# Patient Record
Sex: MALE | Race: WHITE | Employment: FULL TIME | URBAN - METROPOLITAN AREA
[De-identification: names, ages, dates, MRNs, and addresses within clinical notes are randomized per-mention and may not be internally consistent; named-entity substitution may affect disease eponyms.]

---

## 2017-01-02 ENCOUNTER — OFFICE VISIT (OUTPATIENT)
Dept: FAMILY MEDICINE | Facility: CLINIC | Age: 32
End: 2017-01-02
Payer: COMMERCIAL

## 2017-01-02 VITALS
HEART RATE: 65 BPM | HEIGHT: 74 IN | WEIGHT: 257.9 LBS | BODY MASS INDEX: 33.1 KG/M2 | SYSTOLIC BLOOD PRESSURE: 128 MMHG | TEMPERATURE: 97 F | DIASTOLIC BLOOD PRESSURE: 79 MMHG | OXYGEN SATURATION: 96 %

## 2017-01-02 DIAGNOSIS — G47.00 INSOMNIA, UNSPECIFIED TYPE: ICD-10-CM

## 2017-01-02 DIAGNOSIS — B08.1 MOLLUSCUM CONTAGIOSUM: ICD-10-CM

## 2017-01-02 DIAGNOSIS — Z00.01 ENCOUNTER FOR ROUTINE ADULT HEALTH EXAMINATION WITH ABNORMAL FINDINGS: Primary | ICD-10-CM

## 2017-01-02 LAB
BASOPHILS # BLD AUTO: 0 10E9/L (ref 0–0.2)
BASOPHILS NFR BLD AUTO: 0.2 %
DIFFERENTIAL METHOD BLD: NORMAL
EOSINOPHIL # BLD AUTO: 0.1 10E9/L (ref 0–0.7)
EOSINOPHIL NFR BLD AUTO: 1.4 %
ERYTHROCYTE [DISTWIDTH] IN BLOOD BY AUTOMATED COUNT: 12.4 % (ref 10–15)
HCT VFR BLD AUTO: 43 % (ref 40–53)
HGB BLD-MCNC: 14.8 G/DL (ref 13.3–17.7)
LYMPHOCYTES # BLD AUTO: 2.4 10E9/L (ref 0.8–5.3)
LYMPHOCYTES NFR BLD AUTO: 29.6 %
MCH RBC QN AUTO: 30.9 PG (ref 26.5–33)
MCHC RBC AUTO-ENTMCNC: 34.4 G/DL (ref 31.5–36.5)
MCV RBC AUTO: 90 FL (ref 78–100)
MONOCYTES # BLD AUTO: 0.7 10E9/L (ref 0–1.3)
MONOCYTES NFR BLD AUTO: 8.2 %
NEUTROPHILS # BLD AUTO: 4.9 10E9/L (ref 1.6–8.3)
NEUTROPHILS NFR BLD AUTO: 60.6 %
PLATELET # BLD AUTO: 268 10E9/L (ref 150–450)
RBC # BLD AUTO: 4.79 10E12/L (ref 4.4–5.9)
WBC # BLD AUTO: 8 10E9/L (ref 4–11)

## 2017-01-02 PROCEDURE — 80050 GENERAL HEALTH PANEL: CPT | Performed by: FAMILY MEDICINE

## 2017-01-02 PROCEDURE — 99385 PREV VISIT NEW AGE 18-39: CPT | Performed by: FAMILY MEDICINE

## 2017-01-02 PROCEDURE — 93000 ELECTROCARDIOGRAM COMPLETE: CPT | Performed by: FAMILY MEDICINE

## 2017-01-02 PROCEDURE — 80061 LIPID PANEL: CPT | Performed by: FAMILY MEDICINE

## 2017-01-02 PROCEDURE — 36415 COLL VENOUS BLD VENIPUNCTURE: CPT | Performed by: FAMILY MEDICINE

## 2017-01-02 NOTE — MR AVS SNAPSHOT
After Visit Summary   1/2/2017    Esa Rivera    MRN: [id number]           Patient Information     Date Of Birth          1985        Visit Information        Provider Department      1/2/2017 4:00 PM Aristides Jenkins MD [facility] [place]        Today's Diagnoses     Encounter for routine adult health examination with abnormal findings    -  1     Insomnia, unspecified type         Molluscum contagiosum           Care Instructions    -I will let you know when I get the results back from your labs.  -Please ask [place] to route any information to me so that I may know what their thinking is.  -If you are interested, you may come back for a future visit for treatment of your molluscum.    Preventive Health Recommendations  Male Ages 26 - 39    Yearly exam:             See your health care provider every year in order to  o   Review health changes.   o   Discuss preventive care.    o   Review your medicines if your doctor has prescribed any.    You should be tested each year for STDs (sexually transmitted diseases), if you re at risk.     After age 35, talk to your provider about cholesterol testing. If you are at risk for heart disease, have your cholesterol tested at least every 5 years.     If you are at risk for diabetes, you should have a diabetes test (fasting glucose).  Shots: Get a flu shot each year. Get a tetanus shot every 10 years.     Nutrition:    Eat at least 5 servings of fruits and vegetables daily.     Eat whole-grain bread, whole-wheat pasta and brown rice instead of white grains and rice.     Talk to your provider about Calcium and Vitamin D.     Lifestyle    Exercise for at least 150 minutes a week (30 minutes a day, 5 days a week). This will help you control your weight and prevent disease.     Limit alcohol to one drink per day.     No smoking.     Wear sunscreen to prevent skin cancer.     See your dentist every six months for an exam and cleaning.              "Follow-ups after your visit        Who to contact     If you have questions or need follow up information about today's clinic visit or your schedule please contact [facility] directly at [phone].  Normal or non-critical lab and imaging results will be communicated to you by MyChart, letter or phone within 4 business days after the clinic has received the results. If you do not hear from us within 7 days, please contact the clinic through [employer] or phone. If you have a critical or abnormal lab result, we will notify you by phone as soon as possible.  Submit refill requests through [employer] or call your pharmacy and they will forward the refill request to us. Please allow 3 business days for your refill to be completed.          Additional Information About Your Visit        [employer] Information     [employer] gives you secure access to your electronic health record. If you see a primary care provider, you can also send messages to your care team and make appointments. If you have questions, please call your primary care clinic.  If you do not have a primary care provider, please call [phone] and they will assist you.        Your Vitals Were     Pulse Temperature Height BMI (Body Mass Index) Pulse Oximetry       65 97  F (36.1  C) (Oral) 6' 1.74\" (1.873 m) 33.35 kg/m2 96%        Blood Pressure from Last 3 Encounters:   01/02/17 128/79   12/15/16 152/111   07/26/11 110/70    Weight from Last 3 Encounters:   01/02/17 257 lb 14.4 oz (116.983 kg)   12/15/16 249 lb 4.8 oz (113.082 kg)   07/26/11 223 lb (101.152 kg)              We Performed the Following     CBC with platelets differential     Comprehensive metabolic panel     EKG 12-lead complete w/read - Clinics     Lipid panel reflex to direct LDL     TSH with free T4 reflex        Primary Care Provider    Physician No Ref-Primary       No address on file        Thank you!     Thank you for choosing [facility]  for your care. Our " goal is always to provide you with excellent care. Hearing back from our patients is one way we can continue to improve our services. Please take a few minutes to complete the written survey that you may receive in the mail after your visit with us. Thank you!             Your Updated Medication List - Protect others around you: Learn how to safely use, store and throw away your medicines at www.disposemymeds.org.          This list is accurate as of: 1/2/17  4:54 PM.  Always use your most recent med list.                   Brand Name Dispense Instructions for use    OLANZAPINE PO      Take 5 mg by mouth 2 times daily       QUEtiapine 100 MG tablet    SEROquel    30 tablet    Take 1-2 tablets (100-200 mg) by mouth At Bedtime To help with sleep

## 2017-01-02 NOTE — PROGRESS NOTES
SUBJECTIVE:     CC: Esa Rivera is an 31 year old male who presents for preventative health visit.     Patient says that he was working on a project at work and it was making him increasingly stressed and obsessive, and eventually led to him needing to take medical action. He has since seen a therapist and has been taking ativan as needed, which he says has been helpful. Patient has a history of adderall use and says that the episode could be due to a manic episode due to lack of sleep, adderall, or something else. He has been having increased paranoia, and he has been having problems with the current political tensions due to the election, and he notes that he has not been doing as well at his job as usual. Patient notes that he has also had an elevated blood pressure, which might be due to his increased stress levels.    Patient would like a regular physical today with some labs and an EKG which have been requested by his mother, who is an MD.    Patient has a wart on his right flank and on his genitals which he would like looked at.    Healthy Habits:    Do you get at least three servings of calcium containing foods daily (dairy, green leafy vegetables, etc.)? yes    Amount of exercise or daily activities, outside of work: 3 day(s) per week    Problems taking medications regularly No    Medication side effects: Yes decrease energy     Have you had an eye exam in the past two years? no    Do you see a dentist twice per year? yes    Do you have sleep apnea, excessive snoring or daytime drowsiness?no    Today's PHQ-2 Score: No flowsheet data found.    Abuse: Current or Past(Physical, Sexual or Emotional)- No  Do you feel safe in your environment - Yes    Social History   Substance Use Topics     Smoking status: Never Smoker      Smokeless tobacco: Never Used     Alcohol Use: No      Comment: social     The patient does not drink >3 drinks per day nor >7 drinks per week.    Last PSA: No results found for:  "PSA    No results for input(s): CHOL, HDL, LDL, TRIG, CHOLHDLRATIO, NHDL in the last 22071 hours.    Reviewed orders with patient. Reviewed health maintenance and updated orders accordingly - Yes    All Histories reviewed and updated in Epic.    ROS:  Positive for warts on right flank and genitals.    Denies headache, insomnia, chest pain, shortness of breath, cough, heartburn, bowel issues, bladder issues, neck pain, back pain, hip pain, knee pain, ankle pain, or foot pain. Remainder of ROS is negative unless otherwise noted above or in HPI.    This document serves as a record of the services and decisions personally performed and made by Aristides Jenkins MD. It was created on his behalf by Luca Apodaca, a trained medical scribe. The creation of this document is based the provider's statements to the medical scribe.  Luca Apodaca 4:30 PM January 2, 2017    Patient Active Problem List   Diagnosis     Attention deficit disorder     Recurrent dislocation of shoulder joint     Other acne     Past Surgical History   Procedure Laterality Date     C explore shoulder joint  2003     Arthroplasty, Shoulder     Test not found  2004     Fx cervical, fusion        Social History   Substance Use Topics     Smoking status: Never Smoker      Smokeless tobacco: Never Used     Alcohol Use: No      Comment: social     Family History   Problem Relation Age of Onset     Cardiovascular Maternal Grandfather      DIABETES No family hx of          Current Outpatient Prescriptions   Medication Sig Dispense Refill     OLANZAPINE PO Take 5 mg by mouth 2 times daily       QUEtiapine (SEROQUEL) 100 MG tablet Take 1-2 tablets (100-200 mg) by mouth At Bedtime To help with sleep 30 tablet 0     Allergies   Allergen Reactions     No Known Allergies      OBJECTIVE:     /79 mmHg  Pulse 65  Temp(Src) 97  F (36.1  C) (Oral)  Ht 6' 1.74\" (1.873 m)  Wt 257 lb 14.4 oz (116.983 kg)  BMI 33.35 kg/m2  SpO2 96%  EXAM:  GENERAL: " healthy, alert and no distress  EYES: Eyes grossly normal to inspection, PERRL and conjunctivae and sclerae normal. EOMI.  HENT: ear canals and TM's normal, nose and mouth without ulcers or lesions  NECK: no adenopathy, no asymmetry, masses, or scars and thyroid normal to palpation. TMJ normal. No bruits.  RESP: lungs clear to auscultation - no rales, rhonchi or wheezes  CV: regular rate and rhythm, normal S1 S2, no S3 or S4, no murmur, click or rub, no peripheral edema and peripheral pulses strong  ABDOMEN: soft, nontender, no hepatosplenomegaly, no masses and bowel sounds normal   (male): normal scrotal contents, 3 mm lesion on right upper inner thigh with umbilicated center, several slightly red areas in suprapubic area without discreet identifiable papule, no hernia  MS: no gross musculoskeletal defects noted, no edema. Calves nontender.  SKIN: Well healed 4 inch diagonal scar over right SI joint, 1 cm scar on midline well healed  NEURO: Normal strength and tone, mentation intact and speech normal. Knee reflexes normal. No tremors.  PSYCH: mentation appears normal, affect normal/bright  LYMPH: no cervical, supraclavicular, axillary, or inguinal adenopathy    ASSESSMENT/PLAN:     (Z00.01) Encounter for routine adult health examination with abnormal findings  (primary encounter diagnosis)  Comment: Routine physical, labs and EKG completed today. EKG was normal.  Plan: Comprehensive metabolic panel, Lipid panel         reflex to direct LDL, TSH with free T4 reflex,         EKG 12-lead complete w/read - Clinics, CBC with        platelets differential        Follow up with results from lab.    (G47.00) Insomnia, unspecified type  Comment: Somewhat controlled on quetiapine.  Plan: Continue on current medication. Follow up as needed.    (B08.1) Molluscum contagiosum  Comment/Plan: As above in physical exam. Follow up at future date for treatment.    Patient Instructions   -I will let you know when I get the results  "back from your labs.  -Please ask [place] to route any information to me so that I may know what their thinking is.  -If you are interested, you may come back for a future visit for treatment of your molluscum.    Preventive Health Recommendations  Male Ages 26 - 39    Yearly exam:             See your health care provider every year in order to  o   Review health changes.   o   Discuss preventive care.    o   Review your medicines if your doctor has prescribed any.    You should be tested each year for STDs (sexually transmitted diseases), if you re at risk.     After age 35, talk to your provider about cholesterol testing. If you are at risk for heart disease, have your cholesterol tested at least every 5 years.     If you are at risk for diabetes, you should have a diabetes test (fasting glucose).  Shots: Get a flu shot each year. Get a tetanus shot every 10 years.     Nutrition:    Eat at least 5 servings of fruits and vegetables daily.     Eat whole-grain bread, whole-wheat pasta and brown rice instead of white grains and rice.     Talk to your provider about Calcium and Vitamin D.     Lifestyle    Exercise for at least 150 minutes a week (30 minutes a day, 5 days a week). This will help you control your weight and prevent disease.     Limit alcohol to one drink per day.     No smoking.     Wear sunscreen to prevent skin cancer.     See your dentist every six months for an exam and cleaning.           COUNSELING:  Reviewed preventive health counseling, as reflected in patient instructions     reports that he has never smoked. He has never used smokeless tobacco.  Estimated body mass index is 33.35 kg/(m^2) as calculated from the following:    Height as of this encounter: 6' 1.74\" (1.873 m).    Weight as of this encounter: 257 lb 14.4 oz (116.983 kg).   Weight management plan: Encouraged regular exercise and eating healthy diet    Counseling Resources:  ATP IV Guidelines  Pooled Cohorts Equation Calculator  FRAX " Risk Assessment  ICSI Preventive Guidelines  Dietary Guidelines for Americans, 2010  USDA's MyPlate  ASA Prophylaxis  Lung CA Screening    The information in this document, created by the medical scribe for me, accurately reflects the services I personally performed and the decisions made by me. I have reviewed and approved this document for accuracy prior to leaving the patient care area.   Aristides Jenkins MD 4:31 PM January 2, 2017  [facility] [place]

## 2017-01-02 NOTE — PATIENT INSTRUCTIONS
-I will let you know when I get the results back from your labs.  -Please ask [place] to route any information to me so that I may know what their thinking is.  -If you are interested, you may come back for a future visit for treatment of your molluscum.    Preventive Health Recommendations  Male Ages 26 - 39    Yearly exam:             See your health care provider every year in order to  o   Review health changes.   o   Discuss preventive care.    o   Review your medicines if your doctor has prescribed any.    You should be tested each year for STDs (sexually transmitted diseases), if you re at risk.     After age 35, talk to your provider about cholesterol testing. If you are at risk for heart disease, have your cholesterol tested at least every 5 years.     If you are at risk for diabetes, you should have a diabetes test (fasting glucose).  Shots: Get a flu shot each year. Get a tetanus shot every 10 years.     Nutrition:    Eat at least 5 servings of fruits and vegetables daily.     Eat whole-grain bread, whole-wheat pasta and brown rice instead of white grains and rice.     Talk to your provider about Calcium and Vitamin D.     Lifestyle    Exercise for at least 150 minutes a week (30 minutes a day, 5 days a week). This will help you control your weight and prevent disease.     Limit alcohol to one drink per day.     No smoking.     Wear sunscreen to prevent skin cancer.     See your dentist every six months for an exam and cleaning.

## 2017-01-03 LAB
ALBUMIN SERPL-MCNC: 4.5 G/DL (ref 3.4–5)
ALP SERPL-CCNC: 43 U/L (ref 40–150)
ALT SERPL W P-5'-P-CCNC: 114 U/L (ref 0–70)
ANION GAP SERPL CALCULATED.3IONS-SCNC: 9 MMOL/L (ref 3–14)
AST SERPL W P-5'-P-CCNC: 61 U/L (ref 0–45)
BILIRUB SERPL-MCNC: 1.5 MG/DL (ref 0.2–1.3)
BUN SERPL-MCNC: 12 MG/DL (ref 7–30)
CALCIUM SERPL-MCNC: 9.5 MG/DL (ref 8.5–10.1)
CHLORIDE SERPL-SCNC: 105 MMOL/L (ref 94–109)
CHOLEST SERPL-MCNC: 191 MG/DL
CO2 SERPL-SCNC: 27 MMOL/L (ref 20–32)
CREAT SERPL-MCNC: 0.95 MG/DL (ref 0.66–1.25)
GFR SERPL CREATININE-BSD FRML MDRD: ABNORMAL ML/MIN/1.7M2
GLUCOSE SERPL-MCNC: 69 MG/DL (ref 70–99)
HDLC SERPL-MCNC: 55 MG/DL
LDLC SERPL CALC-MCNC: 124 MG/DL
NONHDLC SERPL-MCNC: 136 MG/DL
POTASSIUM SERPL-SCNC: 3.8 MMOL/L (ref 3.4–5.3)
PROT SERPL-MCNC: 7.7 G/DL (ref 6.8–8.8)
SODIUM SERPL-SCNC: 141 MMOL/L (ref 133–144)
TRIGL SERPL-MCNC: 60 MG/DL
TSH SERPL DL<=0.005 MIU/L-ACNC: 2.33 MU/L (ref 0.4–4)

## 2017-01-09 DIAGNOSIS — K75.9 INFLAMMATORY LIVER DISEASE, UNSPECIFIED: Primary | ICD-10-CM

## 2017-01-10 ENCOUNTER — TRANSFERRED RECORDS (OUTPATIENT)
Dept: HEALTH INFORMATION MANAGEMENT | Facility: CLINIC | Age: 32
End: 2017-01-10

## 2017-01-10 NOTE — PROGRESS NOTES
Quick Note:    Chris See: Your recent results indicate liver inflammation likely due to excess alcohol. I recommend abstaining/ or reducing alcohol for 6 weeks and rechecking. Contact if questions.     Aristides  ______

## 2017-01-26 ENCOUNTER — HOSPITAL ENCOUNTER (OUTPATIENT)
Dept: CARDIOLOGY | Facility: CLINIC | Age: 32
Discharge: HOME | End: 2017-01-26
Attending: FAMILY MEDICINE | Admitting: FAMILY MEDICINE
Payer: COMMERCIAL

## 2017-01-26 DIAGNOSIS — Z82.49 FAMILY HISTORY OF ISCHEMIC HEART DISEASE: ICD-10-CM

## 2017-01-26 PROCEDURE — 93325 DOPPLER ECHO COLOR FLOW MAPG: CPT | Mod: 26 | Performed by: INTERNAL MEDICINE

## 2017-01-26 PROCEDURE — 93018 CV STRESS TEST I&R ONLY: CPT | Performed by: INTERNAL MEDICINE

## 2017-01-26 PROCEDURE — 93350 STRESS TTE ONLY: CPT | Mod: 26 | Performed by: INTERNAL MEDICINE

## 2017-01-26 PROCEDURE — 93321 DOPPLER ECHO F-UP/LMTD STD: CPT | Mod: 26 | Performed by: INTERNAL MEDICINE

## 2017-01-26 PROCEDURE — 93350 STRESS TTE ONLY: CPT | Mod: TC

## 2017-01-26 PROCEDURE — 93016 CV STRESS TEST SUPVJ ONLY: CPT | Performed by: INTERNAL MEDICINE

## 2017-01-28 NOTE — PROGRESS NOTES
Quick Note:    Chris See, your recent stress echo is normal. Please contact if any questions.   Aristides  ______

## 2017-05-01 ENCOUNTER — OFFICE VISIT (OUTPATIENT)
Dept: FAMILY MEDICINE | Facility: CLINIC | Age: 32
End: 2017-05-01
Payer: COMMERCIAL

## 2017-05-01 VITALS
SYSTOLIC BLOOD PRESSURE: 126 MMHG | TEMPERATURE: 96.8 F | OXYGEN SATURATION: 100 % | WEIGHT: 248 LBS | BODY MASS INDEX: 32.07 KG/M2 | HEART RATE: 55 BPM | DIASTOLIC BLOOD PRESSURE: 70 MMHG

## 2017-05-01 DIAGNOSIS — B08.1 MOLLUSCUM CONTAGIOSUM: Primary | ICD-10-CM

## 2017-05-01 DIAGNOSIS — F31.74 BIPOLAR 1 DISORDER, MANIC, FULL REMISSION (H): ICD-10-CM

## 2017-05-01 PROCEDURE — 99213 OFFICE O/P EST LOW 20 MIN: CPT | Mod: 25 | Performed by: FAMILY MEDICINE

## 2017-05-01 PROCEDURE — 17110 DESTRUCTION B9 LES UP TO 14: CPT | Performed by: FAMILY MEDICINE

## 2017-05-01 NOTE — PATIENT INSTRUCTIONS
-Please let me know if you have any problems with the wound healing or if you would like to have another round of liquid nitrogen treatment.                Outpatient Cryosurgery  What is cryosurgery?   Cryosurgery is a procedure for destroying abnormal body tissues (sometimes referred to as lesions) by exposure to very cold temperatures.   When is it used?   Cryosurgery is used to treat skin lesions such as freckles (for cosmetic reasons), hemorrhoids, warts, and some skin cancers.   It is also used to treat skin changes caused by the genital wart virus, cervicitis, and precancerous changes on the surface of a woman's cervix. These precancerous abnormalities are usually found with a Pap test. The lesions are also called ADITYA, or cervical intraepithelial neoplasia.   Cryosurgery is not done on the cervix if you are having your menstrual period or if you are pregnant. It also may not be used to treat large abnormal areas.   How do I prepare for cryosurgery?   Most likely you will not have to do anything to prepare for the procedure. It is a simple procedure, and it is done in a short time in your healthcare provider's office.   Find someone to give you a ride home after the procedure.   What happens during the procedure?   Your healthcare provider will use a probelike tool to treat the affected areas. A very cold substance, such as liquid nitrogen, pumped through the probe makes the tip of the probe very cold.   Your provider will touch the tip of the probe to your skin or cervix. Depending on the area being treated, you may feel a burning or cramping sensation while the area freezes and then thaws. Although the procedure may cause some discomfort, an anesthetic is rarely needed.   How long your provider keeps the probe touching the skin or cervix depends on the size and type of the lesion and the type of freezing substance used. For some abnormal tissue, such as genital warts, the procedure works best if the tissue  is frozen quickly, allowed to thaw for a few minutes, and then frozen again.   What happens after the procedure?   For problems such as warts, a small blister will form. The blister will later become a scab or a crust.   If you have cervical cryosurgery, you will be asked to stay in your healthcare provider's office for at least 20 to 30 minutes after the procedure. Although it is rare, you may become dizzy or faint more than 30 minutes after the procedure, so you should have someone give you a ride home.   You may have mild abdominal cramping during and after cervical cryosurgery. You may also have a watery or slightly bloody discharge from the vagina after the procedure. The discharge may last 4 weeks. You should not use tampons or douche for 4 weeks after the surgery, while you are healing. You should also avoid sexual intercourse for 4 weeks.   Some abnormal tissues may need to be treated more than once. Your healthcare provider will tell you how often you need to be checked for recurrence or to have another treatment. You will need a follow-up visit to check healing and to see if any abnormal tissue remains.   If you have cervical cryosurgery because you had an abnormal Pap test, your healthcare provider will tell when you should have your next Pap test.   Follow your healthcare provider's instructions for checking back for problems, questions, and your next visit.   What are the benefits of this procedure?   Cryosurgery is very effective and does not cost as much as other treatments. It can be done in your healthcare provider's office, and anesthesia is not necessary.   What are the risks associated with procedure?   There are usually no complications after this procedure. However, if you are treated for a cervical lesion, during or after cryosurgery you may experience:   dizziness   fainting   hot flashes   cramping in your lower abdomen   bleeding   If you are being treated for a skin lesion:   The treated  area may be discolored.   Hair and sweat glands in the treated area may be damaged. As a result you may lose hair in the treated area and not be able to sweat in that area. You may get a cyst in the area of the sweat gland.   The area may have some scarring.   How can I help take care of myself?   Care for the wounded area according to your healthcare provider's instructions.   Call your provider if:   The treated area is bleeding or not healing.   The lesions come back.   The treated area has signs of infection (redness, tenderness, swelling, or discharge).   After cervical cryosurgery you have abdominal cramps for more than 24 hours.   After cervical cryosurgery you have a foul-smelling vaginal discharge for longer than your provider told you to expect, or the discharge becomes yellow or gray in color.   You have vaginal bleeding that lasts longer than 1 hour.     Published by [employer].  This content is reviewed periodically and is subject to change as new health information becomes available. The information is intended to inform and educate and is not a replacement for medical evaluation, advice, diagnosis or treatment by a healthcare professional.   Developed by [employer].   ? 2010 [employer] and/or its affiliates. All Rights Reserved.   Copyright   [employer] 2011

## 2017-05-01 NOTE — PROGRESS NOTES
SUBJECTIVE:                                                    Esa Rivera is a 32 year old male who presents to clinic today for the following health issues:    WART(S)     Onset: last summer     Description:   Location: right side of abdominal   Number of warts: 1  Painful: no     Accompanying Signs & Symptoms:  Signs of infection: no    History:   History of trauma: no   Prior warts: YES- when he was a kid          Therapies Tried and outcome: none    Patient was diagnosed at his last appointment on 1/2/17 with molluscum contagiosum, and he is here to have the wart frozen off. No pain, itching or tenderness. The wart has been there since summer 2016. History of warts when he was a child.    Patient has recently developed a sore spot on his right thigh he would like checked. He first noticed in 1 week ago, and says that it causes some discomfort. He is wondering if the sore can also be treated with cryotherapy.    Problem list and histories reviewed & adjusted, as indicated.  Additional history: as documented    Patient Active Problem List   Diagnosis     Attention deficit disorder     Recurrent dislocation of shoulder joint     Other acne     Molluscum contagiosum     Inflammatory liver disease, unspecified     Past Surgical History:   Procedure Laterality Date     C EXPLORE SHOULDER JOINT  2003    Arthroplasty, Shoulder     TEST NOT FOUND  2004    Fx cervical, fusion        Social History   Substance Use Topics     Smoking status: Never Smoker     Smokeless tobacco: Never Used     Alcohol use No      Comment: social     Family History   Problem Relation Age of Onset     Cardiovascular Maternal Grandfather      DIABETES No family hx of          Current Outpatient Prescriptions   Medication Sig Dispense Refill     OLANZAPINE PO Take 5 mg by mouth 2 times daily       QUEtiapine (SEROQUEL) 100 MG tablet Take 1-2 tablets (100-200 mg) by mouth At Bedtime To help with sleep 30 tablet 0     Allergies   Allergen  Reactions     No Known Allergies        Reviewed and updated as needed this visit by clinical staff  Tobacco  Allergies  Meds  Med Hx  Surg Hx  Fam Hx  Soc Hx      Reviewed and updated as needed this visit by Provider         ROS:  Positive for wart on abdomen and sore on right thigh.    Denies headache, insomnia, chest pain, shortness of breath, cough, heartburn, bowel issues, bladder issues, neck pain, back pain, hip pain, knee pain, ankle pain, or foot pain. Remainder of ROS is negative unless otherwise noted above or in HPI.    This document serves as a record of the services and decisions personally performed and made by Aristides Jenkins MD. It was created on his behalf by Luca Apodaca, a trained medical scribe. The creation of this document is based the provider's statements to the medical scribe.  Luca Apodaca 5:57 PM May 1, 2017    OBJECTIVE:                                                    /70  Pulse 55  Temp 96.8  F (36  C) (Oral)  Wt 112.5 kg (248 lb)  SpO2 100%  BMI 32.07 kg/m2  Body mass index is 32.07 kg/(m^2).  GENERAL: healthy, alert and no distress  SKIN: 4x6 mm smooth topped umbilicated lump near anterior superior iliac crest and 4x3 mm reddish bump with surrounding redness and scaling half inch around with tiny swelling scab in center  NEURO: Normal strength and tone, mentation intact and speech normal  PSYCH: mentation appears normal, affect normal/bright    Diagnostic Test Results:  No results found for this or any previous visit (from the past 24 hour(s)).     ASSESSMENT/PLAN:                                                      (B08.1) Molluscum contagiosum  (primary encounter diagnosis)  Comment: Cryotherapy completed today.  Plan: DESTRUCT BENIGN LESION, UP TO 14        Follow up as needed.  Encounter Diagnoses   Name Primary?     Molluscum contagiosum Yes     Bipolar 1 disorder, manic, full remission (H)         Patient Instructions   -Please let me know if  you have any problems with the wound healing or if you would like to have another round of liquid nitrogen treatment.     40 minutes were spent with the patient with more than 50% of time spent in counseling and coordination of care, regarding above assessment and plan. Discouraged stopping medications, recommended [facility]. Followup with psychiatrist.  The information in this document, created by the medical scribe for me, accurately reflects the services I personally performed and the decisions made by me. I have reviewed and approved this document for accuracy prior to leaving the patient care area.   Aristides Jenkins MD 5:57 PM May 1, 2017  [facility] – [place], [place]

## 2017-05-01 NOTE — NURSING NOTE
"Chief Complaint   Patient presents with     Wart       Initial /70  Pulse 55  Temp 96.8  F (36  C) (Oral)  Wt 248 lb (112.5 kg)  SpO2 100%  BMI 32.07 kg/m2 Estimated body mass index is 32.07 kg/(m^2) as calculated from the following:    Height as of 1/2/17: 6' 1.74\" (1.873 m).    Weight as of this encounter: 248 lb (112.5 kg).  Medication Reconciliation: complete    "

## 2017-05-01 NOTE — MR AVS SNAPSHOT
After Visit Summary   5/1/2017    Esa Rivera    MRN: [id number]           Patient Information     Date Of Birth          1985        Visit Information        Provider Department      5/1/2017 5:30 PM Aristides Jenkins MD [facility] [place]        Today's Diagnoses     Molluscum contagiosum    -  1      Care Instructions    -Please let me know if you have any problems with the wound healing or if you would like to have another round of liquid nitrogen treatment.                Outpatient Cryosurgery  What is cryosurgery?   Cryosurgery is a procedure for destroying abnormal body tissues (sometimes referred to as lesions) by exposure to very cold temperatures.   When is it used?   Cryosurgery is used to treat skin lesions such as freckles (for cosmetic reasons), hemorrhoids, warts, and some skin cancers.   It is also used to treat skin changes caused by the genital wart virus, cervicitis, and precancerous changes on the surface of a woman's cervix. These precancerous abnormalities are usually found with a Pap test. The lesions are also called ADITYA, or cervical intraepithelial neoplasia.   Cryosurgery is not done on the cervix if you are having your menstrual period or if you are pregnant. It also may not be used to treat large abnormal areas.   How do I prepare for cryosurgery?   Most likely you will not have to do anything to prepare for the procedure. It is a simple procedure, and it is done in a short time in your healthcare provider's office.   Find someone to give you a ride home after the procedure.   What happens during the procedure?   Your healthcare provider will use a probelike tool to treat the affected areas. A very cold substance, such as liquid nitrogen, pumped through the probe makes the tip of the probe very cold.   Your provider will touch the tip of the probe to your skin or cervix. Depending on the area being treated, you may feel a burning or cramping sensation  while the area freezes and then thaws. Although the procedure may cause some discomfort, an anesthetic is rarely needed.   How long your provider keeps the probe touching the skin or cervix depends on the size and type of the lesion and the type of freezing substance used. For some abnormal tissue, such as genital warts, the procedure works best if the tissue is frozen quickly, allowed to thaw for a few minutes, and then frozen again.   What happens after the procedure?   For problems such as warts, a small blister will form. The blister will later become a scab or a crust.   If you have cervical cryosurgery, you will be asked to stay in your healthcare provider's office for at least 20 to 30 minutes after the procedure. Although it is rare, you may become dizzy or faint more than 30 minutes after the procedure, so you should have someone give you a ride home.   You may have mild abdominal cramping during and after cervical cryosurgery. You may also have a watery or slightly bloody discharge from the vagina after the procedure. The discharge may last 4 weeks. You should not use tampons or douche for 4 weeks after the surgery, while you are healing. You should also avoid sexual intercourse for 4 weeks.   Some abnormal tissues may need to be treated more than once. Your healthcare provider will tell you how often you need to be checked for recurrence or to have another treatment. You will need a follow-up visit to check healing and to see if any abnormal tissue remains.   If you have cervical cryosurgery because you had an abnormal Pap test, your healthcare provider will tell when you should have your next Pap test.   Follow your healthcare provider's instructions for checking back for problems, questions, and your next visit.   What are the benefits of this procedure?   Cryosurgery is very effective and does not cost as much as other treatments. It can be done in your healthcare provider's office, and anesthesia is  not necessary.   What are the risks associated with procedure?   There are usually no complications after this procedure. However, if you are treated for a cervical lesion, during or after cryosurgery you may experience:   dizziness   fainting   hot flashes   cramping in your lower abdomen   bleeding   If you are being treated for a skin lesion:   The treated area may be discolored.   Hair and sweat glands in the treated area may be damaged. As a result you may lose hair in the treated area and not be able to sweat in that area. You may get a cyst in the area of the sweat gland.   The area may have some scarring.   How can I help take care of myself?   Care for the wounded area according to your healthcare provider's instructions.   Call your provider if:   The treated area is bleeding or not healing.   The lesions come back.   The treated area has signs of infection (redness, tenderness, swelling, or discharge).   After cervical cryosurgery you have abdominal cramps for more than 24 hours.   After cervical cryosurgery you have a foul-smelling vaginal discharge for longer than your provider told you to expect, or the discharge becomes yellow or gray in color.   You have vaginal bleeding that lasts longer than 1 hour.     Published by [employer].  This content is reviewed periodically and is subject to change as new health information becomes available. The information is intended to inform and educate and is not a replacement for medical evaluation, advice, diagnosis or treatment by a healthcare professional.   Developed by [employer].   ? 2010 [employer] and/or its affiliates. All Rights Reserved.   Copyright   [employer] 2011            Follow-ups after your visit        Who to contact     If you have questions or need follow up information about today's clinic visit or your schedule please [place] [facility] [place] [phone].  Normal or non-critical lab and imaging  results will be communicated to you by MyChart, letter or phone within 4 business days after the clinic has received the results. If you do not hear from us within 7 days, please contact the clinic through [employer] or phone. If you have a critical or abnormal lab result, we will notify you by phone as soon as possible.  Submit refill requests through [employer] or call your pharmacy and they will forward the refill request to us. Please allow 3 business days for your refill to be completed.          Additional Information About Your Visit        [employer] Information     [employer] gives you secure access to your electronic health record. If you see a primary care provider, you can also send messages to your care team and make appointments. If you have questions, please call your primary care clinic.  If you do not have a primary care provider, please call [phone] and they will assist you.        Care EveryWhere ID     This is your Care EveryWhere ID. This could be used by other organizations to access your [place] medical records  [id number]        Your Vitals Were     Pulse Temperature Pulse Oximetry BMI (Body Mass Index)          55 96.8  F (36  C) (Oral) 100% 32.07 kg/m2         Blood Pressure from Last 3 Encounters:   05/01/17 126/70   01/02/17 128/79   12/15/16 (!) 152/111    Weight from Last 3 Encounters:   05/01/17 248 lb (112.5 kg)   01/02/17 257 lb 14.4 oz (117 kg)   12/15/16 249 lb 4.8 oz (113.1 kg)              We Performed the Following     DESTRUCT BENIGN LESION, UP TO 14        Primary Care Provider Office Phone # Fax #    Aristides Jenkins [phone]       [facility] [address]  [facility] [address]        Thank you!     Thank you [place] [facility] [place]. Our goal is always to provide you with excellent care. Hearing back from our patients is one way we can continue to improve our services. Please take a few minutes to  complete the written survey that you may receive in the mail after your visit with us. Thank you!             Your Updated Medication List - Protect others around you: Learn how to safely use, store and throw away your medicines at www.disposemymeds.org.          This list is accurate as of: 5/1/17  6:14 PM.  Always use your most recent med list.                   Brand Name Dispense Instructions for use    OLANZAPINE PO      Take 5 mg by mouth 2 times daily       QUEtiapine 100 MG tablet    SEROquel    30 tablet    Take 1-2 tablets (100-200 mg) by mouth At Bedtime To help with sleep

## 2017-05-02 PROBLEM — F31.74 BIPOLAR 1 DISORDER, MANIC, FULL REMISSION (H): Status: ACTIVE | Noted: 2017-05-02

## 2017-05-02 RX ORDER — LAMOTRIGINE 200 MG/1
200 TABLET, EXTENDED RELEASE ORAL EVERY EVENING
Qty: 30 TABLET | Refills: 11 | Status: SHIPPED | OUTPATIENT
Start: 2017-05-02 | End: 2017-11-08

## 2017-07-24 ENCOUNTER — OFFICE VISIT (OUTPATIENT)
Dept: SLEEP MEDICINE | Facility: CLINIC | Age: 32
End: 2017-07-24
Attending: FAMILY MEDICINE
Payer: COMMERCIAL

## 2017-07-24 VITALS
OXYGEN SATURATION: 100 % | WEIGHT: 249 LBS | RESPIRATION RATE: 14 BRPM | HEIGHT: 74 IN | BODY MASS INDEX: 31.95 KG/M2 | DIASTOLIC BLOOD PRESSURE: 80 MMHG | SYSTOLIC BLOOD PRESSURE: 134 MMHG | HEART RATE: 53 BPM

## 2017-07-24 DIAGNOSIS — G47.30 OBSERVED SLEEP APNEA: ICD-10-CM

## 2017-07-24 DIAGNOSIS — R06.83 SNORING: Primary | ICD-10-CM

## 2017-07-24 PROCEDURE — 99204 OFFICE O/P NEW MOD 45 MIN: CPT | Performed by: PHYSICIAN ASSISTANT

## 2017-07-24 NOTE — PATIENT INSTRUCTIONS
"MY TREATMENT INFORMATION FOR SLEEP APNEA-  Esa Rivera    DOCTOR : Bennett Ezra Goltz, PA-C  SLEEP CENTER : [facility]     MY CONTACT NUMBER: [phone]    Am I having a sleep study at a sleep center?  Make sure you have an appointment for the study before you leave!    Am I having a home sleep study?  Watch this video:  https://www.Jobfox.com/watch?v=CteI_GhyP9g&[email or web address]    Frequently asked questions:  1. What is Obstructive Sleep Apnea (MATEUSZ)? MATEUSZ is the most common type of sleep apnea. Apnea means, \"without breath.\"  Apnea is most often caused by narrowing or collapse of the upper airway as muscles relax during sleep.   Almost everyone has occasional apneas. Most people with sleep apnea have had brief interruptions at night frequently for many years.  The severity of sleep apnea is related to how frequent and severe the events are.   2. What are the consequences of MATEUSZ? Symptoms include: feeling sleepy during the day, snoring loudly, gasping or stopping of breathing, trouble sleeping, and occasionally morning headaches or heartburn at night.  Sleepiness can be serious and even increase the risk of falling asleep while driving. Other health consequences may include development of high blood pressure and other cardiovascular disease in persons who are susceptible. Untreated MATEUSZ  can contribute to heart disease, stroke and diabetes.   3. What are the treatment options? In most situations, sleep apnea is a lifelong disease that must be managed with daily therapy. Medications are not effective for sleep apnea and surgery is generally not considered until other therapies have been tried. Your treatment is your choice . Continuous Positive Airway (CPAP) works right away and is the therapy that is effective in nearly everyone. An oral device to hold your jaw forward is usually the next most reliable option. Other options include postioning devices (to keep you off your back), weight loss, " and surgery including a tongue pacing device. There is more detail about some of these options below.    Important tips for using CPAP and similar devices   Know your equipment:  CPAP is continuous positive airway pressure that prevents obstructive sleep apnea by keeping the throat from collapsing while you are sleeping. In most cases, the device is  smart  and can slowly self-adjusts if your throat collapses and keeps a record every day of how well you are treated-this information is available to you and your care team.  BPAP is bilevel positive airway pressure that keeps your throat open and also assists each breath with a pressure boost to maintain adequate breathing.  Special kinds of BPAP are used in patients who have inadequate breathing from lung or heart disease. In most cases, the device is  smart  and can slowly self-adjusts to assist breathing. Like CPAP, the device keeps a record of how well you are treated.  Your mask is your connection to the device. You get to choose what feels most comfortable and the staff will help to make sure if fits. Here: are some examples of the different masks that are available:       Key points to remember on your journey with sleep apnea:  1. Sleep study.  PAP devices often need to be adjusted during a sleep study to show that they are effective and adjusted right.  2. Good tips to remember: Try wearing just the mask during a quiet time during the day so your body adapts to wearing it. A humidifier is recommended for comfort in most cases to prevent drying of your nose and throat. Allergy medication from your provider may help you if you are having nasal congestion.  3. Getting settled-in. It takes more than one night for most of us to get used to wearing a mask. Try wearing just the mask during a quiet time during the day so your body adapts to wearing it. A humidifier is recommended for comfort in most cases. Our team will work with you carefully on the first day and  will be in contact within 4 days and again at 2 and 4 weeks for advice and remote device adjustments. Your therapy is evaluated by the device each day.   4. Use it every night. The more you are able to sleep naturally for 7-8 hours, the more likely you will have good sleep and to prevent health risks or symptoms from sleep apnea. Even if you use it 4 hours it helps. Occasionally all of us are unable to use a medical therapy, in sleep apnea, it is not dangerous to miss one night.   5. Communicate. Call our skilled team on the number provided on the first day if your visit for problems that make it difficult to wear the device. Over 2 out of 3 patients can learn to wear the device long-term with help from our team. Remember to call our team or your sleep providers if you are unable to wear the device as we may have other solutions for those who cannot adapt to mask CPAP therapy. It is recommended that you sleep your sleep provider within the first 3 months and yearly after that if you are not having problems.   Take care of your equipment. Make sure you clean your mask and tubing using directions every day and that your filter and mask are replaced as recommended or if they are not working.     BESIDES CPAP, WHAT OTHER THERAPIES ARE THERE?    Positioning Device  Positioning devices are generally used when sleep apnea is mild and only occurs on your back.This example shows a pillow that straps around the waist. It may be appropriate for those whose sleep study shows milder sleep apnea that occurs primarily when lying flat on one's back. Preliminary studies have shown benefit but effectiveness at home may need to be verified by a home sleep test. These devices are generally not covered by medical insurance.    Oral Appliance  What is oral appliance therapy?  An oral appliance device fits on your teeth at night like a retainer used after having braces. The device is made by a specialized dentist and requires several  visits over 1-2 months before a manufactured device is made to fit your teeth and is adjusted to prevent your sleep apnea. Once an oral device is working properly, snoring should be improved. A home sleep test may be recommended at that time if to determine whether the sleep apnea is adequately treated.       Some things to remember:  -Oral devices are often, but not always, covered by your medical insurance. Be sure to check with your insurance provider.   -If you are referred for oral therapy, you will be given a list of specialized dentists to consider or you may choose to visit the Web site of the American Academy of Dental Sleep Medicine  -Oral devices are less likely to work if you have severe sleep apnea or are extremely overweight.     More detailed information  An oral appliance is a small acrylic device that fits over the upper and lower teeth  (similar to a retainer or a mouth guard). This device slightly moves jaw forward, which moves the base of the tongue forward, opens the airway, improves breathing for effective treat snoring and obstructive sleep apnea in perhaps 7 out of 10 people .  The best working devices are custom-made by a dental device  after a mold is made of the teeth 1, 2, 3.  When is an oral appliance indicated?  Oral appliance therapy is recommended as a first-line treatment for patients with primary snoring, mild sleep apnea, and for patients with moderate sleep apnea who prefer appliance therapy to use of CPAP4, 5. Severity of sleep apnea is determined by sleep testing and is based on the number of respiratory events per hour of sleep.   How successful is oral appliance therapy?  The success rate of oral appliance therapy in patients with mild sleep apnea is 75-80% while in patients with moderate sleep apnea it is 50-70%. The chance of success in patients with severe sleep apnea is 40-50%. The research also shows that oral appliances have a beneficial effect on the  cardiovascular health of MATEUSZ patients at the same magnitude as CPAP therapy7.  Oral appliances should be a second-line treatment in cases of severe sleep apnea, but if not completely successful then a combination therapy utilizing CPAP plus oral appliance therapy may be effective. Oral appliances tend to be effective in a broad range of patients although studies show that the patients who have the highest success are females, younger patients, those with milder disease, and less severe obesity. 3, 6.   Finding a dentist that practices dental sleep medicine  Specific training is available through the American Academy of Dental Sleep Medicine for dentists interested in working in the field of sleep. To find a dentist who is educated in the field of sleep and the use of oral appliances, near you, visit the Web site of the American Academy of Dental Sleep Medicine.    References  1. Tere et al. Objectively measured vs self-reported compliance during oral appliance therapy for sleep-disordered breathing. Chest 2013; 144(5): 7104-6906.  2. Latesha et al. Objective measurement of compliance during oral appliance therapy for sleep-disordered breathing. Thorax 2013; 68(1): 91-96.  3. Dipika, et al. Mandibular advancement devices in 620 men and women with MATEUSZ and snoring: tolerability and predictors of treatment success. Chest 2004; 125: 4830-4420.  4. Maggy, et al. Oral appliances for snoring and MATEUSZ: a review. Sleep 2006; 29: 244-262.  5. Jenni et al. Oral appliance treatment for MATEUSZ: an update. J Clin Sleep Med 2014; 10(2): 215-227.  6. Lauro et al. Predictors of OSAH treatment outcome. J Dent Res 2007; 86: 3459-2883.    Weight Loss:    Weight loss is a long-term strategy that may improve sleep apnea in some patients.    Weight management is a personal decision.  If you are interested in exploring weight loss strategies, the following discussion covers the impact on weight loss on sleep apnea and  the approaches that may be successful.    Weight loss decreases severity of sleep apnea in most people with obesity. For those with mild obesity who have developed snoring with weight gain, even 15-30 pound weight loss can improve and occasionally eliminate sleep apnea.  Structured and life-long dietary and health habits are necessary to lose weight and keep healthier weight levels.     Though there may be significant health benefits from weight loss, long-term weight loss is very difficult to achieve- studies show success with dietary management in less than 10% of people. In addition, substantial weight loss may require years of dietary control and may be difficult if patients have severe obesity. In these cases, surgical management may be considered.  Finally, older individuals who have tolerated obesity without health complications may be less likely to benefit from weight loss strategies.      Your BMI is Body mass index is 32.2 kg/(m^2).  Weight management is a personal decision.  If you are interested in exploring weight loss strategies, the following discussion covers the approaches that may be successful. Body mass index (BMI) is one way to tell whether you are at a healthy weight, overweight, or obese. It measures your weight in relation to your height.  A BMI of 18.5 to 24.9 is in the healthy range. A person with a BMI of 25 to 29.9 is considered overweight, and someone with a BMI of 30 or greater is considered obese. More than two-thirds of American adults are considered overweight or obese.  Being overweight or obese increases the risk for further weight gain. Excess weight may lead to heart disease and diabetes.  Creating and following plans for healthy eating and physical activity may help you improve your health.  Weight control is part of healthy lifestyle and includes exercise, emotional health, and healthy eating habits. Careful eating habits lifelong are the mainstay of weight control. Though  there are significant health benefits from weight loss, long-term weight loss with diet alone may be very difficult to achieve- studies show long-term success with dietary management in less than 10% of people. Attaining a healthy weight may be especially difficult to achieve in those with severe obesity. In some cases, medications, devices and surgical management might be considered.  What can you do?  If you are overweight or obese and are interested in methods for weight loss, you should discuss this with your provider.     Consider reducing daily calorie intake by 500 calories.     Keep a food journal.     Avoiding skipping meals, consider cutting portions instead.    Diet combined with exercise helps maintain muscle while optimizing fat loss. Strength training is particularly important for building and maintaining muscle mass. Exercise helps reduce stress, increase energy, and improves fitness. Increasing exercise without diet control, however, may not burn enough calories to loose weight.       Start walking three days a week 10-20 minutes at a time    Work towards walking thirty minutes five days a week     Eventually, increase the speed of your walking for 1-2 minutes at time    In addition, we recommend that you review healthy lifestyles and methods for weight loss available through the National Institutes of Health patient information sites:  http://win.niddk.nih.gov/publications/index.htm    And look into health and wellness programs that may be available through your health insurance provider, employer, local community center, or mis club.    Weight management plan: Patient was referred to their PCP to discuss a diet and exercise plan.    Surgery:    Surgery for obstructive sleep apnea is considered generally only when other therapies fail to work. Surgery may be discussed with you if you are having a difficult time tolerating CPAP and or when there is an abnormal structure that requires surgical  correction.  Nose and throat surgeries often enlarge the airway to prevent collapse.  Most of these surgeries create pain for 1-2 weeks and up to half of the most common surgeries are not effective throughout life.  You should carefully discuss the benefits and drawbacks to surgery with your sleep provider and surgeon to determine if it is the best solution for you.   More information  Surgery for MATEUSZ is directed at areas that are responsible for narrowing or complete obstruction of the airway during sleep.  There are a wide range of procedures available to enlarge and/or stabilize the airway to prevent blockage of breathing in the three major areas where it can occur: the palate, tongue, and nasal regions.  Successful surgical treatment depends on the accurate identification of the factors responsible for obstructive sleep apnea in each person.  A personalized approach is required because there is no single treatment that works well for everyone.  Because of anatomic variation, consultation with an examination by a sleep surgeon is a critical first step in determining what surgical options are best for each patient.  In some cases, examination during sedation may be recommended in order to guide the selection of procedures.  Patients will be counseled about risks and benefits as well as the typical recovery course after surgery. Surgery is typically not a cure for a person s MATEUSZ.  However, surgery will often significantly improve one s MATEUSZ severity (termed  success rate ).  Even in the absence of a cure, surgery will decrease the cardiovascular risk associated with OSA7; improve overall quality of life8 (sleepiness, functionality, sleep quality, etc).  Palate Procedures:  Patients with MATEUSZ often have narrowing of their airway in the region of their tonsils and uvula.  The goals of palate procedures are to widen the airway in this region as well as to help the tissues resist collapse.  Modern palate procedure  techniques focus on tissue conservation and soft tissue rearrangement, rather than tissue removal.  Often the uvula is preserved in this procedure. Residual sleep apnea is common in patient after pharyngoplasty with an average reduction in sleep apnea events of 33%2.    Tongue Procedures:  ExamWhile patients are awake, the muscles that surround the throat are active and keep this region open for breathing. These muscles relax during sleep, allowing the tongue and other structures to collapse and block breathing.  There are several different tongue procedures available.  Selection of a tongue base procedure depends on characteristics seen on physical exam.  Generally, procedures are aimed at removing bulky tissues in this area or preventing the back of the tongue from falling back during sleep.  Success rates for tongue surgery range from 50-62%3.  Hypoglossal Nerve Stimulation:  Hypoglossal nerve stimulation has recently received approval from the [place] Food and Drug Administration for the treatment of obstructive sleep apnea.  This is based on research showing that the system was safe and effective in treating sleep apnea6.  Results showed that the median AHI score decreased 68%, from 29.3 to 9.0. This therapy uses an implant system that senses breathing patterns and delivers mild stimulation to airway muscles, which keeps the airway open during sleep.  The system consists of three fully implanted components: a small generator (similar in size to a pacemaker), a breathing sensor, and a stimulation lead.  Using a small handheld remote, a patient turns the therapy on before bed and off upon awakening.    Candidates for this device must be greater than 22 years of age, have moderate to severe MATEUSZ (AHI between 20-65), BMI less than 32, have tried CPAP/oral appliance without success, and have appropriate upper airway anatomy (determined by a sleep endoscopy performed by Dr. eDvi).  Hypoglossal Nerve Stimulation  Pathway:    The sleep surgeon s office will work with the patient through the insurance prior-authorization process (including communications and appeals).    Nasal Procedures:  Nasal obstruction can interfere with nasal breathing during the day and night.  Studies have shown that relief of nasal obstruction can improve the ability of some patients to tolerate positive airway pressure therapy for obstructive sleep apnea1.  Treatment options include medications such as nasal saline, topical corticosteroid and antihistamine sprays, and oral medications such as antihistamines or decongestants. Non-surgical treatments can include external nasal dilators for selected patients. If these are not successful by themselves, surgery can improve the nasal airway either alone or in combination with these other options.    Combination Procedures:  Combination of surgical procedures and other treatments may be recommended, particularly if patients have more than one area of narrowing or persistent positional disease.  The success rate of combination surgery ranges from 66-80%2,3.    References  1. Clau MONGE. The Role of the Nose in Snoring and Obstructive Sleep Apnoea: An Update.  Eur Arch Otorhinolaryngol. 2011; 268: 1365-73.  2.  Cullen SM; Talita JA; Nguyen JR; Pallanch JF; Dahlia MB; Usman SG; Jace JENKINS. Surgical modifications of the upper airway for obstructive sleep apnea in adults: a systematic review and meta-analysis. SLEEP 2010;33(10):0075-1199. Concetta BOWLING. Hypopharyngeal surgery in obstructive sleep apnea: an evidence-based medicine review.  Arch Otolaryngol Head Neck Surg. 2006 Feb;132(2):206-13.  3. Jamil YH1, Dinah Y, Yony IMANI. The efficacy of anatomically based multilevel surgery for obstructive sleep apnea. Otolaryngol Head Neck Surg. 2003 Oct;129(4):327-35.  4. Concetta BOWLING, Goldberg A. Hypopharyngeal Surgery in Obstructive Sleep Apnea: An Evidence-Based Medicine Review. Arch Otolaryngol Head Neck Surg. 2006  Feb;132(2):206-13.  5. Clem STOKES et al. Upper-Airway Stimulation for Obstructive Sleep Apnea.  N Engl J Med. 2014 Jan 9;370(2):139-49.  6. Eugenio Y et al. Increased Incidence of Cardiovascular Disease in Middle-aged Men with Obstructive Sleep Apnea. Am J Respir Crit Care Med; 2002 166: 159-165  Cerna EM et al. Studying Life Effects and Effectiveness of Palatopharyngoplasty (SLEEP) study: Subjective Outcomes of Isolated Uvulopalatopharyngoplasty. Otolaryngol Head Neck Surg. 2011; 144: 623-631. Your blood pressure was checked while you were in clinic today.  Please read the guidelines below about what these numbers mean and what you should do about them.  Your systolic blood pressure is the top number.  This is the pressure when the heart is pumping.  Your diastolic blood pressure is the bottom number.  This is the pressure in between beats.  If your systolic blood pressure is less than 120 and your diastolic blood pressure is less than 80, then your blood pressure is normal. There is nothing more that you need to do about it  If your systolic blood pressure is 120-139 or your diastolic blood pressure is 80-89, your blood pressure may be higher than it should be.  You should have your blood pressure re-checked within a year by a primary care provider.  7. If your systolic blood pressure is 140 or greater or your diastolic blood pressure is 90 or greater, you may have high blood pressure.  High blood pressure is treatable, but if left untreated over time it can put you at risk for heart attack, stroke, or kidney failure.  You should have your blood pressure re-checked by a primary care provider within the next four weeks.

## 2017-07-24 NOTE — MR AVS SNAPSHOT
"              After Visit Summary   7/24/2017    Esa Rivera    MRN: [id number]           Patient Information     Date Of Birth          1985        Visit Information        Provider Department      7/24/2017 9:00 AM Goltz, Bennett Ezra, PA-C [facility]        Today's Diagnoses     Snoring    -  1    Observed sleep apnea          Care Instructions    MY TREATMENT INFORMATION FOR SLEEP APNEA-  Esa Rivera    DOCTOR : Bennett Ezra Goltz, PA-C  SLEEP CENTER : [facility]     MY CONTACT NUMBER: [phone]    Am I having a sleep study at a sleep center?  Make sure you have an appointment for the study before you leave!    Am I having a home sleep study?  Watch this video:  [email or web address]/watch?v=[email or web address]    Frequently asked questions:  1. What is Obstructive Sleep Apnea (MATEUSZ)? MATEUSZ is the most common type of sleep apnea. Apnea means, \"without breath.\"  Apnea is most often caused by narrowing or collapse of the upper airway as muscles relax during sleep.   Almost everyone has occasional apneas. Most people with sleep apnea have had brief interruptions at night frequently for many years.  The severity of sleep apnea is related to how frequent and severe the events are.   2. What are the consequences of MATEUSZ? Symptoms include: feeling sleepy during the day, snoring loudly, gasping or stopping of breathing, trouble sleeping, and occasionally morning headaches or heartburn at night.  Sleepiness can be serious and even increase the risk of falling asleep while driving. Other health consequences may include development of high blood pressure and other cardiovascular disease in persons who are susceptible. Untreated MATEUSZ  can contribute to heart disease, stroke and diabetes.   3. What are the treatment options? In most situations, sleep apnea is a lifelong disease that must be managed with daily therapy. Medications are not effective for sleep apnea and " surgery is generally not considered until other therapies have been tried. Your treatment is your choice . Continuous Positive Airway (CPAP) works right away and is the therapy that is effective in nearly everyone. An oral device to hold your jaw forward is usually the next most reliable option. Other options include postioning devices (to keep you off your back), weight loss, and surgery including a tongue pacing device. There is more detail about some of these options below.    Important tips for using CPAP and similar devices   Know your equipment:  CPAP is continuous positive airway pressure that prevents obstructive sleep apnea by keeping the throat from collapsing while you are sleeping. In most cases, the device is  smart  and can slowly self-adjusts if your throat collapses and keeps a record every day of how well you are treated-this information is available to you and your care team.  BPAP is bilevel positive airway pressure that keeps your throat open and also assists each breath with a pressure boost to maintain adequate breathing.  Special kinds of BPAP are used in patients who have inadequate breathing from lung or heart disease. In most cases, the device is  smart  and can slowly self-adjusts to assist breathing. Like CPAP, the device keeps a record of how well you are treated.  Your mask is your connection to the device. You get to choose what feels most comfortable and the staff will help to make sure if fits. Here: are some examples of the different masks that are available:       Key points to remember on your journey with sleep apnea:  1. Sleep study.  PAP devices often need to be adjusted during a sleep study to show that they are effective and adjusted right.  2. Good tips to remember: Try wearing just the mask during a quiet time during the day so your body adapts to wearing it. A humidifier is recommended for comfort in most cases to prevent drying of your nose and throat. Allergy medication  from your provider may help you if you are having nasal congestion.  3. Getting settled-in. It takes more than one night for most of us to get used to wearing a mask. Try wearing just the mask during a quiet time during the day so your body adapts to wearing it. A humidifier is recommended for comfort in most cases. Our team will work with you carefully on the first day and will be in contact within 4 days and again at 2 and 4 weeks for advice and remote device adjustments. Your therapy is evaluated by the device each day.   4. Use it every night. The more you are able to sleep naturally for 7-8 hours, the more likely you will have good sleep and to prevent health risks or symptoms from sleep apnea. Even if you use it 4 hours it helps. Occasionally all of us are unable to use a medical therapy, in sleep apnea, it is not dangerous to miss one night.   5. Communicate. Call our skilled team on the number provided on the first day if your visit for problems that make it difficult to wear the device. Over 2 out of 3 patients can learn to wear the device long-term with help from our team. Remember to call our team or your sleep providers if you are unable to wear the device as we may have other solutions for those who cannot adapt to mask CPAP therapy. It is recommended that you sleep your sleep provider within the first 3 months and yearly after that if you are not having problems.   Take care of your equipment. Make sure you clean your mask and tubing using directions every day and that your filter and mask are replaced as recommended or if they are not working.     BESIDES CPAP, WHAT OTHER THERAPIES ARE THERE?    Positioning Device  Positioning devices are generally used when sleep apnea is mild and only occurs on your back.This example shows a pillow that straps around the waist. It may be appropriate for those whose sleep study shows milder sleep apnea that occurs primarily when lying flat on one's back. Preliminary  studies have shown benefit but effectiveness at home may need to be verified by a home sleep test. These devices are generally not covered by medical insurance.    Oral Appliance  What is oral appliance therapy?  An oral appliance device fits on your teeth at night like a retainer used after having braces. The device is made by a specialized dentist and requires several visits over 1-2 months before a manufactured device is made to fit your teeth and is adjusted to prevent your sleep apnea. Once an oral device is working properly, snoring should be improved. A home sleep test may be recommended at that time if to determine whether the sleep apnea is adequately treated.       Some things to remember:  -Oral devices are often, but not always, covered by your medical insurance. Be sure to check with your insurance provider.   -If you are referred for oral therapy, you will be given a list of specialized dentists to consider or you may choose to visit the Web site of the American Academy of Dental Sleep Medicine  -Oral devices are less likely to work if you have severe sleep apnea or are extremely overweight.     More detailed information  An oral appliance is a small acrylic device that fits over the upper and lower teeth  (similar to a retainer or a mouth guard). This device slightly moves jaw forward, which moves the base of the tongue forward, opens the airway, improves breathing for effective treat snoring and obstructive sleep apnea in perhaps 7 out of 10 people .  The best working devices are custom-made by a dental device  after a mold is made of the teeth 1, 2, 3.  When is an oral appliance indicated?  Oral appliance therapy is recommended as a first-line treatment for patients with primary snoring, mild sleep apnea, and for patients with moderate sleep apnea who prefer appliance therapy to use of CPAP4, 5. Severity of sleep apnea is determined by sleep testing and is based on the number of  respiratory events per hour of sleep.   How successful is oral appliance therapy?  The success rate of oral appliance therapy in patients with mild sleep apnea is 75-80% while in patients with moderate sleep apnea it is 50-70%. The chance of success in patients with severe sleep apnea is 40-50%. The research also shows that oral appliances have a beneficial effect on the cardiovascular health of MATEUSZ patients at the same magnitude as CPAP therapy7.  Oral appliances should be a second-line treatment in cases of severe sleep apnea, but if not completely successful then a combination therapy utilizing CPAP plus oral appliance therapy may be effective. Oral appliances tend to be effective in a broad range of patients although studies show that the patients who have the highest success are females, younger patients, those with milder disease, and less severe obesity. 3, 6.   Finding a dentist that practices dental sleep medicine  Specific training is available through the American Academy of Dental Sleep Medicine for dentists interested in working in the field of sleep. To find a dentist who is educated in the field of sleep and the use of oral appliances, near you, visit the Web site of the American Academy of Dental Sleep Medicine.    References  1. Tere et al. Objectively measured vs self-reported compliance during oral appliance therapy for sleep-disordered breathing. Chest 2013; 144(5): 3803-1742.  2. Latesha et al. Objective measurement of compliance during oral appliance therapy for sleep-disordered breathing. Thorax 2013; 68(1): 91-96.  3. Dipika, et al. Mandibular advancement devices in 620 men and women with MATEUSZ and snoring: tolerability and predictors of treatment success. Chest 2004; 125: 1688-9981.  4. Maggy, et al. Oral appliances for snoring and MATEUSZ: a review. Sleep 2006; 29: 244-262.  5. Jenni et al. Oral appliance treatment for MATEUSZ: an update. J Clin Sleep Med 2014; 10(2):  215-227.  6. imca Restrepo al. Predictors of OSAH treatment outcome. J Dent Res 2007; 86: 8957-5842.    Weight Loss:    Weight loss is a long-term strategy that may improve sleep apnea in some patients.    Weight management is a personal decision.  If you are interested in exploring weight loss strategies, the following discussion covers the impact on weight loss on sleep apnea and the approaches that may be successful.    Weight loss decreases severity of sleep apnea in most people with obesity. For those with mild obesity who have developed snoring with weight gain, even 15-30 pound weight loss can improve and occasionally eliminate sleep apnea.  Structured and life-long dietary and health habits are necessary to lose weight and keep healthier weight levels.     Though there may be significant health benefits from weight loss, long-term weight loss is very difficult to achieve- studies show success with dietary management in less than 10% of people. In addition, substantial weight loss may require years of dietary control and may be difficult if patients have severe obesity. In these cases, surgical management may be considered.  Finally, older individuals who have tolerated obesity without health complications may be less likely to benefit from weight loss strategies.      Your BMI is Body mass index is 32.2 kg/(m^2).  Weight management is a personal decision.  If you are interested in exploring weight loss strategies, the following discussion covers the approaches that may be successful. Body mass index (BMI) is one way to tell whether you are at a healthy weight, overweight, or obese. It measures your weight in relation to your height.  A BMI of 18.5 to 24.9 is in the healthy range. A person with a BMI of 25 to 29.9 is considered overweight, and someone with a BMI of 30 or greater is considered obese. More than two-thirds of American adults are considered overweight or obese.  Being overweight or obese increases  the risk for further weight gain. Excess weight may lead to heart disease and diabetes.  Creating and following plans for healthy eating and physical activity may help you improve your health.  Weight control is part of healthy lifestyle and includes exercise, emotional health, and healthy eating habits. Careful eating habits lifelong are the mainstay of weight control. Though there are significant health benefits from weight loss, long-term weight loss with diet alone may be very difficult to achieve- studies show long-term success with dietary management in less than 10% of people. Attaining a healthy weight may be especially difficult to achieve in those with severe obesity. In some cases, medications, devices and surgical management might be considered.  What can you do?  If you are overweight or obese and are interested in methods for weight loss, you should discuss this with your provider.     Consider reducing daily calorie intake by 500 calories.     Keep a food journal.     Avoiding skipping meals, consider cutting portions instead.    Diet combined with exercise helps maintain muscle while optimizing fat loss. Strength training is particularly important for building and maintaining muscle mass. Exercise helps reduce stress, increase energy, and improves fitness. Increasing exercise without diet control, however, may not burn enough calories to loose weight.       Start walking three days a week 10-20 minutes at a time    Work towards walking thirty minutes five days a week     Eventually, increase the speed of your walking for 1-2 minutes at time    In addition, we recommend that you review healthy lifestyles and methods for weight loss available through the National Institutes of Health patient information sites:  http://win.niddk.nih.gov/publications/index.htm    And look into health and wellness programs that may be available through your health insurance provider, employer, local community center, or  [facility].    Weight management plan: Patient was referred to their PCP to discuss a diet and exercise plan.    Surgery:    Surgery for obstructive sleep apnea is considered generally only when other therapies fail to work. Surgery may be discussed with you if you are having a difficult time tolerating CPAP and or when there is an abnormal structure that requires surgical correction.  Nose and throat surgeries often enlarge the airway to prevent collapse.  Most of these surgeries create pain for 1-2 weeks and up to half of the most common surgeries are not effective throughout life.  You should carefully discuss the benefits and drawbacks to surgery with your sleep provider and surgeon to determine if it is the best solution for you.   More information  Surgery for MATEUSZ is directed at areas that are responsible for narrowing or complete obstruction of the airway during sleep.  There are a wide range of procedures available to enlarge and/or stabilize the airway to prevent blockage of breathing in the three major areas where it can occur: the palate, tongue, and nasal regions.  Successful surgical treatment depends on the accurate identification of the factors responsible for obstructive sleep apnea in each person.  A personalized approach is required because there is no single treatment that works well for everyone.  Because of anatomic variation, consultation with an examination by a sleep surgeon is a critical first step in determining what surgical options are best for each patient.  In some cases, examination during sedation may be recommended in order to guide the selection of procedures.  Patients will be counseled about risks and benefits as well as the typical recovery course after surgery. Surgery is typically not a cure for a person s MATEUSZ.  However, surgery will often significantly improve one s MATEUSZ severity (termed  success rate ).  Even in the absence of a cure, surgery will decrease the cardiovascular  risk associated with OSA7; improve overall quality of life8 (sleepiness, functionality, sleep quality, etc).  Palate Procedures:  Patients with MATEUSZ often have narrowing of their airway in the region of their tonsils and uvula.  The goals of palate procedures are to widen the airway in this region as well as to help the tissues resist collapse.  Modern palate procedure techniques focus on tissue conservation and soft tissue rearrangement, rather than tissue removal.  Often the uvula is preserved in this procedure. Residual sleep apnea is common in patient after pharyngoplasty with an average reduction in sleep apnea events of 33%2.    Tongue Procedures:  ExamWhile patients are awake, the muscles that surround the throat are active and keep this region open for breathing. These muscles relax during sleep, allowing the tongue and other structures to collapse and block breathing.  There are several different tongue procedures available.  Selection of a tongue base procedure depends on characteristics seen on physical exam.  Generally, procedures are aimed at removing bulky tissues in this area or preventing the back of the tongue from falling back during sleep.  Success rates for tongue surgery range from 50-62%3.  Hypoglossal Nerve Stimulation:  Hypoglossal nerve stimulation has recently received approval from the [place] Food and Drug Administration for the treatment of obstructive sleep apnea.  This is based on research showing that the system was safe and effective in treating sleep apnea6.  Results showed that the median AHI score decreased 68%, from 29.3 to 9.0. This therapy uses an implant system that senses breathing patterns and delivers mild stimulation to airway muscles, which keeps the airway open during sleep.  The system consists of three fully implanted components: a small generator (similar in size to a pacemaker), a breathing sensor, and a stimulation lead.  Using a small handheld remote, a  patient turns the therapy on before bed and off upon awakening.    Candidates for this device must be greater than 22 years of age, have moderate to severe MATEUSZ (AHI between 20-65), BMI less than 32, have tried CPAP/oral appliance without success, and have appropriate upper airway anatomy (determined by a sleep endoscopy performed by Dr. Devi).  Hypoglossal Nerve Stimulation Pathway:    The sleep surgeon s office will work with the patient through the insurance prior-authorization process (including communications and appeals).    Nasal Procedures:  Nasal obstruction can interfere with nasal breathing during the day and night.  Studies have shown that relief of nasal obstruction can improve the ability of some patients to tolerate positive airway pressure therapy for obstructive sleep apnea1.  Treatment options include medications such as nasal saline, topical corticosteroid and antihistamine sprays, and oral medications such as antihistamines or decongestants. Non-surgical treatments can include external nasal dilators for selected patients. If these are not successful by themselves, surgery can improve the nasal airway either alone or in combination with these other options.    Combination Procedures:  Combination of surgical procedures and other treatments may be recommended, particularly if patients have more than one area of narrowing or persistent positional disease.  The success rate of combination surgery ranges from 66-80%2,3.    References  1. Clau MONGE. The Role of the Nose in Snoring and Obstructive Sleep Apnoea: An Update.  Eur Arch Otorhinolaryngol. 2011; 268: 1365-73.  2.  Cullen SM; Talita JA; Nguyen JR; Pallanch JF; Dahlia MB; Usman SG; Jace JENKINS. Surgical modifications of the upper airway for obstructive sleep apnea in adults: a systematic review and meta-analysis. SLEEP 2010;33(10):6005-4236. Concetta BOWLING. Hypopharyngeal surgery in obstructive sleep apnea: an evidence-based medicine review.  Arch  Otolaryngol Head Neck Surg. 2006 Feb;132(2):206-13.  3. Jamil YH1, Dinah Y, Yony IMANI. The efficacy of anatomically based multilevel surgery for obstructive sleep apnea. Otolaryngol Head Neck Surg. 2003 Oct;129(4):327-35.  4. Kezirian E, Goldberg A. Hypopharyngeal Surgery in Obstructive Sleep Apnea: An Evidence-Based Medicine Review. Arch Otolaryngol Head Neck Surg. 2006 Feb;132(2):206-13.  5. Clem STOKES et al. Upper-Airway Stimulation for Obstructive Sleep Apnea.  N Engl J Med. 2014 Jan 9;370(2):139-49.  6. Eugenio Y et al. Increased Incidence of Cardiovascular Disease in Middle-aged Men with Obstructive Sleep Apnea. Am J Respir Crit Care Med; 2002 166: 159-165  Cernapapo CARPIO et al. Studying Life Effects and Effectiveness of Palatopharyngoplasty (SLEEP) study: Subjective Outcomes of Isolated Uvulopalatopharyngoplasty. Otolaryngol Head Neck Surg. 2011; 144: 623-631. Your blood pressure was checked while you were in clinic today.  Please read the guidelines below about what these numbers mean and what you should do about them.  Your systolic blood pressure is the top number.  This is the pressure when the heart is pumping.  Your diastolic blood pressure is the bottom number.  This is the pressure in between beats.  If your systolic blood pressure is less than 120 and your diastolic blood pressure is less than 80, then your blood pressure is normal. There is nothing more that you need to do about it  If your systolic blood pressure is 120-139 or your diastolic blood pressure is 80-89, your blood pressure may be higher than it should be.  You should have your blood pressure re-checked within a year by a primary care provider.  7. If your systolic blood pressure is 140 or greater or your diastolic blood pressure is 90 or greater, you may have high blood pressure.  High blood pressure is treatable, but if left untreated over time it can put you at risk for heart attack, stroke, or kidney failure.  You should have your blood  pressure re-checked by a primary care provider within the next four weeks.                      Follow-ups after your visit        Follow-up notes from your care team     Return in about 2 weeks (around 8/7/2017) for Sleep Study Review.      Your next 10 appointments already scheduled     Aug 22, 2017  8:30 PM CDT   PSG Split with BED 3 SH SLEEP   [facility])    [address]  [facility] [id number] [phone]            Sep 05, 2017  8:30 AM CDT   Return Sleep Patient with Bennett Ezra Goltz, PA-C   [facility])    [address]  [facility] [id number] [phone]              Future tests that were ordered for you today     Open Future Orders        Priority Expected Expires Ordered    Comprehensive Sleep Study Routine  1/20/2018 7/24/2017            Who to contact     If you have questions or need follow up information about today's clinic visit or your schedule please contact [facility] directly at [phone].  Normal or non-critical lab and imaging results will be communicated to you by [employer], letter or phone within 4 business days after the clinic has received the results. If you do not hear from us within 7 days, please contact the clinic through [employer] or phone. If you have a critical or abnormal lab result, we will notify you by phone as soon as possible.  Submit refill requests through [employer] or call your pharmacy and they will forward the refill request to us. Please allow 3 business days for your refill to be completed.          Additional Information About Your Visit        [employer] Information     [employer] gives you secure access to your electronic health record. If you see a primary care provider, you can also send messages to your care team and make appointments. If you have questions, please call your primary care clinic.  If you do not have  "a primary care provider, please call [phone] and they will assist you.        Care EveryWhere ID     This is your Care EveryWhere ID. This could be used by other organizations to access your [place] medical records  [id number]        Your Vitals Were     Pulse Respirations Height Pulse Oximetry BMI (Body Mass Index)       53 14 1.873 m (6' 1.74\") 100% 32.2 kg/m2        Blood Pressure from Last 3 Encounters:   07/24/17 134/80   05/01/17 126/70   01/02/17 128/79    Weight from Last 3 Encounters:   07/24/17 112.9 kg (249 lb)   05/01/17 112.5 kg (248 lb)   01/02/17 117 kg (257 lb 14.4 oz)              We Performed the Following     SLEEP EVALUATION & MANAGEMENT REFERRAL - ADULT        Primary Care Provider Office Phone # Fax #    Aristides Jenkins [phone]       [facility] [address] [facility] [address]  [facility] [address]        Equal Access to Services     BROCK RUEDA : Hadii aad ku hadasho Soomaali, waaxda luqadaha, qaybta kaalmada adeegyada, waxay suzette hayjcn adilson castillo . So [facility] [phone].    ATENCIÓN: Si habla español, tiene a osorio disposición servicios gratuitos de asistencia lingüística. [facility] [phone].    We comply with applicable federal civil rights laws and [place] laws. We do not discriminate on the basis of race, color, national origin, age, disability sex, sexual orientation or gender identity.            Thank you!     Thank you for choosing [facility]  for your care. Our goal is always to provide you with excellent care. Hearing back from our patients is one way we can continue to improve our services. Please take a few minutes to complete the written survey that you may receive in the mail after your visit with us. Thank you!             Your Updated Medication List - Protect others around you: Learn how to safely use, store and throw away your medicines at www.disposemymeds.org.          This list is accurate as of: " 7/24/17 10:09 AM.  Always use your most recent med list.                   Brand Name Dispense Instructions for use Diagnosis    LamoTRIgine 200 MG Tb24    LAMICTAL XR    30 tablet    Take 200 mg by mouth every evening    Bipolar 1 disorder, manic, full remission (H)       OLANZAPINE PO      Take 5 mg by mouth 2 times daily 2.5 mg at night        VYVANSE PO      Take 20 mg by mouth daily

## 2017-07-24 NOTE — NURSING NOTE
"Chief Complaint   Patient presents with     Sleep Problem     Witnessed apnea       Initial /80  Pulse 53  Resp 14  Ht 1.873 m (6' 1.74\")  Wt 112.9 kg (249 lb)  SpO2 100%  BMI 32.2 kg/m2 Estimated body mass index is 32.2 kg/(m^2) as calculated from the following:    Height as of this encounter: 1.873 m (6' 1.74\").    Weight as of this encounter: 112.9 kg (249 lb).  Medication Reconciliation: complete     Neck circumference 41 cm, 16 inches    ESS 3  Kathryn Colon CNA, Sleep Clinic Specialist  "

## 2017-07-24 NOTE — PROGRESS NOTES
Sleep Consultation:    Date on this visit: 7/24/2017    Esa Rivera is sent by Aristides Jenkisn for a sleep consultation regarding snoring and sleep apnea.    Primary Physician: Aristides Jenkisn     Esa Rivera reports his fiance has observed him to stop breathing in his sleep for several years. His medical history is significant for bipolar 1 disorder and ADHD.    Esa goes to bed around 9:30 PM and watches movies until he falls asleep around 10:00-10:15 PM during the week. He wakes up at 6:30 and hits the snooze until 6:48 AM. He does have some trouble getting up. He falls asleep in 30-45 minutes at most. Sometimes he falls asleep right away.  Esa denies difficulty falling asleep for the most part. He feels he sleeps pretty well, aside from when he had a manic episode last December. He sometimes has a racing mind about work. He wakes up 4 times a night for 5-10 minutes before falling back to sleep.  Esa wakes up to body aches, dreams or racing thoughts. He sometimes gets up for the restroom. On weekends, Esa goes to sleep at 11:00 PM.  He wakes up at 8:00 AM without an alarm. He falls asleep in 15-20 minutes.  Patient gets an average of 7-9 hours of sleep per night.     Patient does use electronics in bed and watch TV in bed and does not read in bed. He does have a racing mind about work sometimes.     Esa does not do shift work.  He works day shifts.  He works as an  for [employer]. He works on components for hydrogen fuel cells. He lives with his fiance.    Esa does snore every night and snoring is loud, but he is not sure if it can be heard outside of the room. Patient does have a regular bed partner. There is report of gasping and snorting.  He does have witnessed apneas. It does seem to occur more after he has alcohol. He has 6 drinks on some weekend nights. They never sleep separately.  Patient sleeps on his side. He has infrequent snort arousals as he is just falling asleep and occasional  morning dry mouth, denies morning headaches, morning confusion and restless legs. Esa denies any bruxism (used to, does not notice it anymore), sleep walking, sleep talking, dream enactment, sleep paralysis, cataplexy and hypnogogic/hypnopompic hallucinations.    Esa has depression and anxiety, denies difficulty breathing through his nose, claustrophobia, reflux at night and heartburn.      Esa has lost 15-20 pounds in 10 years.  Patient describes themself as a night person.  He would prefer to go to sleep at 11:00 PM and wake up at 8:00 AM.  Patient's [place] Sleepiness score 3/24 inconsistent with daytime sleepiness.  He is on Vyvanse for ADHD as of the last month.     Esa generally does not take naps. He has some trouble falling asleep. He takes no inadvertant naps.  He denies dozing while driving. Patient was counseled on the importance of driving while alert, to pull over if drowsy, or nap before getting into the vehicle if sleepy.  He uses 3 cups/day of coffee. Last caffeine intake is usually before noon.    Allergies:    Allergies   Allergen Reactions     No Known Allergies        Medications:    Current Outpatient Prescriptions   Medication Sig Dispense Refill     Lisdexamfetamine Dimesylate (VYVANSE PO) Take 20 mg by mouth daily       LamoTRIgine (LAMICTAL XR) 200 MG TB24 Take 200 mg by mouth every evening 30 tablet 11     OLANZAPINE PO Take 5 mg by mouth 2 times daily 2.5 mg at night         Problem List:  Patient Active Problem List    Diagnosis Date Noted     Bipolar 1 disorder, manic, full remission (H) 05/02/2017     Priority: Medium     Inflammatory liver disease, unspecified 01/09/2017     Priority: Medium     Molluscum contagiosum 01/02/2017     Priority: Medium     Other acne 12/23/2003     Priority: Medium     Recurrent dislocation of shoulder joint 06/12/2003     Priority: Medium     Problem list name updated by automated process. Provider to review       Attention deficit disorder 12/04/2002      Priority: Medium     Problem list name updated by automated process. Provider to review          Past Medical/Surgical History:  No past medical history on file.  Past Surgical History:   Procedure Laterality Date     C EXPLORE SHOULDER JOINT  2003    Arthroplasty, Shoulder     TEST NOT FOUND  2004    Fx cervical, fusion        Social History:  Social History     Social History     Marital status: Single     Spouse name: N/A     Number of children: N/A     Years of education: N/A     Occupational History       [employer]     Social History Main Topics     Smoking status: Never Smoker     Smokeless tobacco: Never Used     Alcohol use 1.5 oz/week     3 Standard drinks or equivalent per week      Comment: 2-3 times per week, 2 drinks on a weekday, about 6 on a weekend     Drug use: No     Sexual activity: Yes     Partners: Female     Birth control/ protection: Implant     Other Topics Concern     Exercise Yes     Seat Belt Yes     Self-Exams Yes     Social History Narrative       Family History:  Family History   Problem Relation Age of Onset     Cardiovascular Maternal Grandfather      DIABETES No family hx of      Coronary Artery Disease No family hx of      CEREBROVASCULAR DISEASE No family hx of      Hypertension No family hx of        Review of Systems:  A complete review of systems reviewed by me is negative with the exeption of what has been mentioned in the history of present illness.  CONSTITUTIONAL: NEGATIVE for fever, chills, sweats or night sweats, drug allergies.  CONSTITUTIONAL:  POSITIVE for  weight gain and weight loss  EYES: POSITIVE for changes in vision, blind spots, double vision (when lying in bed watching TV on his computer.  ENT: NEGATIVE for ear pain, sore throat, sinus pain, post-nasal drip, runny nose, bloody nose  CARDIAC: NEGATIVE for fast heartbeats or fluttering in chest, chest pain or pressure, breathlessness when lying flat, swollen legs or swollen feet.  NEUROLOGIC: NEGATIVE headaches,  "weakness or numbness in the arms or legs.  DERMATOLOGIC: NEGATIVE for rashes, new moles or change in mole(s)  PULMONARY: NEGATIVE SOB at rest, SOB with activity, dry cough, productive cough, coughing up blood, wheezing or whistling when breathing.    GASTROINTESTINAL: NEGATIVE for nausea or vomitting, loose or watery stools, fat or grease in stools, constipation, abdominal pain, bowel movements black in color or blood noted.  GENITOURINARY: NEGATIVE for pain during urination, blood in urine, urinating more frequently than usual, irregular menstrual periods.  MUSCULOSKELETAL: NEGATIVE for muscle pain, bone or joint pain, swollen joints.  MUSCULOSKELETAL:  POSITIVE for back pain-in area of thoracic fusion  ENDOCRINE: NEGATIVE for increased thirst or urination, diabetes.  LYMPHATIC: NEGATIVE for swollen lymph nodes, lumps or bumps in the breasts or nipple discharge.  MENTAL HEALTH: POSITIVE for depression, anxiety, bipolar 1, ADHD.    Physical Examination:  Vitals: /80  Pulse 53  Resp 14  Ht 1.873 m (6' 1.74\")  Wt 112.9 kg (249 lb)  SpO2 100%  BMI 32.2 kg/m2  Neck Circumference: 41 cm.     [place] Total Score 7/24/2017   Total score - [place] 3       GENERAL APPEARANCE: healthy, alert, no distress and cooperative  EYES: Eyes grossly normal to inspection, PERRL, conjunctivae and sclerae normal and lids and lashes normal  HENT: nose and mouth without ulcers or lesions, oropharynx crowded/small and tongue base enlarged  NECK: no adenopathy, no asymmetry, masses, or scars, thyroid normal to palpation and trachea midline and normal to palpation  RESP: lungs clear to auscultation - no rales, rhonchi or wheezes  CV: regular rates and rhythm, normal S1 S2, no S3 or S4, no murmur, click or rub and no irregular beats  LYMPHATICS: normal ant/post cervical and supraclavicular nodes  MS: extremities normal- no gross deformities noted  NEURO: Normal strength and tone, mentation intact, speech normal and cranial nerves " 2-12 intact  Mallampati Class: IV.  Tonsillar Stage: 1  hidden by pillars.    Impression/Plan:    (R06.83) Snoring  (primary encounter diagnosis), (G47.30) Observed sleep apnea  Comment: Esa presents with concerns for obstructive sleep apnea. His wife observes snoring and pauses in breathing. He is not sure how often the pauses are observed, but thinks it may relate to when he has alcohol. He has 6 or so drinks on weekends. Other positive risk factors include neck circumference of 41 cm and male gender. He denies sleepiness, ESS 3/24. He is on Vyvanse and has a history of anxiety though. He does not have HTN and his age is <50 (32). He sleeps mostly on his sides and wonders if that could be a treatment.   Plan: Comprehensive Sleep Study        Split night PSG with CPAP/BiPAP titration if indicated. We reviewed that alcohol can significantly contribute to MATEUSZ. He was encouraged to limit his alcohol to 2 drinks per night and try to keep it earlier in the evening.      Literature provided regarding sleep apnea.      He will follow up with me in approximately two weeks after his sleep study has been competed to review the results and discuss plan of care.       Polysomnography reviewed.  Obstructive sleep apnea reviewed.  Complications of untreated sleep apnea were reviewed.    45 minutes was spent during this visit, over 50% in counseling and coordination of care.     Bennett Goltz, PA-C    CC: Aristides Jenkins

## 2017-07-27 ENCOUNTER — TELEPHONE (OUTPATIENT)
Dept: SLEEP MEDICINE | Facility: CLINIC | Age: 32
End: 2017-07-27

## 2017-07-27 NOTE — TELEPHONE ENCOUNTER
Patient wanting to discuss possibly doing a HST instead of an overnight in our facility. He has questions in regards to the differences. Please give him a call, you may leave him a voice mail if he misses your phone call.    Savita Helm

## 2017-08-22 ENCOUNTER — THERAPY VISIT (OUTPATIENT)
Dept: SLEEP MEDICINE | Facility: CLINIC | Age: 32
End: 2017-08-22
Payer: COMMERCIAL

## 2017-08-22 DIAGNOSIS — G47.30 OBSERVED SLEEP APNEA: ICD-10-CM

## 2017-08-22 DIAGNOSIS — R06.83 SNORING: ICD-10-CM

## 2017-08-22 PROCEDURE — 95810 POLYSOM 6/> YRS 4/> PARAM: CPT | Performed by: INTERNAL MEDICINE

## 2017-08-22 NOTE — MR AVS SNAPSHOT
After Visit Summary   8/22/2017    Esa Rivera    MRN: [id number]           Patient Information     Date Of Birth          1985        Visit Information        Provider Department      8/22/2017 8:30 PM BED 3 SH SLEEP [facility]        Today's Diagnoses     Snoring        Observed sleep apnea           Follow-ups after your visit        Your next 10 appointments already scheduled     Oct 18, 2017  1:00 PM CDT   Adult General Eval with Mayito Motley MD   Psychiatry Clinic ([facility])    [address]  [address] [address] [phone]              Who to contact     If you have questions or need follow up information about today's clinic visit or your schedule please contact [facility] directly at [phone].  Normal or non-critical lab and imaging results will be communicated to you by MyChart, letter or phone within 4 business days after the clinic has received the results. If you do not hear from us within 7 days, please contact the clinic through MyChart or phone. If you have a critical or abnormal lab result, we will notify you by phone as soon as possible.  Submit refill requests through [facility] or call your pharmacy and they will forward the refill request to us. Please allow 3 business days for your refill to be completed.          Additional Information About Your Visit        MyChart Information     [facility] gives you secure access to your electronic health record. If you see a primary care provider, you can also send messages to your care team and make appointments. If you have questions, please call your primary care clinic.  If you do not have a primary care provider, please call [phone] and they will assist you.        Care EveryWhere ID     This is your Care EveryWhere ID. This could be used by other organizations to access your [place] medical records  [id number]          Blood Pressure from Last 3 Encounters:   07/24/17 134/80   05/01/17 126/70   01/02/17 128/79    Weight from Last 3 Encounters:   07/24/17 112.9 kg (249 lb)   05/01/17 112.5 kg (248 lb)   01/02/17 117 kg (257 lb 14.4 oz)              We Performed the Following     Comprehensive Sleep Study        Primary Care Provider Office Phone # Fax #    Aristides Jenkins [phone]       [address] [facility] [address]  [facility] [address]        Equal Access to Services     [facility]: Hadii aad ku hadasho Soomaali, waaxda luqadaha, qaybta kaalmada adebassamyada, jermaine castillo . So [facility] [phone].    ATENCIÓN: Si habla español, tiene a osorio disposición servicios gratuitos de asistencia lingüística. [facility] [phone].    We comply with applicable federal civil rights laws and [place] laws. We do not discriminate on the basis of race, color, national origin, age, disability sex, sexual orientation or gender identity.            Thank you!     Thank you for choosing [facility]  for your care. Our goal is always to provide you with excellent care. Hearing back from our patients is one way we can continue to improve our services. Please take a few minutes to complete the written survey that you may receive in the mail after your visit with us. Thank you!             Your Updated Medication List - Protect others around you: Learn how to safely use, store and throw away your medicines at www.disposemymeds.org.          This list is accurate as of: 8/22/17 11:59 PM.  Always use your most recent med list.                   Brand Name Dispense Instructions for use Diagnosis    LamoTRIgine 200 MG Tb24    LAMICTAL XR    30 tablet    Take 200 mg by mouth every evening    Bipolar 1 disorder, manic, full remission (H)       OLANZAPINE PO      Take 5 mg by mouth 2 times daily 2.5 mg at night        VYVANSE PO      Take 20 mg by mouth daily

## 2017-08-25 NOTE — PROCEDURES
" SLEEP STUDY INTERPRETATION  POLYSOMNOGRAPHY REPORT      Patient: Esa Rivera  YOB: 1985  Study Date: 8/22/2017  MRN: [id number]  Referring Provider: MD Jenkins Kevin  Ordering Provider: Goltz, Pa-C, Bennett    Indications for Polysomnography: The patient is a 32 y old Male who is 6' 2\" and weighs 248.0 lbs.  His BMI is 32.2, [place] sleepiness scale 3.0 and neck size is 41.0.  Relevant medical history includes obesity, bipolar 1 disorder, and ADHD. A diagnostic polysomnogram was performed to evaluate for MATEUSZ.    Polysomnogram Data:  A full night polysomnogram recorded the standard physiologic parameters including EEG, EOG, EMG, ECG, nasal and oral airflow.  Respiratory parameters of chest and abdominal movements were recorded with respiratory inductance plethysmography.  Oxygen saturation was recorded by pulse oximetry.      Sleep Architecture: Normal sleep latency without note of sleep aid, increased arousal index, and increased sleep efficiency.  The total recording time of the polysomnogram was 477.1 minutes.  The total sleep time was 458.0 minutes.  Sleep latency was normal at 5.8 minutes without the use of a sleep aid.  REM latency was 78.5 minutes.  Arousal index was increased at 19.8 arousals per hour.  Sleep efficiency was increased at 96.0%.  Wake after sleep onset was 10.5 minutes.  The patient spent 6.0% of total sleep time in Stage N1, 64.0% in Stage N2, 5.1% in Stages N3, and 24.9% in REM.  Time in REM supine was 57.5 minutes.    Respiration: Mild snoring without significant sleep-disordered breathing.    Events - The polysomnogram revealed a presence of - obstructive, - central, and - mixed apneas resulting in an apnea index of - events per hour.  There were - hypopneas resulting in a hypopnea index of - events per hour.  The combined apnea/hypopnea index was - events per hour.  The REM AHI was - events per hour.  The supine AHI was - events per hour.  The RERA index was 0.5 events per " hour.   The RDI was 0.5 events per hour.    Snoring - was reported as mild and intermittent.    Respiratory rate and pattern - was normal.    Sustained Sleep Associated Hypoventilation - Transcutaneous carbon dioxide monitoring was not used, however significant hypoventilation was not suggested by oximetry.    Sleep Associated Hypoxemia - (Greater than 5 minutes O2 sat below 89%) was not present.  Baseline oxygen saturation was 96.0%. Lowest oxygen saturation was 91.5%.  Time spent less than or equal to 88% was - minutes.  Time spent less than or equal to 89% was - minutes.    Movement Activity: Clinically insignificant number of PLMs noted.    Periodic Limb Activity - There were 31 PLMs during the entire study. The PLM index was 4.1 movements per hour.  The PLM Arousal Index was 0.1 per hour.    REM EMG Activity - Excessive muscle activity was not present.    Nocturnal Behavior - Abnormal sleep related behaviors were not noted.    Bruxism - None apparent.    Cardiac Summary: No significant arrhythmias noted.  The average pulse rate was 50.8 bpm.  The minimum pulse rate was 40.9 bpm while the maximum pulse rate was 87.7 bpm. The rhythm is normal sinus. Arrhythmias were not noted.    Assessment:     Normal sleep latency without note of sleep aid, increased arousal index, and increased sleep efficiency.    Mild snoring without significant sleep-disordered breathing.    Clinically insignificant number of PLMs noted.     No significant arrhythmias noted.     Recommendations:    Advise regarding the risks of drowsy driving.    Suggest optimizing sleep schedule and avoiding sleep deprivation.    Weight management (if BMI > 30).    Pharmacologic therapy should be used for management of restless legs syndrome only if present and clinically indicated and not based on the presence of periodic limb movements alone.    Cardiac Comments: -  Diagnostic Codes:     Unspecified Sleep Disturbance G47.9      _____________________________________   Electronically Signed By: Elias Berger MD 8/25/2017

## 2017-09-11 ENCOUNTER — OFFICE VISIT (OUTPATIENT)
Dept: SLEEP MEDICINE | Facility: CLINIC | Age: 32
End: 2017-09-11
Payer: COMMERCIAL

## 2017-09-11 VITALS
HEART RATE: 72 BPM | DIASTOLIC BLOOD PRESSURE: 76 MMHG | OXYGEN SATURATION: 97 % | BODY MASS INDEX: 31.81 KG/M2 | WEIGHT: 240 LBS | HEIGHT: 73 IN | SYSTOLIC BLOOD PRESSURE: 120 MMHG | RESPIRATION RATE: 16 BRPM

## 2017-09-11 DIAGNOSIS — G47.00 INSOMNIA, UNSPECIFIED TYPE: ICD-10-CM

## 2017-09-11 DIAGNOSIS — R06.83 SNORING: Primary | ICD-10-CM

## 2017-09-11 PROCEDURE — 99214 OFFICE O/P EST MOD 30 MIN: CPT | Performed by: PHYSICIAN ASSISTANT

## 2017-09-11 NOTE — PATIENT INSTRUCTIONS
General recommendations for sleep problems (Insomnia)  Allow 2-4 weeks to see results     Establish a regular sleep schedule    Most people only need 7-8 hours of sleep.  Don't be in bed longer than you need     to sleep or you will end up spending more time awake in bed. This trains your    brain to think of the bed as a place to not sleep.  Go to bed at same time each night   Get up at same time each day - Set an alarm everyday (even weekends). This is one of    the most important tips. It prevents you from relying on your insomnia to get you    up on time for your day. That actually reinforces insomnia. It also will help your    body get into a pattern where you start feeling tired at a consistent time each    night.  The body functions best when you keep a consistent routine.  Avoid sleeping-in and napping. Anytime you sleep during the day, you will be less tired at    night. You may be tired enough to fall asleep, but you will wake more in the    middle of the night because you will have met your sleep need before the night is    done.   Cut down time in bed (if not asleep, get up)- Use your bed only for sleep and sex    Anytime you spend time in bed doing activities other than sleep (reading,    watching TV, working, playing on the computer or phone, or even just laying in    bed trying to sleep), you are training  your brain to think of the bed as a place to    do activities other than sleep. If you are not falling asleep within 20-30 minutes,    get out of bed. While out of bed, avoid bright lights. Avoid work or chores. Being    productive in the middle of the night reinforces waking up at night. Find relaxing,    not particularly entertaining activities like reading, listening to music, or relaxation    exercises. Go back to bed if you start feeling groggy, or after about 30 minutes,    even if not feeling very tired. Sometimes, just getting out of bed stops the pattern    of getting frustrated about  laying in bed not sleeping, and that can help you fall    asleep.   Avoid trying to force yourself to sleep- sleep is not like everything else. The harder you    work at most things, the more you can accomplish. The harder you work at    sleep, the less you will sleep.     Make the bedroom comfortable - quiet, dark and cool are better. Consider ear plugs    (silicon). Use dark blinds or wear an eye mask if needed     Make a relaxing routine prior to bedtime  Relaxation exercises:   Progressive muscle relaxation: Relax each muscle group individually    Begin with your feet, flex, then relax. Try to imagine your feet feeling heavy and sinking into the bed. Move to your calves, do the same thing. Work through each muscle group toward your head.    Relaxing Mental Imagery: Try to imagine a trip that you took and found relaxing, or imagine a day at the beach. Try to walk yourself through the day in your mind as if you were dreaming it. Try to imagine sensing the different experiences, such as feeling sand between your toes, the heat of the sun on your skin, seeing the waves crashing the shore, the smell of the salt water, etc.     Deal with your worries before bedtime    Set aside a worry time around dinner time for 10-15 minutes. Write down the    things that are on your mind. Plan time in the coming days to address those    issues. Brainstorm ideas on how you will deal with them. Try to identify issues    that are out of your control, and try to let those issues go.  Listen to relaxation tapes   Classical Music or Nature sounds   Back Massage   Get regular exercise each day (at least 1-2 hours before bedtime)   Take medications only as directed   Eat a light bedtime snack or warm drink   Warm milk   Warm herbal tea (non-caffeinated)       Things to avoid   No overstimulating activities just before bed   No competitive games before bedtime   No exciting television programs before bedtime   Avoid caffeine after lunchtime    Avoid chocolate   Do not use alcohol to induce sleep (worsens Insomnia)   Do not take someone else's sleeping pills   Do not look at the clock when awakening   Do not turn on light when getting up to use bathroom, use a nightlight     Online Programs     www.SHUTi.me (pronounced shut eye). There is a fee for this program. Enter the code  [facility]  if you decide to enroll in this program.      www.sleepIO.com (pronounced sleep ee oh). There is a fee for this program. Enter the code  [facility]  if you decide to enroll in this program.     Suggested Resources  Insomnia Treatment Books     Overcoming Insomnia by Damien Grove and Darlin Pierre (2008)    No More Sleepless Nights by Michael Wilson and Silvia Siddiqi (1996)    Say Preet to Insomnia by Raymond Talley (2009)    The Insomnia Workbook by Barbara Johnosn and Brandin Ayon (2009)    The Insomnia Answer by Allan Dimas and Ger Moore (2006)      Stress Management and Relaxation Books    The Relaxation and Stress Reduction Workbook by Kyra Cortés, Alem Sanchez and Ludin Loera (2008)    Stress Management Workbook: Techniques and Self-Assessment Procedures by Sara Hutson and Percy Owens (1997)    A Mindfulness-Based Stress Reduction Workbook by Bobo Smith and Ludmila Francois (2010)    The Complete Stress Management Workbook by Jai Steel, Geovanny Olvera and Alec Rendon (1996)    Assert Yourself by Miranda Contreras and Esa Contreras (1977)    Relaxation Resources for Computer Download   These websites offer resources to help you relax. This list is for information only. [facility] is not responsible for the quality of services or the actions of any person or organization.  Progressive Muscle Relaxation (PMR):     http://www.innerMetaLogicsstudio.com/progressive-muscle-relaxation-exercise.html     http://studentsupport.[facility]/counseling/resources/self-help/relaxation-and-stress-management/   Deep Breathing  Exercises:    http://www.Guanxi.me/breathing-awareness.html     Meditation:     www.[employer]    www.theMuses Labsguided-meditation-site.com You may have to pay for some of these resources.    Guided Imagery:    http://www.Guanxi.me/guided-imagery-scripts.html     http://for; to (do) Centers/library/tzurpvqyig-nofesw-yuzcvau/     Counseling / Behavioral Health  [facility]  Visit [email or web address] or call [phone] to find a clinic close to you.  Or call [phone] for [facility].    Your BMI is Body mass index is 32.1 kg/(m^2).  Weight management is a personal decision.  If you are interested in exploring weight loss strategies, the following discussion covers the approaches that may be successful. Body mass index (BMI) is one way to tell whether you are at a healthy weight, overweight, or obese. It measures your weight in relation to your height.  A BMI of 18.5 to 24.9 is in the healthy range. A person with a BMI of 25 to 29.9 is considered overweight, and someone with a BMI of 30 or greater is considered obese. More than two-thirds of American adults are considered overweight or obese.  Being overweight or obese increases the risk for further weight gain. Excess weight may lead to heart disease and diabetes.  Creating and following plans for healthy eating and physical activity may help you improve your health.  Weight control is part of healthy lifestyle and includes exercise, emotional health, and healthy eating habits. Careful eating habits lifelong are the mainstay of weight control. Though there are significant health benefits from weight loss, long-term weight loss with diet alone may be very difficult to achieve- studies show long-term success with dietary management in less than 10% of people. Attaining a healthy weight may be especially difficult to achieve in those with severe obesity. In some cases, medications, devices and surgical  management might be considered.  What can you do?  If you are overweight or obese and are interested in methods for weight loss, you should discuss this with your provider.     Consider reducing daily calorie intake by 500 calories.     Keep a food journal.     Avoiding skipping meals, consider cutting portions instead.    Diet combined with exercise helps maintain muscle while optimizing fat loss. Strength training is particularly important for building and maintaining muscle mass. Exercise helps reduce stress, increase energy, and improves fitness. Increasing exercise without diet control, however, may not burn enough calories to loose weight.       Start walking three days a week 10-20 minutes at a time    Work towards walking thirty minutes five days a week     Eventually, increase the speed of your walking for 1-2 minutes at time    In addition, we recommend that you review healthy lifestyles and methods for weight loss available through the National Institutes of Health patient information sites:  http://win.niddk.nih.gov/publications/index.htm    And look into health and wellness programs that may be available through your health insurance provider, employer, local community center, or mis club.    Weight management plan: Patient was referred to their PCP to discuss a diet and exercise plan.

## 2017-09-11 NOTE — NURSING NOTE
"Chief Complaint   Patient presents with     Study Results     psg results       Initial /76  Pulse 72  Resp 16  Ht 1.842 m (6' 0.5\")  Wt 108.9 kg (240 lb)  SpO2 97%  BMI 32.1 kg/m2 Estimated body mass index is 32.1 kg/(m^2) as calculated from the following:    Height as of this encounter: 1.842 m (6' 0.5\").    Weight as of this encounter: 108.9 kg (240 lb).  Medication Reconciliation: complete     ESS 2  Loida Hunt    "

## 2017-09-11 NOTE — MR AVS SNAPSHOT
After Visit Summary   9/11/2017    Esa Rivera    MRN: [id number]           Patient Information     Date Of Birth          1985        Visit Information        Provider Department      9/11/2017 8:30 AM Goltz, Bennett Ezra, PA-C [place] [facility]        Today's Diagnoses     Snoring    -  1      Care Instructions    General recommendations for sleep problems (Insomnia)  Allow 2-4 weeks to see results     Establish a regular sleep schedule    Most people only need 7-8 hours of sleep.  Don't be in bed longer than you need     to sleep or you will end up spending more time awake in bed. This trains your    brain to think of the bed as a place to not sleep.  Go to bed at same time each night   Get up at same time each day - Set an alarm everyday (even weekends). This is one of    the most important tips. It prevents you from relying on your insomnia to get you    up on time for your day. That actually reinforces insomnia. It also will help your    body get into a pattern where you start feeling tired at a consistent time each    night.  The body functions best when you keep a consistent routine.  Avoid sleeping-in and napping. Anytime you sleep during the day, you will be less tired at    night. You may be tired enough to fall asleep, but you will wake more in the    middle of the night because you will have met your sleep need before the night is    done.   Cut down time in bed (if not asleep, get up)- Use your bed only for sleep and sex    Anytime you spend time in bed doing activities other than sleep (reading,    watching TV, working, playing on the computer or phone, or even just laying in    bed trying to sleep), you are training  your brain to think of the bed as a place to    do activities other than sleep. If you are not falling asleep within 20-30 minutes,    get out of bed. While out of bed, avoid bright lights. Avoid work or chores. Being    productive in the middle of the  night reinforces waking up at night. Find relaxing,    not particularly entertaining activities like reading, listening to music, or relaxation    exercises. Go back to bed if you start feeling groggy, or after about 30 minutes,    even if not feeling very tired. Sometimes, just getting out of bed stops the pattern    of getting frustrated about laying in bed not sleeping, and that can help you fall    asleep.   Avoid trying to force yourself to sleep- sleep is not like everything else. The harder you    work at most things, the more you can accomplish. The harder you work at    sleep, the less you will sleep.     Make the bedroom comfortable - quiet, dark and cool are better. Consider ear plugs    (silicon). Use dark blinds or wear an eye mask if needed     Make a relaxing routine prior to bedtime  Relaxation exercises:   Progressive muscle relaxation: Relax each muscle group individually    Begin with your feet, flex, then relax. Try to imagine your feet feeling heavy and sinking into the bed. Move to your calves, do the same thing. Work through each muscle group toward your head.    Relaxing Mental Imagery: Try to imagine a trip that you took and found relaxing, or imagine a day at the beach. Try to walk yourself through the day in your mind as if you were dreaming it. Try to imagine sensing the different experiences, such as feeling sand between your toes, the heat of the sun on your skin, seeing the waves crashing the shore, the smell of the salt water, etc.     Deal with your worries before bedtime    Set aside a worry time around dinner time for 10-15 minutes. Write down the    things that are on your mind. Plan time in the coming days to address those    issues. Brainstorm ideas on how you will deal with them. Try to identify issues    that are out of your control, and try to let those issues go.  Listen to relaxation tapes   Classical Music or Nature sounds   Back Massage   Get regular exercise each day (at  least 1-2 hours before bedtime)   Take medications only as directed   Eat a light bedtime snack or warm drink   Warm milk   Warm herbal tea (non-caffeinated)       Things to avoid   No overstimulating activities just before bed   No competitive games before bedtime   No exciting television programs before bedtime   Avoid caffeine after lunchtime   Avoid chocolate   Do not use alcohol to induce sleep (worsens Insomnia)   Do not take someone else's sleeping pills   Do not look at the clock when awakening   Do not turn on light when getting up to use bathroom, use a nightlight     Online Programs     www.[facility] (pronounced shut eye). There is a fee for this program. Enter the code  [place]  if you decide to enroll in this program.      www.sleepIO.com (pronounced sleep ee oh). There is a fee for this program. Enter the code  [place]  if you decide to enroll in this program.     Suggested Resources  Insomnia Treatment Books     Overcoming Insomnia by Damien Grove and Darlin Pierre (2008)    No More Sleepless Nights by Michael Wilson and Silvia Siddiqi (1996)    Say Preet to Insomnia by Raymond Talley (2009)    The Insomnia Workbook by Barbara Johnson and Brandin Ayon (2009)    The Insomnia Answer by Allan Dimas and Ger Moore (2006)      Stress Management and Relaxation Books    The Relaxation and Stress Reduction Workbook by Kyra Cortés, Alem Sanchez and Ludin Loera (2008)    Stress Management Workbook: Techniques and Self-Assessment Procedures by Sara Hutson and Percy Owens (1997)    A Mindfulness-Based Stress Reduction Workbook by Bobo Smith and Ludmila Francois (2010)    The Complete Stress Management Workbook by Jai Steel, Geovanny Olvera and Alec Rendon (1996)    Assert Yourself by Miranda Contreras and Esa Contreras (1977)    Relaxation Resources for Computer Download   These websites offer resources to help you relax. This list is for information only.  [facility] is not responsible for the quality of services or the actions of any person or organization.  Progressive Muscle Relaxation (PMR):     http://www.[employer]on [employer]/progressive-muscle-relaxation-exercise.html     http://studentsupport.[facility]/counseling/resources/self-help/relaxation-and-stress-management/   Deep Breathing Exercises:    http://www.[employer]/breathing-awareness.html     Meditation:     www79 Group    www.TNT Luxury GroupmeditationFirst Class EV Conversionssite.EV Connect You may have to pay for some of these resources.    Guided Imagery:    http://www.Theron Pharmaceuticals/guided-imagery-scripts.html     http://[place]/library/chqirrwehn-rnniax-yrbxnna/     Counseling / Behavioral Health  [facility]  Visit [email or web address] or call [phone] to find a clinic close to you.  Or call [phone] for [facility].    Your BMI is Body mass index is 32.1 kg/(m^2).  Weight management is a personal decision.  If you are interested in exploring weight loss strategies, the following discussion covers the approaches that may be successful. Body mass index (BMI) is one way to tell whether you are at a healthy weight, overweight, or obese. It measures your weight in relation to your height.  A BMI of 18.5 to 24.9 is in the healthy range. A person with a BMI of 25 to 29.9 is considered overweight, and someone with a BMI of 30 or greater is considered obese. More than two-thirds of American adults are considered overweight or obese.  Being overweight or obese increases the risk for further weight gain. Excess weight may lead to heart disease and diabetes.  Creating and following plans for healthy eating and physical activity may help you improve your health.  Weight control is part of healthy lifestyle and includes exercise, emotional health, and healthy eating habits. Careful eating habits lifelong are the mainstay of weight control. Though there  are significant health benefits from weight loss, long-term weight loss with diet alone may be very difficult to achieve- studies show long-term success with dietary management in less than 10% of people. Attaining a healthy weight may be especially difficult to achieve in those with severe obesity. In some cases, medications, devices and surgical management might be considered.  What can you do?  If you are overweight or obese and are interested in methods for weight loss, you should discuss this with your provider.     Consider reducing daily calorie intake by 500 calories.     Keep a food journal.     Avoiding skipping meals, consider cutting portions instead.    Diet combined with exercise helps maintain muscle while optimizing fat loss. Strength training is particularly important for building and maintaining muscle mass. Exercise helps reduce stress, increase energy, and improves fitness. Increasing exercise without diet control, however, may not burn enough calories to loose weight.       Start walking three days a week 10-20 minutes at a time    Work towards walking thirty minutes five days a week     Eventually, increase the speed of your walking for 1-2 minutes at time    In addition, we recommend that you review healthy lifestyles and methods for weight loss available through the National Institutes of Health patient information sites:  http://win.niddk.nih.gov/publications/index.htm    And look into health and wellness programs that may be available through your health insurance provider, employer, local community center, or mis club.    Weight management plan: Patient was referred to their PCP to discuss a diet and exercise plan.            Follow-ups after your visit        Your next 10 appointments already scheduled     Oct 18, 2017  1:00 PM CDT   Adult General Eval with Mayito Motley MD   Psychiatry Clinic ([facility])    [address]  [facility] [address] [phone]              Who to contact     If you have questions or need follow up information about today's clinic visit or your schedule please contact [facility] directly at [phone].  Normal or non-critical lab and imaging results will be communicated to you by MyChart, letter or phone within 4 business days after the clinic has received the results. If you do not hear from us within 7 days, please contact the clinic through MyChart or phone. If you have a critical or abnormal lab result, we will notify you by phone as soon as possible.  Submit refill requests through [employer] or call your pharmacy and they will forward the refill request to us. Please allow 3 business days for your refill to be completed.          Additional Information About Your Visit        [facility] Information     [employer] gives you secure access to your electronic health record. If you see a primary care provider, you can also send messages to your care team and make appointments. If you have questions, please call your primary care clinic.  If you do not have a primary care provider, please call [phone] and they will assist you.        Care EveryWhere ID     This is your Care EveryWhere ID. This could be used by other organizations to access your [facility] medical records  [id number]        Your Vitals Were     Pulse Respirations Height Pulse Oximetry BMI (Body Mass Index)       72 16 1.842 m (6' 0.5\") 97% 32.1 kg/m2        Blood Pressure from Last 3 Encounters:   09/11/17 120/76   07/24/17 134/80   05/01/17 126/70    Weight from Last 3 Encounters:   09/11/17 108.9 kg (240 lb)   07/24/17 112.9 kg (249 lb)   05/01/17 112.5 kg (248 lb)              Today, you had the following     No orders found for display       Primary Care Provider Office Phone # Fax #    Aristides Jenkins [phone]       [address]  [facility] [address]        Equal " Access to Services     [facility]: Hadii aad ku hadvitalybrady Saminaameena, waphilippeda luqadaha, qaskylerta robinmartyjermaine mckee. So [facility] [phone].    ATENCIÓN: Si habla español, tiene a osorio disposición servicios gratuitos de asistencia lingüística. Llame al [phone].    We comply with applicable federal civil rights laws and [place] laws. We do not discriminate on the basis of race, color, national origin, age, disability sex, sexual orientation or gender identity.            Thank you!     Thank you for choosing [facility]  for your care. Our goal is always to provide you with excellent care. Hearing back from our patients is one way we can continue to improve our services. Please take a few minutes to complete the written survey that you may receive in the mail after your visit with us. Thank you!             Your Updated Medication List - Protect others around you: Learn how to safely use, store and throw away your medicines at www.disposemymeds.org.          This list is accurate as of: 9/11/17  9:03 AM.  Always use your most recent med list.                   Brand Name Dispense Instructions for use Diagnosis    ADDERALL PO      Take 20 mg by mouth daily        LamoTRIgine 200 MG Tb24    LAMICTAL XR    30 tablet    Take 200 mg by mouth every evening    Bipolar 1 disorder, manic, full remission (H)       OLANZAPINE PO      Take 2.5 mg by mouth as needed 2.5 mg at night

## 2017-09-11 NOTE — PROGRESS NOTES
Sleep Study Follow-Up Visit:    Date on this visit: 9/11/2017    Esa Rivera comes in today for follow-up of his sleep study done on 8/22/2017 at the [facility] for his fiance has observed him to stop breathing in his sleep for several years. His medical history is significant for bipolar 1 disorder and ADHD.    Sleep latency 5.8 minutes without Ambien.  REM achieved.   REM latency 78.5 minutes.  Sleep efficiency 96%. Total sleep time 458 minutes.    Sleep architecture:  Stage 1, 6% (5%), stage 2, 64% (45-55%), stage 3, 5.1% (15-20%), stage REM, 24.9% (20-25%).  AHI was 0, with desaturations down to 92%. He spent 0 minutes below 90% SpO2. RDI 0.5.  REM RDI 0, consistent with no REM MATEUSZ.  Supine RDI 0.9/hr, consistent with no SUPINE MATEUSZ.  Periodic Limb Movement Index 4.1/hour, 0.1/hr were associated with arousals.       CPAP titration:  CPAP was not indicated. These findings were reviewed with the patient.    Past medical/surgical history, family history, social history, medications and allergies were reviewed.      Problem List:  Patient Active Problem List    Diagnosis Date Noted     Bipolar 1 disorder, manic, full remission (H) 05/02/2017     Priority: Medium     Inflammatory liver disease, unspecified 01/09/2017     Priority: Medium     Molluscum contagiosum 01/02/2017     Priority: Medium     Other acne 12/23/2003     Priority: Medium     Recurrent dislocation of shoulder joint 06/12/2003     Priority: Medium     Problem list name updated by automated process. Provider to review       Attention deficit disorder 12/04/2002     Priority: Medium     Problem list name updated by automated process. Provider to review          Impression/Plan:    (R06.83) Snoring  (primary encounter diagnosis)  Comment: no significant apnea  Plan: No treatment needed for snoring. He was advised to limit alcohol. He was reminded of how that can contribute to apnea and sleep fragmentation.       (G47.00) Insomnia,  unspecified type  Comment: He has bipolar 1 disorder and is aware that irregular sleep can influence manic episodes. He has olanzapine for when he notices difficulty sleeping. His sleep schedule is a little irregular.   Plan: We reviewed concepts of homeostatic and circadian sleep drives. We reviewed recommendations for sleep scheduling. He was advised to pick an 8 hour window of time allowed in bed for sleep, I recommended 10:30 PM to 6:30 AM based on his work schedule. Try to maintain a consistent schedule on weekends as well. Return if still       He will follow up with me in the future as needed.     Twenty-five minutes spent with patient, all of which were spent face-to-face counseling, consulting, coordinating plan of care.      Bennett Goltz, PA-C    CC: Aristides Jenkins MD

## 2017-10-18 ENCOUNTER — OFFICE VISIT (OUTPATIENT)
Dept: PSYCHIATRY | Facility: CLINIC | Age: 32
End: 2017-10-18
Attending: PSYCHIATRY & NEUROLOGY
Payer: COMMERCIAL

## 2017-10-18 DIAGNOSIS — F31.74 BIPOLAR 1 DISORDER, MANIC, FULL REMISSION (H): Primary | ICD-10-CM

## 2017-10-18 NOTE — PROGRESS NOTES
October 18, 2017  Progress Note    Esa Rivera is a 32 year old year old male with Bipolar I Disorder, Most Recent Episode Manic Severe, with psychotic behavior (296.40) who presents for ongoing psychiatric care. He got  1 month ago. He has no children. He and his wife (a psychologist) live on their own in [place] [facility]. Esa works as an  for [employer].    Diagnosis    Axis 1: Bipolar I disorder, with severe manic episode with psychosis in Dec 2016. ADHD by history. Alcohol use disorder, moderate, in short-term remission  Axis 2: Nil  Axis 3: Remote concussion x 2  Axis 4: Moderate stressors  Axis 5: GAF at time of visit: 70    Assessment & Plan     Esa Rivera is currently euthymic. He is hoping for clarification regarding whether he meets criteria for a diagnosis of bipolar disorder, the necessity of treatment, and treatment options. He will continue his current medications and return in 1 week to discuss treatment options. Aside from LTG and OLZ, he has not taken mood stabilizing or antidepressant medications. He has used stimulants for ADHD (mostly Adderall, also Vyvanse) but is not currently taking them.    The patient understands the risks, benefits, adverse effects and alternatives. Agrees to treatment with the capacity to do so. No medical contraindications to treatment. Agrees to call clinic for any problems. The patient understands to call 911 or come to the nearest ED if life threatening or urgent symptoms present.    Attestation:  Patient has been seen and evaluated by me, Mayito Motley MD, PhD.    I spent a total time of 60 minutes face-to-face with the patient during today s office visit.  Over 50% of this time was spent counseling the patient and/or coordinating care regarding diagnostic assessment, medication management.    Interim History     In Dec 2016 Esa experienced an episode of severe katie with psychosis (grandiose delusions). He was seen in the ER but not hospitalized. He had  "some mild depressive episodes and possible (but questionable) short hypomanic periods in the preceding years, but never received treatment for these.     Esa's manic symptoms began when he became excited about a new idea at work. 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 He became so excited about this the he was unable to sleep. As his katie escalated, his sleep decreased to 1-2 hours per night for 5-6 nights. His affect was elated and also markedly irritable. He would \"go off\" on people for minor things in very uncharacteristic ways. His thoughts were \"really fast, crisp, and clear\". He saw \"connections in everything.\" His energy was clearly elevated. He became grandiose, believing that his idea would change the world and as a result he would meet many \"powerful people\". Eventually he came to believe that sings on the radio were meant for him.     Esa was taking Adderall at the time. He has taken this, on and off, for many years. In my opinion it is not an adequate explanation for his manic symptoms.      His mother (a PCP) became concerned and brought him to hospital. He was assessed in the ER and prescribed Ativan for sleep. He was seen by Dr Burt (a psychiatrist) the next day. He was prescribed OLZ and LTG. The doses were titrated \"up and down\" for the next 6 months. There were significant side effects (sedation, cognitive impairment), likely from OLZ. Most recently, Esa stopped OLZ for a period of time. In the context of being on a honeymoon in [place], he develop racing thoughts and difficulty sleeping and restarted it, with benefit. He currently takes 10 mg HS.    Esa endorses several previous short (1-2 day) periods of reduced sleep and elevated mood, typically in the context of getting involved in a work project. At most they represent subthreshold hypomanic periods.     He has also experienced several " periods over the years, lasting about a week, of low mood, decreased energy and motivation, poor focus/concentration, and negative thoughts. He denies neurovegetative symptoms or SI. His wife thought that these represented depressive episodes.    Esa has consumed EtOH heavily at points during his life. It was most pronounced during his college years. He has sporadically binge-drank since then. He has not had a drink in 2 weeks.    Past Psych Hx: As above. Esa was assessed at [facility] in Dec 2016 and diagnosed with BDI. He was seen by Psychiatry at age 17 and dxed with ADD. He took Adderall off and on (mostly on) with benefit over the years since then. He denies abusing it.     Past Med Hx: 2 concussion: 1) age 15 - skiing. Age 16: football. LOC x a couple of minutes with both.    MEDICATION ADHERENCE: Good.   Current Medications     Current Outpatient Prescriptions   Medication Sig Dispense Refill     Amphetamine-Dextroamphetamine (ADDERALL PO) Take 20 mg by mouth daily       LamoTRIgine (LAMICTAL XR) 200 MG TB24 Take 200 mg by mouth every evening 30 tablet 11     OLANZAPINE PO Take 2.5 mg by mouth as needed 2.5 mg at night           Substance use     ETOH: Sporadic binge drinking  Cigarettes: NA  Street Drugs: Denies    Review of Systems     A comprehensive review of systems was performed and is negative other than noted above.     Allergies     Allergies   Allergen Reactions     No Known Allergies         Mental Status Exam     There were no vitals taken for this visit.    Alertness: alert  and oriented  Appearance: well groomed  Behavior/Demeanor: cooperative, pleasant and calm, with good  eye contact  Speech: normal  Language: intact  Psychomotor: normal or unremarkable  Mood:  description consistent with euthymia  Affect: full range; was congruent to mood  Thought Process/Associations: unremarkable  Thought Content:  Denies suicidal ideation and violent ideation  Perception:  Denies auditory hallucinations and  visual hallucinations  Insight: good  Judgment: good  Cognition:  does appear grossly intact.    Laboratory

## 2017-10-18 NOTE — MR AVS SNAPSHOT
After Visit Summary   10/18/2017    Esa Rivera    MRN: [id number]           Patient Information     Date Of Birth          1985        Visit Information        Provider Department      10/18/2017 1:00 PM Mayito Motley MD Psychiatry [facility]        Today's Diagnoses     Bipolar 1 disorder, manic, full remission (H)    -  1      Care Instructions    Continue current medications  Return in 1 week          Follow-ups after your visit        Follow-up notes from your care team     Return in about 1 week (around 10/25/2017).      Your next 10 appointments already scheduled     Oct 25, 2017 10:00 AM CDT   Adult Med Follow UP with Mayito Motley MD   Psychiatry Clinic ([facility])    [address]  [address] [facility] [address] [phone]              Who to contact     Please call your clinic at [phone] to:    Ask questions about your health    Make or cancel appointments    Discuss your medicines    Learn about your test results    Speak to your doctor   If you have compliments or concerns about an experience at your clinic, or if you wish to file a complaint, please contact [facility] Patient Relations at [phone] or email us at [email or web address]         Additional Information About Your Visit        MyChart Information     [employer] gives you secure access to your electronic health record. If you see a primary care provider, you can also send messages to your care team and make appointments. If you have questions, please call your primary care clinic.  If you do not have a primary care provider, please call [phone] and they will assist you.      [employer] is an electronic gateway that provides easy, online access to your medical records. With [employer], you can request a clinic appointment, read your test results, renew a prescription or communicate with your care team.     To access your  existing account, please contact your [facility] or call [phone] for assistance.        Care EveryWhere ID     This is your Care EveryWhere ID. This could be used by other organizations to access your [place] medical records  [id number]         Blood Pressure from Last 3 Encounters:   09/11/17 120/76   07/24/17 134/80   05/01/17 126/70    Weight from Last 3 Encounters:   09/11/17 108.9 kg (240 lb)   07/24/17 112.9 kg (249 lb)   05/01/17 112.5 kg (248 lb)              Today, you had the following     No orders found for display       Primary Care Provider Office Phone # Fax #    Aristides Jenkins [phone]       [address] [address]        Equal Access to Services     ALEX RUEDA : Hadii aad jovany hadasho Soameena, waaxda luqadaha, qaybta kaalmada adebassamyada, jermaine castillo . So [facility] [phone].    ATENCIÓN: Si habla español, tiene a osorio disposición servicios gratuitos de asistencia lingüística. Sadie al [phone].    We comply with applicable federal civil rights laws and [place] laws. We do not discriminate on the basis of race, color, national origin, age, disability, sex, sexual orientation, or gender identity.            Thank you!     Thank you for choosing [facility]IN[facility]  for your care. Our goal is always to provide you with excellent care. Hearing back from our patients is one way we can continue to improve our services. Please take a few minutes to complete the written survey that you may receive in the mail after your visit with us. Thank you!             Your Updated Medication List - Protect others around you: Learn how to safely use, store and throw away your medicines at www.disposemymeds.org.          This list is accurate as of: 10/18/17  6:55 PM.  Always use your most recent med list.                   Brand Name Dispense Instructions for use Diagnosis    LamoTRIgine 200 MG Tb24     LAMICTAL XR    30 tablet    Take 200 mg by mouth every evening    Bipolar 1 disorder, manic, full remission (H)       OLANZAPINE PO      Take 2.5 mg by mouth as needed 2.5 mg at night

## 2017-10-20 ENCOUNTER — TRANSFERRED RECORDS (OUTPATIENT)
Dept: HEALTH INFORMATION MANAGEMENT | Facility: CLINIC | Age: 32
End: 2017-10-20

## 2017-10-25 ENCOUNTER — OFFICE VISIT (OUTPATIENT)
Dept: PSYCHIATRY | Facility: CLINIC | Age: 32
End: 2017-10-25
Attending: PSYCHIATRY & NEUROLOGY
Payer: COMMERCIAL

## 2017-10-25 VITALS
HEART RATE: 72 BPM | DIASTOLIC BLOOD PRESSURE: 81 MMHG | BODY MASS INDEX: 32.37 KG/M2 | WEIGHT: 242 LBS | SYSTOLIC BLOOD PRESSURE: 133 MMHG

## 2017-10-25 DIAGNOSIS — F31.74 BIPOLAR 1 DISORDER, MANIC, FULL REMISSION (H): Primary | ICD-10-CM

## 2017-10-25 PROCEDURE — 99212 OFFICE O/P EST SF 10 MIN: CPT | Mod: ZF

## 2017-10-25 RX ORDER — LITHIUM CARBONATE 300 MG
TABLET ORAL
Qty: 60 TABLET | Refills: 0 | Status: SHIPPED | OUTPATIENT
Start: 2017-10-25 | End: 2017-11-07

## 2017-10-25 NOTE — PATIENT INSTRUCTIONS
Continue lamotrigine and olanzapine  Add lithium 600 mg at night for 4 days, then increase to 900 mg at night  Get a blood test after 1 week at 900 mg  Return in 2 weeks      Thank you for coming to the PSYCHIATRY CLINIC.    Lab Testing:  If you had lab testing today and your results are reassuring or normal they will be mailed to you or sent through [employer] within 7 days.   If the lab tests need quick action we will call you with the results.  The phone number we will call with results is # [phone] (home) . If this is not the best number please call our clinic and change the number.    Medication Refills:  If you need any refills please call your pharmacy and they will contact us. Our fax number for refills is [phone]. Please allow three business for refill processing.   If you need to  your refill at a new pharmacy, please contact the new pharmacy directly. The new pharmacy will help you get your medications transferred.     Scheduling:  If you have any concerns about today's visit or wish to schedule another appointment please call our office during normal business hours [phone] (8-5:00 M-F)    Contact Us:  Please call [phone] during business hours (8-5:00 M-F).  If after clinic hours, or on the weekend, please call  [phone].    Financial Assistance [phone]  [facility] [phone]  [facility] [phone]  Medical Records [id number]      MENTAL HEALTH CRISIS NUMBERS:  [facility]:   [facility] - [phone]   Crisis Residence [facility] Residence - [phone]   Walk-In Counseling [facility] - [phone]   COPE 24/7 [place] Mobile Team for Adults - [[phone]]; Child - [[phone]]     Crisis Connection - [phone]     [facility]:   [facility] - [phone]   Walk-in counseling [facility] - [phone]   Walk-in counseling [facility] - [phone]   Crisis Residence  [facility] - [phone]   Urgent Care Adult Mental Health:   --Drop-in, 24/7 crisis line, and [facility] Mobile Team [[phone]]    CRISIS TEXT LINE: Text [phone] from anywhere, anytime, any crisis 24/7;    OR SEE www.crisistextline.org     Poison Control Center - [phone]    CHILD: [facility] needs assessment team - [phone]     [facility] - [phone]; or [facility] - [phone]    If you have a medical emergency please call 911or go to the nearest ER.                    _____________________________________________    Again thank you for choosing PSYCHIATRY CLINIC and please let us know how we can best partner with you to improve you and your family's health.  You may be receiving a survey in the mail regarding this appointment. We would love to have your feedback, both positive and negative, so please fill out the survey and return it using the provided envolpe. The survey is done by an external company, so your answers are anonymous.

## 2017-10-25 NOTE — PROGRESS NOTES
October 25, 2017  Progress Note    Esa Rivera is a 32 year old year old male with Bipolar I Disorder, Most Recent Episode Manic Severe, with psychotic behavior (296.40) who presents for ongoing psychiatric care. He got  1 month ago. He has no children. He and his wife (a psychologist) live on their own in [facility]. Esa works as an  for [employer].    Diagnosis    Axis 1: Bipolar I disorder, with severe manic episode with psychosis in Dec 2016. ADHD by history. Alcohol use disorder, moderate, in short-term remission  Axis 2: Nil  Axis 3: Remote concussion x 2  Axis 4: Moderate stressors  Axis 5: GAF at time of visit: 70    Assessment & Plan     Esa Rivera is currently experiencing mild katie. He will add lithium 600 mg HS, increasing to 900 mg HS, to his  mg and OLZ 20 mg. He will get a blood test to check the blood level after a week on 900 mg. He will take the next 2 weeks off work. He will return in 2 weeks. We will hopefully taper and DC OLZ over the next couple of months. He may need treatment for ADHD but this is best assessed during euthymia. Aside from LTG and OLZ, he has not taken mood stabilizing or antidepressant medications. He has used stimulants for ADHD (mostly Adderall, also Vyvanse) but is not currently taking them.    The patient understands the risks, benefits, adverse effects and alternatives. Agrees to treatment with the capacity to do so. No medical contraindications to treatment. Agrees to call clinic for any problems. The patient understands to call 911 or come to the nearest ED if life threatening or urgent symptoms present.    Attestation:  Patient has been seen and evaluated by me, Mayito Motley MD, PhD.    I spent a total time of 60 minutes face-to-face with the patient during today s office visit.  Over 50% of this time was spent counseling the patient and/or coordinating care regarding diagnostic assessment, medication management.    Interim History     I saw  "Esa and his wife today. They report that Esa has had manic symptoms for the last 7-10 days. He has been grandiose about ideas at work (believing he has an idea that will make \"trillions\"). His mood has been elevated and he has been more irritable. He has spent more money than usual, on things he feels are necessary for his work related ideas. He has had ideas of reference, eg believing photographers will following him to document him. He has partial insight into his katie.    He has drank some alcohol - \"more than intended\" - but not heavily/consistently. He denies other substance use.    His manic symptoms have likely been present for several weeks, beginning while he was on his honeymoon. They have escalated over the last week or so.    We had a lengthy discussion about the Dx of bipolar disorder and treatments. We specifically discussed Li and DVP as long-term mood stabilizing medications. We reviewed the benefits and s/e of each.    Esa elects a trial of lithium. He and his wife had several good questions which I answered. He is agreeable with the plan above.      In Dec 2016 Esa experienced an episode of severe katie with psychosis (grandiose delusions). He was seen in the ER but not hospitalized. He had some mild depressive episodes and possible (but questionable) short hypomanic periods in the preceding years, but never received treatment for these.     Esa's manic symptoms began when he became excited about a new idea at work. 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 He became so excited about this the he was unable to sleep. As his katie escalated, his sleep decreased to 1-2 hours per night for 5-6 nights. His affect was elated and also markedly irritable. He would \"go off\" on people for minor things in very uncharacteristic ways. His thoughts were \"really fast, crisp, and clear\". He saw \"connections in " "everything.\" His energy was clearly elevated. He became grandiose, believing that his idea would change the world and as a result he would meet many \"powerful people\". Eventually he came to believe that sings on the radio were meant for him.     Esa was taking Adderall at the time. He has taken this, on and off, for many years. In my opinion it is not an adequate explanation for his manic symptoms.      His mother (a PCP) became concerned and brought him to hospital. He was assessed in the ER and prescribed Ativan for sleep. He was seen by Dr Burt (a psychiatrist) the next day. He was prescribed OLZ and LTG. The doses were titrated \"up and down\" for the next 6 months. There were significant side effects (sedation, cognitive impairment), likely from OLZ. Most recently, Esa stopped OLZ for a period of time. In the context of being on a honeymoon in [place], he develop racing thoughts and difficulty sleeping and restarted it, with benefit. He currently takes 10 mg HS.    Esa endorses several previous short (1-2 day) periods of reduced sleep and elevated mood, typically in the context of getting involved in a work project. At most they represent subthreshold hypomanic periods.     He has also experienced several periods over the years, lasting about a week, of low mood, decreased energy and motivation, poor focus/concentration, and negative thoughts. He denies neurovegetative symptoms or SI. His wife thought that these represented depressive episodes.    sEa has consumed EtOH heavily at points during his life. It was most pronounced during his college years. He has sporadically binge-drank since then. He has not had a drink in 2 weeks.    Past Psych Hx: As above. Esa was assessed at [facility] in Dec 2016 and diagnosed with BDI. He was seen by Psychiatry at age 17 and dxed with ADD. He took Adderall off and on (mostly on) with benefit over the years since then. He denies abusing it.     Past Med Hx: 2 concussion: 1) age " 15 - skiing. Age 16: football. LOC x a couple of minutes with both.    MEDICATION ADHERENCE: Good.   Current Medications     Current Outpatient Prescriptions   Medication Sig Dispense Refill     LamoTRIgine (LAMICTAL XR) 200 MG TB24 Take 200 mg by mouth every evening 30 tablet 11     OLANZAPINE PO Take 2.5 mg by mouth as needed 2.5 mg at night           Substance use     ETOH: Sporadic binge drinking  Cigarettes: NA  Street Drugs: Denies    Review of Systems     A comprehensive review of systems was performed and is negative other than noted above.     Allergies     Allergies   Allergen Reactions     No Known Allergies         Mental Status Exam     Wt 109.8 kg (242 lb)  BMI 32.37 kg/m2    Alertness: alert  and oriented  Appearance: well groomed  Behavior/Demeanor: cooperative, pleasant and calm, with good  eye contact  Speech: normal  Language: intact  Psychomotor: normal or unremarkable  Mood:  description consistent with euthymia  Affect: full range; was congruent to mood  Thought Process/Associations: unremarkable  Thought Content:  Denies suicidal ideation and violent ideation  Perception:  Denies auditory hallucinations and visual hallucinations  Insight: good  Judgment: good  Cognition:  does appear grossly intact.    Laboratory

## 2017-10-25 NOTE — NURSING NOTE
Chief Complaint   Patient presents with     Recheck Medication     Bipolar I disorder     Reviewed allergies, smoking status, and pharmacy preference    Obtained weight, blood pressure and heart rate

## 2017-10-26 ASSESSMENT — PATIENT HEALTH QUESTIONNAIRE - PHQ9: SUM OF ALL RESPONSES TO PHQ QUESTIONS 1-9: 7

## 2017-10-31 ENCOUNTER — TELEPHONE (OUTPATIENT)
Dept: PSYCHIATRY | Facility: CLINIC | Age: 32
End: 2017-10-31

## 2017-10-31 DIAGNOSIS — Z51.81 THERAPEUTIC DRUG MONITORING: ICD-10-CM

## 2017-10-31 DIAGNOSIS — F31.9 BIPOLAR 1 DISORDER (H): Primary | ICD-10-CM

## 2017-10-31 NOTE — TELEPHONE ENCOUNTER
1031/17 at 1142:     Pt Care         Bradley Lam, Ladonna RAMIREZ, RN       Phone Number: [phone] (Call me)                     [facility]   call 10/31/17@11:43am on behave of pt. Caller stated that pt would like to know if he needs take a Lithium Level. Pt asked if either the nurse or provider Motley could please give him a call.

## 2017-10-31 NOTE — TELEPHONE ENCOUNTER
Per 10/25/17 office visit note and med tab:  He will add lithium 600 mg HS for 4 nights then increase to 900 mg HS      PEN 11/8/17.        Routed to Dr. Motley to advise when pt should have lab and if other labs should be ordered.

## 2017-11-01 ENCOUNTER — TELEPHONE (OUTPATIENT)
Dept: PSYCHIATRY | Facility: CLINIC | Age: 32
End: 2017-11-01

## 2017-11-01 NOTE — TELEPHONE ENCOUNTER
Mayito Motley MD   You     11/1/17 (7:54 AM)                He should get CBC/diff, Li level, lytes, BUN, Cr, TSH after 1 week at 900 mg.

## 2017-11-01 NOTE — TELEPHONE ENCOUNTER
Orders placed.  Pt notified via phon call at [phone].  Provided education about lithium level being drawn 12 hours post dose.

## 2017-11-06 DIAGNOSIS — F31.9 BIPOLAR 1 DISORDER (H): ICD-10-CM

## 2017-11-06 DIAGNOSIS — Z51.81 THERAPEUTIC DRUG MONITORING: ICD-10-CM

## 2017-11-06 LAB
ANION GAP SERPL CALCULATED.3IONS-SCNC: 8 MMOL/L (ref 3–14)
BASOPHILS # BLD AUTO: 0 10E9/L (ref 0–0.2)
BASOPHILS NFR BLD AUTO: 0.3 %
BUN SERPL-MCNC: 21 MG/DL (ref 7–30)
CHLORIDE SERPL-SCNC: 105 MMOL/L (ref 94–109)
CO2 SERPL-SCNC: 27 MMOL/L (ref 20–32)
CREAT SERPL-MCNC: 0.99 MG/DL (ref 0.66–1.25)
DIFFERENTIAL METHOD BLD: NORMAL
EOSINOPHIL # BLD AUTO: 0.2 10E9/L (ref 0–0.7)
EOSINOPHIL NFR BLD AUTO: 2.4 %
ERYTHROCYTE [DISTWIDTH] IN BLOOD BY AUTOMATED COUNT: 11.9 % (ref 10–15)
GFR SERPL CREATININE-BSD FRML MDRD: 87 ML/MIN/1.7M2
HCT VFR BLD AUTO: 43.8 % (ref 40–53)
HGB BLD-MCNC: 14.8 G/DL (ref 13.3–17.7)
IMM GRANULOCYTES # BLD: 0 10E9/L (ref 0–0.4)
IMM GRANULOCYTES NFR BLD: 0.1 %
LITHIUM SERPL-SCNC: 0.53 MMOL/L (ref 0.6–1.2)
LYMPHOCYTES # BLD AUTO: 1.4 10E9/L (ref 0.8–5.3)
LYMPHOCYTES NFR BLD AUTO: 19.9 %
MCH RBC QN AUTO: 31 PG (ref 26.5–33)
MCHC RBC AUTO-ENTMCNC: 33.8 G/DL (ref 31.5–36.5)
MCV RBC AUTO: 92 FL (ref 78–100)
MONOCYTES # BLD AUTO: 0.6 10E9/L (ref 0–1.3)
MONOCYTES NFR BLD AUTO: 7.8 %
NEUTROPHILS # BLD AUTO: 5 10E9/L (ref 1.6–8.3)
NEUTROPHILS NFR BLD AUTO: 69.5 %
NRBC # BLD AUTO: 0 10*3/UL
NRBC BLD AUTO-RTO: 0 /100
PLATELET # BLD AUTO: 238 10E9/L (ref 150–450)
POTASSIUM SERPL-SCNC: 4.1 MMOL/L (ref 3.4–5.3)
RBC # BLD AUTO: 4.77 10E12/L (ref 4.4–5.9)
SODIUM SERPL-SCNC: 140 MMOL/L (ref 133–144)
TSH SERPL DL<=0.005 MIU/L-ACNC: 3.16 MU/L (ref 0.4–4)
WBC # BLD AUTO: 7.2 10E9/L (ref 4–11)

## 2017-11-06 PROCEDURE — 84520 ASSAY OF UREA NITROGEN: CPT | Performed by: PSYCHIATRY & NEUROLOGY

## 2017-11-06 PROCEDURE — 84443 ASSAY THYROID STIM HORMONE: CPT | Performed by: PSYCHIATRY & NEUROLOGY

## 2017-11-06 PROCEDURE — 82565 ASSAY OF CREATININE: CPT | Performed by: PSYCHIATRY & NEUROLOGY

## 2017-11-06 PROCEDURE — 80051 ELECTROLYTE PANEL: CPT | Performed by: PSYCHIATRY & NEUROLOGY

## 2017-11-06 PROCEDURE — 85025 COMPLETE CBC W/AUTO DIFF WBC: CPT | Performed by: PSYCHIATRY & NEUROLOGY

## 2017-11-06 PROCEDURE — 36415 COLL VENOUS BLD VENIPUNCTURE: CPT | Performed by: PSYCHIATRY & NEUROLOGY

## 2017-11-06 PROCEDURE — 80178 ASSAY OF LITHIUM: CPT | Performed by: PSYCHIATRY & NEUROLOGY

## 2017-11-07 ENCOUNTER — TELEPHONE (OUTPATIENT)
Dept: PSYCHIATRY | Facility: CLINIC | Age: 32
End: 2017-11-07

## 2017-11-07 DIAGNOSIS — F31.74 BIPOLAR 1 DISORDER, MANIC, FULL REMISSION (H): ICD-10-CM

## 2017-11-07 DIAGNOSIS — Z79.899 ENCOUNTER FOR LONG-TERM (CURRENT) USE OF MEDICATIONS: Primary | ICD-10-CM

## 2017-11-07 RX ORDER — LITHIUM CARBONATE 300 MG
1200 TABLET ORAL AT BEDTIME
Qty: 60 TABLET | Refills: 0 | COMMUNITY
Start: 2017-11-07 | End: 2017-11-08

## 2017-11-07 NOTE — TELEPHONE ENCOUNTER
----- Message from Jamila Guillory RN sent at 11/7/2017  9:15 AM CST -----      ----- Message -----     From: Mayito Motley MD     Sent: 11/7/2017   8:56 AM       To: MAE Uriostegui -     Can you give Carissa call to follow up on his labs?    His Li level yesterday was 0.53. If he has been on lithium 900 mg HS for a week, and he is tolerating it OK, then I'll suggest her should increase the dose to 1200 mg and get another blood test to check the level after 1 week at that amount.    Thx!    DB

## 2017-11-07 NOTE — TELEPHONE ENCOUNTER
Spoke with pt to pass along recommendations per Dr. Motley.  Reviewed lab results.  Pt is agreeable to increasing Lithium to 1200 mg QHS.  Will likely need a refill of Lithium and Zypexa at appt tomorrow.    Pt also mentions two other items that he would like to discuss with provider tomorrow:    1. When pt obtained his lithium prescription from the pharmacy, was advised against drinking coffee and tea by the pharmacist.  Pt seemed uncertain about the reason for this and mentions that the pharmacist retracted the recommendation after discussing.  Writer stated that it is possible that it was not recommended to consume these if there was a concern about dehydration secondary to caffeine (diuresis).  Pt reports that he consumes other non-caffeinated beverages and continues to drink coffee, kombucha, and herbal tea.  Will discuss specific herbal teas at upcoming appt to see if there are any interactions.      2. Pt plans to bring disability paperwork to appt tomorrow.  Reports that provider will need to complete- Attending Physician's Statement.  Reports that this is due on 11/11/17.    Routed to Dr. Motley.

## 2017-11-08 ENCOUNTER — TELEPHONE (OUTPATIENT)
Dept: PSYCHIATRY | Facility: CLINIC | Age: 32
End: 2017-11-08

## 2017-11-08 ENCOUNTER — OFFICE VISIT (OUTPATIENT)
Dept: PSYCHIATRY | Facility: CLINIC | Age: 32
End: 2017-11-08
Attending: PSYCHIATRY & NEUROLOGY
Payer: COMMERCIAL

## 2017-11-08 VITALS
WEIGHT: 247 LBS | SYSTOLIC BLOOD PRESSURE: 127 MMHG | HEART RATE: 64 BPM | BODY MASS INDEX: 33.04 KG/M2 | DIASTOLIC BLOOD PRESSURE: 81 MMHG

## 2017-11-08 DIAGNOSIS — Z51.81 ENCOUNTER FOR THERAPEUTIC DRUG MONITORING: ICD-10-CM

## 2017-11-08 DIAGNOSIS — F31.9 BIPOLAR 1 DISORDER (H): Primary | ICD-10-CM

## 2017-11-08 DIAGNOSIS — F31.74 BIPOLAR 1 DISORDER, MANIC, FULL REMISSION (H): ICD-10-CM

## 2017-11-08 PROCEDURE — 99212 OFFICE O/P EST SF 10 MIN: CPT | Mod: ZF

## 2017-11-08 RX ORDER — LITHIUM CARBONATE 300 MG
1200 TABLET ORAL AT BEDTIME
Qty: 120 TABLET | Refills: 0 | Status: SHIPPED | OUTPATIENT
Start: 2017-11-08 | End: 2017-11-15

## 2017-11-08 RX ORDER — LAMOTRIGINE 200 MG/1
200 TABLET, EXTENDED RELEASE ORAL EVERY EVENING
Qty: 30 TABLET | Refills: 0 | Status: SHIPPED | OUTPATIENT
Start: 2017-11-08 | End: 2017-12-06

## 2017-11-08 RX ORDER — OLANZAPINE 20 MG/1
20 TABLET ORAL AT BEDTIME
Qty: 30 TABLET | Refills: 0 | Status: SHIPPED | OUTPATIENT
Start: 2017-11-08 | End: 2017-11-10

## 2017-11-08 NOTE — NURSING NOTE
Chief Complaint   Patient presents with     Recheck Medication     Bipolar 1 disorder         Reviewed allergies, smoking status, and pharmacy preference  Administered abuse screening questions   Obtained weight, blood pressure and heart rate

## 2017-11-08 NOTE — MR AVS SNAPSHOT
After Visit Summary   11/8/2017    Esa Rivera    MRN: [id number]           Patient Information     Date Of Birth          1985        Visit Information        Provider Department      11/8/2017 2:30 PM Mayito Motley MD Psychiatry Clinic        Today's Diagnoses     Bipolar 1 disorder (H)    -  1      Care Instructions    Continue current medications  Blood test in 1 week  Return in 2 weeks          Follow-ups after your visit        Follow-up notes from your care team     Return in about 2 weeks (around 11/22/2017).      Your next 10 appointments already scheduled     Nov 22, 2017  2:30 PM CST   Adult Med Follow UP with Mayito Motley MD   Psychiatry Clinic ([facility])    [address]  [address] [facility] [address] [phone]              Future tests that were ordered for you today     Open Future Orders        Priority Expected Expires Ordered    Lithium Level Routine 11/9/2017 11/8/2018 11/8/2017    [place] Level Routine 11/13/2017 11/7/2018 11/7/2017            Who to contact     Please call your clinic at [phone] to:    Ask questions about your health    Make or cancel appointments    Discuss your medicines    Learn about your test results    Speak to your doctor   If you have compliments or concerns about an experience at your clinic, or if you wish to file a complaint, please contact [facility] Patient Relations at [phone] or email us at [email or web address]         Additional Information About Your Visit        MyChart Information     MyChart gives you secure access to your electronic health record. If you see a primary care provider, you can also send messages to your care team and make appointments. If you have questions, please call your primary care clinic.  If you do not have a primary care provider, please call [phone] and they will assist you.      [employer] is an  electronic gateway that provides easy, online access to your medical records. With [employer], you can request a clinic appointment, read your test results, renew a prescription or communicate with your care team.     To access your existing account, please contact your [facility] or call [phone] for assistance.        Care EveryWhere ID     This is your Care EveryWhere ID. This could be used by other organizations to access your [facility] medical records  [id number]        Your Vitals Were     Pulse BMI (Body Mass Index)                64 33.04 kg/m2           Blood Pressure from Last 3 Encounters:   11/08/17 127/81   10/25/17 133/81   09/11/17 120/76    Weight from Last 3 Encounters:   11/08/17 112 kg (247 lb)   10/25/17 109.8 kg (242 lb)   09/11/17 108.9 kg (240 lb)              Today, you had the following     No orders found for display       Primary Care Provider Office Phone # Fax #    Aristides Jenkins [phone]       [address] [address]        Equal Access to Services     [facility]DRINA : Hadii aad ku hadasho Soameena, waaxda luqadaha, qaybta kaalmada treva, jermaine castillo . So [facility] [phone].    ATENCIÓN: Si habla español, tiene a osorio disposición servicios gratuitos de asistencia lingüística. Sadie al [phone].    We comply with applicable federal civil rights laws and [place] laws. We do not discriminate on the basis of race, color, national origin, age, disability, sex, sexual orientation, or gender identity.            Thank you!     Thank you for choosing PSYCHIATRY CLINIC  for your care. Our goal is always to provide you with excellent care. Hearing back from our patients is one way we can continue to improve our services. Please take a few minutes to complete the written survey that you may receive in the mail after your visit with us. Thank you!             Your Updated  Medication List - Protect others around you: Learn how to safely use, store and throw away your medicines at www.disposemymeds.org.          This list is accurate as of: 11/8/17  3:33 PM.  Always use your most recent med list.                   Brand Name Dispense Instructions for use Diagnosis    LamoTRIgine 200 MG Tb24    LAMICTAL XR    30 tablet    Take 200 mg by mouth every evening    Bipolar 1 disorder, manic, full remission (H)       lithium 300 MG tablet     60 tablet    Take 4 tablets (1,200 mg) by mouth At Bedtime    Bipolar 1 disorder, manic, full remission (H)       OLANZAPINE PO      Take 2.5 mg by mouth as needed 2.5 mg at night

## 2017-11-08 NOTE — TELEPHONE ENCOUNTER
Spoke with pt regarding another matter and he requests RFs for the followin.  Olanzapine 20 mg po q HS   2.  Lamictal  mg q evening  3.  Lithium 1200 mg q HS    All doses are verified in 17 note and sent to [facility] pharmacy.

## 2017-11-08 NOTE — PROGRESS NOTES
Nov 8, 2017  Progress Note    Esa Rivera is a 32 year old year old male with Bipolar I Disorder, Most Recent Episode Manic Severe, with psychotic behavior (296.40) who presents for ongoing psychiatric care. He got  1 month ago. He has no children. He and his wife (a psychologist) live on their own in [place]. Esa works as an  for [employer].    Diagnosis    Axis 1: Bipolar I disorder, with severe manic episode with psychosis in Dec 2016. ADHD by history. Alcohol use disorder, moderate, in short-term remission  Axis 2: Nil  Axis 3: Remote concussion x 2  Axis 4: Moderate stressors  Axis 5: GAF at time of visit: 70    Assessment & Plan     Esa Rivera is currently improved but not recovered from his mild katie. He has increased lithium to 1200 mg HS, and will continue this. He will continue  mg and OLZ 20 mg. He will get a blood test to check the blood level in one week. He will take the next 2 weeks off work. He will return in 2 weeks. We will hopefully taper and DC OLZ over the next couple of months. He may need treatment for ADHD but this is best assessed during euthymia. Aside from LTG and OLZ, he has not taken mood stabilizing or antidepressant medications. He has used stimulants for ADHD (mostly Adderall, also Vyvanse) but is not currently taking them.    The patient understands the risks, benefits, adverse effects and alternatives. Agrees to treatment with the capacity to do so. No medical contraindications to treatment. Agrees to call clinic for any problems. The patient understands to call 911 or come to the nearest ED if life threatening or urgent symptoms present.    Attestation:  Patient has been seen and evaluated by me, Mayito Motley MD, PhD.    I spent a total time of 30 minutes face-to-face with the patient during today s office visit.  Over 50% of this time was spent counseling the patient and/or coordinating care regarding diagnostic assessment, medication  "management.    Interim History     I saw Esa and his wife today. They report that Esa has improved since starting lithium. His mood is more stable and he is less irritable. He is less fixated on his idea about home fuel cells. He and his wife feels he is still not completely back to baseline.    He is tolerating lithium well. He does sleep about 12 hours per night but OLZ could also be contributing to this.     Esa and his wife are agreeable with the above plan. He will return in 2 weeks.      In Dec 2016 Esa experienced an episode of severe katie with psychosis (grandiose delusions). He was seen in the ER but not hospitalized. He had some mild depressive episodes and possible (but questionable) short hypomanic periods in the preceding years, but never received treatment for these.     Esa's manic symptoms began when he became excited about a new idea at work. 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 He became so excited about this the he was unable to sleep. As his katie escalated, his sleep decreased to 1-2 hours per night for 5-6 nights. His affect was elated and also markedly irritable. He would \"go off\" on people for minor things in very uncharacteristic ways. His thoughts were \"really fast, crisp, and clear\". He saw \"connections in everything.\" His energy was clearly elevated. He became grandiose, believing that his idea would change the world and as a result he would meet many \"powerful people\". Eventually he came to believe that sings on the radio were meant for him.     Esa was taking Adderall at the time. He has taken this, on and off, for many years. In my opinion it is not an adequate explanation for his manic symptoms.      His mother (a PCP) became concerned and brought him to hospital. He was assessed in the ER and prescribed Ativan for sleep. He was seen by Dr Burt (a psychiatrist) the next day. He " "was prescribed OLZ and LTG. The doses were titrated \"up and down\" for the next 6 months. There were significant side effects (sedation, cognitive impairment), likely from OLZ. Most recently, Esa stopped OLZ for a period of time. In the context of being on a honeymoon in [place], he develop racing thoughts and difficulty sleeping and restarted it, with benefit. He currently takes 10 mg HS.    Esa endorses several previous short (1-2 day) periods of reduced sleep and elevated mood, typically in the context of getting involved in a work project. At most they represent subthreshold hypomanic periods.     He has also experienced several periods over the years, lasting about a week, of low mood, decreased energy and motivation, poor focus/concentration, and negative thoughts. He denies neurovegetative symptoms or SI. His wife thought that these represented depressive episodes.    Esa has consumed EtOH heavily at points during his life. It was most pronounced during his college years. He has sporadically binge-drank since then. He has not had a drink in 2 weeks.    Past Psych Hx: As above. Esa was assessed at [facility] in Dec 2016 and diagnosed with BDI. He was seen by Psychiatry at age 17 and dxed with ADD. He took Adderall off and on (mostly on) with benefit over the years since then. He denies abusing it.     Past Med Hx: 2 concussion: 1) age 15 - skiing. Age 16: football. LOC x a couple of minutes with both.    MEDICATION ADHERENCE: Good.   Current Medications     Current Outpatient Prescriptions   Medication Sig Dispense Refill     lithium 300 MG tablet Take 4 tablets (1,200 mg) by mouth At Bedtime 60 tablet 0     LamoTRIgine (LAMICTAL XR) 200 MG TB24 Take 200 mg by mouth every evening 30 tablet 11     OLANZAPINE PO Take 2.5 mg by mouth as needed 2.5 mg at night           Substance use     ETOH: Sporadic binge drinking  Cigarettes: NA  Street Drugs: Denies    Review of Systems     A comprehensive review of systems was " performed and is negative other than noted above.     Allergies     Allergies   Allergen Reactions     No Known Allergies         Mental Status Exam     /81  Pulse 64  Wt 112 kg (247 lb)  BMI 33.04 kg/m2    Alertness: alert  and oriented  Appearance: well groomed  Behavior/Demeanor: cooperative, pleasant and calm, with good  eye contact  Speech: normal  Language: intact  Psychomotor: normal or unremarkable  Mood:  description consistent with euthymia  Affect: full range; was congruent to mood  Thought Process/Associations: unremarkable  Thought Content:  Denies suicidal ideation and violent ideation  Perception:  Denies auditory hallucinations and visual hallucinations  Insight: good  Judgment: good  Cognition:  does appear grossly intact.    Laboratory

## 2017-11-09 ENCOUNTER — TELEPHONE (OUTPATIENT)
Dept: PSYCHIATRY | Facility: CLINIC | Age: 32
End: 2017-11-09

## 2017-11-09 ASSESSMENT — PATIENT HEALTH QUESTIONNAIRE - PHQ9: SUM OF ALL RESPONSES TO PHQ QUESTIONS 1-9: 4

## 2017-11-09 NOTE — TELEPHONE ENCOUNTER
Rec'd disability form from:  Patient    Regarding:  Short term disability benefits     Requesting:   -Completion of form  -All office notes, H&P, lab results    When completed:  Fax to  [facility]  Fax [phone]  Phone [phone]    IRMA is included.  Signed on 11/8/17.

## 2017-11-09 NOTE — TELEPHONE ENCOUNTER
On 9/12/2017 the patient signed an IRMA authorizing the release of records from [facility] to [facility].  I faxed the form to [phone], sent the original to scanning and held a copy in Psychiatry until scanning complete/confirmed.Jenni Valle/YIMI

## 2017-11-10 ENCOUNTER — TELEPHONE (OUTPATIENT)
Dept: PSYCHIATRY | Facility: CLINIC | Age: 32
End: 2017-11-10

## 2017-11-10 DIAGNOSIS — F31.74 BIPOLAR 1 DISORDER, MANIC, FULL REMISSION (H): ICD-10-CM

## 2017-11-10 RX ORDER — OLANZAPINE 20 MG/1
TABLET ORAL
Qty: 1 TABLET | Refills: 0 | COMMUNITY
Start: 2017-11-10 | End: 2017-12-06

## 2017-11-10 NOTE — TELEPHONE ENCOUNTER
----- Message from Pat Chacon sent at 11/10/2017 11:52 AM CST -----  Regarding: Med side effects  Contact: [phone]  Wife Julia called, Esa is having trouble falling asleep, it takes him several hours each night since taking new meds and she is wondering if there is anything they can do about the sleep mostly but also the other. Phone number is [facility].    Ok to leave VM: Yes

## 2017-11-10 NOTE — TELEPHONE ENCOUNTER
Form completed and faxed along with all office visit notes and 11/6/17 Lithium level result.  Faxed to below contact info.      Copy of form and IRMA:  -sent to scanning  -retained in clinic until scanning is confirmed

## 2017-11-10 NOTE — TELEPHONE ENCOUNTER
Mayito Motley MD   You     11/10/17  (4:02 PM)                 We increased Esa's lithium to 1200 mg HS at his last appointment. That's unlikely to be contributing to his lack of sleep. I'll suggest:     - increase olanzapine to 30 mg at night for the next week   - continue lithium and lamotrigine   - 1-2 cups of coffee per day, max, and none after noon   - if he is still having trouble with sleep try melatonin 3-9 mg along with current meds   - he can see me next week if symptoms re worsening

## 2017-11-10 NOTE — TELEPHONE ENCOUNTER
Left detailed VM for pt with Dr. Motley's recommendations below.  Updated med tab to reflect temporary increase in olanzapine.

## 2017-11-10 NOTE — TELEPHONE ENCOUNTER
"Called pt.  Since taking current medications it's been taking him only 5-10 minutes to fall asleep.  However, the last 2 nights it's taken about 2 hours to fall asleep.  Last night he slept ~6 hours.  Night before he slept ~8 hours.  Once he falls asleep he is able to stay asleep.  \"So much on mind that I can't relax\"...\"racing thoughts\"...\"thinking and I can't stop\"....\"maybe too focused on things\".  Denies increased sxs of katie stating he feels manic he has energy despite lack of sleep.  Currently he can tell he is less rested.  Denies change in bedtime routine.   Admits that he may have had caffeine too late in the day yesterday.  Used a delta wave sleep machine which was helpful.    Reports less cognitive speed and function & increased difficulty with attention.  Agrees with description of feeling foggy and having cloudy thoughts.  He recalls the discussion with Dr. Motley and his wife that he shouldn't take Adderall at this time.     He would be interested in a sleep aid but doesn't want to rely on one every night.       Routed to Dr. Motley to advise.    "

## 2017-11-14 ENCOUNTER — TELEPHONE (OUTPATIENT)
Dept: PSYCHIATRY | Facility: CLINIC | Age: 32
End: 2017-11-14

## 2017-11-14 NOTE — TELEPHONE ENCOUNTER
----- Message from Arabella Harry sent at 11/14/2017  2:04 PM CST -----  Tolu/Ladonna    RN with [employer] is calling about pt's disability claim. She says that they have received the pt's last office visit note. She is looking for exam findings to support his disability. She says that the mental status exam/notes is within normal limits, so she needs something to support his claim. She asked for a call back.      [phone]  Ok for detailed message.

## 2017-11-15 ENCOUNTER — OFFICE VISIT (OUTPATIENT)
Dept: PSYCHIATRY | Facility: CLINIC | Age: 32
End: 2017-11-15
Attending: PSYCHIATRY & NEUROLOGY
Payer: COMMERCIAL

## 2017-11-15 ENCOUNTER — TELEPHONE (OUTPATIENT)
Dept: PSYCHIATRY | Facility: CLINIC | Age: 32
End: 2017-11-15

## 2017-11-15 VITALS
DIASTOLIC BLOOD PRESSURE: 93 MMHG | BODY MASS INDEX: 33.15 KG/M2 | SYSTOLIC BLOOD PRESSURE: 138 MMHG | HEART RATE: 55 BPM | WEIGHT: 247.8 LBS

## 2017-11-15 DIAGNOSIS — F31.74 BIPOLAR 1 DISORDER, MANIC, FULL REMISSION (H): ICD-10-CM

## 2017-11-15 DIAGNOSIS — F31.10 BIPOLAR I DISORDER, MOST RECENT EPISODE (OR CURRENT) MANIC (H): Primary | ICD-10-CM

## 2017-11-15 DIAGNOSIS — Z79.899 ENCOUNTER FOR LONG-TERM (CURRENT) USE OF MEDICATIONS: ICD-10-CM

## 2017-11-15 LAB — LITHIUM SERPL-SCNC: 0.75 MMOL/L (ref 0.6–1.2)

## 2017-11-15 PROCEDURE — 99212 OFFICE O/P EST SF 10 MIN: CPT | Mod: ZF

## 2017-11-15 PROCEDURE — 80178 ASSAY OF LITHIUM: CPT | Performed by: PSYCHIATRY & NEUROLOGY

## 2017-11-15 PROCEDURE — 36415 COLL VENOUS BLD VENIPUNCTURE: CPT | Performed by: PSYCHIATRY & NEUROLOGY

## 2017-11-15 RX ORDER — LITHIUM CARBONATE 300 MG
1500 TABLET ORAL AT BEDTIME
Qty: 150 TABLET | Refills: 0 | Status: SHIPPED | OUTPATIENT
Start: 2017-11-15 | End: 2017-11-24

## 2017-11-15 NOTE — MR AVS SNAPSHOT
After Visit Summary   11/15/2017    Esa Rivera    MRN: [id number]           Patient Information     Date Of Birth          1985        Visit Information        Provider Department      11/15/2017 10:30 AM Mayito Motley MD Psychiatry Clinic        Today's Diagnoses     Bipolar I disorder, most recent episode (or current) manic (H)    -  1      Care Instructions    We will contact you with recommendations about medication changes.  Return in 1 week          Follow-ups after your visit        Follow-up notes from your care team     Return in about 1 week (around 11/22/2017).      Your next 10 appointments already scheduled     Nov 22, 2017  2:30 PM CST   Adult Med Follow UP with Mayito Motley MD   Psychiatry Clinic ([facility])    [address] [facility] [address] [phone]              Who to contact     Please call your clinic at [phone] to:    Ask questions about your health    Make or cancel appointments    Discuss your medicines    Learn about your test results    Speak to your doctor   If you have compliments or concerns about an experience at your clinic, or if you wish to file a complaint, please contact [facility] Patient Relations at [phone] or email us at [email or web address]         Additional Information About Your Visit        MyChart Information     [employer] gives you secure access to your electronic health record. If you see a primary care provider, you can also send messages to your care team and make appointments. If you have questions, please call your primary care clinic.  If you do not have a primary care provider, please call [phone] and they will assist you.      [employer] is an electronic gateway that provides easy, online access to your medical records. With [employer], you can request a clinic appointment, read your test results, renew a prescription  or communicate with your care team.     To access your existing account, please contact your [facility] or call [phone] for assistance.        Care EveryWhere ID     This is your Care EveryWhere ID. This could be used by other organizations to access your [facility] medical records  [id number]        Your Vitals Were     Pulse BMI (Body Mass Index)                55 33.15 kg/m2           Blood Pressure from Last 3 Encounters:   11/15/17 (!) 138/93   11/08/17 127/81   10/25/17 133/81    Weight from Last 3 Encounters:   11/15/17 112.4 kg (247 lb 12.8 oz)   11/08/17 112 kg (247 lb)   10/25/17 109.8 kg (242 lb)              Today, you had the following     No orders found for display       Primary Care Provider Office Phone # Fax #    Aristides Jenkins [phone]       [address] [facility] [address]  [facility] [address]        Equal Access to Services     [facility]INA : Hadii aad ku hadasho Soomaali, waaxda luqadaha, qaybta kaalmada adeegyada, waxay idiin hayjcn adilson kharabarrie castillo . So [facility] [phone].    ATENCIÓN: Si habla español, tiene a osorio disposición servicios gratuitos de asistencia lingüística. Juan Rame al [phone].    We comply with applicable federal civil rights laws and [place] laws. We do not discriminate on the basis of race, color, national origin, age, disability, sex, sexual orientation, or gender identity.            Thank you!     Thank you for choosing PSYCHIATRY CLINIC  for your care. Our goal is always to provide you with excellent care. Hearing back from our patients is one way we can continue to improve our services. Please take a few minutes to complete the written survey that you may receive in the mail after your visit with us. Thank you!             Your Updated Medication List - Protect others around you: Learn how to safely use, store and throw away your medicines at www.disposemymeds.org.          This list is accurate as of:  11/15/17 11:01 AM.  Always use your most recent med list.                   Brand Name Dispense Instructions for use Diagnosis    LamoTRIgine 200 MG Tb24    LAMICTAL XR    30 tablet    Take 200 mg by mouth every evening    Bipolar 1 disorder, manic, full remission (H)       lithium 300 MG tablet     120 tablet    Take 4 tablets (1,200 mg) by mouth At Bedtime    Bipolar 1 disorder, manic, full remission (H)       OLANZapine 20 MG tablet    zyPREXA    1 tablet    Take 1.5 tabs (30 mg) po at HS for the next week to aid in sleep initiation.  Plan is to go back to 20 mg po q HS after that.    Bipolar 1 disorder, manic, full remission (H)

## 2017-11-15 NOTE — TELEPHONE ENCOUNTER
Called pt.  Reviewed results.  He's agreeable to the increase and will have a lithium level in 1 week.  Rx sent.  Lab order placed.

## 2017-11-15 NOTE — TELEPHONE ENCOUNTER
----- Message from Mayito Motley MD sent at 11/15/2017 12:09 PM CST -----  Chris Dougherty -     Would you mind giving Carissa call to discuss medications? I saw him this am and he was still experiencing mild katie. I wanted to see his Li level before making recommendations. It came back low (0.75). Can you ask him to increase the dose from 1200 mg to 1500 mg HS? He should get the level checked again in a week.    Thx!    DB

## 2017-11-15 NOTE — PROGRESS NOTES
Nov 15, 2017  Progress Note    Esa Rivera is a 32 year old year old male with Bipolar I Disorder, Most Recent Episode Manic Severe, with psychotic behavior (296.40) who presents for ongoing psychiatric care. He got  1 month ago. He has no children. He and his wife (a psychologist) live on their own in [facility]. Esa works as an  for [employer].    Diagnosis    Axis 1: Bipolar I disorder, with severe manic episode with psychosis in Dec 2016. ADHD by history. Alcohol use disorder, moderate, in short-term remission  Axis 2: Nil  Axis 3: Remote concussion x 2  Axis 4: Moderate stressors  Axis 5: GAF at time of visit: 70    Assessment & Plan     Esa Rivera continues to display mild manic symptoms. He has increased lithium to 1200 mg HS, and got a blood test this morning, but I have not yet seen the results. He continues OLZ 20 mg. He takes melatonin 3-6 mg PRN for sleep, with benefit.  Once his lithium level is available I will recommend either 1) increasing lithium or 2) increasing OLZ to target his ramining manic symptoms. He will return in 2 weeks. We will hopefully taper and DC OLZ over the next couple of months. He may need treatment for ADHD but this is best assessed during euthymia. Aside from LTG and OLZ, he has not taken mood stabilizing or antidepressant medications. He has used stimulants for ADHD (mostly Adderall, also Vyvanse) but is not currently taking them.    The patient understands the risks, benefits, adverse effects and alternatives. Agrees to treatment with the capacity to do so. No medical contraindications to treatment. Agrees to call clinic for any problems. The patient understands to call 911 or come to the nearest ED if life threatening or urgent symptoms present.    Attestation:  Patient has been seen and evaluated by me, Mayito Motley MD, PhD.    I spent a total time of 30 minutes face-to-face with the patient and his wife during today s office visit.  Over 50% of this time  "was spent counseling the patient and/or coordinating care regarding diagnostic assessment, medication management.    Interim History     I saw Esa and his wife today. They report that Esa's manic symptoms persist. His mood is elevated and he is more social and talkative than usual. He is dressing in suits whenever he goes out of the house, which is unusual for him. He talks about being a \"new Esa\". He remains preoccupied with his grandiose ideas and often has trouble settling to sleep. He is distractible and finds it hard to concentrate when he is reading. His insight is limited; his wife and parents note the changes more than Esa does.      He is tolerating lithium well. He has a mild hand tremor.     Esa and his wife are agreeable with the above plan. He will return for his scheduled appointment in 1 week.      In Dec 2016 Esa experienced an episode of severe katie with psychosis (grandiose delusions). He was seen in the ER but not hospitalized. He had some mild depressive episodes and possible (but questionable) short hypomanic periods in the preceding years, but never received treatment for these.     Esa's manic symptoms began when he became excited about a new idea at work. 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 He became so excited about this the he was unable to sleep. As his katie escalated, his sleep decreased to 1-2 hours per night for 5-6 nights. His affect was elated and also markedly irritable. He would \"go off\" on people for minor things in very uncharacteristic ways. His thoughts were \"really fast, crisp, and clear\". He saw \"connections in everything.\" His energy was clearly elevated. He became grandiose, believing that his idea would change the world and as a result he would meet many \"powerful people\". Eventually he came to believe that sings on the radio were meant for him.     Esa was taking " "Adderall at the time. He has taken this, on and off, for many years. In my opinion it is not an adequate explanation for his manic symptoms.      His mother (a PCP) became concerned and brought him to hospital. He was assessed in the ER and prescribed Ativan for sleep. He was seen by Dr Burt (a psychiatrist) the next day. He was prescribed OLZ and LTG. The doses were titrated \"up and down\" for the next 6 months. There were significant side effects (sedation, cognitive impairment), likely from OLZ. Most recently, Esa stopped OLZ for a period of time. In the context of being on a honeymoon in [place], he develop racing thoughts and difficulty sleeping and restarted it, with benefit. He currently takes 10 mg HS.    Esa endorses several previous short (1-2 day) periods of reduced sleep and elevated mood, typically in the context of getting involved in a work project. At most they represent subthreshold hypomanic periods.     He has also experienced several periods over the years, lasting about a week, of low mood, decreased energy and motivation, poor focus/concentration, and negative thoughts. He denies neurovegetative symptoms or SI. His wife thought that these represented depressive episodes.    Esa has consumed EtOH heavily at points during his life. It was most pronounced during his college years. He has sporadically binge-drank since then. He has not had a drink in 2 weeks.    Past Psych Hx: As above. Esa was assessed at [place] in Dec 2016 and diagnosed with BDI. He was seen by Psychiatry at age 17 and dxed with ADD. He took Adderall off and on (mostly on) with benefit over the years since then. He denies abusing it.     Past Med Hx: 2 concussion: 1) age 15 - skiing. Age 16: football. LOC x a couple of minutes with both.    MEDICATION ADHERENCE: Good.   Current Medications     Current Outpatient Prescriptions   Medication Sig Dispense Refill     OLANZapine (ZYPREXA) 20 MG tablet Take 1.5 tabs (30 mg) po at HS " for the next week to aid in sleep initiation.  Plan is to go back to 20 mg po q HS after that. 1 tablet 0     LamoTRIgine (LAMICTAL XR) 200 MG TB24 Take 200 mg by mouth every evening 30 tablet 0     lithium 300 MG tablet Take 4 tablets (1,200 mg) by mouth At Bedtime 120 tablet 0         Substance use     ETOH: Sporadic binge drinking  Cigarettes: NA  Street Drugs: Denies    Review of Systems     A comprehensive review of systems was performed and is negative other than noted above.     Allergies     Allergies   Allergen Reactions     No Known Allergies         Mental Status Exam     BP (!) 138/93  Pulse 55  Wt 112.4 kg (247 lb 12.8 oz)  BMI 33.15 kg/m2    Alertness: alert  and oriented  Appearance: well groomed  Behavior/Demeanor: cooperative, pleasant and calm, with good  eye contact  Speech: normal  Language: intact  Psychomotor: normal or unremarkable  Mood:  elevated  Affect: labile; was congruent to mood  Thought Process/Associations: unremarkable  Thought Content:  Denies suicidal ideation and violent ideation. He has grandiose over-valued ideas.  Perception:  Denies auditory hallucinations and visual hallucinations  Insight: good  Judgment: good  Cognition:  does appear grossly intact.    Laboratory

## 2017-11-22 ENCOUNTER — OFFICE VISIT (OUTPATIENT)
Dept: PSYCHIATRY | Facility: CLINIC | Age: 32
End: 2017-11-22
Attending: PSYCHIATRY & NEUROLOGY
Payer: COMMERCIAL

## 2017-11-22 VITALS
HEART RATE: 50 BPM | DIASTOLIC BLOOD PRESSURE: 79 MMHG | SYSTOLIC BLOOD PRESSURE: 130 MMHG | WEIGHT: 259.2 LBS | BODY MASS INDEX: 34.67 KG/M2

## 2017-11-22 DIAGNOSIS — F31.10 BIPOLAR I DISORDER, MOST RECENT EPISODE (OR CURRENT) MANIC (H): Primary | ICD-10-CM

## 2017-11-22 PROCEDURE — 99212 OFFICE O/P EST SF 10 MIN: CPT | Mod: ZF

## 2017-11-22 NOTE — MR AVS SNAPSHOT
After Visit Summary   11/22/2017    Esa Rivera    MRN: [id number]           Patient Information     Date Of Birth          1985        Visit Information        Provider Department      11/22/2017 2:30 PM Mayito Motley MD [facility]        Today's Diagnoses     Bipolar I disorder, most recent episode (or current) manic (H)    -  1      Care Instructions    Blood test on Friday to check lithium level  Take acetaminophen if you need something for a headache  Return in 1 week          Follow-ups after your visit        Follow-up notes from your care team     Return in about 1 week (around 11/29/2017).      Your next 10 appointments already scheduled     Nov 29, 2017  9:00 AM CST   Adult Med Follow UP with Mayito Motley MD   Psychiatry Clinic ([facility])    [place] [id number] [facility] [address] [phone]              Who to contact     Please call your clinic at [phone] to:    Ask questions about your health    Make or cancel appointments    Discuss your medicines    Learn about your test results    Speak to your doctor   If you have compliments or concerns about an experience at your clinic, or if you wish to file a complaint, please contact [facility] Patient Relations at [phone] or email us at [email or web address]         Additional Information About Your Visit        MyChart Information     [employer] gives you secure access to your electronic health record. If you see a primary care provider, you can also send messages to your care team and make appointments. If you have questions, please call your primary care clinic.  If you do not have a primary care provider, please call [phone] and they will assist you.      [employer] is an electronic gateway that provides easy, online access to your medical records. With [employer], you can request a clinic appointment, read your  test results, renew a prescription or communicate with your care team.     To access your existing account, please contact your [facility] or call [phone] for assistance.        Care EveryWhere ID     This is your Care EveryWhere ID. This could be used by other organizations to access your [facility] medical records  [id number]        Your Vitals Were     Pulse BMI (Body Mass Index)                50 34.67 kg/m2           Blood Pressure from Last 3 Encounters:   11/22/17 130/79   11/15/17 (!) 138/93   11/08/17 127/81    Weight from Last 3 Encounters:   11/22/17 117.6 kg (259 lb 3.2 oz)   11/15/17 112.4 kg (247 lb 12.8 oz)   11/08/17 112 kg (247 lb)              Today, you had the following     No orders found for display       Primary Care Provider Office Phone # Fax #    Aristides Jenkins [phone]       [address] [address]        Equal Access to Services     BROCK RUEDA : Hadii aad ku hadasho Soomaali, waaxda luqadaha, qaybta kaalmada adeegyada, waxay suzette hayraphael castillo . So [facility] [phone].    ATENCIÓN: Si habla español, tiene a osorio disposición servicios gratuitos de asistencia lingüística. Llame al [phone].    We comply with applicable federal civil rights laws and [place] laws. We do not discriminate on the basis of race, color, national origin, age, disability, sex, sexual orientation, or gender identity.            Thank you!     Thank you for choosing PS[facility]AT[facility] CLINIC  for your care. Our goal is always to provide you with excellent care. Hearing back from our patients is one way we can continue to improve our services. Please take a few minutes to complete the written survey that you may receive in the mail after your visit with us. Thank you!             Your Updated Medication List - Protect others around you: Learn how to safely use, store and throw away your medicines at  www.disposemymeds.org.          This list is accurate as of: 11/22/17  3:08 PM.  Always use your most recent med list.                   Brand Name Dispense Instructions for use Diagnosis    LamoTRIgine 200 MG Tb24    LAMICTAL XR    30 tablet    Take 200 mg by mouth every evening    Bipolar 1 disorder, manic, full remission (H)       lithium 300 MG tablet     150 tablet    Take 5 tablets (1,500 mg) by mouth At Bedtime    Bipolar 1 disorder, manic, full remission (H)       OLANZapine 20 MG tablet    zyPREXA    1 tablet    Take 1.5 tabs (30 mg) po at HS for the next week to aid in sleep initiation.  Plan is to go back to 20 mg po q HS after that.    Bipolar 1 disorder, manic, full remission (H)

## 2017-11-22 NOTE — PATIENT INSTRUCTIONS
Blood test on Friday to check lithium level  Take acetaminophen if you need something for a headache  Return in 1 week

## 2017-11-22 NOTE — PROGRESS NOTES
Nov 22, 2017  Progress Note    Esa Rivera is a 32 year old year old male with Bipolar I Disorder, Most Recent Episode Manic Severe, with psychotic behavior (296.40) who presents for ongoing psychiatric care. He got  1 month ago. He has no children. He and his wife (a psychologist) live on their own in [place] [facility] [facility] [place]. Esa works as an  for [employer].    Diagnosis    Axis 1: Bipolar I disorder, with severe manic episode with psychosis in Dec 2016. ADHD by history. Alcohol use disorder, moderate, in short-term remission  Axis 2: Nil  Axis 3: Remote concussion x 2  Axis 4: Moderate stressors  Axis 5: GAF at time of visit: 70    Assessment & Plan     Esa Rivera continues to display mild manic symptoms. They are improved but not resolved. He has increased lithium to 1500 mg HS, about a week ago. He agrees to get a blood test on Fri and we will make further adjustments if necessary. He will stay off work for the time being. He will also continue OLZ and return in 1 week. He takes melatonin 3-6 mg PRN for sleep, with benefit.  We will hopefully taper and DC OLZ over the next couple of months. He may need treatment for ADHD but this is best assessed during euthymia. Aside from LTG and OLZ, he has not taken mood stabilizing or antidepressant medications. He has used stimulants for ADHD (mostly Adderall, also Vyvanse) but is not currently taking them.    The patient understands the risks, benefits, adverse effects and alternatives. Agrees to treatment with the capacity to do so. No medical contraindications to treatment. Agrees to call clinic for any problems. The patient understands to call 911 or come to the nearest ED if life threatening or urgent symptoms present.    Attestation:  Patient has been seen and evaluated by me, Mayito Motley MD, PhD.    I spent a total time of 30 minutes face-to-face with the patient during today s office visit.  Over 50% of this time was spent counseling the patient and/or  "coordinating care regarding diagnostic assessment, medication management.    Interim History     I saw Esa on his own today. He reports that he is still experiencing some manic symptoms. He is spending excessively - $400 on charitable donations; three suits, including one in the last few days). He is clearly more irritable than usual, and this is leading to conflict with his wife. He remains preoccupied with his grandiose ideas and met yesterday with  re patenting them. He is sleeping well and denies any other impulsive behavior.    He has plans to spend Thanksgiving with his family.     I reviewed my impression with Esa and he accepts it. He is agreeable with the above plan. He will return in 1 week.     He is tolerating lithium well. He has a mild headache. I advised him to use acetaminophen if needed for pain.          In Dec 2016 Esa experienced an episode of severe katie with psychosis (grandiose delusions). He was seen in the ER but not hospitalized. He had some mild depressive episodes and possible (but questionable) short hypomanic periods in the preceding years, but never received treatment for these.     Esa's manic symptoms began when he became excited about a new idea at work. 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 He became so excited about this the he was unable to sleep. As his katie escalated, his sleep decreased to 1-2 hours per night for 5-6 nights. His affect was elated and also markedly irritable. He would \"go off\" on people for minor things in very uncharacteristic ways. His thoughts were \"really fast, crisp, and clear\". He saw \"connections in everything.\" His energy was clearly elevated. He became grandiose, believing that his idea would change the world and as a result he would meet many \"powerful people\". Eventually he came to believe that sings on the radio were meant for him.     Esa " "was taking Adderall at the time. He has taken this, on and off, for many years. In my opinion it is not an adequate explanation for his manic symptoms.      His mother (a PCP) became concerned and brought him to hospital. He was assessed in the ER and prescribed Ativan for sleep. He was seen by Dr Burt (a psychiatrist) the next day. He was prescribed OLZ and LTG. The doses were titrated \"up and down\" for the next 6 months. There were significant side effects (sedation, cognitive impairment), likely from OLZ. Most recently, Esa stopped OLZ for a period of time. In the context of being on a honeymoon in [place], he develop racing thoughts and difficulty sleeping and restarted it, with benefit. He currently takes 10 mg HS.    Esa endorses several previous short (1-2 day) periods of reduced sleep and elevated mood, typically in the context of getting involved in a work project. At most they represent subthreshold hypomanic periods.     He has also experienced several periods over the years, lasting about a week, of low mood, decreased energy and motivation, poor focus/concentration, and negative thoughts. He denies neurovegetative symptoms or SI. His wife thought that these represented depressive episodes.    Esa has consumed EtOH heavily at points during his life. It was most pronounced during his college years. He has sporadically binge-drank since then. He has not had a drink in 2 weeks.    Past Psych Hx: As above. Esa was assessed at [place] in Dec 2016 and diagnosed with BDI. He was seen by Psychiatry at age 17 and dxed with ADD. He took Adderall off and on (mostly on) with benefit over the years since then. He denies abusing it.     Past Med Hx: 2 concussion: 1) age 15 - skiing. Age 16: football. LOC x a couple of minutes with both.    MEDICATION ADHERENCE: Good.   Current Medications     Current Outpatient Prescriptions   Medication Sig Dispense Refill     lithium 300 MG tablet Take 5 tablets (1,500 mg) by " mouth At Bedtime 150 tablet 0     OLANZapine (ZYPREXA) 20 MG tablet Take 1.5 tabs (30 mg) po at HS for the next week to aid in sleep initiation.  Plan is to go back to 20 mg po q HS after that. 1 tablet 0     LamoTRIgine (LAMICTAL XR) 200 MG TB24 Take 200 mg by mouth every evening 30 tablet 0         Substance use     ETOH: Sporadic binge drinking  Cigarettes: NA  Street Drugs: Denies    Review of Systems     A comprehensive review of systems was performed and is negative other than noted above.     Allergies     Allergies   Allergen Reactions     No Known Allergies         Mental Status Exam     /79  Pulse 50  Wt 117.6 kg (259 lb 3.2 oz)  BMI 34.67 kg/m2    Alertness: alert  and oriented  Appearance: well groomed  Behavior/Demeanor: cooperative, pleasant and calm, with good  eye contact  Speech: normal  Language: intact  Psychomotor: normal or unremarkable  Mood:  elevated  Affect: labile; was congruent to mood  Thought Process/Associations: unremarkable  Thought Content:  Denies suicidal ideation and violent ideation. He has grandiose over-valued ideas.  Perception:  Denies auditory hallucinations and visual hallucinations  Insight: good  Judgment: good  Cognition:  does appear grossly intact.    Laboratory

## 2017-11-22 NOTE — NURSING NOTE
Chief Complaint   Patient presents with     Recheck Medication     Bipolar I disorder     Reviewed allergies, smoking status, and pharmacy preference  Administered abuse screening question  Obtained weight, blood pressure and heart rate

## 2017-11-24 ENCOUNTER — TELEPHONE (OUTPATIENT)
Dept: PSYCHIATRY | Facility: CLINIC | Age: 32
End: 2017-11-24

## 2017-11-24 DIAGNOSIS — F31.74 BIPOLAR 1 DISORDER, MANIC, FULL REMISSION (H): ICD-10-CM

## 2017-11-24 LAB — LITHIUM SERPL-SCNC: 0.84 MMOL/L (ref 0.6–1.2)

## 2017-11-24 PROCEDURE — 80178 ASSAY OF LITHIUM: CPT | Performed by: PSYCHIATRY & NEUROLOGY

## 2017-11-24 PROCEDURE — 36415 COLL VENOUS BLD VENIPUNCTURE: CPT | Performed by: PSYCHIATRY & NEUROLOGY

## 2017-11-24 RX ORDER — LITHIUM CARBONATE 300 MG
1800 TABLET ORAL AT BEDTIME
Qty: 180 TABLET | Refills: 0 | Status: SHIPPED | OUTPATIENT
Start: 2017-11-24 | End: 2018-01-03

## 2017-11-24 NOTE — TELEPHONE ENCOUNTER
11/15/17 pt increased Lithium to 1500 mg q hs.   11/24/17 lithium level is 0.84.    E-mailed Dr. Motley to advise if pt should continue with current dose until 11/29/17 appt.

## 2017-11-24 NOTE — TELEPHONE ENCOUNTER
Called pt [phone] who is agreeable to the increase and labs.  Sent new Rx to pharmacy.  Labs are ordered in [facility].

## 2017-11-24 NOTE — TELEPHONE ENCOUNTER
Rec'd disability form from:  [employer]  P [phone]  F [phone]     Regarding:  Attending Provider Statement     Requesting:   -Completion of form  -All relevamt office notes, H&P, lab results     When completed:   Above fax    Form is partially completed (also see 11/24/17 letter). In Dr. Motley's folder for full completion.             Deadline is Sunday 11/26/17.  We didn't receive this until 11/22/17 at 1545, closed 11/23/17, Dr. Motley is out 11/24/17.  Writer called # above to inform them it will be done on 11/27/17 but they are closed today for the holiday.

## 2017-11-24 NOTE — TELEPHONE ENCOUNTER
From: Mayito Motley <[email or web address]  Sent: Friday, November 24, 2017 12:19 PM  To: Ladonna Bailey  Subject: Re: lithium level     I'll suggest we increase Esa's Li to 1800 mg, and get a blood level (+ TSH, lytes, BUN, Cr) 1 week after the increase.

## 2017-11-29 ENCOUNTER — OFFICE VISIT (OUTPATIENT)
Dept: PSYCHIATRY | Facility: CLINIC | Age: 32
End: 2017-11-29
Attending: PSYCHIATRY & NEUROLOGY
Payer: COMMERCIAL

## 2017-11-29 VITALS
HEART RATE: 55 BPM | SYSTOLIC BLOOD PRESSURE: 130 MMHG | WEIGHT: 252.4 LBS | BODY MASS INDEX: 33.76 KG/M2 | DIASTOLIC BLOOD PRESSURE: 78 MMHG

## 2017-11-29 DIAGNOSIS — F31.10 BIPOLAR I DISORDER, MOST RECENT EPISODE (OR CURRENT) MANIC (H): Primary | ICD-10-CM

## 2017-11-29 PROCEDURE — 99212 OFFICE O/P EST SF 10 MIN: CPT | Mod: ZF

## 2017-11-29 NOTE — PROGRESS NOTES
Nov 29, 2017  Progress Note    Esa Rivera is a 32 year old year old male with Bipolar I Disorder, Most Recent Episode Manic Severe, with psychotic behavior (296.40) who presents for ongoing psychiatric care. He has no children. He and his wife (a psychologist) live on their own [place] Esa works as an  for [employer].    Diagnosis    Axis 1: Bipolar I disorder, with severe manic episode with psychosis in Dec 2016. ADHD by history. Alcohol use disorder, moderate, in short-term remission  Axis 2: Nil  Axis 3: Remote concussion x 2  Axis 4: Moderate stressors  Axis 5: GAF at time of visit: 70    Assessment & Plan     Esa Rivera continues to display mild manic symptoms, though he does appear to be improving. He has now increased lithium to 1800 mg HS, (as of 5 days ago). He agrees to get a blood test on Fri and we will make further adjustments if necessary. He will stay off work for the time being. He will also continue OLZ. He takes melatonin 3-6 mg PRN for sleep, with benefit. He will return in 1 week. I have asked him to bring his wife to his next appointment for collateral info.  We will hopefully taper and DC OLZ over the next couple of months. He may need treatment for ADHD but this is best assessed during euthymia. Aside from LTG and OLZ, he has not taken mood stabilizing or antidepressant medications. He has used stimulants for ADHD (mostly Adderall, also Vyvanse) but is not currently taking them.    The patient understands the risks, benefits, adverse effects and alternatives. Agrees to treatment with the capacity to do so. No medical contraindications to treatment. Agrees to call clinic for any problems. The patient understands to call 911 or come to the nearest ED if life threatening or urgent symptoms present.    Attestation:  Patient has been seen and evaluated by me, Mayito Motley MD, PhD.    I spent a total time of 30 minutes face-to-face with the patient during today s office visit.  Over  "50% of this time was spent counseling the patient and/or coordinating care regarding diagnostic assessment, medication management.    Interim History     I saw Esa on his own today. He reports that he is still experiencing some manic symptoms but they are reduced in intensity. He no longer feels that people are paying particular attention to him and so he is no longer dressing in suits. He is no longer spending excessively He does still describe racing thoughts and feeling energized, which leads to difficulty settling to sleep. He is less preoccupied with his grandiose ideas though he does have to make an effort to not become preoccupied with them.     He is tolerating lithium well.      I reviewed my impression with Esa and he accepts it. He is agreeable with the above plan. He will return in 1 week.             In Dec 2016 Esa experienced an episode of severe katie with psychosis (grandiose delusions). He was seen in the ER but not hospitalized. He had some mild depressive episodes and possible (but questionable) short hypomanic periods in the preceding years, but never received treatment for these.     Esa's manic symptoms began when he became excited about a new idea at work. 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 He became so excited about this the he was unable to sleep. As his katie escalated, his sleep decreased to 1-2 hours per night for 5-6 nights. His affect was elated and also markedly irritable. He would \"go off\" on people for minor things in very uncharacteristic ways. His thoughts were \"really fast, crisp, and clear\". He saw \"connections in everything.\" His energy was clearly elevated. He became grandiose, believing that his idea would change the world and as a result he would meet many \"powerful people\". Eventually he came to believe that sings on the radio were meant for him.     Esa was taking " "Adderall at the time. He has taken this, on and off, for many years. In my opinion it is not an adequate explanation for his manic symptoms.      His mother (a PCP) became concerned and brought him to hospital. He was assessed in the ER and prescribed Ativan for sleep. He was seen by Dr Burt (a psychiatrist) the next day. He was prescribed OLZ and LTG. The doses were titrated \"up and down\" for the next 6 months. There were significant side effects (sedation, cognitive impairment), likely from OLZ. Most recently, Esa stopped OLZ for a period of time. In the context of being on a honeymoon in [place], he develop racing thoughts and difficulty sleeping and restarted it, with benefit. He currently takes 10 mg HS.    Esa endorses several previous short (1-2 day) periods of reduced sleep and elevated mood, typically in the context of getting involved in a work project. At most they represent subthreshold hypomanic periods.     He has also experienced several periods over the years, lasting about a week, of low mood, decreased energy and motivation, poor focus/concentration, and negative thoughts. He denies neurovegetative symptoms or SI. His wife thought that these represented depressive episodes.    Esa has consumed EtOH heavily at points during his life. It was most pronounced during his college years. He has sporadically binge-drank since then. He has not had a drink in 2 weeks.    Past Psych Hx: As above. Esa was assessed at [facility] in Dec 2016 and diagnosed with BDI. He was seen by Psychiatry at age 17 and dxed with ADD. He took Adderall off and on (mostly on) with benefit over the years since then. He denies abusing it.     Past Med Hx: 2 concussion: 1) age 15 - skiing. Age 16: football. LOC x a couple of minutes with both.    MEDICATION ADHERENCE: Good.   Current Medications     Current Outpatient Prescriptions   Medication Sig Dispense Refill     lithium 300 MG tablet Take 6 tablets (1,800 mg) by mouth At " Bedtime 180 tablet 0     OLANZapine (ZYPREXA) 20 MG tablet Take 1.5 tabs (30 mg) po at HS for the next week to aid in sleep initiation.  Plan is to go back to 20 mg po q HS after that. 1 tablet 0     LamoTRIgine (LAMICTAL XR) 200 MG TB24 Take 200 mg by mouth every evening 30 tablet 0         Substance use     ETOH: Sporadic binge drinking  Cigarettes: NA  Street Drugs: Denies    Review of Systems     A comprehensive review of systems was performed and is negative other than noted above.     Allergies     Allergies   Allergen Reactions     No Known Allergies         Mental Status Exam     /78  Pulse 55  Wt 114.5 kg (252 lb 6.4 oz)  BMI 33.76 kg/m2    Alertness: alert  and oriented  Appearance: well groomed  Behavior/Demeanor: cooperative, pleasant and calm, with good  eye contact  Speech: normal  Language: intact  Psychomotor: normal or unremarkable  Mood:  elevated  Affect: labile; was congruent to mood  Thought Process/Associations: unremarkable  Thought Content:  Denies suicidal ideation and violent ideation. He has grandiose over-valued ideas.  Perception:  Denies auditory hallucinations and visual hallucinations  Insight: good  Judgment: good  Cognition:  does appear grossly intact.    Laboratory

## 2017-11-29 NOTE — MR AVS SNAPSHOT
After Visit Summary   11/29/2017    Esa Rivera    MRN: [id number]           Patient Information     Date Of Birth          1985        Visit Information        Provider Department      11/29/2017 9:00 AM Mayito Motley MD Psychiatry Clinic        Care Instructions    Thank you for coming to the PSYCHIATRY CLINIC.    Lab Testing:  If you had lab testing today and your results are reassuring or normal they will be mailed to you or sent through [employer] within 7 days.   If the lab tests need quick action we will call you with the results.  The phone number we will call with results is # [phone] (home) . If this is not the best number please call our clinic and change the number.    Medication Refills:  If you need any refills please call your pharmacy and they will contact us. Our fax number for refills is [phone]. Please allow three business for refill processing.   If you need to  your refill at a new pharmacy, please contact the new pharmacy directly. The new pharmacy will help you get your medications transferred.     Scheduling:  If you have any concerns about today's visit or wish to schedule another appointment please call our office during normal business hours [phone] (8-5:00 M-F)    Contact Us:  Please call [phone] during business hours (8-5:00 M-F).  If after clinic hours, or on the weekend, please call  [phone].    Financial Assistance [phone]  [facility] [phone]  [facility] [phone]  Medical Records [phone]      MENTAL HEALTH CRISIS NUMBERS:  [facility]:   [facility] - [phone]   Crisis Residence [facility] - [phone]   Walk-In Counseling [facility] - [phone]   COPE 24/7 [place] Mobile Team for Adults - [[phone]]; Child - [[phone]]     Crisis Connection - [phone]     [facility]:   [facility] - [phone]   Walk-in counseling [facility]  - [facility] - [phone]   Walk-in counseling [facility] - [phone]   Crisis Residence  [facility] - [phone]   Urgent Care Adult Mental Health:   --Drop-in, 24/7 crisis line, and [facility] Mobile Team [[phone]]    CRISIS TEXT LINE: Text 168-435 from anywhere, anytime, any crisis 24/7;    OR SEE www.crisistextline.org     Poison Control Center - [phone]    CHILD: [facility] needs assessment team - [phone]     Trans Lifeline - [phone]; or Vascular Closure Lifeline - 1-174.235.5543    If you have a medical emergency please call 911or go to the nearest ER.                    _____________________________________________    Again thank you for choosing PSYCHIATRY CLINIC and please let us know how we can best partner with you to improve you and your family's health.  You may be receiving a survey in the mail regarding this appointment. We would love to have your feedback, both positive and negative, so please fill out the survey and return it using the provided envolpe. The survey is done by an external company, so your answers are anonymous.             Follow-ups after your visit        Your next 10 appointments already scheduled     Dec 06, 2017  8:30 AM CST   Adult Med Follow UP with Mayito Motley MD   Psychiatry Clinic ([facility])    [address]  [address] [facility] [id number] [phone]              Who to contact     Please call your clinic at [phone] to:    Ask questions about your health    Make or cancel appointments    Discuss your medicines    Learn about your test results    Speak to your doctor   If you have compliments or concerns about an experience at your clinic, or if you wish to file a complaint, please contact [facility] Patient Relations at [phone] or email us at [email or web address]         Additional Information  About Your Visit        [employer] Information     [employer] gives you secure access to your electronic health record. If you see a primary care provider, you can also send messages to your care team and make appointments. If you have questions, please call your primary care clinic.  If you do not have a primary care provider, please call [phone] and they will assist you.      [employer] is an electronic gateway that provides easy, online access to your medical records. With [employer], you can request a clinic appointment, read your test results, renew a prescription or communicate with your care team.     To access your existing account, please contact your [facility] or call [phone] for assistance.        Care EveryWhere ID     This is your Care EveryWhere ID. This could be used by other organizations to access your [facility] medical records  [id number]        Your Vitals Were     Pulse BMI (Body Mass Index)                55 33.76 kg/m2           Blood Pressure from Last 3 Encounters:   11/29/17 130/78   11/22/17 130/79   11/15/17 (!) 138/93    Weight from Last 3 Encounters:   11/29/17 114.5 kg (252 lb 6.4 oz)   11/22/17 117.6 kg (259 lb 3.2 oz)   11/15/17 112.4 kg (247 lb 12.8 oz)              Today, you had the following     No orders found for display       Primary Care Provider Office Phone # Fax #    Aristides Jenkins [phone]       [address] [facility] [address]  [facility] [address]        Equal Access to Services     BROCK RUEDA : Hadii aad ku hadasho Soomaali, waaxda luqadaha, qaybta kaalmada adeegyada, jermaine castillo . So [facility] [phone].    ATENCIÓN: Si habla español, tiene a osorio disposición servicios gratuitos de asistencia lingüística. Llame al [phone].    We comply with applicable federal civil rights laws and [place] laws. We do not discriminate on the basis of race, color, national origin, age, disability, sex,  sexual orientation, or gender identity.            Thank you!     Thank you for choosing PSYCHIATRY CLINIC  for your care. Our goal is always to provide you with excellent care. Hearing back from our patients is one way we can continue to improve our services. Please take a few minutes to complete the written survey that you may receive in the mail after your visit with us. Thank you!             Your Updated Medication List - Protect others around you: Learn how to safely use, store and throw away your medicines at www.disposemymeds.org.          This list is accurate as of: 11/29/17  9:38 AM.  Always use your most recent med list.                   Brand Name Dispense Instructions for use Diagnosis    LamoTRIgine 200 MG Tb24    LAMICTAL XR    30 tablet    Take 200 mg by mouth every evening    Bipolar 1 disorder, manic, full remission (H)       lithium 300 MG tablet     180 tablet    Take 6 tablets (1,800 mg) by mouth At Bedtime    Bipolar 1 disorder, manic, full remission (H)       OLANZapine 20 MG tablet    zyPREXA    1 tablet    Take 1.5 tabs (30 mg) po at HS for the next week to aid in sleep initiation.  Plan is to go back to 20 mg po q HS after that.    Bipolar 1 disorder, manic, full remission (H)

## 2017-11-29 NOTE — PATIENT INSTRUCTIONS
Continue current medication  Return in 1 week    Thank you for coming to the PSYCHIATRY CLINIC.    Lab Testing:  If you had lab testing today and your results are reassuring or normal they will be mailed to you or sent through [employer] within 7 days.   If the lab tests need quick action we will call you with the results.  The phone number we will call with results is # [phone] (home) . If this is not the best number please call our clinic and change the number.    Medication Refills:  If you need any refills please call your pharmacy and they will contact us. Our fax number for refills is [phone]. Please allow three business for refill processing.   If you need to  your refill at a new pharmacy, please contact the new pharmacy directly. The new pharmacy will help you get your medications transferred.     Scheduling:  If you have any concerns about today's visit or wish to schedule another appointment please call our office during normal business hours [phone] (8-5:00 M-F)    Contact Us:  Please call [phone] during business hours (8-5:00 M-F).  If after clinic hours, or on the weekend, please call  [phone].    Financial Assistance [phone]  [facility] [phone]  [place] [facility] [phone]  Medical Records [phone]      MENTAL HEALTH CRISIS NUMBERS:  [facility]:   [facility] - [phone]   Crisis Residence [facility] - [phone]   Walk-In Counseling [facility] [phone]   COPE 24/7 [place] Mobile Team for Adults - [[phone]]; Child - [[phone]]     Crisis Connection - [phone]     [facility]:   [facility] - [phone]   Walk-in counseling [facility] - [phone]   Walk-in counseling [facility] - [phone]   Crisis Residence [facility] - [phone]   Urgent Care Adult Mental Health:   --Drop-in, 24/7 crisis line, and [facility] Mobile Team [[phone]]    CRISIS TEXT LINE: Text [phone] from anywhere, anytime, any crisis 24/7;    OR SEE www.crisistextline.org     Poison Control Center - [phone]    CHILD: [facility] needs assessment team - [phone]     [facility] Lifeline - [phone]; or [facility] Lifeline - 1-412.813.9003    If you have a medical emergency please call 911or go to the nearest ER.                    _____________________________________________    Again thank you for choosing PSYCHIATRY CLINIC and please let us know how we can best partner with you to improve you and your family's health.  You may be receiving a survey in the mail regarding this appointment. We would love to have your feedback, both positive and negative, so please fill out the survey and return it using the provided envolpe. The survey is done by an external company, so your answers are anonymous.

## 2017-12-01 DIAGNOSIS — F31.74 BIPOLAR 1 DISORDER, MANIC, FULL REMISSION (H): ICD-10-CM

## 2017-12-01 LAB
ANION GAP SERPL CALCULATED.3IONS-SCNC: 9 MMOL/L (ref 3–14)
BUN SERPL-MCNC: 14 MG/DL (ref 7–30)
CHLORIDE SERPL-SCNC: 109 MMOL/L (ref 94–109)
CO2 SERPL-SCNC: 23 MMOL/L (ref 20–32)
CREAT SERPL-MCNC: 1.07 MG/DL (ref 0.66–1.25)
GFR SERPL CREATININE-BSD FRML MDRD: 80 ML/MIN/1.7M2
LITHIUM SERPL-SCNC: 1.09 MMOL/L (ref 0.6–1.2)
POTASSIUM SERPL-SCNC: 4.1 MMOL/L (ref 3.4–5.3)
SODIUM SERPL-SCNC: 141 MMOL/L (ref 133–144)
TSH SERPL DL<=0.005 MIU/L-ACNC: 3.05 MU/L (ref 0.4–4)

## 2017-12-01 PROCEDURE — 36415 COLL VENOUS BLD VENIPUNCTURE: CPT | Performed by: PSYCHIATRY & NEUROLOGY

## 2017-12-01 PROCEDURE — 84520 ASSAY OF UREA NITROGEN: CPT | Performed by: PSYCHIATRY & NEUROLOGY

## 2017-12-01 PROCEDURE — 84443 ASSAY THYROID STIM HORMONE: CPT | Performed by: PSYCHIATRY & NEUROLOGY

## 2017-12-01 PROCEDURE — 82565 ASSAY OF CREATININE: CPT | Performed by: PSYCHIATRY & NEUROLOGY

## 2017-12-01 PROCEDURE — 80051 ELECTROLYTE PANEL: CPT | Performed by: PSYCHIATRY & NEUROLOGY

## 2017-12-01 PROCEDURE — 80178 ASSAY OF LITHIUM: CPT | Performed by: PSYCHIATRY & NEUROLOGY

## 2017-12-04 ENCOUNTER — TELEPHONE (OUTPATIENT)
Dept: PSYCHIATRY | Facility: CLINIC | Age: 32
End: 2017-12-04

## 2017-12-04 NOTE — TELEPHONE ENCOUNTER
"On 11/30/2017 IRMA was returned from [employer] with documentation stating \"Request for information denial sheet\" due to incomplete authorization form and asking for \"Purpose for disclosure\" to be completed..  Patient has a scheduled appointment on Wed., 12/06/2017, rooming staff ask patient to fill out new IRMA. Original documentation kept in Psychiatry. Routed to Dr. Motley, and Ladonna Bailey, RNCC.Lizzeth Martel LPN    "

## 2017-12-06 ENCOUNTER — OFFICE VISIT (OUTPATIENT)
Dept: PSYCHIATRY | Facility: CLINIC | Age: 32
End: 2017-12-06
Attending: PSYCHIATRY & NEUROLOGY
Payer: COMMERCIAL

## 2017-12-06 ENCOUNTER — TELEPHONE (OUTPATIENT)
Dept: PSYCHIATRY | Facility: CLINIC | Age: 32
End: 2017-12-06

## 2017-12-06 VITALS
HEART RATE: 66 BPM | DIASTOLIC BLOOD PRESSURE: 90 MMHG | SYSTOLIC BLOOD PRESSURE: 143 MMHG | BODY MASS INDEX: 33.81 KG/M2 | WEIGHT: 252.8 LBS

## 2017-12-06 DIAGNOSIS — F31.74 BIPOLAR 1 DISORDER, MANIC, FULL REMISSION (H): ICD-10-CM

## 2017-12-06 PROCEDURE — 99212 OFFICE O/P EST SF 10 MIN: CPT | Mod: ZF

## 2017-12-06 RX ORDER — LAMOTRIGINE 200 MG/1
200 TABLET, EXTENDED RELEASE ORAL EVERY EVENING
Qty: 30 TABLET | Refills: 1 | Status: SHIPPED | OUTPATIENT
Start: 2017-12-06 | End: 2018-01-25

## 2017-12-06 RX ORDER — OLANZAPINE 20 MG/1
20 TABLET ORAL AT BEDTIME
Qty: 1 TABLET | Refills: 1 | Status: SHIPPED | OUTPATIENT
Start: 2017-12-06 | End: 2017-12-08

## 2017-12-06 ASSESSMENT — PATIENT HEALTH QUESTIONNAIRE - PHQ9: SUM OF ALL RESPONSES TO PHQ QUESTIONS 1-9: 6

## 2017-12-06 NOTE — PROGRESS NOTES
Dec 6, 2017  Progress Note    Esa Rivera is a 32 year old year old male with Bipolar I Disorder, Most Recent Episode Manic Severe, with psychotic behavior (296.40) who presents for ongoing psychiatric care. He has no children. He and his wife (a psychologist) live on their own in [facility] [place]. Esa works as an  for [employer].    Diagnosis    Axis 1: Bipolar I disorder, with severe manic episode with psychosis in Dec 2016. ADHD by history. Alcohol use disorder, moderate, in short-term remission  Axis 2: Nil  Axis 3: Remote concussion x 2  Axis 4: Moderate stressors  Axis 5: GAF at time of visit: 70    Assessment & Plan     Esa Rivera is now well. He denies symptoms of katie or depression. His wife, who accompanied him to the appointment, agrees. He is likely experiencing medication side effects, including feeling slowed, increased appetite, sedation, and polyuria/polydipsia.  His lithium level is 1.09. He will continue lithium 1800 mg HS,  mg daily, and OLZ 20 mg HS. He will return in 1 week. If he remains well he will be OK for a return to work on Dec 18. He may need treatment for ADHD but this is best assessed during euthymia. Aside from LTG and OLZ, he has not taken mood stabilizing or antidepressant medications. He has used stimulants for ADHD (mostly Adderall, also Vyvanse) but is not currently taking them.    The patient understands the risks, benefits, adverse effects and alternatives. Agrees to treatment with the capacity to do so. No medical contraindications to treatment. Agrees to call clinic for any problems. The patient understands to call 911 or come to the nearest ED if life threatening or urgent symptoms present.    Attestation:  Patient has been seen and evaluated by me, Mayito Motley MD, PhD.    I spent a total time of 30 minutes face-to-face with the patient during today s office visit.  Over 50% of this time was spent counseling the patient and/or coordinating care regarding  "diagnostic assessment, medication management.    Interim History     I saw Esa with his wife today. They report Esa is doing well. He is not experiencing symptoms of katie or depression. He sleeps well but finds it very hard to awaken in the am. He feels slowed physically and mentally. He also reports other s/e as above.     We had a lengthy discussion about Esa's medications. I have suggested we keep them in place for now. In 4-8 weeks we can tasper and hopefully DC OLZ. Following that it may be possible to lower Esa's lithium dose.     He is aware his side feects may improve with time, and will certainly get better with later dose reductions.     Esa will return in 1 week.             In Dec 2016 Esa experienced an episode of severe katie with psychosis (grandiose delusions). He was seen in the ER but not hospitalized. He had some mild depressive episodes and possible (but questionable) short hypomanic periods in the preceding years, but never received treatment for these.     Esa's manic symptoms began when he became excited about a new idea at work. 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 He became so excited about this the he was unable to sleep. As his katie escalated, his sleep decreased to 1-2 hours per night for 5-6 nights. His affect was elated and also markedly irritable. He would \"go off\" on people for minor things in very uncharacteristic ways. His thoughts were \"really fast, crisp, and clear\". He saw \"connections in everything.\" His energy was clearly elevated. He became grandiose, believing that his idea would change the world and as a result he would meet many \"powerful people\". Eventually he came to believe that sings on the radio were meant for him.     Esa was taking Adderall at the time. He has taken this, on and off, for many years. In my opinion it is not an adequate explanation for his manic " "symptoms.      His mother (a PCP) became concerned and brought him to hospital. He was assessed in the ER and prescribed Ativan for sleep. He was seen by Dr Burt (a psychiatrist) the next day. He was prescribed OLZ and LTG. The doses were titrated \"up and down\" for the next 6 months. There were significant side effects (sedation, cognitive impairment), likely from OLZ. Most recently, Esa stopped OLZ for a period of time. In the context of being on a honeymoon in [place], he develop racing thoughts and difficulty sleeping and restarted it, with benefit. He currently takes 10 mg HS.    Esa endorses several previous short (1-2 day) periods of reduced sleep and elevated mood, typically in the context of getting involved in a work project. At most they represent subthreshold hypomanic periods.     He has also experienced several periods over the years, lasting about a week, of low mood, decreased energy and motivation, poor focus/concentration, and negative thoughts. He denies neurovegetative symptoms or SI. His wife thought that these represented depressive episodes.    Esa has consumed EtOH heavily at points during his life. It was most pronounced during his college years. He has sporadically binge-drank since then. He has not had a drink in 2 weeks.    Past Psych Hx: As above. Esa was assessed at [facility] in Dec 2016 and diagnosed with BDI. He was seen by Psychiatry at age 17 and dxed with ADD. He took Adderall off and on (mostly on) with benefit over the years since then. He denies abusing it.     Past Med Hx: 2 concussion: 1) age 15 - skiing. Age 16: football. LOC x a couple of minutes with both.    MEDICATION ADHERENCE: Good.   Current Medications     Current Outpatient Prescriptions   Medication Sig Dispense Refill     lithium 300 MG tablet Take 6 tablets (1,800 mg) by mouth At Bedtime 180 tablet 0     OLANZapine (ZYPREXA) 20 MG tablet Take 1.5 tabs (30 mg) po at HS for the next week to aid in sleep initiation.  " Plan is to go back to 20 mg po q HS after that. 1 tablet 0     LamoTRIgine (LAMICTAL XR) 200 MG TB24 Take 200 mg by mouth every evening 30 tablet 0         Substance use     ETOH: Sporadic binge drinking  Cigarettes: NA  Street Drugs: Denies    Review of Systems     A comprehensive review of systems was performed and is negative other than noted above.     Allergies     Allergies   Allergen Reactions     No Known Allergies         Mental Status Exam     /90  Pulse 66  Wt 114.7 kg (252 lb 12.8 oz)  BMI 33.81 kg/m2    Alertness: alert  and oriented  Appearance: well groomed  Behavior/Demeanor: cooperative, pleasant and calm, with good  eye contact  Speech: normal  Language: intact  Psychomotor: normal or unremarkable  Mood:  description consistent with euthymia  Affect: labile; was congruent to mood  Thought Process/Associations: unremarkable  Thought Content:  Denies suicidal ideation and violent ideation. He has grandiose over-valued ideas.  Perception:  Denies auditory hallucinations and visual hallucinations  Insight: good  Judgment: good  Cognition:  does appear grossly intact.    Laboratory

## 2017-12-06 NOTE — TELEPHONE ENCOUNTER
"Per LYNDA Martel note on 12/4/2017 (see that documentation) , the patient signed a new IRMA on 12/4/2017 - adding the \"purpose\" for requesting records from [facility] to [facility].  I faxed this new IRMA to [facility] at  [phone] on 12/6/2017, sent the original to scanning and held a copy in Psychiatry until scanning complete/confirmed.Jenni Valle/YIMI    "

## 2017-12-06 NOTE — MR AVS SNAPSHOT
After Visit Summary   12/6/2017    Esa Rivera    MRN: [id number]           Patient Information     Date Of Birth          1985        Visit Information        Provider Department      12/6/2017 8:30 AM Mayito Motley MD Psychiatry Clinic        Today's Diagnoses     Bipolar 1 disorder, manic, full remission (H)          Care Instructions    Continue current medications  Return in  week          Follow-ups after your visit        Follow-up notes from your care team     Return in about 1 week (around 12/13/2017).      Your next 10 appointments already scheduled     Dec 13, 2017 10:30 AM [facility]   Adult Med Follow UP with Mayito Motley MD   Psychiatry Clinic ([facility])    [address]  [address] [facility] [address] [phone]              Who to contact     Please call your clinic at [phone] to:    Ask questions about your health    Make or cancel appointments    Discuss your medicines    Learn about your test results    Speak to your doctor   If you have compliments or concerns about an experience at your clinic, or if you wish to file a complaint, please contact [facility] Patient Relations at [phone] or email us at [email or web address]         Additional Information About Your Visit        MyChart Information     [facility] gives you secure access to your electronic health record. If you see a primary care provider, you can also send messages to your care team and make appointments. If you have questions, please call your primary care clinic.  If you do not have a primary care provider, please call [phone] and they will assist you.      [employer] is an electronic gateway that provides easy, online access to your medical records. With [employer], you can request a clinic appointment, read your test results, renew a prescription or communicate with your care team.     To access your existing  account, please contact your [facility] or call [phone] for assistance.        Care EveryWhere ID     This is your Care EveryWhere ID. This could be used by other organizations to access your [facility] medical records  [id number]        Your Vitals Were     Pulse BMI (Body Mass Index)                66 33.81 kg/m2           Blood Pressure from Last 3 Encounters:   12/06/17 143/90   11/29/17 130/78   11/22/17 130/79    Weight from Last 3 Encounters:   12/06/17 114.7 kg (252 lb 12.8 oz)   11/29/17 114.5 kg (252 lb 6.4 oz)   11/22/17 117.6 kg (259 lb 3.2 oz)              Today, you had the following     No orders found for display         Today's Medication Changes          These changes are accurate as of: 12/6/17  9:10 AM.  If you have any questions, ask your nurse or doctor.               These medicines have changed or have updated prescriptions.        Dose/Directions    OLANZapine 20 MG tablet   Commonly known as:  zyPREXA   This may have changed:    - how much to take  - how to take this  - when to take this  - additional instructions   Used for:  Bipolar 1 disorder, manic, full remission (H)   Changed by:  Mayito Motley MD        Dose:  20 mg   Take 1 tablet (20 mg) by mouth At Bedtime   Quantity:  1 tablet   Refills:  1            Where to get your medicines      These medications were sent to [facility]/pharmacy #[id number] - [place], [place] - [address]  [address], [place] [address]     Phone:  [phone]     LamoTRIgine 200 MG Tb24    OLANZapine 20 MG tablet                Primary Care Provider Office Phone # Fax #    Aristides Jenkins [phone]       [address]  [facility] [address]        Equal Access to Services     BROCK RUEDA : Bertha Clark, elizabeth dao, qaomkar kadick tilley, jermaine castaneda. So [facility] [place] [phone].    ATENCIÓN: Si yessenia castaneda osorio  disposición servicios gratuitos de asistencia lingüística. Sadie colón [phone].    We comply with applicable federal civil rights laws and [place] laws. We do not discriminate on the basis of race, color, national origin, age, disability, sex, sexual orientation, or gender identity.            Thank you!     Thank you for choosing PSYCHIATRY CLINIC  for your care. Our goal is always to provide you with excellent care. Hearing back from our patients is one way we can continue to improve our services. Please take a few minutes to complete the written survey that you may receive in the mail after your visit with us. Thank you!             Your Updated Medication List - Protect others around you: Learn how to safely use, store and throw away your medicines at www.disposemymeds.org.          This list is accurate as of: 12/6/17  9:10 AM.  Always use your most recent med list.                   Brand Name Dispense Instructions for use Diagnosis    LamoTRIgine 200 MG Tb24    LAMICTAL XR    30 tablet    Take 200 mg by mouth every evening    Bipolar 1 disorder, manic, full remission (H)       lithium 300 MG tablet     180 tablet    Take 6 tablets (1,800 mg) by mouth At Bedtime    Bipolar 1 disorder, manic, full remission (H)       OLANZapine 20 MG tablet    zyPREXA    1 tablet    Take 1 tablet (20 mg) by mouth At Bedtime    Bipolar 1 disorder, manic, full remission (H)

## 2017-12-08 DIAGNOSIS — F31.74 BIPOLAR 1 DISORDER, MANIC, FULL REMISSION (H): ICD-10-CM

## 2017-12-08 RX ORDER — OLANZAPINE 20 MG/1
20 TABLET ORAL AT BEDTIME
Qty: 30 TABLET | Refills: 1 | Status: SHIPPED | OUTPATIENT
Start: 2017-12-08 | End: 2018-01-03

## 2017-12-08 NOTE — TELEPHONE ENCOUNTER
On 12/6/17 the following was sent:     Disp Refills Start End CAMERON   OLANZapine (ZYPREXA) 20 MG tablet 1 tablet 1 12/6/2017  No   Sig: Take 1 tablet (20 mg) by mouth At Bedtime   Class: E-Prescribe   Route: Oral   Order: [id number]   E-Prescribing Status: Receipt confirmed by pharmacy (12/6/2017  9:02 AM CST)       Writer re-sent with #[id number]

## 2017-12-13 ENCOUNTER — OFFICE VISIT (OUTPATIENT)
Dept: PSYCHIATRY | Facility: CLINIC | Age: 32
End: 2017-12-13
Attending: PSYCHIATRY & NEUROLOGY
Payer: COMMERCIAL

## 2017-12-13 VITALS
BODY MASS INDEX: 34.32 KG/M2 | HEART RATE: 65 BPM | DIASTOLIC BLOOD PRESSURE: 83 MMHG | SYSTOLIC BLOOD PRESSURE: 138 MMHG | WEIGHT: 256.6 LBS

## 2017-12-13 DIAGNOSIS — F31.74 BIPOLAR 1 DISORDER, MANIC, FULL REMISSION (H): Primary | ICD-10-CM

## 2017-12-13 PROCEDURE — 99212 OFFICE O/P EST SF 10 MIN: CPT | Mod: ZF

## 2017-12-13 ASSESSMENT — PATIENT HEALTH QUESTIONNAIRE - PHQ9: SUM OF ALL RESPONSES TO PHQ QUESTIONS 1-9: 9

## 2017-12-13 NOTE — PROGRESS NOTES
Dec 13, 2017  Progress Note    Esa Rivera is a 32 year old year old male with Bipolar I Disorder, Most Recent Episode Manic Severe, with psychotic behavior (296.40) who presents for ongoing psychiatric care. He has no children. He and his wife (a psychologist) live on their own in [facility]. Esa works as an  for [email or web address].    Diagnosis    Axis 1: Bipolar I disorder, with severe manic episode with psychosis in Dec 2016. ADHD by history. Alcohol use disorder, moderate, in short-term remission  Axis 2: Nil  Axis 3: Remote concussion x 2  Axis 4: Moderate stressors  Axis 5: GAF at time of visit: 70    Assessment & Plan     Esa Rivera remains well. He denies symptoms of katie or depression. He has been stable for long enough that I think a graduated return to work is reasonable. This will need to be done cautiously since work-related factors contributed to the onset of his recent katie. I will recommend half-time for the first 2 weeks, and then full time.      Esa is experiencing medication side effects, including feeling slowed, increased appetite, sedation, and polyuria/polydipsia.  His lithium level is 1.09. He will continue lithium 1800 mg HS,  mg daily, and OLZ 20 mg HS. He will return in 2-3 weeks.     Longer term we will hopefully be able to taper and DC OLZ. He may be able to modestly lower his lithium dose also. He may need treatment for ADHD but this is best assessed during euthymia. Aside from LTG and OLZ, he has not taken mood stabilizing or antidepressant medications. He has used stimulants for ADHD (mostly Adderall, also Vyvanse) but is not currently taking them.    The patient understands the risks, benefits, adverse effects and alternatives. Agrees to treatment with the capacity to do so. No medical contraindications to treatment. Agrees to call clinic for any problems. The patient understands to call 911 or come to the nearest ED if life threatening or urgent symptoms  "present.    Attestation:  Patient has been seen and evaluated by me, Mayito Motley MD, PhD.    I spent a total time of 30 minutes face-to-face with the patient during today s office visit.  Over 50% of this time was spent counseling the patient and/or coordinating care regarding diagnostic assessment, medication management.    Interim History     I saw Esa on his own today. Esa reports he is doing well. He is not experiencing symptoms of katie or depression. He continues to feel somewhat slowed physically and mentally. He also reports other s/e as above.     We had a lengthy discussion about Esa returning to work. He has some normal anxiety about this, but overall he feels ready. He is agreeable to my recommendation about a graduated return.    We discussed factors that can precipitate katie (overinvolvemenrt with work-related activities appears to be a trigger for him; sleep deprivation; stress) and factors that contribute to mood stability (medication adherence; lack of substance abuse; regular sleep and activity levels).    Esa is agreeable with our plan and will return in 2-3 weeks.             In Dec 2016 Esa experienced an episode of severe katie with psychosis (grandiose delusions). He was seen in the ER but not hospitalized. He had some mild depressive episodes and possible (but questionable) short hypomanic periods in the preceding years, but never received treatment for these.     Esa's manic symptoms began when he became excited about a new idea at work. 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 He became so excited about this the he was unable to sleep. As his katie escalated, his sleep decreased to 1-2 hours per night for 5-6 nights. His affect was elated and also markedly irritable. He would \"go off\" on people for minor things in very uncharacteristic ways. His thoughts were \"really fast, crisp, and " "clear\". He saw \"connections in everything.\" His energy was clearly elevated. He became grandiose, believing that his idea would change the world and as a result he would meet many \"powerful people\". Eventually he came to believe that sings on the radio were meant for him.     Esa was taking Adderall at the time. He has taken this, on and off, for many years. In my opinion it is not an adequate explanation for his manic symptoms.      His mother (a PCP) became concerned and brought him to hospital. He was assessed in the ER and prescribed Ativan for sleep. He was seen by Dr Burt (a psychiatrist) the next day. He was prescribed OLZ and LTG. The doses were titrated \"up and down\" for the next 6 months. There were significant side effects (sedation, cognitive impairment), likely from OLZ. Most recently, Esa stopped OLZ for a period of time. In the context of being on a honeymoon in [place], he develop racing thoughts and difficulty sleeping and restarted it, with benefit. He currently takes 10 mg HS.    Esa endorses several previous short (1-2 day) periods of reduced sleep and elevated mood, typically in the context of getting involved in a work project. At most they represent subthreshold hypomanic periods.     He has also experienced several periods over the years, lasting about a week, of low mood, decreased energy and motivation, poor focus/concentration, and negative thoughts. He denies neurovegetative symptoms or SI. His wife thought that these represented depressive episodes.    Esa has consumed EtOH heavily at points during his life. It was most pronounced during his college years. He has sporadically binge-drank since then. He has not had a drink in 2 weeks.    Past Psych Hx: As above. Esa was assessed at [facility] in Dec 2016 and diagnosed with BDI. He was seen by Psychiatry at age 17 and dxed with ADD. He took Adderall off and on (mostly on) with benefit over the years since then. He denies abusing it. "     Past Med Hx: 2 concussion: 1) age 15 - skiing. Age 16: football. LOC x a couple of minutes with both.    MEDICATION ADHERENCE: Good.   Current Medications     Current Outpatient Prescriptions   Medication Sig Dispense Refill     OLANZapine (ZYPREXA) 20 MG tablet Take 1 tablet (20 mg) by mouth At Bedtime 30 tablet 1     LamoTRIgine (LAMICTAL XR) 200 MG TB24 Take 200 mg by mouth every evening 30 tablet 1     lithium 300 MG tablet Take 6 tablets (1,800 mg) by mouth At Bedtime 180 tablet 0         Substance use     ETOH: Sporadic binge drinking  Cigarettes: NA  Street Drugs: Denies    Review of Systems     A comprehensive review of systems was performed and is negative other than noted above.     Allergies     Allergies   Allergen Reactions     No Known Allergies         Mental Status Exam     /83  Pulse 65  Wt 116.4 kg (256 lb 9.6 oz)  BMI 34.32 kg/m2    Alertness: alert  and oriented  Appearance: well groomed  Behavior/Demeanor: cooperative, pleasant and calm, with good  eye contact  Speech: normal  Language: intact  Psychomotor: normal or unremarkable  Mood:  description consistent with euthymia  Affect: full range; was congruent to mood  Thought Process/Associations: unremarkable  Thought Content:  Denies suicidal ideation and violent ideation. He has grandiose over-valued ideas.  Perception:  Denies auditory hallucinations and visual hallucinations  Insight: good  Judgment: good  Cognition:  does appear grossly intact.    Laboratory

## 2017-12-13 NOTE — MR AVS SNAPSHOT
After Visit Summary   12/13/2017    Esa Rivera    MRN: [id number]           Patient Information     Date Of Birth          1985        Visit Information        Provider Department      12/13/2017 10:30 AM Mayito Motley MD Psychiatry Clinic        Today's Diagnoses     Bipolar 1 disorder, manic, full remission (H)    -  1      Care Instructions    Continue usual medications  Return in 2-3 weeks          Follow-ups after your visit        Follow-up notes from your care team     Return in about 2 weeks (around 12/27/2017).      Your next 10 appointments already scheduled     Jan 03, 2018  9:30 AM CST   Adult Med Follow UP with Mayito Motley MD   Psychiatry Clinic ([facility])    [address]  [address] [facility] [address] [phone]              Who to contact     Please call your clinic at [phone] to:    Ask questions about your health    Make or cancel appointments    Discuss your medicines    Learn about your test results    Speak to your doctor   If you have compliments or concerns about an experience at your clinic, or if you wish to file a complaint, please contact [facility] Patient Relations at [phone] or email us at [email or web address]         Additional Information About Your Visit        MyChart Information     [employer] gives you secure access to your electronic health record. If you see a primary care provider, you can also send messages to your care team and make appointments. If you have questions, please call your primary care clinic.  If you do not have a primary care provider, please call [phone] and they will assist you.      [employer] is an electronic gateway that provides easy, online access to your medical records. With [employer], you can request a clinic appointment, read your test results, renew a prescription or communicate with your care team.     To access your  existing account, please contact your [facility] or call [phone] for assistance.        Care EveryWhere ID     This is your Care EveryWhere ID. This could be used by other organizations to access your [place] medical records  [id number]        Your Vitals Were     Pulse BMI (Body Mass Index)                65 34.32 kg/m2           Blood Pressure from Last 3 Encounters:   12/13/17 138/83   12/06/17 143/90   11/29/17 130/78    Weight from Last 3 Encounters:   12/13/17 116.4 kg (256 lb 9.6 oz)   12/06/17 114.7 kg (252 lb 12.8 oz)   11/29/17 114.5 kg (252 lb 6.4 oz)              Today, you had the following     No orders found for display       Primary Care Provider Office Phone # Fax #    Aristides Jenkins [phone]       [address] [address]        Equal Access to Services     [facility]: Hadii aad ku hadasho Soomaali, waaxda luqadaha, qaybta kaalmada adeegyada, waxay idiin hayjcn adilson castillo . So [facility] [phone].    ATENCIÓN: Si habla español, tiene a osorio disposición servicios gratuitos de asistencia lingüística. Llame al [phone].    We comply with applicable federal civil rights laws and [place] laws. We do not discriminate on the basis of race, color, national origin, age, disability, sex, sexual orientation, or gender identity.            Thank you!     Thank you for choosing PSYCHIATRY CLINIC  for your care. Our goal is always to provide you with excellent care. Hearing back from our patients is one way we can continue to improve our services. Please take a few minutes to complete the written survey that you may receive in the mail after your visit with us. Thank you!             Your Updated Medication List - Protect others around you: Learn how to safely use, store and throw away your medicines at www.disposemymeds.org.          This list is accurate as of: 12/13/17 12:59 PM.  Always use your most  recent med list.                   Brand Name Dispense Instructions for use Diagnosis    LamoTRIgine 200 MG Tb24    LAMICTAL XR    30 tablet    Take 200 mg by mouth every evening    Bipolar 1 disorder, manic, full remission (H)       lithium 300 MG tablet     180 tablet    Take 6 tablets (1,800 mg) by mouth At Bedtime    Bipolar 1 disorder, manic, full remission (H)       OLANZapine 20 MG tablet    zyPREXA    30 tablet    Take 1 tablet (20 mg) by mouth At Bedtime    Bipolar 1 disorder, manic, full remission (H)

## 2017-12-13 NOTE — NURSING NOTE
Chief Complaint   Patient presents with     Recheck Medication     Bipolar 1 disorder     Reviewed allergies, smoking status, and pharmacy preference  Obtained weight, blood pressure and heart rate

## 2017-12-20 ENCOUNTER — TELEPHONE (OUTPATIENT)
Dept: PSYCHIATRY | Facility: CLINIC | Age: 32
End: 2017-12-20

## 2017-12-20 NOTE — TELEPHONE ENCOUNTER
----- Message from Mami Billingsley sent at 12/20/2017  9:50 AM CST -----  Regarding: cold med suggestions/Bond  Contact: [phone]  Pt's spouse is the caller. She said he's had a bad cough at night and it's been very difficult for him to sleep. They are looking for recommendations for a cough suppressant to try or to avoid with his current medications. You can give Carissa call back any time. Ok to leave detailed message.

## 2017-12-26 NOTE — TELEPHONE ENCOUNTER
Tolu, Mayito Skelton MD   Flaa, Ladonna RAMIREZ, RN 5 days ago              Good question. The only things I can think of are:     - He should avoid anything with Ibuprofen in it (since he takes lithium). Acetaminophen is OK. (I think most cold/cough medicines have the latter anyway).   - A lot of cough suppressants contain dextromethorphan, which might be a little sedating along with lithium and OLZ. Not that he should avoid it, but he should be careful the next morning that he is not over-sedated before he drives, for instance    Returned call to pt to pass along recommendations per Dr. Motley.  Apologized for delay in return call.  Pt reports that cough sx have improved however is still considering cough suppressant.  Denies any further questions.

## 2018-01-03 ENCOUNTER — OFFICE VISIT (OUTPATIENT)
Dept: PSYCHIATRY | Facility: CLINIC | Age: 33
End: 2018-01-03
Attending: PSYCHIATRY & NEUROLOGY
Payer: COMMERCIAL

## 2018-01-03 VITALS
SYSTOLIC BLOOD PRESSURE: 132 MMHG | WEIGHT: 260.4 LBS | BODY MASS INDEX: 34.83 KG/M2 | DIASTOLIC BLOOD PRESSURE: 85 MMHG | HEART RATE: 68 BPM

## 2018-01-03 DIAGNOSIS — F31.74 BIPOLAR 1 DISORDER, MANIC, FULL REMISSION (H): ICD-10-CM

## 2018-01-03 PROCEDURE — G0463 HOSPITAL OUTPT CLINIC VISIT: HCPCS | Mod: ZF

## 2018-01-03 RX ORDER — LITHIUM CARBONATE 300 MG
1800 TABLET ORAL AT BEDTIME
Qty: 180 TABLET | Refills: 0 | Status: SHIPPED | OUTPATIENT
Start: 2018-01-03 | End: 2018-01-25

## 2018-01-03 RX ORDER — OLANZAPINE 5 MG/1
TABLET ORAL
Qty: 21 TABLET | Refills: 0 | Status: SHIPPED | OUTPATIENT
Start: 2018-01-03 | End: 2018-02-07

## 2018-01-03 RX ORDER — OLANZAPINE 5 MG/1
TABLET ORAL
Qty: 21 TABLET | Refills: 0 | Status: SHIPPED | OUTPATIENT
Start: 2018-01-03 | End: 2018-01-03

## 2018-01-03 ASSESSMENT — PATIENT HEALTH QUESTIONNAIRE - PHQ9: SUM OF ALL RESPONSES TO PHQ QUESTIONS 1-9: 9

## 2018-01-03 NOTE — PATIENT INSTRUCTIONS
Reduce olanzapine by 5 mg per week until stopped  Notify us if there is any return of manic symptoms  Continue other medications  Return in 1 month

## 2018-01-03 NOTE — PROGRESS NOTES
Nico 3, 2018  Progress Note    Esa Rivera is a 32 year old year old male with Bipolar I Disorder, Most Recent Episode Manic Severe, with psychotic behavior (296.40) who presents for ongoing psychiatric care. He has no children. He and his wife (a psychologist) live on their own in [place]. Esa works as an  for [employer].    Diagnosis    Axis 1: Bipolar I disorder, with severe manic episode with psychosis in Dec 2016. ADHD by history. Alcohol use disorder, moderate, in short-term remission  Axis 2: Nil  Axis 3: Remote concussion x 2  Axis 4: Moderate stressors  Axis 5: GAF at time of visit: 70    Assessment & Plan     Esa Rivera remains well. He denies symptoms of katie or depression. He has been stable for long enough that I think we can start to taper and DC his olanzapine. He complains of side effects from it (increased appetite, sedation). He will reduce the dose by 5 mg per week until it is DCed. He will let us know if there is any return of his manic symptoms. He will return in 1 month. We should do routine labs then for lithium and a;lso to checks cholesterol and glucose.    Other medication options include reducing lithium over time if he continues to experience side effects (he reports polyuria, cognitive slowing), and increasing lamotrigine if depression recurs. He has used stimulants for ADHD (mostly Adderall, also Vyvanse) but is not currently taking them. He does complain of symptoms of ADHD (though it is not clear if they are true ADHD or post-concussive), and has benefited from stimulants previously.     The patient understands the risks, benefits, adverse effects and alternatives. Agrees to treatment with the capacity to do so. No medical contraindications to treatment. Agrees to call clinic for any problems. The patient understands to call 911 or come to the nearest ED if life threatening or urgent symptoms present.    Attestation:  Patient has been seen and evaluated by me, Mayito Motley,  MD, PhD.    I spent a total time of 50 minutes face-to-face with the patient during today s office visit.  Over 50% of this time was spent counseling the patient and/or coordinating care regarding diagnostic assessment, medication management.    Interim History     I saw Esa on his own today. He reports he is doing well. He is not experiencing symptoms of katie or depression. He continues to feel somewhat slowed physically and mentally. He also reports other s/e as above.     Esa's half-time return to work went well. He started back full time yesterday. There has been no return of his grandiose thinking. He feels that he is performing well.     We had a lengthy discussion about Esa's medications. He is getting OLZ-related s/e as above. He will taper the dose and is aware of the risk of mood instability recurring. He will let us know if this happens. He has symptoms of ADHD but does not believe they were present during childhood. He thinks they may have begun following 2 sports-related concussions in his teens. He was tested for ADHD in his late teens and says he received this diagnosis. He has benefited from stimulants. He will bring the results of his testing to his next appointment. Reintroducing a stimulant after a period of stability may be reasonable.    Esa is agreeable with our plan and will return in 2-3 weeks.           In Dec 2016 Esa experienced an episode of severe katie with psychosis (grandiose delusions). He was seen in the ER but not hospitalized. He had some mild depressive episodes and possible (but questionable) short hypomanic periods in the preceding years, but never received treatment for these.     Esa's manic symptoms began when he became excited about a new idea at work. 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 He became so excited about this the he was unable to sleep. As his katie  "escalated, his sleep decreased to 1-2 hours per night for 5-6 nights. His affect was elated and also markedly irritable. He would \"go off\" on people for minor things in very uncharacteristic ways. His thoughts were \"really fast, crisp, and clear\". He saw \"connections in everything.\" His energy was clearly elevated. He became grandiose, believing that his idea would change the world and as a result he would meet many \"powerful people\". Eventually he came to believe that sings on the radio were meant for him.     Esa was taking Adderall at the time. He has taken this, on and off, for many years. In my opinion it is not an adequate explanation for his manic symptoms.      His mother (a PCP) became concerned and brought him to hospital. He was assessed in the ER and prescribed Ativan for sleep. He was seen by Dr Burt (a psychiatrist) the next day. He was prescribed OLZ and LTG. The doses were titrated \"up and down\" for the next 6 months. There were significant side effects (sedation, cognitive impairment), likely from OLZ. Most recently, Esa stopped OLZ for a period of time. In the context of being on a honeymoon in [place], he develop racing thoughts and difficulty sleeping and restarted it, with benefit. He currently takes 10 mg HS.    Esa endorses several previous short (1-2 day) periods of reduced sleep and elevated mood, typically in the context of getting involved in a work project. At most they represent subthreshold hypomanic periods.     He has also experienced several periods over the years, lasting about a week, of low mood, decreased energy and motivation, poor focus/concentration, and negative thoughts. He denies neurovegetative symptoms or SI. His wife thought that these represented depressive episodes.    Esa has consumed EtOH heavily at points during his life. It was most pronounced during his college years. He has sporadically binge-drank since then. He has not had a drink in 2 weeks.    Past Psych " Hx: As above. Esa was assessed at [place] in Dec 2016 and diagnosed with BDI. He was seen by Psychiatry at age 17 and dxed with ADD. He took Adderall off and on (mostly on) with benefit over the years since then. He denies abusing it.     Past Med Hx: 2 concussion: 1) age 15 - skiing. Age 16: football. LOC x a couple of minutes with both.    MEDICATION ADHERENCE: Good.   Current Medications     Current Outpatient Prescriptions   Medication Sig Dispense Refill     OLANZapine (ZYPREXA) 20 MG tablet Take 1 tablet (20 mg) by mouth At Bedtime 30 tablet 1     LamoTRIgine (LAMICTAL XR) 200 MG TB24 Take 200 mg by mouth every evening 30 tablet 1     lithium 300 MG tablet Take 6 tablets (1,800 mg) by mouth At Bedtime 180 tablet 0         Substance use     ETOH: Sporadic binge drinking  Cigarettes: NA  Street Drugs: Denies    Review of Systems     A comprehensive review of systems was performed and is negative other than noted above.     Allergies     Allergies   Allergen Reactions     No Known Allergies         Mental Status Exam     /85  Pulse 68  Wt 118.1 kg (260 lb 6.4 oz)  BMI 34.83 kg/m2    Alertness: alert  and oriented  Appearance: well groomed  Behavior/Demeanor: cooperative, pleasant and calm, with good  eye contact  Speech: normal  Language: intact  Psychomotor: normal or unremarkable  Mood:  description consistent with euthymia  Affect: full range; was congruent to mood  Thought Process/Associations: unremarkable  Thought Content:  Denies suicidal ideation and violent ideation. He has grandiose over-valued ideas.  Perception:  Denies auditory hallucinations and visual hallucinations  Insight: good  Judgment: good  Cognition:  does appear grossly intact.    Laboratory

## 2018-01-03 NOTE — MR AVS SNAPSHOT
After Visit Summary   1/3/2018    Esa Rivera    MRN: [id number]           Patient Information     Date Of Birth          1985        Visit Information        Provider Department      1/3/2018 9:30 AM Mayito Motley MD Psychiatry Clinic        Today's Diagnoses     Bipolar 1 disorder, manic, full remission (H)          Care Instructions    Reduce olanzapine by 5 mg per week until stopped  Notify us if there is any return of manic symptoms  Continue other medications  Return in 1 month          Follow-ups after your visit        Follow-up notes from your care team     Return in about 4 weeks (around 1/31/2018).      Your next 10 appointments already scheduled     Jan 24, 2018  4:00 PM CST   Adult Med Follow UP with Mayito Motley MD   Psychiatry Clinic ([facility])    [address] [facility] [address] [phone]              Who to contact     Please call your clinic at [phone] to:    Ask questions about your health    Make or cancel appointments    Discuss your medicines    Learn about your test results    Speak to your doctor   If you have compliments or concerns about an experience at your clinic, or if you wish to file a complaint, please contact [facility] Patient Relations at [phone] or email us at [email or web address]         Additional Information About Your Visit        MyChart Information     [employer] gives you secure access to your electronic health record. If you see a primary care provider, you can also send messages to your care team and make appointments. If you have questions, please call your primary care clinic.  If you do not have a primary care provider, please call [phone] and they will assist you.      [employer] is an electronic gateway that provides easy, online access to your medical records. With [employer], you can request a clinic appointment, read your  test results, renew a prescription or communicate with your care team.     To access your existing account, please contact your [facility] or call [phone] for assistance.        Care EveryWhere ID     This is your Care EveryWhere ID. This could be used by other organizations to access your [place] medical records  [id number]        Your Vitals Were     Pulse BMI (Body Mass Index)                68 34.83 kg/m2           Blood Pressure from Last 3 Encounters:   01/03/18 132/85   12/13/17 138/83   12/06/17 143/90    Weight from Last 3 Encounters:   01/03/18 118.1 kg (260 lb 6.4 oz)   12/13/17 116.4 kg (256 lb 9.6 oz)   12/06/17 114.7 kg (252 lb 12.8 oz)              Today, you had the following     No orders found for display         Today's Medication Changes          These changes are accurate as of: 1/3/18 11:14 AM.  If you have any questions, ask your nurse or doctor.               Start taking these medicines.        Dose/Directions    OLANZapine 5 MG tablet   Commonly known as:  zyPREXA   Used for:  Bipolar 1 disorder, manic, full remission (H)   Started by:  Mayito Motley MD        10 mg po for 1 week, then 5 mg for 1 week, then stop   Quantity:  21 tablet   Refills:  0            Where to get your medicines      These medications were sent to [facility]/pharmacy #[id number] - [place], [place] - [address] [facility]  [address], [place] [address]     Phone:  [phone]     lithium 300 MG tablet    OLANZapine 5 MG tablet                Primary Care Provider Office Phone # Fax #    Aristides Jenkins [phone]       [address] [facility] [address]  [facility] [address]        Equal Access to Services     [facility] : Hadii milla Clark, waphilippeda luqadaha, qaybta kaalmada treva, jermaine castaneda. So [facility] [phone].    ATENCIÓN: Si habla español, tiene a osorio disposición servicios gratuitos de asistencia lingüística. Llame  al [phone].    We comply with applicable federal civil rights laws and [place] laws. We do not discriminate on the basis of race, color, national origin, age, disability, sex, sexual orientation, or gender identity.            Thank you!     Thank you for choosing PSYCHIATRY CLINIC  for your care. Our goal is always to provide you with excellent care. Hearing back from our patients is one way we can continue to improve our services. Please take a few minutes to complete the written survey that you may receive in the mail after your visit with us. Thank you!             Your Updated Medication List - Protect others around you: Learn how to safely use, store and throw away your medicines at www.disposemymeds.org.          This list is accurate as of: 1/3/18 11:14 AM.  Always use your most recent med list.                   Brand Name Dispense Instructions for use Diagnosis    LamoTRIgine 200 MG Tb24    LAMICTAL XR    30 tablet    Take 200 mg by mouth every evening    Bipolar 1 disorder, manic, full remission (H)       lithium 300 MG tablet     180 tablet    Take 6 tablets (1,800 mg) by mouth At Bedtime    Bipolar 1 disorder, manic, full remission (H)       OLANZapine 5 MG tablet    zyPREXA    21 tablet    10 mg po for 1 week, then 5 mg for 1 week, then stop    Bipolar 1 disorder, manic, full remission (H)

## 2018-01-08 ENCOUNTER — TELEPHONE (OUTPATIENT)
Dept: PSYCHIATRY | Facility: CLINIC | Age: 33
End: 2018-01-08

## 2018-01-08 NOTE — TELEPHONE ENCOUNTER
1/4/18 at 1308 message from Dr. Motley:    I think it might be best to ask Esa's insurance company (versus Esa himself). I completed some forms for him a while back and he faxed them himself to the insurance company. They told him either that 1) the fax did not go through, or that 2) it did but they did not receive progress notes that they wanted. (He wasn't totally clear.) Would you be able to ask them what it is they need?     Its  Disability Programs; the claim # is [id number]; the worker assigned to the claim is Xiang Mariet; and the number is [phone]; push [phone] then ext [phone].

## 2018-01-08 NOTE — TELEPHONE ENCOUNTER
Rec'd incoming faxed records from [employer] [facility] (18 pages total).    Routed to Dr. Motley to see if he would like to review a copy prior to submission to scanning.

## 2018-01-09 NOTE — TELEPHONE ENCOUNTER
1/8/18 at 1625:    paperwork sent/Mami Darden Jodi L, RN       Phone Number: [phone]                     Pt is the caller. He is calling to confirm the office notes are being faxed over to the [facility]. Please follow up with pt when you can. Ok to leave detailed message.

## 2018-01-09 NOTE — TELEPHONE ENCOUNTER
Per 11/9/17 encounter writer submitted all previous notes and relevant lab results to [facility].      Today, writer faxed all appt notes and relevant lab work from 11/9/17 to present.  Faxed to [phone].      Notified pt via .

## 2018-01-11 NOTE — TELEPHONE ENCOUNTER
Spoke with pt and informed him all office visit from notes from 11/15/17 - 1/3/18 (inclusive) were faxed to [facility] on 1/9/18.  He will call [facility] to confirm they have been rec'd and to inquire if additional paperwork needs to be included.     12/11/17 - 12/29/17 dates currently being denied but now that records have been faxed hopefully this will be reversed   12/18/17 - 12/29/17 worked part time  1/2/17 returned to work full time

## 2018-01-11 NOTE — TELEPHONE ENCOUNTER
1/11/18 at 1334:     Pt Calling Back to F/U         [place], Pat Bailey, Ladonna RAMIREZ RN       Phone Number: [phone]; yes ok to lvm     Pt has a mtg from 2-3pm today 1/10 but is able to answer calls prior to or after.

## 2018-01-12 NOTE — TELEPHONE ENCOUNTER
Writer has not rec'd response from Dr. Motley.    Will forward original records to Dr. Motley with request that he forward them to scanning post-review.

## 2018-01-24 ENCOUNTER — OFFICE VISIT (OUTPATIENT)
Dept: PSYCHIATRY | Facility: CLINIC | Age: 33
End: 2018-01-24
Attending: PSYCHIATRY & NEUROLOGY
Payer: COMMERCIAL

## 2018-01-24 DIAGNOSIS — F31.74 BIPOLAR 1 DISORDER, MANIC, FULL REMISSION (H): Primary | ICD-10-CM

## 2018-01-24 NOTE — PATIENT INSTRUCTIONS
Continue lithium and lamotrigine  Blood test to check lithium level and cholesterol  Return in 2 weeks

## 2018-01-24 NOTE — PROGRESS NOTES
Jan 24, 2018  Progress Note    Esa Rivera is a 32 year old year old male with Bipolar I Disorder, Most Recent Episode Manic Severe, with psychotic behavior (296.40) who presents for ongoing psychiatric care. He has no children. He and his wife (a psychologist) live on their own in [place]. Esa works as an  for [employer].    Diagnosis    Axis 1: Bipolar I disorder, with severe manic episode with psychosis in Dec 2016. ADHD by history. Alcohol use disorder, moderate, in short-term remission  Axis 2: Nil  Axis 3: Remote concussion x 2  Axis 4: Moderate stressors  Axis 5: GAF at time of visit: 70    Assessment & Plan     Esa Rivera remains well. He denies symptoms of katie or depression.  He has been able to taper and discontinue his olanzapine, and he took his last 5 mg dose last night.  He denies any reemergence of mood symptoms. His appetite has definitely decreased as the olanzapine dose has gone down.  That said, Esa does continue to experience slowed cognition, which she thinks is related to lithium.  He will get a blood test to check his lithium level, and we will meet again in 2 weeks.  At that point, options could include carefully reducing the dose of lithium, or adding a stimulant medication, which Esa has taken in the past with benefit.  I will also make a referral for him to see a MICD therapist, at his request.    Other medication options include reducing lithium over time if he continues to experience side effects (he reports polyuria, cognitive slowing), and increasing lamotrigine if depression recurs. He has used stimulants for ADHD (mostly Adderall, also Vyvanse) but is not currently taking them. He does complain of symptoms of ADHD (though it is not clear if they are true ADHD or post-concussive), and has benefited from stimulants previously.     The patient understands the risks, benefits, adverse effects and alternatives. Agrees to treatment with the capacity to do so. No medical  contraindications to treatment. Agrees to call clinic for any problems. The patient understands to call 911 or come to the nearest ED if life threatening or urgent symptoms present.    Attestation:  Patient has been seen and evaluated by me, Mayito Motley MD, PhD.    I spent a total time of 30 minutes face-to-face with the patient during today s office visit.  Over 50% of this time was spent counseling the patient and/or coordinating care regarding diagnostic assessment, medication management.    Interim History     I saw Esa on his own today. He reports he is doing well. He is not experiencing symptoms of katie or depression.     As we discussed at his last visit, Esa has tapered his olanzapine by 5 mg weekly, and took his last dose last night.  His appetite has come down, and he is pleased about that.  He denies any reemergence of mood symptoms.    Esa does report ongoing cognitive difficulties, particularly with word finding and cognitive slowing.  They are most noticeable at work, and Esa feels that they are impairing his performance.  Given that we have just discontinued 1 of Esa's antimanic medications, he is agreeable to continue his lithium and lamotrigine for the time being.  He is aware the reducing lithium or adding a stimulant may be beneficial once he displays ongoing mood stability.    Esa does report that he and his wife are concerned about his drinking.  He has drank relatively heavily in the past.  He tends to have an urge to drink on most evenings.  Right now it is only 1 drink, with more on the weekends, but Esa is concerned that he could become heavier.  He does request a referral to an MICD specialist, and I will make one for him.    Esa will return in 2 weeks.  He will get a blood test to check routine labs before then.          In Dec 2016 Esa experienced an episode of severe katie with psychosis (grandiose delusions). He was seen in the ER but not hospitalized. He had some mild  "depressive episodes and possible (but questionable) short hypomanic periods in the preceding years, but never received treatment for these.     Esa's manic symptoms began when he became excited about a new idea at work. 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 He became so excited about this the he was unable to sleep. As his katie escalated, his sleep decreased to 1-2 hours per night for 5-6 nights. His affect was elated and also markedly irritable. He would \"go off\" on people for minor things in very uncharacteristic ways. His thoughts were \"really fast, crisp, and clear\". He saw \"connections in everything.\" His energy was clearly elevated. He became grandiose, believing that his idea would change the world and as a result he would meet many \"powerful people\". Eventually he came to believe that sings on the radio were meant for him.     Esa was taking Adderall at the time. He has taken this, on and off, for many years. In my opinion it is not an adequate explanation for his manic symptoms.      His mother (a PCP) became concerned and brought him to hospital. He was assessed in the ER and prescribed Ativan for sleep. He was seen by Dr Burt (a psychiatrist) the next day. He was prescribed OLZ and LTG. The doses were titrated \"up and down\" for the next 6 months. There were significant side effects (sedation, cognitive impairment), likely from OLZ. Most recently, Esa stopped OLZ for a period of time. In the context of being on a honeymoon in [place], he develop racing thoughts and difficulty sleeping and restarted it, with benefit. He currently takes 10 mg HS.    Esa endorses several previous short (1-2 day) periods of reduced sleep and elevated mood, typically in the context of getting involved in a work project. At most they represent subthreshold hypomanic periods.     He has also experienced several periods over the " years, lasting about a week, of low mood, decreased energy and motivation, poor focus/concentration, and negative thoughts. He denies neurovegetative symptoms or SI. His wife thought that these represented depressive episodes.    Esa has consumed EtOH heavily at points during his life. It was most pronounced during his college years. He has sporadically binge-drank since then. He has not had a drink in 2 weeks.    Past Psych Hx: As above. Esa was assessed at [facility] in Dec 2016 and diagnosed with BDI. He was seen by Psychiatry at age 17 and dxed with ADD. He took Adderall off and on (mostly on) with benefit over the years since then. He denies abusing it.     Past Med Hx: 2 concussion: 1) age 15 - skiing. Age 16: football. LOC x a couple of minutes with both.    MEDICATION ADHERENCE: Good.   Current Medications     Current Outpatient Prescriptions   Medication Sig Dispense Refill     OLANZapine (ZYPREXA) 5 MG tablet 10 mg po for 1 week, then 5 mg for 1 week, then stop 21 tablet 0     lithium 300 MG tablet Take 6 tablets (1,800 mg) by mouth At Bedtime 180 tablet 0     LamoTRIgine (LAMICTAL XR) 200 MG TB24 Take 200 mg by mouth every evening 30 tablet 1         Substance use     ETOH: Sporadic binge drinking  Cigarettes: NA  Street Drugs: Denies    Review of Systems     A comprehensive review of systems was performed and is negative other than noted above.     Allergies     Allergies   Allergen Reactions     No Known Allergies         Mental Status Exam     There were no vitals taken for this visit.    Alertness: alert  and oriented  Appearance: well groomed  Behavior/Demeanor: cooperative, pleasant and calm, with good  eye contact  Speech: normal  Language: intact  Psychomotor: normal or unremarkable  Mood:  description consistent with euthymia  Affect: full range; was congruent to mood  Thought Process/Associations: unremarkable  Thought Content:  Denies suicidal ideation and violent ideation. He has grandiose  over-valued ideas.  Perception:  Denies auditory hallucinations and visual hallucinations  Insight: good  Judgment: good  Cognition:  does appear grossly intact.    Laboratory

## 2018-01-24 NOTE — MR AVS SNAPSHOT
After Visit Summary   1/24/2018    Esa Rivera    MRN: [id number]           Patient Information     Date Of Birth          1985        Visit Information        Provider Department      1/24/2018 4:00 PM Mayito Motley MD [facility]        Today's Diagnoses     Bipolar 1 disorder, manic, full remission (H)    -  1      Care Instructions    Continue lithium and lamotrigine  Blood test to check lithium level and cholesterol  Return in 2 weeks          Follow-ups after your visit        Follow-up notes from your care team     Return in about 2 weeks (around 2/7/2018).      Your next 10 appointments already scheduled     Feb 07, 2018  8:30 AM CST   Adult Med Follow UP with Mayito Motley MD   Psychiatry Clinic ([facility])    [address] [facility] [address] [phone]              Who to contact     Please call your clinic at [phone] to:    Ask questions about your health    Make or cancel appointments    Discuss your medicines    Learn about your test results    Speak to your doctor   If you have compliments or concerns about an experience at your clinic, or if you wish to file a complaint, please contact [facility] Patient Relations at [phone] or email us at [email or web address]         Additional Information About Your Visit        MyChart Information     [employer] gives you secure access to your electronic health record. If you see a primary care provider, you can also send messages to your care team and make appointments. If you have questions, please call your primary care clinic.  If you do not have a primary care provider, please call [phone] and they will assist you.      [employer] is an electronic gateway that provides easy, online access to your medical records. With [employer], you can request a clinic appointment, read your test results, renew a prescription or  communicate with your care team.     To access your existing account, please contact your [facility] or call [phone] for assistance.        Care EveryWhere ID     This is your Care EveryWhere ID. This could be used by other organizations to access your [place] medical records  [id number]         Blood Pressure from Last 3 Encounters:   01/03/18 132/85   12/13/17 138/83   12/06/17 143/90    Weight from Last 3 Encounters:   01/03/18 118.1 kg (260 lb 6.4 oz)   12/13/17 116.4 kg (256 lb 9.6 oz)   12/06/17 114.7 kg (252 lb 12.8 oz)              Today, you had the following     No orders found for display       Primary Care Provider Office Phone # Fax #    Aristides Jenkins [phone]       [address]  [facility] [address]        Equal Access to Services     [facility] HospitalALE[facility]ANDRINA : Hadii aad ku hadasho Soameena, waaxda luqadaha, qaybta kaalmada adeegyada, waxay suzette castillo . So [facility] [phone].    ATENCIÓN: Si habla español, tiene a osorio disposición servicios gratuitos de asistencia lingüística. Juan Rame al [phone].    We comply with applicable federal civil rights laws and [place] laws. We do not discriminate on the basis of race, color, national origin, age, disability, sex, sexual orientation, or gender identity.            Thank you!     Thank you for choosing PSYCHIATRY CLINIC  for your care. Our goal is always to provide you with excellent care. Hearing back from our patients is one way we can continue to improve our services. Please take a few minutes to complete the written survey that you may receive in the mail after your visit with us. Thank you!             Your Updated Medication List - Protect others around you: Learn how to safely use, store and throw away your medicines at www.disposemymeds.org.          This list is accurate as of 1/24/18  4:42 PM.  Always use your most recent med list.                   Brand  Name Dispense Instructions for use Diagnosis    LamoTRIgine 200 MG Tb24    LAMICTAL XR    30 tablet    Take 200 mg by mouth every evening    Bipolar 1 disorder, manic, full remission (H)       lithium 300 MG tablet     180 tablet    Take 6 tablets (1,800 mg) by mouth At Bedtime    Bipolar 1 disorder, manic, full remission (H)       OLANZapine 5 MG tablet    zyPREXA    21 tablet    10 mg po for 1 week, then 5 mg for 1 week, then stop    Bipolar 1 disorder, manic, full remission (H)

## 2018-01-25 ENCOUNTER — TELEPHONE (OUTPATIENT)
Dept: PSYCHIATRY | Facility: CLINIC | Age: 33
End: 2018-01-25

## 2018-01-25 DIAGNOSIS — Z51.81 ENCOUNTER FOR THERAPEUTIC DRUG MONITORING: Primary | ICD-10-CM

## 2018-01-25 DIAGNOSIS — F31.74 BIPOLAR 1 DISORDER, MANIC, FULL REMISSION (H): ICD-10-CM

## 2018-01-25 DIAGNOSIS — F31.9 BIPOLAR 1 DISORDER (H): ICD-10-CM

## 2018-01-25 RX ORDER — LITHIUM CARBONATE 300 MG
1800 TABLET ORAL AT BEDTIME
Qty: 180 TABLET | Refills: 0 | Status: SHIPPED | OUTPATIENT
Start: 2018-01-25 | End: 2018-02-07

## 2018-01-25 RX ORDER — LAMOTRIGINE 200 MG/1
200 TABLET, EXTENDED RELEASE ORAL EVERY EVENING
Qty: 30 TABLET | Refills: 0 | Status: SHIPPED | OUTPATIENT
Start: 2018-01-25 | End: 2018-02-07

## 2018-01-25 NOTE — TELEPHONE ENCOUNTER
1/24/18 at 1628 Per Mayito Motley MD the following labs are ordered:    CBC + diff   TSH   Lithium level   Lytes BUN Cr   Fasting glucose, cholesterol, triglycerides     Pt has them done at   facility.

## 2018-01-25 NOTE — TELEPHONE ENCOUNTER
1/25/18 at 0804:    Refill Request  Urgent        Mami Foster, Ladonna RAMIREZ RN       Phone Number: [phone]                     Caller:  patient   Medication:  [place], limotrigine   Pharmacy and location:  [facility] [address] [facility]   Have you contacted the pharmacy: no   How much of medication do you have left:  Several days, but he is leaving for vacation tomorrow night and will be out before he gets back   Pending appt: 2/7/18   Okay to leave VM:  yes     Patient saw Dr. Motley yesterday and thought he had enough, but counted again and realized he will need the refill after all.

## 2018-01-26 ASSESSMENT — PATIENT HEALTH QUESTIONNAIRE - PHQ9: SUM OF ALL RESPONSES TO PHQ QUESTIONS 1-9: 6

## 2018-01-31 ENCOUNTER — TELEPHONE (OUTPATIENT)
Dept: PSYCHIATRY | Facility: CLINIC | Age: 33
End: 2018-01-31

## 2018-01-31 NOTE — TELEPHONE ENCOUNTER
Social Work  Outgoing Voicemail Message  [facility]      Left a voicemail message for Esa Rivera. Writer noted she was SW in Dr. Motley's office and was calling to offer resources discussed with Dr. Motley.    Writer requested call back. Included writer's direct contact information and availability, including that writer would be out of office next week.     Plan: SW will assist as needed. If patient returns to office before calling back and wants resources, below are possible resources. All are located in [place].    [facility] offers [facility]otherapy group [phone]    Florinda Liz is [facility], offers individual therapy [phone]    [facility] has an MICD program and offers outpatient therapy [phone]    [facility] offers therapy [phone]        JOSEE Schwartz, [facility]

## 2018-02-06 DIAGNOSIS — Z51.81 ENCOUNTER FOR THERAPEUTIC DRUG MONITORING: ICD-10-CM

## 2018-02-06 DIAGNOSIS — F31.9 BIPOLAR 1 DISORDER (H): ICD-10-CM

## 2018-02-06 LAB
ANION GAP SERPL CALCULATED.3IONS-SCNC: 4 MMOL/L (ref 3–14)
BASOPHILS # BLD AUTO: 0 10E9/L (ref 0–0.2)
BASOPHILS NFR BLD AUTO: 0.2 %
BUN SERPL-MCNC: 12 MG/DL (ref 7–30)
CHLORIDE SERPL-SCNC: 107 MMOL/L (ref 94–109)
CHOLEST SERPL-MCNC: 156 MG/DL
CO2 SERPL-SCNC: 29 MMOL/L (ref 20–32)
CREAT SERPL-MCNC: 1.11 MG/DL (ref 0.66–1.25)
DIFFERENTIAL METHOD BLD: NORMAL
EOSINOPHIL # BLD AUTO: 0.2 10E9/L (ref 0–0.7)
EOSINOPHIL NFR BLD AUTO: 2.9 %
ERYTHROCYTE [DISTWIDTH] IN BLOOD BY AUTOMATED COUNT: 12.6 % (ref 10–15)
GFR SERPL CREATININE-BSD FRML MDRD: 76 ML/MIN/1.7M2
GLUCOSE SERPL-MCNC: 101 MG/DL (ref 70–99)
HCT VFR BLD AUTO: 47.3 % (ref 40–53)
HGB BLD-MCNC: 15.3 G/DL (ref 13.3–17.7)
IMM GRANULOCYTES # BLD: 0 10E9/L (ref 0–0.4)
IMM GRANULOCYTES NFR BLD: 0.3 %
LITHIUM SERPL-SCNC: 1.17 MMOL/L (ref 0.6–1.2)
LYMPHOCYTES # BLD AUTO: 1.5 10E9/L (ref 0.8–5.3)
LYMPHOCYTES NFR BLD AUTO: 22.6 %
MCH RBC QN AUTO: 30.7 PG (ref 26.5–33)
MCHC RBC AUTO-ENTMCNC: 32.3 G/DL (ref 31.5–36.5)
MCV RBC AUTO: 95 FL (ref 78–100)
MONOCYTES # BLD AUTO: 0.4 10E9/L (ref 0–1.3)
MONOCYTES NFR BLD AUTO: 6.3 %
NEUTROPHILS # BLD AUTO: 4.5 10E9/L (ref 1.6–8.3)
NEUTROPHILS NFR BLD AUTO: 67.7 %
NRBC # BLD AUTO: 0 10*3/UL
NRBC BLD AUTO-RTO: 0 /100
PLATELET # BLD AUTO: 261 10E9/L (ref 150–450)
POTASSIUM SERPL-SCNC: 4.5 MMOL/L (ref 3.4–5.3)
RBC # BLD AUTO: 4.99 10E12/L (ref 4.4–5.9)
SODIUM SERPL-SCNC: 140 MMOL/L (ref 133–144)
TRIGL SERPL-MCNC: 111 MG/DL
TSH SERPL DL<=0.005 MIU/L-ACNC: 2.52 MU/L (ref 0.4–4)
WBC # BLD AUTO: 6.6 10E9/L (ref 4–11)

## 2018-02-06 PROCEDURE — 82465 ASSAY BLD/SERUM CHOLESTEROL: CPT | Performed by: PSYCHIATRY & NEUROLOGY

## 2018-02-06 PROCEDURE — 84478 ASSAY OF TRIGLYCERIDES: CPT | Performed by: PSYCHIATRY & NEUROLOGY

## 2018-02-06 PROCEDURE — 82565 ASSAY OF CREATININE: CPT | Performed by: PSYCHIATRY & NEUROLOGY

## 2018-02-06 PROCEDURE — 82947 ASSAY GLUCOSE BLOOD QUANT: CPT | Performed by: PSYCHIATRY & NEUROLOGY

## 2018-02-06 PROCEDURE — 80051 ELECTROLYTE PANEL: CPT | Performed by: PSYCHIATRY & NEUROLOGY

## 2018-02-06 PROCEDURE — 80178 ASSAY OF LITHIUM: CPT | Performed by: PSYCHIATRY & NEUROLOGY

## 2018-02-06 PROCEDURE — 36415 COLL VENOUS BLD VENIPUNCTURE: CPT | Performed by: PSYCHIATRY & NEUROLOGY

## 2018-02-06 PROCEDURE — 84443 ASSAY THYROID STIM HORMONE: CPT | Performed by: PSYCHIATRY & NEUROLOGY

## 2018-02-06 PROCEDURE — 85025 COMPLETE CBC W/AUTO DIFF WBC: CPT | Performed by: PSYCHIATRY & NEUROLOGY

## 2018-02-06 PROCEDURE — 84520 ASSAY OF UREA NITROGEN: CPT | Performed by: PSYCHIATRY & NEUROLOGY

## 2018-02-07 ENCOUNTER — OFFICE VISIT (OUTPATIENT)
Dept: PSYCHIATRY | Facility: CLINIC | Age: 33
End: 2018-02-07
Attending: PSYCHIATRY & NEUROLOGY
Payer: COMMERCIAL

## 2018-02-07 VITALS
DIASTOLIC BLOOD PRESSURE: 83 MMHG | WEIGHT: 258.8 LBS | BODY MASS INDEX: 34.62 KG/M2 | HEART RATE: 57 BPM | SYSTOLIC BLOOD PRESSURE: 136 MMHG

## 2018-02-07 DIAGNOSIS — F31.74 BIPOLAR 1 DISORDER, MANIC, FULL REMISSION (H): ICD-10-CM

## 2018-02-07 DIAGNOSIS — F31.30 BIPOLAR I DISORDER, MOST RECENT EPISODE DEPRESSED (H): Primary | ICD-10-CM

## 2018-02-07 PROCEDURE — G0463 HOSPITAL OUTPT CLINIC VISIT: HCPCS | Mod: ZF

## 2018-02-07 RX ORDER — LAMOTRIGINE 200 MG/1
200 TABLET, EXTENDED RELEASE ORAL EVERY EVENING
Qty: 30 TABLET | Refills: 2 | Status: SHIPPED | OUTPATIENT
Start: 2018-02-07 | End: 2018-04-18

## 2018-02-07 RX ORDER — LITHIUM CARBONATE 300 MG
1800 TABLET ORAL AT BEDTIME
Qty: 180 TABLET | Refills: 2 | Status: SHIPPED | OUTPATIENT
Start: 2018-02-07 | End: 2018-04-18

## 2018-02-07 RX ORDER — LAMOTRIGINE 100 MG/1
100 TABLET, EXTENDED RELEASE ORAL AT BEDTIME
Qty: 30 TABLET | Refills: 0 | Status: SHIPPED | OUTPATIENT
Start: 2018-02-07 | End: 2018-03-06

## 2018-02-07 RX ORDER — QUETIAPINE FUMARATE 25 MG/1
TABLET, FILM COATED ORAL
Qty: 90 TABLET | Refills: 0 | Status: SHIPPED | OUTPATIENT
Start: 2018-02-07 | End: 2018-04-02

## 2018-02-07 ASSESSMENT — PAIN SCALES - GENERAL: PAINLEVEL: NO PAIN (0)

## 2018-02-07 NOTE — NURSING NOTE
Chief Complaint   Patient presents with     Recheck Medication     Bipolar 1 disorder, manic, full remission     Reviewed Allergies, Medications, Pharmacy, Smoking Status, and Pain Level  Administered Abuse Screening Questions   Obtained Weight, Blood Pressure, Heart Rate

## 2018-02-07 NOTE — PROGRESS NOTES
Feb 7, 2018  Progress Note    Esa Rivera is a 32 year old year old male with Bipolar I Disorder, Most Recent Episode Manic Severe, with psychotic behavior (296.40) who presents for ongoing psychiatric care. He has no children. He and his wife (a psychologist) live on their own in [facility]. Esa works as an  for [employer].    Diagnosis    Axis 1: Bipolar I disorder, with severe manic episode with psychosis in Dec 2016. ADHD by history. Alcohol use disorder, moderate, in short-term remission  Axis 2: Nil  Axis 3: Remote concussion x 2  Axis 4: Moderate stressors  Axis 5: GAF at time of visit: 70    Assessment & Plan     Since the last visit, Esa has stopped olanzapine, and continues to take lithium 1800 mg daily and lamotrigine 200 mg at night.  His lithium level on February 6 was at the upper end of the therapeutic range at 1.17.   If Esa is experiencing side effects from lithium, and he has reported polyuria and cognitive slowing, slightly reducing the dose down the road may be an option.  I do not think that now is the best time, given Ward's current anxiety and depressive symptoms.  His blood glucose was also slightly elevated, at 101.  We should check this again in a couple of months after being free of olanzapine.    Esa has been under very significant stress at work, and describes high anxiety and depressive symptoms.  He will talk with his supervisor at work about reducing his workload.  On his wife's urging he will start seeing his therapist again.  He is agreeable to increase his lamotrigine to 300 mg at night to target depressive symptoms, and to use Seroquel 25 mg twice daily as needed and 25-50 mg at bedtime as needed to promote sleep and reduce anxiety.  We talked about reintroducing Adderall, but given the amount of stress we both agree that this is not the best time.  This will be revisited. Esa will return in 2 weeks.    Other medication options include reducing lithium over time if he continues  to experience side effects (he reports polyuria, cognitive slowing). He has used stimulants for ADHD (mostly Adderall, also Vyvanse) but is not currently taking them. He does complain of symptoms of ADHD (though it is not clear if they are true ADHD or post-concussive), and has benefited from stimulants previously.     The patient understands the risks, benefits, adverse effects and alternatives. Agrees to treatment with the capacity to do so. No medical contraindications to treatment. Agrees to call clinic for any problems. The patient understands to call 911 or come to the nearest ED if life threatening or urgent symptoms present.    Attestation:  Patient has been seen and evaluated by me, Mayito Motley MD, PhD.    Interim History     I saw Esa on his own today.  He reports that things have been very stressful at work.  He has had to take on the responsibilities of a coworker who has left the company, and so is in charge of a number of different projects.  There are very tight deadlines, and a lot of pressure to get things accomplished, and Esa finds it hard to stay on top of everything.  His anxiety level is high, and his sleep is broken at night as a result.    In this context, Esa also reports low mood, reduced motivation and energy, and fatigue.  As noted his sleep is poor.  His concentration is also impaired, and this is worse during periods of high stress.  In addition to his workload, Esa was on vacation last week, and drank more than usual, and may be experiencing some depressogenic effects from alcohol.    Esa and I agree that work stress is the main trigger for his symptoms, and will need to be addressed.  In addition, we both think that a true depression has also been triggered by the stress.  Esa is agreeable to the medication changes outlined above.  We had a lengthy discussion about the pros and cons of reintroducing Adderall.  It could help with Esa's concentration, but it could also  "worsen his anxiety, and given the amount of stress, the likelihood of the triggering a manic episode may be increased, we both agree that deferring reintroducing this for now his best.    Esa will have a conversation with his supervisor at work today about reducing his workload.  He will return here in 2 weeks.          In Dec 2016 Esa experienced an episode of severe katie with psychosis (grandiose delusions). He was seen in the ER but not hospitalized. He had some mild depressive episodes and possible (but questionable) short hypomanic periods in the preceding years, but never received treatment for these.     Esa's manic symptoms began when he became excited about a new idea at work. 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 He became so excited about this the he was unable to sleep. As his katie escalated, his sleep decreased to 1-2 hours per night for 5-6 nights. His affect was elated and also markedly irritable. He would \"go off\" on people for minor things in very uncharacteristic ways. His thoughts were \"really fast, crisp, and clear\". He saw \"connections in everything.\" His energy was clearly elevated. He became grandiose, believing that his idea would change the world and as a result he would meet many \"powerful people\". Eventually he came to believe that sings on the radio were meant for him.     Esa was taking Adderall at the time. He has taken this, on and off, for many years. In my opinion it is not an adequate explanation for his manic symptoms.      His mother (a PCP) became concerned and brought him to hospital. He was assessed in the ER and prescribed Ativan for sleep. He was seen by Dr Burt (a psychiatrist) the next day. He was prescribed OLZ and LTG. The doses were titrated \"up and down\" for the next 6 months. There were significant side effects (sedation, cognitive impairment), likely from OLZ. " Most recently, Esa stopped OLZ for a period of time. In the context of being on a honeymoon in [place], he develop racing thoughts and difficulty sleeping and restarted it, with benefit. He currently takes 10 mg HS.    Esa endorses several previous short (1-2 day) periods of reduced sleep and elevated mood, typically in the context of getting involved in a work project. At most they represent subthreshold hypomanic periods.     He has also experienced several periods over the years, lasting about a week, of low mood, decreased energy and motivation, poor focus/concentration, and negative thoughts. He denies neurovegetative symptoms or SI. His wife thought that these represented depressive episodes.    Esa has consumed EtOH heavily at points during his life. It was most pronounced during his college years. He has sporadically binge-drank since then. He has not had a drink in 2 weeks.    Past Psych Hx: As above. Esa was assessed at [facility] in Dec 2016 and diagnosed with BDI. He was seen by Psychiatry at age 17 and dxed with ADD. He took Adderall off and on (mostly on) with benefit over the years since then. He denies abusing it.     Past Med Hx: 2 concussion: 1) age 15 - skiing. Age 16: football. LOC x a couple of minutes with both.    MEDICATION ADHERENCE: Good.   Current Medications     Current Outpatient Prescriptions   Medication Sig Dispense Refill     LamoTRIgine (LAMICTAL XR) 200 MG TB24 Take 200 mg by mouth every evening 30 tablet 0     lithium 300 MG tablet Take 6 tablets (1,800 mg) by mouth At Bedtime 180 tablet 0     OLANZapine (ZYPREXA) 5 MG tablet 10 mg po for 1 week, then 5 mg for 1 week, then stop (Patient not taking: Reported on 2/7/2018) 21 tablet 0         Substance use     ETOH: Sporadic binge drinking  Cigarettes: NA  Street Drugs: Denies    Review of Systems     A comprehensive review of systems was performed and is negative other than noted above.     Allergies     Allergies   Allergen Reactions      No Known Allergies         Mental Status Exam     /83  Pulse 57  Wt 117.4 kg (258 lb 12.8 oz)  BMI 34.62 kg/m2    Alertness: alert  and oriented  Appearance: well groomed  Behavior/Demeanor: cooperative and pleasant, with good  eye contact  Speech: normal  Language: intact  Psychomotor: restless and fidgety  Mood:  depressed and anxious  Affect: restricted; was congruent to mood  Thought Process/Associations: unremarkable  Thought Content:  Denies suicidal ideation and violent ideation. He has grandiose over-valued ideas.  Perception:  Denies auditory hallucinations and visual hallucinations  Insight: good  Judgment: good  Cognition:  does appear grossly intact.      PSYCHIATRY CLINIC INDIVIDUAL PSYCHOTHERAPY NOTE                                                     [16]   Start time - 0840        End time - 0915  Date reviewed - 2/7/2018       Date next due - 5/7/2018    Subjective: This supportive psychotherapy session addressed issues related to orientation to therapy plan and goals of therapy plan.  Patient's reaction: Maintenance in the context of mental status appropriate for ambulatory setting.  Progress: good  Plan: RTC 2 mos  Psychotherapy services during this visit included  myself and the patient.   TREATMENT  PLAN          SYMPTOMS; PROBLEMS   MEASURABLE GOALS;    FUNCTIONAL IMPROVEMENT INTERVENTIONS;   GAINS MADE DISCHARGE CRITERIA   Depression: depressed mood, anhedonia, low energy, insomnia and concentration problems   reduce depressive symptoms and reduce depressive episodes medications   psycho-education   self-care skills symptom resolution   Crystal/Hypomania: none   reduce manic/hypomanic episodes medications   psychotherapy  self-care skills symptom resolution

## 2018-02-07 NOTE — MR AVS SNAPSHOT
After Visit Summary   2/7/2018    Esa Rivera    MRN: [id number]           Patient Information     Date Of Birth          1985        Visit Information        Provider Department      2/7/2018 8:30 AM Mayito Motley MD Psychiatry Clinic        Today's Diagnoses     Bipolar I disorder, most recent episode depressed (H)    -  1    Bipolar 1 disorder, manic, full remission (H)          Care Instructions    Increase lamotrigine to 300 mg at night  Add Seroquel 25 mg as needed for anxiety, and 25-50 mg as needed for sleep  Continue other medications  Return in 2-3 weeks    Thank you for coming to the PSYCHIATRY CLINIC.    Lab Testing:  If you had lab testing today and your results are reassuring or normal they will be mailed to you or sent through [employer] within 7 days.   If the lab tests need quick action we will call you with the results.  The phone number we will call with results is # [phone] (home) . If this is not the best number please call our clinic and change the number.    Medication Refills:  If you need any refills please call your pharmacy and they will contact us. Our fax number for refills is [phone]. Please allow three business for refill processing.   If you need to  your refill at a new pharmacy, please contact the new pharmacy directly. The new pharmacy will help you get your medications transferred.     Scheduling:  If you have any concerns about today's visit or wish to schedule another appointment please call our office during normal business hours [phone] (8-5:00 M-F)    Contact Us:  Please call [phone] during business hours (8-5:00 M-F).  If after clinic hours, or on the weekend, please call  [phone].    Financial Assistance [phone]  [facility] Billing [phone]  [facility] Billing [phone]  Medical Records [phone]      MENTAL HEALTH CRISIS NUMBERS:  [facility]:   [facility] - [phone]   Crisis  Residence [facility] - [phone]   Walk-In Counseling Center [facility] - [phone]   COPE 24/7 [place] Mobile Team for Adults - [[phone]]; Child - [[phone]]     Crisis Connection - [phone]     [facility]:   [facility] - [phone]   Walk-in counseling [facility] - [phone]   Walk-in counseling [facility] - [phone]   Crisis Residence [facility] - [phone]   Urgent Care Adult Mental Health:   --Drop-in, 24/7 crisis line, and [place] Mobile Team [[phone]]    CRISIS TEXT LINE: Text [phone] from anywhere, anytime, any crisis 24/7;    OR SEE www.crisistextline.org     Poison Control Center - [phone]    CHILD: [facility] needs assessment team - [phone]     [facility] Lifeline - [phone]; or [facility] Lifeline - [phone]    If you have a medical emergency please call 911or go to the nearest ER.                    _____________________________________________    Again thank you for choosing PSYCHIATRY CLINIC and please let us know how we can best partner with you to improve you and your family's health.  You may be receiving a survey in the mail regarding this appointment. We would love to have your feedback, both positive and negative, so please fill out the survey and return it using the provided envolpe. The survey is done by an external company, so your answers are anonymous.             Follow-ups after your visit        Follow-up notes from your care team     Return in about 2 weeks (around 2/21/2018).      Your next 10 appointments already scheduled     Feb 21, 2018  8:00 AM CST   Adult Med Follow UP with Mayito Motley MD   Psychiatry Clinic ([facility])    [address] [facility] [address] [phone]              Who to contact     Please call your clinic at [phone] to:    Ask questions about  your health    Make or cancel appointments    Discuss your medicines    Learn about your test results    Speak to your doctor   If you have compliments or concerns about an experience at your clinic, or if you wish to file a complaint, please contact [facility] Patient Relations at [phone] or email us at [email or web address]         Additional Information About Your Visit        MyChart Information     [employer] gives you secure access to your electronic health record. If you see a primary care provider, you can also send messages to your care team and make appointments. If you have questions, please call your primary care clinic.  If you do not have a primary care provider, please call [phone] and they will assist you.      [employer] is an electronic gateway that provides easy, online access to your medical records. With [employer], you can request a clinic appointment, read your test results, renew a prescription or communicate with your care team.     To access your existing account, please contact your [facility] or call [phone] for assistance.        Care EveryWhere ID     This is your Care EveryWhere ID. This could be used by other organizations to access your [place] medical records  [id number]        Your Vitals Were     Pulse BMI (Body Mass Index)                57 34.62 kg/m2           Blood Pressure from Last 3 Encounters:   02/07/18 136/83   01/03/18 132/85   12/13/17 138/83    Weight from Last 3 Encounters:   02/07/18 117.4 kg (258 lb 12.8 oz)   01/03/18 118.1 kg (260 lb 6.4 oz)   12/13/17 116.4 kg (256 lb 9.6 oz)              Today, you had the following     No orders found for display         Today's Medication Changes          These changes are accurate as of 2/7/18  9:29 AM.  If you have any questions, ask your nurse or doctor.               Start taking these medicines.        Dose/Directions    QUEtiapine 25 MG tablet    Commonly known as:  SEROquel   Used for:  Bipolar I disorder, most recent episode depressed (H)   Started by:  Mayito Motley MD        25mg BID PRN for anxiety + 25-50mg HS PRN for sleep   Quantity:  90 tablet   Refills:  0         These medicines have changed or have updated prescriptions.        Dose/Directions    * lamoTRIgine 100 MG 24 hr tablet   Commonly known as:  LaMICtal   This may have changed:  You were already taking a medication with the same name, and this prescription was added. Make sure you understand how and when to take each.   Used for:  Bipolar I disorder, most recent episode depressed (H)   Changed by:  Mayito Motley MD        Dose:  100 mg   Take 1 tablet (100 mg) by mouth At Bedtime   Quantity:  30 tablet   Refills:  0       * LamoTRIgine 200 MG Tb24   Commonly known as:  LAMICTAL XR   This may have changed:  Another medication with the same name was added. Make sure you understand how and when to take each.   Used for:  Bipolar 1 disorder, manic, full remission (H)   Changed by:  Mayito Motley MD        Dose:  200 mg   Take 200 mg by mouth every evening   Quantity:  30 tablet   Refills:  2       * Notice:  This list has 2 medication(s) that are the same as other medications prescribed for you. Read the directions carefully, and ask your doctor or other care provider to review them with you.         Where to get your medicines      These medications were sent to [facility]/pharmacy #[id number] - [place], [place] - [address] [facility]  [address], [place] [address]     Phone:  [phone]     lamoTRIgine 100 MG 24 hr tablet    LamoTRIgine 200 MG Tb24    lithium 300 MG tablet    QUEtiapine 25 MG tablet                Primary Care Provider Office Phone # Fax #    Aristides Jenkins [phone]       [address] [facility] [address]  [facility] [address]        Equal Access to Services     BROCK RUEDA AH: Bertha Clark, elizabeth dao, nakul wolf  jermaine tilleybassam adamsaan ah. Samina [facility] [phone].    ATENCIÓN: Si francisco jo, tiene a osorio disposición servicios gratuitos de asistencia lingüística. Sadie al [phone].    We comply with applicable federal civil rights laws and [place] laws. We do not discriminate on the basis of race, color, national origin, age, disability, sex, sexual orientation, or gender identity.            Thank you!     Thank you for choosing PSYCHIATRY CLINIC  for your care. Our goal is always to provide you with excellent care. Hearing back from our patients is one way we can continue to improve our services. Please take a few minutes to complete the written survey that you may receive in the mail after your visit with us. Thank you!             Your Updated Medication List - Protect others around you: Learn how to safely use, store and throw away your medicines at www.disposemymeds.org.          This list is accurate as of 2/7/18  9:29 AM.  Always use your most recent med list.                   Brand Name Dispense Instructions for use Diagnosis    * lamoTRIgine 100 MG 24 hr tablet    LaMICtal    30 tablet    Take 1 tablet (100 mg) by mouth At Bedtime    Bipolar I disorder, most recent episode depressed (H)       * LamoTRIgine 200 MG Tb24    LAMICTAL XR    30 tablet    Take 200 mg by mouth every evening    Bipolar 1 disorder, manic, full remission (H)       lithium 300 MG tablet     180 tablet    Take 6 tablets (1,800 mg) by mouth At Bedtime    Bipolar 1 disorder, manic, full remission (H)       QUEtiapine 25 MG tablet    SEROquel    90 tablet    25mg BID PRN for anxiety + 25-50mg HS PRN for sleep    Bipolar I disorder, most recent episode depressed (H)       * Notice:  This list has 2 medication(s) that are the same as other medications prescribed for you. Read the directions carefully, and ask your doctor or other care provider to review them with you.

## 2018-02-07 NOTE — PATIENT INSTRUCTIONS
Increase lamotrigine to 300 mg at night  Add Seroquel 25 mg as needed for anxiety, and 25-50 mg as needed for sleep  Continue other medications  Return in 2-3 weeks    Thank you for coming to the PSYCHIATRY CLINIC.    Lab Testing:  If you had lab testing today and your results are reassuring or normal they will be mailed to you or sent through [employer] within 7 days.   If the lab tests need quick action we will call you with the results.  The phone number we will call with results is # [phone] (home) . If this is not the best number please call our clinic and change the number.    Medication Refills:  If you need any refills please call your pharmacy and they will contact us. Our fax number for refills is [phone]. Please allow three business for refill processing.   If you need to  your refill at a new pharmacy, please contact the new pharmacy directly. The new pharmacy will help you get your medications transferred.     Scheduling:  If you have any concerns about today's visit or wish to schedule another appointment please call our office during normal business hours [phone] (8-5:00 M-F)    Contact Us:  Please call [phone] during business hours (8-5:00 M-F).  If after clinic hours, or on the weekend, please call  [phone].    Financial Assistance [phone]  [facility] Billing [phone]  [place] Billing [phone]  Medical Records [phone]      MENTAL HEALTH CRISIS NUMBERS:  [facility]:   [facility] - [phone]   Crisis Residence [facility] - [phone]   Walk-In Counseling [facility] - [phone]   COPE 24/7 [place] Mobile Team for Adults - [[phone]]; Child - [[phone]]     Crisis Connection - [phone]     [facility]:   [facility] - [phone]   Walk-in counseling [facility] - [phone]   Walk-in counseling [facility] - [phone]   Crisis Residence   [facility] - [phone]   Urgent Care Adult Mental Health:   --Drop-in, 24/7 crisis line, and [facility] Mobile Team [[phone]]    CRISIS TEXT LINE: Text [phone] from anywhere, anytime, any crisis 24/7;    OR SEE www.crisistextline.org     Poison Control Center - [phone]    CHILD: [facility] needs assessment team - [phone]     [facility] - [phone]; or [facility] Lifeline - [phone]    If you have a medical emergency please call 911or go to the nearest ER.                    _____________________________________________    Again thank you for choosing PSYCHIATRY CLINIC and please let us know how we can best partner with you to improve you and your family's health.  You may be receiving a survey in the mail regarding this appointment. We would love to have your feedback, both positive and negative, so please fill out the survey and return it using the provided envolpe. The survey is done by an external company, so your answers are anonymous.

## 2018-02-08 ASSESSMENT — PATIENT HEALTH QUESTIONNAIRE - PHQ9: SUM OF ALL RESPONSES TO PHQ QUESTIONS 1-9: 8

## 2018-02-21 ENCOUNTER — OFFICE VISIT (OUTPATIENT)
Dept: PSYCHIATRY | Facility: CLINIC | Age: 33
End: 2018-02-21
Attending: PSYCHIATRY & NEUROLOGY
Payer: COMMERCIAL

## 2018-02-21 VITALS
SYSTOLIC BLOOD PRESSURE: 129 MMHG | WEIGHT: 257.2 LBS | HEART RATE: 55 BPM | DIASTOLIC BLOOD PRESSURE: 80 MMHG | BODY MASS INDEX: 34.4 KG/M2

## 2018-02-21 DIAGNOSIS — F31.74 BIPOLAR 1 DISORDER, MANIC, FULL REMISSION (H): ICD-10-CM

## 2018-02-21 PROCEDURE — G0463 HOSPITAL OUTPT CLINIC VISIT: HCPCS | Mod: ZF

## 2018-02-21 ASSESSMENT — PAIN SCALES - GENERAL: PAINLEVEL: NO PAIN (0)

## 2018-02-21 NOTE — MR AVS SNAPSHOT
After Visit Summary   2/21/2018    Esa Rivera    MRN: [id number]           Patient Information     Date Of Birth          1985        Visit Information        Provider Department      2/21/2018 8:00 AM Mayito Motley MD [facility]        Today's Diagnoses     Bipolar 1 disorder, manic, full remission (H)          Care Instructions    Continue usual medications  Return in 2 mos          Follow-ups after your visit        Follow-up notes from your care team     Return in about 2 months (around 4/21/2018).      Your next 10 appointments already scheduled     Apr 18, 2018  8:00 AM CDT   Adult Med Follow UP with Mayito Motley MD   Psychiatry Clinic ([facility])    [address]  [address] [facility] [address] [phone]              Who to contact     Please call your clinic at [phone] to:    Ask questions about your health    Make or cancel appointments    Discuss your medicines    Learn about your test results    Speak to your doctor            Additional Information About Your Visit        [employer] Information     [employer] gives you secure access to your electronic health record. If you see a primary care provider, you can also send messages to your care team and make appointments. If you have questions, please call your primary care clinic.  If you do not have a primary care provider, please call [phone] and they will assist you.      [employer] is an electronic gateway that provides easy, online access to your medical records. With [employer], you can request a clinic appointment, read your test results, renew a prescription or communicate with your care team.     To access your existing account, please contact your [facility] or call [phone] for assistance.        Care EveryWhere ID     This is your Care EveryWhere ID. This could be used by other organizations to access your [place]  medical records  [id number]        Your Vitals Were     Pulse BMI (Body Mass Index)                55 34.4 kg/m2           Blood Pressure from Last 3 Encounters:   02/21/18 129/80   02/07/18 136/83   01/03/18 132/85    Weight from Last 3 Encounters:   02/21/18 116.7 kg (257 lb 3.2 oz)   02/07/18 117.4 kg (258 lb 12.8 oz)   01/03/18 118.1 kg (260 lb 6.4 oz)              Today, you had the following     No orders found for display       Primary Care Provider Office Phone # Fax #    Aristides Jenkins [phone]       [address] [facility] [address]  [facility] [address]        Equal Access to Services     BROCK RUEDA : Hadii aad ku hadasho Soameena, waaxda luqadaha, qaybta kaalmada adeegyada, jermaine castillo . So [facility] [phone].    ATENCIÓN: Si habla español, tiene a osorio disposición servicios gratuitos de asistencia lingüística. Llame al [phone].    We comply with applicable federal civil rights laws and [place] laws. We do not discriminate on the basis of race, color, national origin, age, disability, sex, sexual orientation, or gender identity.            Thank you!     Thank you for choosing PS[facility] CLINIC  for your care. Our goal is always to provide you with excellent care. Hearing back from our patients is one way we can continue to improve our services. Please take a few minutes to complete the written survey that you may receive in the mail after your visit with us. Thank you!             Your Updated Medication List - Protect others around you: Learn how to safely use, store and throw away your medicines at www.disposemymeds.org.          This list is accurate as of 2/21/18  8:40 AM.  Always use your most recent med list.                   Brand Name Dispense Instructions for use Diagnosis    * lamoTRIgine 100 MG 24 hr tablet    LaMICtal    30 tablet    Take 1 tablet (100 mg) by mouth At Bedtime    Bipolar I disorder, most recent episode depressed (H)        * LamoTRIgine 200 MG Tb24    LAMICTAL XR    30 tablet    Take 200 mg by mouth every evening    Bipolar 1 disorder, manic, full remission (H)       lithium 300 MG tablet     180 tablet    Take 6 tablets (1,800 mg) by mouth At Bedtime    Bipolar 1 disorder, manic, full remission (H)       QUEtiapine 25 MG tablet    SEROquel    90 tablet    25mg BID PRN for anxiety + 25-50mg HS PRN for sleep    Bipolar I disorder, most recent episode depressed (H)       * Notice:  This list has 2 medication(s) that are the same as other medications prescribed for you. Read the directions carefully, and ask your doctor or other care provider to review them with you.

## 2018-02-21 NOTE — PROGRESS NOTES
Feb 21, 2018  Progress Note    Esa Rivera is a 32 year old year old male with Bipolar I Disorder, Most Recent Episode Manic Severe, with psychotic behavior (296.40) who presents for ongoing psychiatric care. He has no children. He and his wife (a psychologist) live on their own in [facility] Esa works as an  for [employer].    Diagnosis    Axis 1: Bipolar I disorder, with severe manic episode with psychosis in Dec 2016. ADHD by history. Alcohol use disorder, moderate, in short-term remission  Axis 2: Nil  Axis 3: Remote concussion x 2  Axis 4: Moderate stressors  Axis 5: GAF at time of visit: 70    Assessment & Plan     Since the last visit, Esa has continued lithium 1800 mg at bedtime, increased his lamotrigine to 300 mg at bedtime, and started Seroquel 25 mg at bedtime.  He reports that his mood is improved, and he denies current symptoms of depression or katie.  His anxiety level is also reduced.  His lithium level on February 6 was at the upper end of the therapeutic range at 1.17.   If Esa is experiencing side effects from lithium, and he has reported polyuria and cognitive slowing, slightly reducing the dose down the road may be an option.  I do not think that now is the best time, given Ward's current anxiety and depressive symptoms.  His blood glucose was also slightly elevated, at 101.  We should check this again in a couple of months after being free of olanzapine.  He will continue his current medications and return in 2 months.  If things are well at that point, and if he is continuing to experience symptoms of ADHD, we could cautiously introduce a stimulant at that point.    The patient understands the risks, benefits, adverse effects and alternatives. Agrees to treatment with the capacity to do so. No medical contraindications to treatment. Agrees to call clinic for any problems. The patient understands to call 911 or come to the nearest ED if life threatening or urgent symptoms  present.    Attestation:  Patient has been seen and evaluated by me, Mayito Motley MD, PhD.    Interim History     I saw Esa on his own today.  He has been adherent with his medications.  He denies side effects from increasing lamotrigine or starting Seroquel.    Esa reports that things are significantly better.  His mood is reasonably good most of the time.  His anxiety level is definitely less.  He is sleeping 7-8 hours per night, and has a good energy level during the day.  He feels less overwhelmed, and it is easier for him to focus on things at work.  He feels that he is managing his responsibilities better, and is being productive.    We had a discussion about early warning signs for the reemergence of manic episodes.  Esa identifies increased need for sleep and becoming overly focused on grandiose thoughts.  His plan will be if these reemerge, that he will start taking olanzapine 10-15 mg and contact us immediately.    Esa is agreeable with the above plan.  He is continuing to work with his supervisors to manage his workload, and they seem receptive to providing him with support.  We reviewed the importance of regular sleep-wake cycles and behavioral patterns, and the importance of remaining substance free.  Esa denies recent substance abuse.    Esa is agreeable with the above plan.  He will return in 2 months.          In Dec 2016 Esa experienced an episode of severe katie with psychosis (grandiose delusions). He was seen in the ER but not hospitalized. He had some mild depressive episodes and possible (but questionable) short hypomanic periods in the preceding years, but never received treatment for these.     Esa's manic symptoms began when he became excited about a new idea at work. 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 He became so excited about this the he was unable to sleep. As his katie  "escalated, his sleep decreased to 1-2 hours per night for 5-6 nights. His affect was elated and also markedly irritable. He would \"go off\" on people for minor things in very uncharacteristic ways. His thoughts were \"really fast, crisp, and clear\". He saw \"connections in everything.\" His energy was clearly elevated. He became grandiose, believing that his idea would change the world and as a result he would meet many \"powerful people\". Eventually he came to believe that sings on the radio were meant for him.     Esa was taking Adderall at the time. He has taken this, on and off, for many years. In my opinion it is not an adequate explanation for his manic symptoms.      His mother (a PCP) became concerned and brought him to hospital. He was assessed in the ER and prescribed Ativan for sleep. He was seen by Dr Burt (a psychiatrist) the next day. He was prescribed OLZ and LTG. The doses were titrated \"up and down\" for the next 6 months. There were significant side effects (sedation, cognitive impairment), likely from OLZ. Most recently, Esa stopped OLZ for a period of time. In the context of being on a honeymoon in [place], he develop racing thoughts and difficulty sleeping and restarted it, with benefit. He currently takes 10 mg HS.    Esa endorses several previous short (1-2 day) periods of reduced sleep and elevated mood, typically in the context of getting involved in a work project. At most they represent subthreshold hypomanic periods.     He has also experienced several periods over the years, lasting about a week, of low mood, decreased energy and motivation, poor focus/concentration, and negative thoughts. He denies neurovegetative symptoms or SI. His wife thought that these represented depressive episodes.    Esa has consumed EtOH heavily at points during his life. It was most pronounced during his college years. He has sporadically binge-drank since then. He has not had a drink in 2 weeks.    Past Psych " Hx: As above. Esa was assessed at [facility] in Dec 2016 and diagnosed with BDI. He was seen by Psychiatry at age 17 and dxed with ADD. He took Adderall off and on (mostly on) with benefit over the years since then. He denies abusing it.     Past Med Hx: 2 concussion: 1) age 15 - skiing. Age 16: football. LOC x a couple of minutes with both.    MEDICATION ADHERENCE: Good.   Current Medications     Current Outpatient Prescriptions   Medication Sig Dispense Refill     lamoTRIgine (LAMICTAL) 100 MG 24 hr tablet Take 1 tablet (100 mg) by mouth At Bedtime 30 tablet 0     QUEtiapine (SEROQUEL) 25 MG tablet 25mg BID PRN for anxiety + 25-50mg HS PRN for sleep 90 tablet 0     lithium 300 MG tablet Take 6 tablets (1,800 mg) by mouth At Bedtime 180 tablet 2     LamoTRIgine (LAMICTAL XR) 200 MG TB24 Take 200 mg by mouth every evening 30 tablet 2         Substance use     ETOH: Sporadic binge drinking  Cigarettes: NA  Street Drugs: Denies    Review of Systems     A comprehensive review of systems was performed and is negative other than noted above.     Allergies     Allergies   Allergen Reactions     No Known Allergies         Mental Status Exam     /80  Pulse 55  Wt 116.7 kg (257 lb 3.2 oz)  BMI 34.4 kg/m2    Alertness: alert  and oriented  Appearance: well groomed  Behavior/Demeanor: cooperative and pleasant, with good  eye contact  Speech: normal  Language: intact  Psychomotor: restless and fidgety  Mood:  depressed and anxious  Affect: restricted; was congruent to mood  Thought Process/Associations: unremarkable  Thought Content:  Denies suicidal ideation and violent ideation. He has grandiose over-valued ideas.  Perception:  Denies auditory hallucinations and visual hallucinations  Insight: good  Judgment: good  Cognition:  does appear grossly intact.      PSYCHIATRY CLINIC INDIVIDUAL PSYCHOTHERAPY NOTE                                                     [16]   Start time - 0804        End time - 0820  Date reviewed -  2/7/2018       Date next due - 5/7/2018    Subjective: This supportive psychotherapy session addressed issues related to orientation to therapy plan and goals of therapy plan.  Patient's reaction: Maintenance in the context of mental status appropriate for ambulatory setting.  Progress: good  Plan: RTC 2 mos  Psychotherapy services during this visit included  myself and the patient.   TREATMENT  PLAN          SYMPTOMS; PROBLEMS   MEASURABLE GOALS;    FUNCTIONAL IMPROVEMENT INTERVENTIONS;   GAINS MADE DISCHARGE CRITERIA   Depression: depressed mood, anhedonia, low energy, insomnia and concentration problems   reduce depressive symptoms and reduce depressive episodes medications   psycho-education   self-care skills symptom resolution   Crystal/Hypomania: none   reduce manic/hypomanic episodes medications   psychotherapy  self-care skills symptom resolution

## 2018-02-21 NOTE — NURSING NOTE
Chief Complaint   Patient presents with     Recheck Medication     Bipolar I disorder, most recent episode depressed      Reviewed Allergies, Medications, Pharmacy, Smoking Status, and Pain Level    Obtained Weight, Blood Pressure, Heart Rate

## 2018-02-22 ASSESSMENT — PATIENT HEALTH QUESTIONNAIRE - PHQ9: SUM OF ALL RESPONSES TO PHQ QUESTIONS 1-9: 4

## 2018-03-06 DIAGNOSIS — F31.30 BIPOLAR I DISORDER, MOST RECENT EPISODE DEPRESSED (H): ICD-10-CM

## 2018-03-06 DIAGNOSIS — F31.74 BIPOLAR 1 DISORDER, MANIC, FULL REMISSION (H): ICD-10-CM

## 2018-03-06 RX ORDER — LAMOTRIGINE 100 MG/1
100 TABLET, EXTENDED RELEASE ORAL AT BEDTIME
Qty: 30 TABLET | Refills: 1 | Status: SHIPPED | OUTPATIENT
Start: 2018-03-06 | End: 2018-04-18 | Stop reason: DRUGHIGH

## 2018-03-06 NOTE — TELEPHONE ENCOUNTER
Medication requested: lamoTRIgine (LAMICTAL) 100 MG    Last refilled: 2/7/18  Qty: 30    Last seen: 2/21/18  RTC: 2 MOS  Cancel: 0  No-show: 0  Next appt:  4/18/18    Refill decision:  Refilled for 30 days per protocol. UNTIL APPT

## 2018-03-07 ENCOUNTER — TELEPHONE (OUTPATIENT)
Dept: PSYCHIATRY | Facility: CLINIC | Age: 33
End: 2018-03-07

## 2018-03-07 NOTE — TELEPHONE ENCOUNTER
----- Message from Bradley Lam sent at 3/6/2018  8:10 AM CST -----  Regarding: Care Coordination  Contact: [phone]  Pt call 3/6/18@8:11am regarding medication Lithium. Pt stated that he missed a dose of medication and was wondering what should he do. Pt asked if either the nurse or provider Motley could please give him a call.     Thanks

## 2018-03-07 NOTE — TELEPHONE ENCOUNTER
-Returned call to pt  -No answer   -LVM requesting c/b to further discuss  -Clinic number provided

## 2018-03-20 ENCOUNTER — TELEPHONE (OUTPATIENT)
Dept: PSYCHIATRY | Facility: CLINIC | Age: 33
End: 2018-03-20

## 2018-03-20 NOTE — TELEPHONE ENCOUNTER
"Writer returned pt's call regarding his recent mood changes. According to the pt, a few days ago he was feeling very depressed. He said he practiced meditation, which was very helpful and in one day he was \"very happy, almost manic.\" He states he has flashbacks to prior manic episodes and is worried he'll become that manic again. He occasionally feels depressed as well and bounces back and forth between these moods. Discussed the use of Olanzapine 10 to 15 mg PRN if needed for katie symptoms (per MD's last office note). Pt reports he has taken 10 mg, but says it made him feel extra tired and sluggish. Reminded him that this medication does have sedating effects. At this time he would like to continue with using the Olanzapine 10 - 15 mg for possible spouts of katie, unless symptoms get worse. Reminded pt that it's okay to call the clinic with any concerns or reschedule for an earlier appointment if his symptoms worsen. Will route message to provider for any further recommendations.   "

## 2018-03-23 NOTE — TELEPHONE ENCOUNTER
Message  Received: Today       Mayito Motley MD Pratt, Laura, RN       Caller: Unspecified (3 days ago,  3:36 PM)                     SABAS Lancaster     I think Esa's suggestion of using olanzapine 10 mg as needed for manic symptoms is reasonable.  If they persist, however, we may need to make changes in his scheduled medications.  Could you ask him to call us by mid next week if the symptoms persist?     Thanks!     DB

## 2018-03-23 NOTE — TELEPHONE ENCOUNTER
Writer read provider's suggestions and relayed this to the pt over the phone. Pt stated understanding and agreed to call next week if his manic symptoms continue.

## 2018-04-02 DIAGNOSIS — F31.30 BIPOLAR I DISORDER, MOST RECENT EPISODE DEPRESSED (H): ICD-10-CM

## 2018-04-02 RX ORDER — QUETIAPINE FUMARATE 25 MG/1
TABLET, FILM COATED ORAL
Qty: 90 TABLET | Refills: 0 | Status: SHIPPED | OUTPATIENT
Start: 2018-04-02 | End: 2018-05-01

## 2018-04-02 NOTE — TELEPHONE ENCOUNTER
Medication requested: quetiapine  Last refilled: 2/7/18  Qty: 90      Last seen: 2/21/18  RTC: 2 mo  Cancel: 0  No-show: 0  Next appt: 4/18/18    Refill decision:   Refilled for 30 days per protocol.

## 2018-04-18 ENCOUNTER — OFFICE VISIT (OUTPATIENT)
Dept: PSYCHIATRY | Facility: CLINIC | Age: 33
End: 2018-04-18
Attending: PSYCHIATRY & NEUROLOGY
Payer: COMMERCIAL

## 2018-04-18 ENCOUNTER — TELEPHONE (OUTPATIENT)
Dept: PSYCHIATRY | Facility: CLINIC | Age: 33
End: 2018-04-18

## 2018-04-18 DIAGNOSIS — F31.9 BIPOLAR 1 DISORDER (H): Primary | ICD-10-CM

## 2018-04-18 DIAGNOSIS — F31.74 BIPOLAR 1 DISORDER, MANIC, FULL REMISSION (H): ICD-10-CM

## 2018-04-18 RX ORDER — LAMOTRIGINE 200 MG/1
400 TABLET, EXTENDED RELEASE ORAL EVERY EVENING
Qty: 180 TABLET | Refills: 0 | Status: SHIPPED | OUTPATIENT
Start: 2018-04-18 | End: 2018-05-15 | Stop reason: DRUGHIGH

## 2018-04-18 RX ORDER — LITHIUM CARBONATE 300 MG
1800 TABLET ORAL AT BEDTIME
Qty: 180 TABLET | Refills: 2 | Status: ON HOLD | OUTPATIENT
Start: 2018-04-18 | End: 2018-05-21

## 2018-04-18 NOTE — TELEPHONE ENCOUNTER
----- Message from Anisha Alcantara RN sent at 4/18/2018  8:45 AM CDT -----      ----- Message -----     From: Mayito Motley MD     Sent: 4/18/2018   8:33 AM       To: MAE David -     Can you order labs for Esa?  He gets them done downstairs.  He needs a CBC and differential, electrolytes, BUN, creatinine, and fasting glucose, cholesterol, and lipids.    Thanks!    DB

## 2018-04-18 NOTE — PATIENT INSTRUCTIONS
Increase lamotrigine to 400 mg at night  Continue your other usual medications  Please get a blood test to check her lithium level and other routine labs  Please return for follow-up in one month

## 2018-04-18 NOTE — PROGRESS NOTES
Apr 18, 2018  Progress Note    Esa Rivera is a 33 year old year old male with Bipolar I Disorder, Most Recent Episode Manic Severe, with psychotic behavior (296.40) who presents for ongoing psychiatric care. He has no children. He and his wife (a psychologist) live on their own in [facility] Esa works as an  for [employer].    Diagnosis    Axis 1: Bipolar I disorder, with severe manic episode with psychosis in Dec 2016. ADHD by history. Alcohol use disorder, moderate, in short-term remission  Axis 2: Nil  Axis 3: Remote concussion x 2  Axis 4: Moderate stressors  Axis 5: GAF at time of visit: 70    Assessment & Plan     Since the last visit, Esa has been experiencing some mood instability.  He had a short depression that lasted 1-2 weeks, followed immediately by a hypomanic episode.  Since then he has been experiencing mild mixed symptoms.  He will increase his lamotrigine to 400 mg at night.  He will continue lithium 1800 mg at night.  He will continue to use Seroquel 25 mg as needed for sleep.  He has olanzapine 10 mg tablets in case he experiences manic symptoms.  Esa will return in 1 month.  In the meantime he will get a blood test to check his lithium level, glucose, and other routine labs.  If things are well down the road, and if he is continuing to experience symptoms of ADHD, we could cautiously introduce a stimulant.    The patient understands the risks, benefits, adverse effects and alternatives. Agrees to treatment with the capacity to do so. No medical contraindications to treatment. Agrees to call clinic for any problems. The patient understands to call 911 or come to the nearest ED if life threatening or urgent symptoms present.    Attestation:  Patient has been seen and evaluated by me, Mayito Motley MD, PhD.    Interim History     I saw Esa with his wife Julia today.  They report that several weeks ago, Esa experienced a 1-2 week depressive episode, in the context of some work stress.  It  "ended rather abruptly when Esa read the introduction to a book called \"The Hypomanic Advantage\".  He himself began experiencing hypomanic symptoms that lasted a couple of weeks.  He contacted the clinic, and we recommended that he use olanzapine 10 mg to treat these, which he did for about a week, with benefit.    Since then, Esa has been experiencing mild mixed symptoms.  He tends to have strong emotional reactions to things, and can become despondent or elevated rather quickly.  They do not meet criteria for full mood episodes.    Esa has been adherent with his medications.  He drinks alcohol regularly, but not excessively, and denies any other substance use.  He has been \"meditating\", typically for about an hour each day.  He sometimes has strong emotional reactions during this, including periods of tearfulness and elevated mood.  He feels that he is close to God while he is meditating, but this is clearly nondelusional.    Esa is sleeping well.  He has good relationships with his wife, other family, and friends.  He is doing his best to moderate stress.    Esa is agreeable with the medication plan outlined above.  He will return in 1 month.          In Dec 2016 Esa experienced an episode of severe katie with psychosis (grandiose delusions). He was seen in the ER but not hospitalized. He had some mild depressive episodes and possible (but questionable) short hypomanic periods in the preceding years, but never received treatment for these.     Esa's manic symptoms began when he became excited about a new idea at work. 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 He became so excited about this the he was unable to sleep. As his katie escalated, his sleep decreased to 1-2 hours per night for 5-6 nights. His affect was elated and also markedly irritable. He would \"go off\" on people for minor things in very " "uncharacteristic ways. His thoughts were \"really fast, crisp, and clear\". He saw \"connections in everything.\" His energy was clearly elevated. He became grandiose, believing that his idea would change the world and as a result he would meet many \"powerful people\". Eventually he came to believe that sings on the radio were meant for him.     Esa was taking Adderall at the time. He has taken this, on and off, for many years. In my opinion it is not an adequate explanation for his manic symptoms.      His mother (a PCP) became concerned and brought him to hospital. He was assessed in the ER and prescribed Ativan for sleep. He was seen by Dr Burt (a psychiatrist) the next day. He was prescribed OLZ and LTG. The doses were titrated \"up and down\" for the next 6 months. There were significant side effects (sedation, cognitive impairment), likely from OLZ. Most recently, Esa stopped OLZ for a period of time. In the context of being on a honeymoon in [place], he develop racing thoughts and difficulty sleeping and restarted it, with benefit. He currently takes 10 mg HS.    Esa endorses several previous short (1-2 day) periods of reduced sleep and elevated mood, typically in the context of getting involved in a work project. At most they represent subthreshold hypomanic periods.     He has also experienced several periods over the years, lasting about a week, of low mood, decreased energy and motivation, poor focus/concentration, and negative thoughts. He denies neurovegetative symptoms or SI. His wife thought that these represented depressive episodes.    Esa has consumed EtOH heavily at points during his life. It was most pronounced during his college years. He has sporadically binge-drank since then. He has not had a drink in 2 weeks.    Past Psych Hx: As above. Esa was assessed at [facility] in Dec 2016 and diagnosed with BDI. He was seen by Psychiatry at age 17 and dxed with ADD. He took Adderall off and on (mostly on) " with benefit over the years since then. He denies abusing it.     Past Med Hx: 2 concussion: 1) age 15 - skiing. Age 16: football. LOC x a couple of minutes with both.    MEDICATION ADHERENCE: Good.   Current Medications     Current Outpatient Prescriptions   Medication Sig Dispense Refill     LamoTRIgine (LAMICTAL XR) 200 MG TB24 Take 200 mg by mouth every evening 30 tablet 2     lamoTRIgine (LAMICTAL) 100 MG 24 hr tablet Take 1 tablet (100 mg) by mouth At Bedtime 30 tablet 1     lithium 300 MG tablet Take 6 tablets (1,800 mg) by mouth At Bedtime 180 tablet 2     QUEtiapine (SEROQUEL) 25 MG tablet 25mg BID PRN for anxiety + 25-50mg HS PRN for sleep 90 tablet 0         Substance use     ETOH: Sporadic binge drinking  Cigarettes: NA  Street Drugs: Denies    Review of Systems     A comprehensive review of systems was performed and is negative other than noted above.     Allergies     Allergies   Allergen Reactions     No Known Allergies         Mental Status Exam     There were no vitals taken for this visit.    Alertness: alert  and oriented  Appearance: well groomed  Behavior/Demeanor: cooperative and pleasant, with good  eye contact  Speech: normal  Language: intact  Psychomotor: restless and fidgety  Mood:  description consistent with euthymia  Affect: full range; was congruent to mood  Thought Process/Associations: unremarkable  Thought Content:  Denies suicidal ideation and violent ideation. He has grandiose over-valued ideas.  Perception:  Denies auditory hallucinations and visual hallucinations  Insight: good  Judgment: good  Cognition:  does appear grossly intact.      PSYCHIATRY CLINIC INDIVIDUAL PSYCHOTHERAPY NOTE                                                     [16]   Start time - 0806        End time - 0825  Date reviewed - 2/7/2018       Date next due - 5/7/2018    Subjective: This supportive psychotherapy session addressed issues related to orientation to therapy plan and goals of therapy plan.   Patient's reaction: Maintenance in the context of mental status appropriate for ambulatory setting.  Progress: good  Plan: RTC 2 mos  Psychotherapy services during this visit included  myself and the patient.   TREATMENT  PLAN          SYMPTOMS; PROBLEMS   MEASURABLE GOALS;    FUNCTIONAL IMPROVEMENT INTERVENTIONS;   GAINS MADE DISCHARGE CRITERIA   Depression: depressed mood, anhedonia, low energy, insomnia and concentration problems   reduce depressive symptoms and reduce depressive episodes medications   psycho-education   self-care skills symptom resolution   Crystal/Hypomania: none   reduce manic/hypomanic episodes medications   psychotherapy  self-care skills symptom resolution

## 2018-04-18 NOTE — MR AVS SNAPSHOT
After Visit Summary   4/18/2018    sEa Rivera    MRN: [id number]           Patient Information     Date Of Birth          1985        Visit Information        Provider Department      4/18/2018 8:00 AM Mayito Motley MD Psychiatry Clinic        Today's Diagnoses     Bipolar 1 disorder, manic, full remission (H)          Care Instructions    Increase lamotrigine to 400 mg at night  Continue your other usual medications  Please get a blood test to check her lithium level and other routine labs  Please return for follow-up in one month          Follow-ups after your visit        Follow-up notes from your care team     Return in about 4 weeks (around 5/16/2018).      Your next 10 appointments already scheduled     May 23, 2018  8:00 AM CDT   Adult Med Follow UP with Mayito Motley MD   Psychiatry Clinic ([facility])    [place] [id number] [address] [phone]              Who to contact     Please call your clinic at [phone] to:    Ask questions about your health    Make or cancel appointments    Discuss your medicines    Learn about your test results    Speak to your doctor            Additional Information About Your Visit        MyChart Information     [employer] gives you secure access to your electronic health record. If you see a primary care provider, you can also send messages to your care team and make appointments. If you have questions, please call your primary care clinic.  If you do not have a primary care provider, please call [phone] and they will assist you.      [employer] is an electronic gateway that provides easy, online access to your medical records. With [employer], you can request a clinic appointment, read your test results, renew a prescription or communicate with your care team.     To access your existing account, please contact your [facility] or call  [phone] for assistance.        Care EveryWhere ID     This is your Care EveryWhere ID. This could be used by other organizations to access your [facility] medical records  [id number]         Blood Pressure from Last 3 Encounters:   02/21/18 129/80   02/07/18 136/83   01/03/18 132/85    Weight from Last 3 Encounters:   02/21/18 116.7 kg (257 lb 3.2 oz)   02/07/18 117.4 kg (258 lb 12.8 oz)   01/03/18 118.1 kg (260 lb 6.4 oz)              Today, you had the following     No orders found for display         Today's Medication Changes          These changes are accurate as of 4/18/18  8:43 AM.  If you have any questions, ask your nurse or doctor.               These medicines have changed or have updated prescriptions.        Dose/Directions    LamoTRIgine 200 MG Tb24   Commonly known as:  LAMICTAL XR   This may have changed:    - how much to take  - Another medication with the same name was removed. Continue taking this medication, and follow the directions you see here.   Used for:  Bipolar 1 disorder, manic, full remission (H)        Dose:  400 mg   Take 400 mg by mouth every evening   Quantity:  180 tablet   Refills:  0            Where to get your medicines      These medications were sent to [facility]/pharmacy #[id number] - [place], [place] - [address] [facility]  [address], [place] [address]     Phone:  [phone]     LamoTRIgine 200 MG Tb24    lithium 300 MG tablet                Primary Care Provider Office Phone # Fax #    Aristides Jenkins [phone]       [address] [facility] [address]  [facility] [address]        Equal Access to Services     [facility] : Hadii milla brown Soameena, waaxda luqadaha, qaybta kaalmajermaine mckee. So [facility] [phone].    ATENCIÓN: Si habla español, tiene a osorio disposición servicios gratuitos de asistencia lingüística. Llame al [phone].    We comply with applicable federal civil rights laws and [place] laws. We  do not discriminate on the basis of race, color, national origin, age, disability, sex, sexual orientation, or gender identity.            Thank you!     Thank you for choosing PSYCHIATRY CLINIC  for your care. Our goal is always to provide you with excellent care. Hearing back from our patients is one way we can continue to improve our services. Please take a few minutes to complete the written survey that you may receive in the mail after your visit with us. Thank you!             Your Updated Medication List - Protect others around you: Learn how to safely use, store and throw away your medicines at www.disposemymeds.org.          This list is accurate as of 4/18/18  8:43 AM.  Always use your most recent med list.                   Brand Name Dispense Instructions for use Diagnosis    LamoTRIgine 200 MG Tb24    LAMICTAL XR    180 tablet    Take 400 mg by mouth every evening    Bipolar 1 disorder, manic, full remission (H)       lithium 300 MG tablet     180 tablet    Take 6 tablets (1,800 mg) by mouth At Bedtime    Bipolar 1 disorder, manic, full remission (H)       QUEtiapine 25 MG tablet    SEROquel    90 tablet    25mg BID PRN for anxiety + 25-50mg HS PRN for sleep    Bipolar I disorder, most recent episode depressed (H)

## 2018-04-19 ASSESSMENT — PATIENT HEALTH QUESTIONNAIRE - PHQ9: SUM OF ALL RESPONSES TO PHQ QUESTIONS 1-9: 7

## 2018-04-27 ENCOUNTER — TELEPHONE (OUTPATIENT)
Dept: PSYCHIATRY | Facility: CLINIC | Age: 33
End: 2018-04-27

## 2018-04-27 ENCOUNTER — HOSPITAL ENCOUNTER (EMERGENCY)
Facility: CLINIC | Age: 33
Discharge: HOME | End: 2018-04-27
Attending: PSYCHIATRY & NEUROLOGY | Admitting: PSYCHIATRY & NEUROLOGY
Payer: COMMERCIAL

## 2018-04-27 VITALS
HEART RATE: 95 BPM | RESPIRATION RATE: 16 BRPM | DIASTOLIC BLOOD PRESSURE: 94 MMHG | OXYGEN SATURATION: 99 % | WEIGHT: 242.5 LBS | HEIGHT: 75 IN | BODY MASS INDEX: 30.15 KG/M2 | TEMPERATURE: 99.5 F | SYSTOLIC BLOOD PRESSURE: 166 MMHG

## 2018-04-27 DIAGNOSIS — F31.12 BIPOLAR AFFECTIVE DISORDER, CURRENTLY MANIC, MODERATE (H): ICD-10-CM

## 2018-04-27 LAB
AMPHETAMINES UR QL SCN: NEGATIVE
BARBITURATES UR QL: NEGATIVE
BENZODIAZ UR QL: NEGATIVE
CANNABINOIDS UR QL SCN: NEGATIVE
COCAINE UR QL: NEGATIVE
ETHANOL UR QL SCN: NEGATIVE
OPIATES UR QL SCN: NEGATIVE

## 2018-04-27 PROCEDURE — 99284 EMERGENCY DEPT VISIT MOD MDM: CPT | Mod: Z6 | Performed by: PSYCHIATRY & NEUROLOGY

## 2018-04-27 PROCEDURE — 80307 DRUG TEST PRSMV CHEM ANLYZR: CPT | Performed by: PSYCHIATRY & NEUROLOGY

## 2018-04-27 PROCEDURE — 80320 DRUG SCREEN QUANTALCOHOLS: CPT | Performed by: PSYCHIATRY & NEUROLOGY

## 2018-04-27 PROCEDURE — 90791 PSYCH DIAGNOSTIC EVALUATION: CPT

## 2018-04-27 PROCEDURE — 99285 EMERGENCY DEPT VISIT HI MDM: CPT | Mod: 25

## 2018-04-27 PROCEDURE — 25000132 ZZH RX MED GY IP 250 OP 250 PS 637: Performed by: PSYCHIATRY & NEUROLOGY

## 2018-04-27 RX ORDER — OLANZAPINE 10 MG/1
10-20 TABLET ORAL AT BEDTIME
Qty: 10 TABLET | Refills: 0 | Status: ON HOLD | OUTPATIENT
Start: 2018-04-27 | End: 2018-05-21

## 2018-04-27 RX ORDER — OLANZAPINE 10 MG/1
10 TABLET, ORALLY DISINTEGRATING ORAL ONCE
Status: COMPLETED | OUTPATIENT
Start: 2018-04-27 | End: 2018-04-27

## 2018-04-27 RX ADMIN — OLANZAPINE 10 MG: 10 TABLET, ORALLY DISINTEGRATING ORAL at 22:13

## 2018-04-27 ASSESSMENT — ENCOUNTER SYMPTOMS
HYPERACTIVE: 0
EYES NEGATIVE: 1
MUSCULOSKELETAL NEGATIVE: 1
GASTROINTESTINAL NEGATIVE: 1
SLEEP DISTURBANCE: 1
NEUROLOGICAL NEGATIVE: 1
NERVOUS/ANXIOUS: 1
HEMATOLOGIC/LYMPHATIC NEGATIVE: 1
APPETITE CHANGE: 1
HALLUCINATIONS: 0
CARDIOVASCULAR NEGATIVE: 1
DECREASED CONCENTRATION: 1
ACTIVITY CHANGE: 1
ENDOCRINE NEGATIVE: 1
RESPIRATORY NEGATIVE: 1

## 2018-04-27 NOTE — ED AVS SNAPSHOT
[facility] [place], Emergency Department    [address]    [facility] [place] [place] [address]    Phone:  [phone]    Fax:  [phone]                                       Esa Rivera   MRN: [id number]    Department:  [facility], Emergency Department   Date of Visit:  4/27/2018           After Visit Summary Signature Page     I have received my discharge instructions, and my questions have been answered. I have discussed any challenges I see with this plan with the nurse or doctor.    ..........................................................................................................................................  Patient/Patient Representative Signature      ..........................................................................................................................................  Patient Representative Print Name and Relationship to Patient    ..................................................               ................................................  Date                                            Time    ..........................................................................................................................................  Reviewed by Signature/Title    ...................................................              ..............................................  Date                                                            Time

## 2018-04-27 NOTE — ED AVS SNAPSHOT
[facility], Emergency Department    [address]    [place] [address]    Phone:  [phone]    Fax:  [phone]                                       Esa Rivera   MRN: [id number]    Department:  [facility], Emergency Department   Date of Visit:  4/27/2018           Patient Information     Date Of Birth          1985        Your diagnoses for this visit were:     Bipolar affective disorder, currently manic, moderate (H)        You were seen by Christian Amos MD.      Follow-up Information     Follow up with Aristides Jenkins MD.    Specialty:  Family Practice    Contact information:    [address] [facility] [address]  [facility] [address] [phone]          Follow up with Mayito Motley MD.    Specialty:  Psychiatry    Contact information:    [address] [facility] [address] [phone]          Discharge Instructions       Please take the higher dose of lamictal as prescribed by your psychiatrist  You would get the quickest and fastest benefit by taking olanzapine. Take 20 mg at bedtime if you still have trouble sleeping.  Please follow-up with your psychiatrist ASAP    Your next 10 appointments already scheduled     May 23, 2018  8:00 AM CDT   Adult Med Follow UP with Mayito Motley MD   Psychiatry Clinic ([facility])    [place] [id number]  [address] [address] [phone]              24 Hour Appointment Hotline       To make an appointment at any [facility], call [phone] ([phone]). If you don't have a family doctor or clinic, we will help you find one. [facility] are conveniently located to serve the needs of you and your family.             Review of your medicines      CONTINUE these medicines which may have CHANGED, or have new prescriptions. If we are uncertain of the size of tablets/capsules you have at home, strength may be listed as something that might have changed.         Dose / Directions Last dose taken    OLANZapine 10 MG tablet   Commonly known as:  zyPREXA   Dose:  10-20 mg   What changed:    - medication strength  - how much to take  - when to take this  - reasons to take this   Quantity:  10 tablet        Take 1-2 tablets (10-20 mg) by mouth At Bedtime   Refills:  0          Our records show that you are taking the medicines listed below. If these are incorrect, please call your family doctor or clinic.        Dose / Directions Last dose taken    LamoTRIgine 200 MG Tb24   Commonly known as:  LAMICTAL XR   Dose:  400 mg   Quantity:  180 tablet        Take 400 mg by mouth every evening   Refills:  0        lithium 300 MG tablet   Dose:  1800 mg   Quantity:  180 tablet        Take 6 tablets (1,800 mg) by mouth At Bedtime   Refills:  2        QUEtiapine 25 MG tablet   Commonly known as:  SEROquel   Quantity:  90 tablet        25mg BID PRN for anxiety + 25-50mg HS PRN for sleep   Refills:  0                Prescriptions were sent or printed at these locations (1 Prescription)                   Other Prescriptions                Printed at Department/Unit printer (1 of 1)         OLANZapine (ZYPREXA) 10 MG tablet                Procedures and tests performed during your visit     Drug abuse screen 6 urine (tox)      Orders Needing Specimen Collection     None      Pending Results     No orders found from 4/25/2018 to 4/28/2018.            Pending Culture Results     No orders found from 4/25/2018 to 4/28/2018.            Pending Results Instructions     If you had any lab results that were not finalized at the time of your Discharge, you can call the ED Lab Result RN at [phone]. You will be contacted by this team for any positive Lab results or changes in treatment. The nurses are available 7 days a week from 10A to 6:30P.  You can leave a message 24 hours per day and they will return your call.        Thank you for choosing [facility]       Thank you for choosing [facility]  for your care. Our goal is always to provide you with excellent care. Hearing back from our patients is one way we can continue to improve our services. Please take a few minutes to complete the written survey that you may receive in the mail after you visit with us. Thank you!        [employer] Information     [employer] gives you secure access to your electronic health record. If you see a primary care provider, you can also send messages to your care team and make appointments. If you have questions, please call your primary care clinic.  If you do not have a primary care provider, please call [phone] and they will assist you.        Care EveryWhere ID     This is your Care EveryWhere ID. This could be used by other organizations to access your [facility] medical records  [id number]        Equal Access to Services     BROCK RUEDA : Bertha Clark, elizabeth dao, nakul tilley, jermaine castaneda. So [facility] [phone].    ATENCIÓN: Si habla español, tiene a osorio disposición servicios gratuitos de asistencia lingüística. Llame al [phone].    We comply with applicable federal civil rights laws and [place] laws. We do not discriminate on the basis of race, color, national origin, age, disability, sex, sexual orientation, or gender identity.            After Visit Summary       This is your record. Keep this with you and show to your community pharmacist(s) and doctor(s) at your next visit.

## 2018-04-27 NOTE — TELEPHONE ENCOUNTER
"Telephone Psychiatry Note:    Julia, wife of our patient, Esa Rivera (who follows with [facility] Psychiatrist Dr. Motley) called the clinic after-hours line tonight to report her concerns regarding the psychiatric condition of her .  Julia, who is herself a psychologist, explained to this writer that Esa has become extremely manic, has become religiously preoccupied with living a \"strict Gnosticist life,\" has not been eating or sleeping, and has been refusing PRN olanzapine.  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  She stated that his parents are currently on the way over to the house to provide support.  She denied specific or acute concerns for dangerousness, however did report that given his state she has hid their guns and is also planning to hide the knives.    This writer counseled Julia that Esa definitely requires emergency assessment given his acuity, [facility]  She stated that the family would speak with him and try to convince him of this.  In the event he was apprehensive/refused to come in, she stated that she would call 911 for emergency assistance/transport to the ED.    Michael Green, DO  Psychiatry PGY2  "

## 2018-04-28 NOTE — ED TRIAGE NOTES
Patient has experienced insomnia and loss of appetite for the last couple of days. Has a Manic Episode today, patient will not discuss. Hx Bi-polar.

## 2018-04-28 NOTE — DISCHARGE INSTRUCTIONS
Please take the higher dose of lamictal as prescribed by your psychiatrist  You would get the quickest and fastest benefit by taking olanzapine. Take 20 mg at bedtime if you still have trouble sleeping.  Please follow-up with your psychiatrist ASAP

## 2018-04-30 ENCOUNTER — CARE COORDINATION (OUTPATIENT)
Dept: PSYCHIATRY | Facility: CLINIC | Age: 33
End: 2018-04-30

## 2018-04-30 DIAGNOSIS — F31.9 BIPOLAR 1 DISORDER (H): Primary | ICD-10-CM

## 2018-04-30 NOTE — PROGRESS NOTES
Message  Received: Today       Mayito Motley MD Blake, Katherine J RN       Caller: Unspecified (Today, 11:52 AM)                     Chris Handy -     We should add a lithium level to Esa's blood work.  I generally recommend that people be on a stable dose of medication for one week prior to getting medication levels drawn.     Regarding the increase in the lamotrigine, Esa should be on the 300 mg dose consistently for 2 weeks prior to increasing it to 400 mg.     Thanks!     DB

## 2018-04-30 NOTE — PROGRESS NOTES
Lithium level added to current lab orders.  Call placed to Julia but no answer.  LVM regarding Dr. Motley's recommendations to continue Lamictal 300 mg daily consistently for 2 weeks prior to increasing to 400 mg daily.  Provided clinic number for call back with any questions.

## 2018-04-30 NOTE — PROGRESS NOTES
"Returned call to pt's spouse Julia.  She has two questions following events of this weekend (see on-call resident and ER note):     1. When should pt have labs drawn which were ordered at last appt  2. When should Lamictal be increased    Shares that prior to coming to ER but had been non-compliant in taking medications for two days.  Per Julia the ER physician recommended being on medications at least 5 days prior to getting labs drawn.  Julia states they are monitoring pt taking medications now and he has been taking them regularly since Friday night.  Advised that pt could come to lab later this week to have labs drawn but Dr. Motley could provide recommendations at appt tomorrow.  Will also see if Dr. Motley would like to order any medication levels.  Current pending labs are for CBC w/diff, electrolytes, BUN, creatinine, glucose, lipid panel.  Regarding Lamictal increase advised that pt can increase Lamictal to 400 mg today as directed by ER physician as Julia confirms pt has been on 300 mg daily for 3 days.  Julia updates that they are seeing \"glimpses\" of improvement.  Pt stayed home from work today and family is taking \"shifts\" in staying with pt.  Along with usual medications pt has been taking Zyprexa 20 mg at night and Seroquel 25 mg qhs.  Sleep is \"ok.\"  Eating is still a struggle as pt is selective in what he will eat.  Pt prefers fruit and other low calorie items.  Spouse has noticed a decrease in weight.  Pt is drinking plenty of water.  Is not drinking alcohol or using other substances.  Will update Dr. Motley for appt tomorrow.  "

## 2018-04-30 NOTE — PROGRESS NOTES
Manic Episode, please call ASAP - Bond  Received: Today       Pat Mata, Moraima RODRIGUEZ RN       Phone Number: [phone]                     OK to lvm     Julia is the wife calling about her , she should be free for the next hour. And then will be free after 11am until 2pm.     I did schedule and appt for tomorrow morning at 11:30am, but she would like to speak with nursing staff.

## 2018-05-01 ENCOUNTER — OFFICE VISIT (OUTPATIENT)
Dept: PSYCHIATRY | Facility: CLINIC | Age: 33
End: 2018-05-01
Attending: PSYCHIATRY & NEUROLOGY
Payer: COMMERCIAL

## 2018-05-01 VITALS
DIASTOLIC BLOOD PRESSURE: 76 MMHG | SYSTOLIC BLOOD PRESSURE: 104 MMHG | WEIGHT: 239.8 LBS | BODY MASS INDEX: 29.97 KG/M2 | HEART RATE: 76 BPM

## 2018-05-01 DIAGNOSIS — F31.10 BIPOLAR I DISORDER, MOST RECENT EPISODE (OR CURRENT) MANIC (H): Primary | ICD-10-CM

## 2018-05-01 DIAGNOSIS — F31.30 BIPOLAR I DISORDER, MOST RECENT EPISODE DEPRESSED (H): ICD-10-CM

## 2018-05-01 PROCEDURE — G0463 HOSPITAL OUTPT CLINIC VISIT: HCPCS | Mod: ZF

## 2018-05-01 RX ORDER — QUETIAPINE FUMARATE 25 MG/1
TABLET, FILM COATED ORAL
Qty: 90 TABLET | Refills: 0 | Status: SHIPPED | OUTPATIENT
Start: 2018-05-01 | End: 2018-05-15

## 2018-05-01 ASSESSMENT — PAIN SCALES - GENERAL: PAINLEVEL: NO PAIN (0)

## 2018-05-01 NOTE — MR AVS SNAPSHOT
After Visit Summary   5/1/2018    Esa Rivera    MRN: [id number]           Patient Information     Date Of Birth          1985        Visit Information        Provider Department      5/1/2018 11:30 AM Mayito Motley MD [facility]        Today's Diagnoses     Bipolar I disorder, most recent episode (or current) manic (H)    -  1      Care Instructions    Continue olanzapine 20 mg at night.  Increase this to 30 mg at night by Friday if her manic symptoms are not resolved.  Continue your other usual medications.  Please contact us if you are not sleeping well at night.  Return for follow-up in 1 week          Follow-ups after your visit        Follow-up notes from your care team     Return in about 1 week (around 5/8/2018).      Your next 10 appointments already scheduled     May 16, 2018 10:30 AM CDT   Adult Med Follow UP with Mayito Motley MD   Psychiatry Clinic ([facility])    [place] [id number]  [facility] [address] [id number] [phone]            May 23, 2018  8:00 AM CDT   Adult Med Follow UP with Mayito Motley MD   Psychiatry Clinic ([facility])    [place] [id number]  [facility] [id number] [phone]              Future tests that were ordered for you today     Open Future Orders        Priority Expected Expires Ordered    Lithium Level Routine  4/30/2019 4/30/2018            Who to contact     Please call your clinic at [phone] to:    Ask questions about your health    Make or cancel appointments    Discuss your medicines    Learn about your test results    Speak to your doctor            Additional Information About Your Visit        MyChart Information     [email or web address] gives you secure access to your electronic health record. If you see a primary care provider, you can also send messages to your care team and make appointments. If you have questions, please  call your primary care clinic.  If you do not have a primary care provider, please call [phone] and they will assist you.      [employer] is an electronic gateway that provides easy, online access to your medical records. With [employer], you can request a clinic appointment, read your test results, renew a prescription or communicate with your care team.     To access your existing account, please contact your [facility] or call [phone] for assistance.        Care EveryWhere ID     This is your Care EveryWhere ID. This could be used by other organizations to access your [place] medical records  [id number]        Your Vitals Were     Pulse BMI (Body Mass Index)                76 29.97 kg/m2           Blood Pressure from Last 3 Encounters:   05/01/18 104/76   04/27/18 (!) 166/94   02/21/18 129/80    Weight from Last 3 Encounters:   05/01/18 108.8 kg (239 lb 12.8 oz)   04/27/18 110 kg (242 lb 8 oz)   02/21/18 116.7 kg (257 lb 3.2 oz)              Today, you had the following     No orders found for display       Primary Care Provider Office Phone # Fax #    Aristides Jenkins [phone]       [address] [address]        Equal Access to Services     BROCK RUEDA : Hadii aad ku hadasho Soomaali, waaxda luqadaha, qaybta kaalmada adeegyada, waxay betseyin hayjcn adilson khlorraine castillo . So [facility] [phone].    ATENCIÓN: Si habla español, tiene a osorio disposición servicios gratuitos de asistencia lingüística. Llame al [phone].    We comply with applicable federal civil rights laws and [place] laws. We do not discriminate on the basis of race, color, national origin, age, disability, sex, sexual orientation, or gender identity.            Thank you!     Thank you for choosing [facility]  for your care. Our goal is always to provide you with excellent care. Hearing back from our patients is one way we can continue to improve our  services. Please take a few minutes to complete the written survey that you may receive in the mail after your visit with us. Thank you!             Your Updated Medication List - Protect others around you: Learn how to safely use, store and throw away your medicines at www.disposemymeds.org.          This list is accurate as of 5/1/18 12:08 PM.  Always use your most recent med list.                   Brand Name Dispense Instructions for use Diagnosis    LamoTRIgine 200 MG Tb24    LAMICTAL XR    180 tablet    Take 400 mg by mouth every evening    Bipolar 1 disorder, manic, full remission (H)       lithium 300 MG tablet     180 tablet    Take 6 tablets (1,800 mg) by mouth At Bedtime    Bipolar 1 disorder, manic, full remission (H)       OLANZapine 10 MG tablet    zyPREXA    10 tablet    Take 1-2 tablets (10-20 mg) by mouth At Bedtime        QUEtiapine 25 MG tablet    SEROquel    90 tablet    25mg BID PRN for anxiety + 25-50mg HS PRN for sleep    Bipolar I disorder, most recent episode depressed (H)

## 2018-05-01 NOTE — NURSING NOTE
Chief Complaint   Patient presents with     Recheck Medication     Bipolar 1 disorder, manic, full remission

## 2018-05-01 NOTE — PROGRESS NOTES
May 1, 2018  Progress Note    Esa Rivera is a 33 year old year old male with Bipolar I Disorder, Most Recent Episode Manic Severe, with psychotic behavior (296.40) who presents for ongoing psychiatric care. He has no children. He and his wife (a psychologist) live on their own in [facility] [place] Esa works as an  for [employer].    Diagnosis    Axis 1: Bipolar I disorder, with severe manic episode with psychosis in Dec 2016. ADHD by history. Alcohol use disorder, moderate, in short-term remission  Axis 2: Nil  Axis 3: Remote concussion x 2  Axis 4: Moderate stressors  Axis 5: GAF at time of visit: 70    Assessment & Plan     Esa has been experiencing significant manic episodes with grandiose delusions over the last week.  He was seen in the emergency room on Friday night, and offered a voluntary admission, but declined.  He is taking olanzapine 20 mg at night along with his usual medications, and there seems to be some improvement.  I have advised Esa to continue olanzapine, and to increase it to 30 mg by the end of the week if things are not better still.  He will continue his other usual medications.  He will return for follow-up in 1 week.  Hospitalization remains an option if his katie does not resolve with outpatient treatment.    The patient understands the risks, benefits, adverse effects and alternatives. Agrees to treatment with the capacity to do so. No medical contraindications to treatment. Agrees to call clinic for any problems. The patient understands to call 911 or come to the nearest ED if life threatening or urgent symptoms present.    Attestation:  Patient has been seen and evaluated by me, Mayito Motley MD, PhD.    Interim History     I saw Esa accompanied by his wife and parents today.      They report that Esa has been experiencing significant manic symptoms for the last week.  His mood has been elevated.  Thursday and Friday last week he did not sleep much at all.  He has been more active  "than usual.  He has wanted to make some very expensive purchases, including a central air conditioning unit for their home, but has not done so under the guidance of his family.  There have been no other reckless behaviors, and no substance use.      Esa has clearly been experiencing grandiose delusions of a spiritual nature.  He feels much more spiritually aware.  In this context, he removed a number of things from his home that he considered to be \"sinful\".  This included alcohol, his mattress, and several things related to his wedding, as he feels that he and his wife lived in sin prior to getting .    Esa has poor insight into his current manic symptoms.  He finds it difficult to take olanzapine, as it is sedating during the daytime.  He also has difficulty with his wife and his parents trying to control his behavior.    I made a number of suggestions for the management of Esa's katie.  He will continue taking olanzapine along with his usual medications.  He will increase it to 30 mg by the end of the week if he is not doing better.  If he is not sleeping well, they will contact the clinic and let us know.  I have advised Esa to keep things very low-key and not take part in activities that are overly stimulating.  I have advised him not to consume any alcohol, not to go to work, and not to drive a motor vehicle until his manic episode resolves.    Esa is agreeable and will return for follow-up in 1 week.          In Dec 2016 Esa experienced an episode of severe katie with psychosis (grandiose delusions). He was seen in the ER but not hospitalized. He had some mild depressive episodes and possible (but questionable) short hypomanic periods in the preceding years, but never received treatment for these.     Esa's manic symptoms began when he became excited about a new idea at work. 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 He became so excited about this the he was unable to sleep. As his katie escalated, his sleep decreased to 1-2 hours per night for 5-6 nights. His affect was elated and also markedly irritable. He would \"go off\" on people for minor things in very uncharacteristic ways. His thoughts were \"really fast, crisp, and clear\". He saw \"connections in everything.\" His energy was clearly elevated. He became grandiose, believing that his idea would change the world and as a result he would meet many \"powerful people\". Eventually he came to believe that sings on the radio were meant for him.     Esa was taking Adderall at the time. He has taken this, on and off, for many years. In my opinion it is not an adequate explanation for his manic symptoms.      His mother (a PCP) became concerned and brought him to hospital. He was assessed in the ER and prescribed Ativan for sleep. He was seen by Dr Burt (a psychiatrist) the next day. He was prescribed OLZ and LTG. The doses were titrated \"up and down\" for the next 6 months. There were significant side effects (sedation, cognitive impairment), likely from OLZ. Most recently, Esa stopped OLZ for a period of time. In the context of being on a honeymoon in [place], he develop racing thoughts and difficulty sleeping and restarted it, with benefit. He currently takes 10 mg HS.    Esa endorses several previous short (1-2 day) periods of reduced sleep and elevated mood, typically in the context of getting involved in a work project. At most they represent subthreshold hypomanic periods.     He has also experienced several periods over the years, lasting about a week, of low mood, decreased energy and motivation, poor focus/concentration, and negative thoughts. He denies neurovegetative symptoms or SI. His wife thought that these represented depressive episodes.    Esa has consumed EtOH heavily at points during his life. It was most pronounced during his college years. " He has sporadically binge-drank since then. He has not had a drink in 2 weeks.    Past Psych Hx: As above. Esa was assessed at [place] in Dec 2016 and diagnosed with BDI. He was seen by Psychiatry at age 17 and dxed with ADD. He took Adderall off and on (mostly on) with benefit over the years since then. He denies abusing it.     Past Med Hx: 2 concussion: 1) age 15 - skiing. Age 16: football. LOC x a couple of minutes with both.    MEDICATION ADHERENCE: Good.   Current Medications     Current Outpatient Prescriptions   Medication Sig Dispense Refill     LamoTRIgine (LAMICTAL XR) 200 MG TB24 Take 400 mg by mouth every evening 180 tablet 0     lithium 300 MG tablet Take 6 tablets (1,800 mg) by mouth At Bedtime 180 tablet 2     OLANZapine (ZYPREXA) 10 MG tablet Take 1-2 tablets (10-20 mg) by mouth At Bedtime 10 tablet 0     QUEtiapine (SEROQUEL) 25 MG tablet 25mg BID PRN for anxiety + 25-50mg HS PRN for sleep 90 tablet 0         Substance use     ETOH: Sporadic binge drinking  Cigarettes: NA  Street Drugs: Denies    Review of Systems     A comprehensive review of systems was performed and is negative other than noted above.     Allergies     Allergies   Allergen Reactions     No Known Allergies         Mental Status Exam     There were no vitals taken for this visit.    Alertness: alert  and oriented  Appearance: well groomed  Behavior/Demeanor: cooperative and pleasant, with good  eye contact  Speech: normal  Language: intact  Psychomotor: restless and fidgety  Mood:  irritable and elevated  Affect: restricted; was not congruent to mood  Thought Process/Associations: unremarkable  Thought Content:  Denies suicidal ideation and violent ideation. He has grandiose over-valued ideas.  Perception:  Denies auditory hallucinations and visual hallucinations  Insight: good  Judgment: good  Cognition:  does appear grossly intact.      PSYCHIATRY CLINIC INDIVIDUAL PSYCHOTHERAPY NOTE                                                      [16]   Start time - 0806        End time - 0825  Date reviewed - 2/7/2018       Date next due - 5/7/2018    Subjective: This supportive psychotherapy session addressed issues related to orientation to therapy plan and goals of therapy plan.  Patient's reaction: Maintenance in the context of mental status appropriate for ambulatory setting.  Progress: good  Plan: RTC 2 mos  Psychotherapy services during this visit included  myself and the patient.   TREATMENT  PLAN          SYMPTOMS; PROBLEMS   MEASURABLE GOALS;    FUNCTIONAL IMPROVEMENT INTERVENTIONS;   GAINS MADE DISCHARGE CRITERIA   Depression: depressed mood, anhedonia, low energy, insomnia and concentration problems   reduce depressive symptoms and reduce depressive episodes medications   psycho-education   self-care skills symptom resolution   Crystal/Hypomania: none   reduce manic/hypomanic episodes medications   psychotherapy  self-care skills symptom resolution

## 2018-05-01 NOTE — TELEPHONE ENCOUNTER
Medication requested:  QUEtiapine (SEROQUEL) 25 MG  Last refilled: 4/2/18  Qty: 90      Last seen: 5/1/8  RTC: 1 WEEK  Cancel: 0  No-show: 0  Next appt: 5/16/18    Refill decision:Refilled for 30 days per protocol.

## 2018-05-02 ASSESSMENT — PATIENT HEALTH QUESTIONNAIRE - PHQ9: SUM OF ALL RESPONSES TO PHQ QUESTIONS 1-9: 3

## 2018-05-03 ENCOUNTER — TELEPHONE (OUTPATIENT)
Dept: PSYCHIATRY | Facility: CLINIC | Age: 33
End: 2018-05-03

## 2018-05-03 NOTE — TELEPHONE ENCOUNTER
-Returned call to Julia  -No answer at number provided  -LVM requesting c/b to clinic  -Clinic number provided

## 2018-05-03 NOTE — TELEPHONE ENCOUNTER
----- Message from Veronica Pearson sent at 5/3/2018 10:08 AM CDT -----  Regarding: Patient's Spouse Julia  Contact: [phone]  Julia, pt's spouse, would like a call back with questions regarding Esa's lab. She was not sure if there is a Consent on file. This is all the info given.    Thanks!

## 2018-05-05 ENCOUNTER — HOSPITAL ENCOUNTER (OUTPATIENT)
Dept: LAB | Facility: CLINIC | Age: 33
Discharge: HOME | End: 2018-05-05
Attending: NURSE PRACTITIONER | Admitting: PSYCHIATRY & NEUROLOGY
Payer: COMMERCIAL

## 2018-05-05 DIAGNOSIS — F31.9 BIPOLAR 1 DISORDER (H): ICD-10-CM

## 2018-05-05 LAB
ANION GAP SERPL CALCULATED.3IONS-SCNC: 11 MMOL/L (ref 3–14)
BASOPHILS # BLD AUTO: 0 10E9/L (ref 0–0.2)
BASOPHILS NFR BLD AUTO: 0.5 %
BUN SERPL-MCNC: 10 MG/DL (ref 7–30)
CHLORIDE SERPL-SCNC: 101 MMOL/L (ref 94–109)
CHOLEST SERPL-MCNC: 105 MG/DL
CO2 SERPL-SCNC: 23 MMOL/L (ref 20–32)
CREAT SERPL-MCNC: 1.02 MG/DL (ref 0.66–1.25)
DIFFERENTIAL METHOD BLD: NORMAL
EOSINOPHIL # BLD AUTO: 0.2 10E9/L (ref 0–0.7)
EOSINOPHIL NFR BLD AUTO: 3.3 %
ERYTHROCYTE [DISTWIDTH] IN BLOOD BY AUTOMATED COUNT: 11.9 % (ref 10–15)
GFR SERPL CREATININE-BSD FRML MDRD: 84 ML/MIN/1.7M2
GLUCOSE SERPL-MCNC: 80 MG/DL (ref 70–99)
HCT VFR BLD AUTO: 42.8 % (ref 40–53)
HDLC SERPL-MCNC: 48 MG/DL
HGB BLD-MCNC: 14.4 G/DL (ref 13.3–17.7)
IMM GRANULOCYTES # BLD: 0 10E9/L (ref 0–0.4)
IMM GRANULOCYTES NFR BLD: 0.3 %
LDLC SERPL CALC-MCNC: 43 MG/DL
LITHIUM SERPL-SCNC: 1.89 MMOL/L (ref 0.6–1.2)
LYMPHOCYTES # BLD AUTO: 1.1 10E9/L (ref 0.8–5.3)
LYMPHOCYTES NFR BLD AUTO: 17.3 %
MCH RBC QN AUTO: 30.8 PG (ref 26.5–33)
MCHC RBC AUTO-ENTMCNC: 33.6 G/DL (ref 31.5–36.5)
MCV RBC AUTO: 92 FL (ref 78–100)
MONOCYTES # BLD AUTO: 0.5 10E9/L (ref 0–1.3)
MONOCYTES NFR BLD AUTO: 8.3 %
NEUTROPHILS # BLD AUTO: 4.4 10E9/L (ref 1.6–8.3)
NEUTROPHILS NFR BLD AUTO: 70.3 %
NONHDLC SERPL-MCNC: 57 MG/DL
NRBC # BLD AUTO: 0 10*3/UL
NRBC BLD AUTO-RTO: 0 /100
PLATELET # BLD AUTO: 325 10E9/L (ref 150–450)
POTASSIUM SERPL-SCNC: 4 MMOL/L (ref 3.4–5.3)
RBC # BLD AUTO: 4.67 10E12/L (ref 4.4–5.9)
SODIUM SERPL-SCNC: 135 MMOL/L (ref 133–144)
TRIGL SERPL-MCNC: 71 MG/DL
WBC # BLD AUTO: 6.3 10E9/L (ref 4–11)

## 2018-05-05 PROCEDURE — 85025 COMPLETE CBC W/AUTO DIFF WBC: CPT | Performed by: PSYCHIATRY & NEUROLOGY

## 2018-05-05 PROCEDURE — 82947 ASSAY GLUCOSE BLOOD QUANT: CPT | Performed by: PSYCHIATRY & NEUROLOGY

## 2018-05-05 PROCEDURE — 36415 COLL VENOUS BLD VENIPUNCTURE: CPT | Performed by: PSYCHIATRY & NEUROLOGY

## 2018-05-05 PROCEDURE — 80178 ASSAY OF LITHIUM: CPT | Performed by: PSYCHIATRY & NEUROLOGY

## 2018-05-05 PROCEDURE — 84520 ASSAY OF UREA NITROGEN: CPT | Performed by: PSYCHIATRY & NEUROLOGY

## 2018-05-05 PROCEDURE — 80061 LIPID PANEL: CPT | Performed by: PSYCHIATRY & NEUROLOGY

## 2018-05-05 PROCEDURE — 80051 ELECTROLYTE PANEL: CPT | Performed by: PSYCHIATRY & NEUROLOGY

## 2018-05-05 PROCEDURE — 82565 ASSAY OF CREATININE: CPT | Performed by: PSYCHIATRY & NEUROLOGY

## 2018-05-07 ENCOUNTER — TELEPHONE (OUTPATIENT)
Dept: PSYCHIATRY | Facility: CLINIC | Age: 33
End: 2018-05-07

## 2018-05-07 ENCOUNTER — CARE COORDINATION (OUTPATIENT)
Dept: PSYCHIATRY | Facility: CLINIC | Age: 33
End: 2018-05-07

## 2018-05-07 DIAGNOSIS — Z51.81 THERAPEUTIC DRUG MONITORING: ICD-10-CM

## 2018-05-07 DIAGNOSIS — F31.9 BIPOLAR DISORDER (H): Primary | ICD-10-CM

## 2018-05-07 NOTE — PROGRESS NOTES
Message  Received: Today       Mayito Motley MD Blake, Katherine J, RN Hi Katie -     Would you mind contacting Esa about the labs he got done 2 days ago?  His lithium level was elevated at 1.89.  The remainder of his labs looked good.  His glucose is back to the normal range.  His BUN and creatinine are normal.     Can you ask Esa if he got the blood test done 12 hours after taking his last dose of lithium?  Also it will be important to know if he has any new or worse side effects of lithium, especially grogginess, confusion, bad hand tremors, difficulty with balance, nausea, or vomiting.     Thanks!     DB

## 2018-05-07 NOTE — TELEPHONE ENCOUNTER
"-Incoming fax from:  Disability  -Requestin.  Copy of office notes from 18 moving forward  2.  Completion of \"Attending Provider Statement\"   -Purpose: To provide disability benefits  -Due date: 18  -Return to:  Disability Center  -IMRA included: No, will have rooming staff obtain at appt on 18  -Form placed in Dr. Motley's folder for review/completion    "

## 2018-05-07 NOTE — PROGRESS NOTES
"Spoke with pt ([phone]) relaying recent lab results.  Pt confirms labs were drawn within minutes of the 12 hour dayo of when he takes lithium.  Pt confirms he is taking Lithium 1800 mg qhs.  Pt uncertain if he is having any side effects from medication.  Reviewed the following:     Grogginess - yes, but attributes to Zyprexa  Confusion - yes and STM problems.  Pt believes this started with last increase in lithium dose.  Nausea - no  Vomiting -no  Hand tremors -no  Diarrhea - no  Sweating - no  Balance difficulty - yes intermittently, especially when bending over or tying shoes.  Feels unsteady at times.  No falls.  Denies lightheadedness/dizziness.  Believes he had previously discussed with Dr. Motley who thought it may be more likely d/t Lamictal  Ear ringing - yes, intermittently.  First noticed a few weeks to a month ago  Increased thirst - yes    Pt reports mood has been improving.  Did increase Zyprexa to 30 mg daily.  Is also on Lamictal  mg daily.  Sleeping well at night.  Although feels tired during the day is not taking any naps.  Appetite is \"healthy.\"  States that his mood is not depressed but feels it may be moving that way because of the Zyprexa and would like to go off this medication.  Is currently not working and shares that we should be receiving paperwork from his employer.  Pt denies any SI.  Requesting refill of Lamictal  mg tabs to [facility].  Reminded of appt next Wed.  Will forward to Dr. Motley and call pt back with recommendations.         "

## 2018-05-08 RX ORDER — LAMOTRIGINE 100 MG/1
100 TABLET, EXTENDED RELEASE ORAL DAILY
Qty: 30 TABLET | Refills: 0 | Status: ON HOLD | OUTPATIENT
Start: 2018-05-08 | End: 2018-05-21

## 2018-05-08 NOTE — PROGRESS NOTES
Spoke with pt who states understanding of the following orders:   1.  Reduce Lithium to 1500 mg qhs  2.  Obtain Lithium level in 1 week  3.  Level to be drawn in the AM 12 hours after taking medication at night  4.  Continue Lamictal  mg daily until next appt  5.  Next appt 5/16/18 at 1030  6.  Call if mood worsens with lithium decrease    Per pt request lab order placed in EHR as he will go to [facility] lab to have this drawn.  Confirmed with pt that we did receive forms for his work (see telephone note dated 5/7/18).  Pt asks about potential decrease in olanzapine.  Discuss that he should continue current dose of 30 mg daily until appt next week.  Discuss need to make medication changes cautiously and that making too many med decreases at once could place pt at risk of worsening of symptoms.  Pt agreeable to continuing current olanzapine dose and discussing possible decrease at appt next week.  Refilled Lamictal  mg tabs as pt is out of medication.

## 2018-05-08 NOTE — PROGRESS NOTES
Message  Received: Today       Mayito Motley MD Blake, Katherine J RN       Caller: Unspecified (Yesterday,  9:50 AM)                     Chris Handy -     Are you able to give Carissa call to discuss this a bit further?  He should reduce his lithium to 1500 mg at bedtime.  He should check the level again in 1 week.  We can refill his lamotrigine  mg tablets.  Let's keep the dose at 300 mg daily for the time being, until I see Esa again.     Thanks!     DB

## 2018-05-14 ENCOUNTER — CARE COORDINATION (OUTPATIENT)
Dept: PSYCHIATRY | Facility: CLINIC | Age: 33
End: 2018-05-14

## 2018-05-14 ENCOUNTER — HOSPITAL ENCOUNTER (OUTPATIENT)
Dept: LAB | Facility: CLINIC | Age: 33
Discharge: HOME | End: 2018-05-14
Attending: NURSE PRACTITIONER | Admitting: NURSE PRACTITIONER
Payer: COMMERCIAL

## 2018-05-14 DIAGNOSIS — F31.9 BIPOLAR DISORDER (H): Primary | ICD-10-CM

## 2018-05-14 DIAGNOSIS — Z51.81 THERAPEUTIC DRUG MONITORING: ICD-10-CM

## 2018-05-14 DIAGNOSIS — F31.9 BIPOLAR DISORDER (H): ICD-10-CM

## 2018-05-14 LAB — LITHIUM SERPL-SCNC: 2.41 MMOL/L (ref 0.6–1.2)

## 2018-05-14 PROCEDURE — 80178 ASSAY OF LITHIUM: CPT | Performed by: PSYCHIATRY & NEUROLOGY

## 2018-05-14 PROCEDURE — 36415 COLL VENOUS BLD VENIPUNCTURE: CPT | Performed by: PSYCHIATRY & NEUROLOGY

## 2018-05-14 NOTE — PROGRESS NOTES
Message  Received: Today       Mayito Motley MD Blake, Katherine J, RN Hi Katie -     Can you touch base with Esa BHATIA? His lithium level this morning was quite elevated (2.41).     Can you ask him about symptoms of lithium toxicity? He should also hold the lithium and get his level rechecked tomorrow am. We should add BUN, Cr to those labs.     Thx!     DB

## 2018-05-14 NOTE — PROGRESS NOTES
"Returned call to mom So.  She would like a referral placed for MTM visit with pharmacist and is hoping they could meet tomorrow.   Feels that pt could use more education regarding food/fluid intake with lithium and overall education on his medications as it is a struggle to get him to take his Zyprexa specifically as it makes pt tired.  Mom has been monitoring/administering meds and confirms she had reduced Lithium to 1500 mg qhs.  States that Wed is a \"tough day for the family\" so would prefer to have MTM appt tomorrow rather than on Wed when pt will be here seeing Dr. Motley.  Also updates that pt is on a very restrictive diet and is not eating much.  Usual meal consists of banana and glass of water for breakfast, some pieces of fruit for lunch (2 strawberries or 1/2 an avocado) and dinner is 1/2 cup of vegetables with quinoa.  Mom states that pt was drinking quite a bit of water when his thirst was elevated but now is drinking less.  Mom confirms pt is agreeable with MTM visit and would prefer that she be contacted about the appt since she will be driving pt.  Will update Dr. Motley.  MTM referral placed.  "

## 2018-05-14 NOTE — PROGRESS NOTES
Would like to schedule with Pharmacy  Scheduling Received: Today       Veronica Pearson Katherine J, RN       Phone Number: [phone]                     Jenni Garcia, pt's mom, would like to schedule an MTM for Esa.     Thanks!

## 2018-05-14 NOTE — PROGRESS NOTES
"Call placed to pt at [phone] who reports he is \"great.\"  Relayed results of Lithium level from this morning.  Pt confirms he reduced Lithium dose to 1500 mg at bedtime.  Not taking any Ibuprofen.  States he is not on any medications other than what Dr. Motley is prescribing.  He denies much change in symptoms since we spoke a week ago.  Currently reports the following:     Confusion - no change  Nausea - no  Vomiting -no  Hand tremors -occasionally when eating  Diarrhea - no  Sweating - no  Balance difficulty - no change  Ear ringing - no change  Increased thirst - slightly reduced     Pt states understanding that he should not take any lithium tonight and have level drawn tomorrow morning along with labs to evaluate kidney function.  Pt asks that writer speak with his mother who is next to him to relay medication instructions.  Spoke with pt's mother explaining pt should not take any lithium tonight and have level drawn tomorrow morning.  Mom states understanding.  Orders placed in EHR.  Reviewed with pt that he should come to ER right away if he develops any new symptoms, such as those reviewed today, or if any current symptoms worsen.  Pt agrees.  Dr. Motley updated via phone.  Will also send copy of this message to provider.      "

## 2018-05-14 NOTE — PROGRESS NOTES
Called pt's spouse Julia at [phone].  She is wanting to provide an update for Wednesday's appointment as she is uncertain if anyone will be able to accompany pt due to work schedules.  States that pt is sleeping better but they are worried about his eating. Maybe getting 1000 calories per day. Yesterday ate an orange for breakfast, lunch was some blackberries or blueberries and dinner was salad, without meat, and 1/2-3/4 cup of quinoa.  States that pt continues to be fairly delusional but is overall a little better.  Pt has low insight.  Will have a good day and then next be rigid about beliefs.  States that delusions are likely connected to change in eating habits as pt has said he doesn't want to be gluttonous.  Julia is aware of elevated lithium level as she was notified by pt's mother today.  Relayed that pt should not take any lithium tonight and then lab draw in the morning.  Reviewed s/s of lithium toxicity and if any are seen pt should be brought to ER right away.  Julia updates that pt had been on Zyprexa 30 mg qhs for 8 days and then reduced to 25 mg past two nights and plan to further decrease to 20 mg tonight.      Updated Dr. Manuel, in Dr. Motley's absence, to obtain add'l lab orders d/t decrease in eating.  Per TORB from Dr. Manuel will cancel BUN and creatinine and change to BMP (which will include BUN + Creatinine) to check electrolytes.  Will update Dr. Motley upon return to clinic.

## 2018-05-14 NOTE — PROGRESS NOTES
Pt's wife calling for FYI - Bond  Received: Today       Pat Mata, Moraima RODRIGUEZ RN                   Julia wife of pt has called to update you.  No IRMA on file/PHI.     She wants to relay the types of symptoms they have been noticing since latest episode, but since she could not leave you a confidential vm, she hung up and said she would continue to try until she can get a hold of you.  She did not wish to say what the symptoms were/are at this time.     Thanks!

## 2018-05-15 ENCOUNTER — HOSPITAL ENCOUNTER (OUTPATIENT)
Dept: LAB | Facility: CLINIC | Age: 33
Discharge: HOME | End: 2018-05-15
Attending: NURSE PRACTITIONER | Admitting: PSYCHIATRY & NEUROLOGY
Payer: COMMERCIAL

## 2018-05-15 ENCOUNTER — OFFICE VISIT (OUTPATIENT)
Dept: PHARMACY | Facility: CLINIC | Age: 33
End: 2018-05-15
Payer: COMMERCIAL

## 2018-05-15 ENCOUNTER — TELEPHONE (OUTPATIENT)
Dept: PSYCHIATRY | Facility: CLINIC | Age: 33
End: 2018-05-15

## 2018-05-15 VITALS
BODY MASS INDEX: 27.6 KG/M2 | HEART RATE: 63 BPM | WEIGHT: 220.8 LBS | SYSTOLIC BLOOD PRESSURE: 114 MMHG | DIASTOLIC BLOOD PRESSURE: 69 MMHG

## 2018-05-15 DIAGNOSIS — F31.12 BIPOLAR 1 DISORDER, MANIC, MODERATE (H): Primary | ICD-10-CM

## 2018-05-15 DIAGNOSIS — Z51.81 THERAPEUTIC DRUG MONITORING: ICD-10-CM

## 2018-05-15 DIAGNOSIS — F31.9 BIPOLAR DISORDER (H): ICD-10-CM

## 2018-05-15 LAB
ANION GAP SERPL CALCULATED.3IONS-SCNC: 10 MMOL/L (ref 3–14)
BUN SERPL-MCNC: 13 MG/DL (ref 7–30)
CALCIUM SERPL-MCNC: 9.3 MG/DL (ref 8.5–10.1)
CHLORIDE SERPL-SCNC: 105 MMOL/L (ref 94–109)
CO2 SERPL-SCNC: 24 MMOL/L (ref 20–32)
CREAT SERPL-MCNC: 1.04 MG/DL (ref 0.66–1.25)
GFR SERPL CREATININE-BSD FRML MDRD: 82 ML/MIN/1.7M2
GLUCOSE SERPL-MCNC: 95 MG/DL (ref 70–99)
LITHIUM SERPL-SCNC: 1.86 MMOL/L (ref 0.6–1.2)
POTASSIUM SERPL-SCNC: 3.6 MMOL/L (ref 3.4–5.3)
SODIUM SERPL-SCNC: 139 MMOL/L (ref 133–144)

## 2018-05-15 PROCEDURE — 80048 BASIC METABOLIC PNL TOTAL CA: CPT | Performed by: PSYCHIATRY & NEUROLOGY

## 2018-05-15 PROCEDURE — 80178 ASSAY OF LITHIUM: CPT | Performed by: PSYCHIATRY & NEUROLOGY

## 2018-05-15 PROCEDURE — 99607 MTMS BY PHARM ADDL 15 MIN: CPT | Performed by: PHARMACIST

## 2018-05-15 PROCEDURE — 99605 MTMS BY PHARM NP 15 MIN: CPT | Performed by: PHARMACIST

## 2018-05-15 PROCEDURE — 36415 COLL VENOUS BLD VENIPUNCTURE: CPT | Performed by: PSYCHIATRY & NEUROLOGY

## 2018-05-15 NOTE — Clinical Note
In hopes that you get this prior to you see him today, I forgot to mention that he requested switching lamotrigine to three 100mg tablets in order to have only one copay.  He is currently taking one 100mg and one 200mg.  Let me know if you'd like for me to enter the order after you see him today. Karie

## 2018-05-15 NOTE — MR AVS SNAPSHOT
After Visit Summary   5/15/2018    Esa Rivera    MRN: [id number]           Patient Information     Date Of Birth          1985        Visit Information        Provider Department      5/15/2018 12:30 PM Karie Mendoza DEVAN [facility]        Today's Diagnoses     Bipolar 1 disorder, manic, moderate (H)    -  1      Care Instructions    Recommendations from today's MTM visit:                                                    MTM (medication therapy management) is a service provided by a clinical pharmacist designed to help you get the most of out of your medicines.   Today we reviewed what your medicines are for, how to know if they are working, that your medicines are safe and how to make your medicine regimen as easy as possible.     1. Continue holding your lithium dose tonight and follow up with Dr. Motley tomorrow, 5/16.    2. We discussed that your reduced fluid intake and weight loss could be the cause of your high lithium levels.  It's important to stay consistent with fluid intake when taking lithium.  Discuss with Dr. Motley tomorrow.    Next MTM visit: as needed    To schedule another MTM appointment, please call the clinic directly or you may call the MTM scheduling line at [phone] or toll-free at [phone].     My Clinical Pharmacist's contact information:                                                      It was a pleasure seeing you today!  Please feel free to contact me with any questions or concerns you have.      Karie Mendoza, PharmD  Medication Therapy Management Pharmacist  [facility]  Phone: [phone]    You may receive a survey about the MTM services you received.  I would appreciate your feedback to help me serve you better in the future. Please fill it out and return it when you can. Your comments will be anonymous.            Follow-ups after your visit        Your next 10 appointments  already scheduled     May 16, 2018 10:30 AM CDT   Adult Med Follow UP with Mayito Motley MD   Psychiatry Clinic ([facility])    [place] [id number] [id number] [phone]            May 23, 2018  8:00 AM CDT   Adult Med Follow UP with Mayito Motley MD   Psychiatry Clinic ([facility])    [address]  [address] [id number] [phone]              Who to contact     If you have questions or need follow up information about today's clinic visit or your schedule please contact [facility] directly at [phone].  Normal or non-critical lab and imaging results will be communicated to you by ForMunehart, letter or phone within 4 business days after the clinic has received the results. If you do not hear from us within 7 days, please contact the clinic through Terrajoulet or phone. If you have a critical or abnormal lab result, we will notify you by phone as soon as possible.  Submit refill requests through JeNaCell or call your pharmacy and they will forward the refill request to us. Please allow 3 business days for your refill to be completed.          Additional Information About Your Visit        JeNaCell Information     JeNaCell gives you secure access to your electronic health record. If you see a primary care provider, you can also send messages to your care team and make appointments. If you have questions, please call your primary care clinic.  If you do not have a primary care provider, please call [phone] and they will assist you.        Care EveryWhere ID     This is your Care EveryWhere ID. This could be used by other organizations to access your [facility] medical records  [id number]        Your Vitals Were     Pulse BMI (Body Mass Index)                63 27.6 kg/m2           Blood Pressure from Last 3 Encounters:   05/15/18 114/69    05/01/18 104/76   04/27/18 (!) 166/94    Weight from Last 3 Encounters:   05/15/18 220 lb 12.8 oz (100.2 kg)   05/01/18 239 lb 12.8 oz (108.8 kg)   04/27/18 242 lb 8 oz (110 kg)              We Performed the Following     MED THERAPY MANAGE REFERRAL          Today's Medication Changes          These changes are accurate as of 5/15/18 11:59 PM.  If you have any questions, ask your nurse or doctor.               These medicines have changed or have updated prescriptions.        Dose/Directions    lamoTRIgine 100 MG 24 hr tablet   Commonly known as:  LAMICTAL XR   This may have changed:    - how much to take  - additional instructions  - Another medication with the same name was removed. Continue taking this medication, and follow the directions you see here.   Used for:  Bipolar disorder (H)        Dose:  100 mg   Take 1 tablet (100 mg) by mouth daily (Take with 200 mg tablet for total of 300 mg daily)   Quantity:  30 tablet   Refills:  0       lithium 300 MG tablet   This may have changed:  how much to take   Used for:  Bipolar 1 disorder, manic, full remission (H)        Dose:  1800 mg   Take 6 tablets (1,800 mg) by mouth At Bedtime   Quantity:  180 tablet   Refills:  2                Primary Care Provider Office Phone # Fax #    Aristides Jenkins [phone]       [address] [address]        Equal Access to Services     ALEX [facility] Hospital[facility] AH: Hadii milla brown Soameena, waaxda luqadaha, qaybta kaalmada treva, jermaine castillo . So [facility] [phone].    ATENCIÓN: Si habla español, tiene a osorio disposición servicios gratuitos de asistencia lingüística. Sadie al [phone].    We comply with applicable federal civil rights laws and [place] laws. We do not discriminate on the basis of race, color, national origin, age, disability, sex, sexual orientation, or gender identity.            Thank you!     Thank you for choosing [facility]  [facility]  for your care. Our goal is always to provide you with excellent care. Hearing back from our patients is one way we can continue to improve our services. Please take a few minutes to complete the written survey that you may receive in the mail after your visit with us. Thank you!             Your Updated Medication List - Protect others around you: Learn how to safely use, store and throw away your medicines at www.disposemymeds.org.          This list is accurate as of 5/15/18 11:59 PM.  Always use your most recent med list.                   Brand Name Dispense Instructions for use Diagnosis    lamoTRIgine 100 MG 24 hr tablet    LAMICTAL XR    30 tablet    Take 1 tablet (100 mg) by mouth daily (Take with 200 mg tablet for total of 300 mg daily)    Bipolar disorder (H)       lithium 300 MG tablet     180 tablet    Take 6 tablets (1,800 mg) by mouth At Bedtime    Bipolar 1 disorder, manic, full remission (H)       OLANZapine 10 MG tablet    zyPREXA    10 tablet    Take 1-2 tablets (10-20 mg) by mouth At Bedtime

## 2018-05-15 NOTE — TELEPHONE ENCOUNTER
12:52 pm  Called patient but unable to leave  as mailbox is full.  Sent Blinpickhart message.    3:38 pm called patient but mailbox is still full.  BlinpickharIn Flow message has not been read.    Will route to Dr. Motley for FYI.

## 2018-05-15 NOTE — Clinical Note
Hi Dr. Motley,  It seems as though at the end of April/early May, pt began a highly restrictive vegan diet and is now eating very small portions of berries, veggies, and quinoa.  He thinks he drinks 2-3 glasses of water daily.  He does season his veggies with a little salt and pepper.  He has lost 22 pounds in the last 18 days. Although his lithium levels have trended up, it seems quite possible that these recently high levels could be related to drastic weight loss and significant reduction in sodium and fluid intake.  It is somewhat concerning that he hasn't exhibited typical toxicity, so it makes it challenging for him to know when to get checked. Additionally, he has not been taking olanzapine 30mg lately due to extreme sedation.  He plans to discuss with you tomorrow.  I'll route you my final note when complete.  Please let me know what questions you have. Thanks, Karie

## 2018-05-15 NOTE — TELEPHONE ENCOUNTER
----- Message from Anisha Alcantara RN sent at 5/15/2018 11:21 AM CDT -----      ----- Message -----     From: Mayito Motley MD     Sent: 5/15/2018  11:13 AM       To: MAE David -     Can you speak with Esa when you have some time?  I reviewed the lab work that he had done this morning.      His lithium level has come back down, but is still elevated at 1.86.  The remainder of his blood chemistry is normal.  His kidney function is fine.  His GFR was 82, and it has been trending from the mid 70s to the mid 80s over the last several months, so it has not changed, which is good.    It is unclear to me why Esa's lithium level became so elevated, and given that it is still elevated, he should continue holding lithium until I see him again tomorrow.  I recommend he continue taking olanzapine 30 mg at night.    Thx!    DB

## 2018-05-15 NOTE — PROGRESS NOTES
"SUBJECTIVE/OBJECTIVE:                           Esa Rivera is a 33 year old male coming in for an initial visit for Medication Therapy Management.  He was self-referred to me.  He sees Dr. Motley at [facility].     Chief Complaint: \"Do you know what could cause my lithium level to go up?\"    Allergies/ADRs: Reviewed in Epic  Tobacco: No tobacco use  Alcohol: not currently using  Caffeine: no caffeine  PMH: Reviewed in Epic    Medication Adherence/Access:  no issues reported; takes all medication at 9:30pm    Bipolar: Pt is prescribed lamotrigine ER 300mg QHS, lithium 1500mg QHS, and olanzapine 30mg QHS.  He is currently taking lamotrigine ER 300mg QHS, holding lithium, and olanzapine 25mg (though only has tablets enough for 20mg tonight).  Pt has recently had elevated lithium levels and wanted to discuss possible reasons, in addition to other medication side effects.  Pt was seen in ED on 4/27/2018 at which time prn Zyprexa was changed to scheduled dosing.  Patient saw Dr. Motley on 5/1 at which time the Zyprexa was increased to 30 mg starting on 5/4.  Patient took 30 mg until 5/12 but decrease dose to 25 mg due to excessive sleepiness. Patient stated he feels \"zoned out\" and cannot think or do simple math which is very frustrating to him.  Patient wondered if there were other medications he can take as he is unable to drive and does not think he will be able to return to work with his current state of drowsiness.  Patient had been taking lithium 1800 mg nightly since December 2017. The lithium levels remained WNL until 5/5/18, although had been slightly trending upward.  Patient reported he started a vegan diet at the end of April and has been eating very small portions.  He reported wanting a diet change in order to be \"closer to God and live a healthier life.\"  Patient stated he has been eating mostly berries, roasted vegetables, avocado and quinoa and has a glass a water with each " of three meals daily, which is his fluid intake for the day.  Pt has lost 22lbs in the last 18 days.    Patient's mother reported that he seemed extremely thirsty for the first week of May prior to his level on 5/5 but then returned to normal.  Patient does have a minor tremor which he stated is more apparent when holding objects and is difficult to see in today's visit.  He denied any increased tremor, nausea, vomiting, diarrhea or confusion over the past month but did endorse excessive sleepiness which he thinks is more related to Zyprexa.    Of note, pt has 2 co-pays for his lamotrigine as he takes one 100 mg and one 200 mg tablet.  Patient requested taking three 100 mg tablets in order to have only one co-pay.  Seroquel 25 mg as needed for anxiety and sleep is on medication list, though patient reported not taking this, as it was discontinued when Zyprexa became scheduled.    Li Labs:  2/6/2018: 1.17  5/5/2018: 1.89  5/14/2018: 2.41  5/15/2018: 1.86    Current labs include:  BP Readings from Last 3 Encounters:   05/15/18 114/69   05/01/18 104/76   04/27/18 (!) 166/94     Lab Results   Component Value Date    CHOL 105 05/05/2018     Lab Results   Component Value Date    TRIG 71 05/05/2018     Lab Results   Component Value Date    HDL 48 05/05/2018     Lab Results   Component Value Date    LDL 43 05/05/2018     Last Basic Metabolic Panel:  Lab Results   Component Value Date     05/15/2018      Lab Results   Component Value Date    POTASSIUM 3.6 05/15/2018     Lab Results   Component Value Date    CHLORIDE 105 05/15/2018     Lab Results   Component Value Date    BUN 13 05/15/2018     Lab Results   Component Value Date    CR 1.04 05/15/2018     GFR Estimate   Date Value Ref Range Status   05/15/2018 82 >60 mL/min/1.7m2 Final     Comment:     Non  GFR Calc   05/05/2018 84 >60 mL/min/1.7m2 Final     Comment:     Non  GFR Calc   02/06/2018 76 >60 mL/min/1.7m2 Final     Comment:      Non  GFR Calc     ASSESSMENT:                             Current medications were reviewed today.     Medication Adherence: excellent, no issues identified    Bipolar: Recent lithium toxicity likely due to diet change over the past few weeks, even though levels were more elevated than normally expected from fluid restriction and/or weight loss.  Recommend patient hold lithium dose again tonight and follow-up with Dr. Motley at appointment tomorrow (see RN note).  Due to lack of noticeable signs of lithium toxicity and inconsistent eating habits patient may not be able to continue lithium therapy at this time.  Sent message to Dr. Motley for new lamotrigine prescription to take three 100 mg tablets.  Discussed patient's frustration with daytime sedation and offered that he may be able to change therapies once symptoms are better controlled.  Metabolic monitoring labs up to date and WNL.  Removed Seroquel from medication list.    PLAN:                            1. Pt told hold lithium tonight.  2. Follow up with Dr. Motley on 5/16/18- msg sent about switching lamotrigine to three 100mg tablets    I spent 60 minutes with this patient today. A copy of the visit note was provided to the patient's primary care provider.    Will follow up in as needed. Pt will schedule if he has further questions.    The patient was given a summary of these recommendations as an after visit summary.     Karie Mendoza, Esteban  Medication Therapy Management Pharmacist  [facility]  Phone: [phone]

## 2018-05-16 ENCOUNTER — HOSPITAL ENCOUNTER (INPATIENT)
Facility: CLINIC | Age: 33
LOS: 5 days | Discharge: HOME | DRG: 885 | End: 2018-05-21
Attending: FAMILY MEDICINE | Admitting: PSYCHIATRY & NEUROLOGY
Payer: COMMERCIAL

## 2018-05-16 ENCOUNTER — TELEPHONE (OUTPATIENT)
Dept: BEHAVIORAL HEALTH | Facility: CLINIC | Age: 33
End: 2018-05-16

## 2018-05-16 ENCOUNTER — TELEPHONE (OUTPATIENT)
Dept: PSYCHIATRY | Facility: CLINIC | Age: 33
End: 2018-05-16

## 2018-05-16 ENCOUNTER — OFFICE VISIT (OUTPATIENT)
Dept: PSYCHIATRY | Facility: CLINIC | Age: 33
End: 2018-05-16
Attending: PSYCHIATRY & NEUROLOGY
Payer: COMMERCIAL

## 2018-05-16 VITALS
WEIGHT: 221.2 LBS | HEART RATE: 63 BPM | SYSTOLIC BLOOD PRESSURE: 106 MMHG | BODY MASS INDEX: 27.65 KG/M2 | DIASTOLIC BLOOD PRESSURE: 73 MMHG

## 2018-05-16 DIAGNOSIS — F31.2 BIPOLAR AFFECTIVE DISORDER, CURRENTLY MANIC, SEVERE, WITH PSYCHOTIC FEATURES (H): ICD-10-CM

## 2018-05-16 DIAGNOSIS — F31.10 BIPOLAR I DISORDER, MOST RECENT EPISODE (OR CURRENT) MANIC (H): Primary | ICD-10-CM

## 2018-05-16 DIAGNOSIS — R21 RASH AND OTHER NONSPECIFIC SKIN ERUPTION: ICD-10-CM

## 2018-05-16 PROBLEM — F30.9 MANIA (H): Status: ACTIVE | Noted: 2018-05-16

## 2018-05-16 LAB
ALBUMIN SERPL-MCNC: 4.5 G/DL (ref 3.4–5)
ALP SERPL-CCNC: 65 U/L (ref 40–150)
ALT SERPL W P-5'-P-CCNC: 53 U/L (ref 0–70)
ANION GAP SERPL CALCULATED.3IONS-SCNC: 10 MMOL/L (ref 3–14)
AST SERPL W P-5'-P-CCNC: 34 U/L (ref 0–45)
BASOPHILS # BLD AUTO: 0 10E9/L (ref 0–0.2)
BASOPHILS NFR BLD AUTO: 0.3 %
BILIRUB SERPL-MCNC: 1.3 MG/DL (ref 0.2–1.3)
BUN SERPL-MCNC: 15 MG/DL (ref 7–30)
CALCIUM SERPL-MCNC: 9.9 MG/DL (ref 8.5–10.1)
CHLORIDE SERPL-SCNC: 104 MMOL/L (ref 94–109)
CO2 SERPL-SCNC: 27 MMOL/L (ref 20–32)
CREAT SERPL-MCNC: 1.07 MG/DL (ref 0.66–1.25)
DIFFERENTIAL METHOD BLD: NORMAL
EOSINOPHIL # BLD AUTO: 0.2 10E9/L (ref 0–0.7)
EOSINOPHIL NFR BLD AUTO: 2.3 %
ERYTHROCYTE [DISTWIDTH] IN BLOOD BY AUTOMATED COUNT: 12.4 % (ref 10–15)
GFR SERPL CREATININE-BSD FRML MDRD: 79 ML/MIN/1.7M2
GLUCOSE SERPL-MCNC: 88 MG/DL (ref 70–99)
HCT VFR BLD AUTO: 43.1 % (ref 40–53)
HGB BLD-MCNC: 14.1 G/DL (ref 13.3–17.7)
IMM GRANULOCYTES # BLD: 0 10E9/L (ref 0–0.4)
IMM GRANULOCYTES NFR BLD: 0.1 %
LITHIUM SERPL-SCNC: 1.66 MMOL/L (ref 0.6–1.2)
LYMPHOCYTES # BLD AUTO: 1 10E9/L (ref 0.8–5.3)
LYMPHOCYTES NFR BLD AUTO: 14.1 %
MCH RBC QN AUTO: 29.9 PG (ref 26.5–33)
MCHC RBC AUTO-ENTMCNC: 32.7 G/DL (ref 31.5–36.5)
MCV RBC AUTO: 91 FL (ref 78–100)
MONOCYTES # BLD AUTO: 0.4 10E9/L (ref 0–1.3)
MONOCYTES NFR BLD AUTO: 6 %
NEUTROPHILS # BLD AUTO: 5.4 10E9/L (ref 1.6–8.3)
NEUTROPHILS NFR BLD AUTO: 77.2 %
NRBC # BLD AUTO: 0 10*3/UL
NRBC BLD AUTO-RTO: 0 /100
PLATELET # BLD AUTO: 312 10E9/L (ref 150–450)
POTASSIUM SERPL-SCNC: 3.6 MMOL/L (ref 3.4–5.3)
PROT SERPL-MCNC: 7.6 G/DL (ref 6.8–8.8)
RBC # BLD AUTO: 4.72 10E12/L (ref 4.4–5.9)
SODIUM SERPL-SCNC: 141 MMOL/L (ref 133–144)
TSH SERPL DL<=0.005 MIU/L-ACNC: 3.5 MU/L (ref 0.4–4)
WBC # BLD AUTO: 7 10E9/L (ref 4–11)

## 2018-05-16 PROCEDURE — 99285 EMERGENCY DEPT VISIT HI MDM: CPT | Performed by: FAMILY MEDICINE

## 2018-05-16 PROCEDURE — 99285 EMERGENCY DEPT VISIT HI MDM: CPT | Mod: Z6 | Performed by: FAMILY MEDICINE

## 2018-05-16 PROCEDURE — 84443 ASSAY THYROID STIM HORMONE: CPT | Performed by: FAMILY MEDICINE

## 2018-05-16 PROCEDURE — 12400001 ZZH R&B MH UMMC

## 2018-05-16 PROCEDURE — G0463 HOSPITAL OUTPT CLINIC VISIT: HCPCS | Mod: ZF

## 2018-05-16 PROCEDURE — 25000132 ZZH RX MED GY IP 250 OP 250 PS 637: Performed by: STUDENT IN AN ORGANIZED HEALTH CARE EDUCATION/TRAINING PROGRAM

## 2018-05-16 PROCEDURE — 85025 COMPLETE CBC W/AUTO DIFF WBC: CPT | Performed by: FAMILY MEDICINE

## 2018-05-16 PROCEDURE — 80178 ASSAY OF LITHIUM: CPT | Performed by: FAMILY MEDICINE

## 2018-05-16 PROCEDURE — 80175 DRUG SCREEN QUAN LAMOTRIGINE: CPT | Performed by: FAMILY MEDICINE

## 2018-05-16 PROCEDURE — 80053 COMPREHEN METABOLIC PANEL: CPT | Performed by: FAMILY MEDICINE

## 2018-05-16 RX ORDER — DIVALPROEX SODIUM 500 MG/1
500 TABLET, EXTENDED RELEASE ORAL AT BEDTIME
Status: DISCONTINUED | OUTPATIENT
Start: 2018-05-16 | End: 2018-05-21

## 2018-05-16 RX ORDER — OLANZAPINE 10 MG/1
10 TABLET ORAL
Status: DISCONTINUED | OUTPATIENT
Start: 2018-05-16 | End: 2018-05-21

## 2018-05-16 RX ORDER — OLANZAPINE 10 MG/2ML
10 INJECTION, POWDER, FOR SOLUTION INTRAMUSCULAR
Status: DISCONTINUED | OUTPATIENT
Start: 2018-05-16 | End: 2018-05-21

## 2018-05-16 RX ORDER — ACETAMINOPHEN 325 MG/1
650 TABLET ORAL EVERY 4 HOURS PRN
Status: DISCONTINUED | OUTPATIENT
Start: 2018-05-16 | End: 2018-05-21 | Stop reason: HOSPADM

## 2018-05-16 RX ORDER — DIVALPROEX SODIUM 250 MG/1
250 TABLET, EXTENDED RELEASE ORAL DAILY
Status: DISCONTINUED | OUTPATIENT
Start: 2018-05-17 | End: 2018-05-16

## 2018-05-16 RX ORDER — RISPERIDONE 1 MG/1
1 TABLET ORAL AT BEDTIME
Status: DISCONTINUED | OUTPATIENT
Start: 2018-05-16 | End: 2018-05-17

## 2018-05-16 RX ORDER — HYDROXYZINE HYDROCHLORIDE 25 MG/1
25 TABLET, FILM COATED ORAL EVERY 4 HOURS PRN
Status: DISCONTINUED | OUTPATIENT
Start: 2018-05-16 | End: 2018-05-21 | Stop reason: HOSPADM

## 2018-05-16 RX ADMIN — DIVALPROEX SODIUM 500 MG: 500 TABLET, FILM COATED, EXTENDED RELEASE ORAL at 21:13

## 2018-05-16 RX ADMIN — HYDROXYZINE HYDROCHLORIDE 25 MG: 25 TABLET ORAL at 23:35

## 2018-05-16 RX ADMIN — RISPERIDONE 1 MG: 1 TABLET ORAL at 21:15

## 2018-05-16 ASSESSMENT — ACTIVITIES OF DAILY LIVING (ADL)
EATING: 0 - INDEPENDENT
TOILETING: 0 - INDEPENDENT
DRESS: 0 - INDEPENDENT
DRESS: 0-->INDEPENDENT
TRANSFERRING: 0 - INDEPENDENT
AMBULATION: 0 - INDEPENDENT
FALL_HISTORY_WITHIN_LAST_SIX_MONTHS: YES
BATHING: 0-->INDEPENDENT
COMMUNICATION: 0 - UNDERSTANDS/COMMUNICATES WITHOUT DIFFICULTY
BATHING: 0 - INDEPENDENT
SWALLOWING: 0-->SWALLOWS FOODS/LIQUIDS WITHOUT DIFFICULTY
TOILETING: 0-->INDEPENDENT
SWALLOWING: 0 - SWALLOWS FOODS/LIQUIDS WITHOUT DIFFICULTY
COGNITION: 0 - NO COGNITION ISSUES REPORTED
RETIRED_EATING: 0-->INDEPENDENT
AMBULATION: 0-->INDEPENDENT
TRANSFERRING: 0-->INDEPENDENT
RETIRED_COMMUNICATION: 0-->UNDERSTANDS/COMMUNICATES WITHOUT DIFFICULTY
CHANGE_IN_FUNCTIONAL_STATUS_SINCE_ONSET_OF_CURRENT_ILLNESS/INJURY: NO

## 2018-05-16 ASSESSMENT — ENCOUNTER SYMPTOMS
FEVER: 0
SHORTNESS OF BREATH: 0
NERVOUS/ANXIOUS: 1
AGITATION: 1
ABDOMINAL PAIN: 0

## 2018-05-16 ASSESSMENT — PAIN SCALES - GENERAL: PAINLEVEL: NO PAIN (0)

## 2018-05-16 NOTE — TELEPHONE ENCOUNTER
On 5/16/2018 the patient signed a PHI authorizing phone messages to be left for him regarding scheduling information, medical information and billing information. The form also authorizes Person to Person communication with Julia Holm (Wife) for scheduling information, medical information and billing information. I sent this document to scanning on 5/16/2018 and kept a copy in Psychiatry until scanning is complete/confirmed. Henny Matta MA

## 2018-05-16 NOTE — IP AVS SNAPSHOT
MRN:[id number]                      After Visit Summary   5/16/2018    Esa Rivera    MRN: [id number]           Thank you!     Thank you for choosing [facility] for your care. Our goal is always to provide you with excellent care.        Patient Information     Date Of Birth          1985        Designated Caregiver       Most Recent Value    Caregiver    Will someone help with your care after discharge? no      About your hospital stay     You were admitted on:  May 16, 2018 You last received care in the:  [facility]    You were discharged on:  May 21, 2018       Who to Call     For medical emergencies, please call 911.  For non-urgent questions about your medical care, please call your primary care provider or clinic, [phone]          Attending Provider     Provider Specialty    Xiang Ahmadi MD Emergency Medicine    [facility]    [facility]       Primary Care Provider Office Phone # Fax #    Aristides Lopez Jenkins [phone]      Your next 10 appointments already scheduled     May 23, 2018  8:00 AM CDT   Adult Med Follow UP with Mayito Motley MD   Psychiatry Clinic ([facility])    [place] [id number]  [address] [address] [phone]              Additional Services     Medication Therapy Management Referral       MTM referral reason            Depakote prescribed     This service is designed to help you get the most from your medications.  A specially trained pharmacist will work closely with you and your doctors  to solve any problems related to your medications and to help you get the   best results from taking them.      The Medication Therapy Management staff will call you to schedule an appointment.                  Further instructions from your care team       Behavioral Discharge Planning and Instructions    Summary:   You were admitted to  [facility] on 5/16/18 with acute katie, poor intake of food/fluids under the care of Dr. Royal.      You met with Dr. Royal and his team daily for ongoing psychiatric assessment and medication management.  You had opportunities to participate in therapeutic groups on the unit.   At this time you report your mood is stabilizing and you report you are not having thoughts or intent to harm yourself or others.   You will be discharged home and will resume care with your outpatient providers.    Disposition:  Home    Main Diagnosis:   Bipolar Affective Disorder- katie    Major Treatments, Procedures and Findings:   Medications were  managed throughout your stay. An internal medicine consult was completed during your stay. You had the opportunity to participate in treatment programming while on the unit including occupational therapy, mental health support and education and spiritual services.     Symptoms to Report:   Please report if you are experiencing increased aggression and/or confusion, problematic loss of sleep, worsening mood, or thoughts of suicide to your treatment team or notify your primary provider.   IF THE SYMPTOMS YOU ARE EXPERIENCING ARE A MEDICAL EMERGENCY, CALL 911 IMMEDIATELY    Lifestyle Adjustment:   1. Adjust your lifestyle to get enough sleep, relaxation, exercise and good nutrition.  Continue to develop healthy coping skills to decrease stress and promote a healthy lifestyle.  2. Abstain from all substances of abuse.  3. Take medications as prescribed.  Please work with your doctor to discuss any concerns you have with your medications or side effects you may be experiencing.  4. Follow up with appointments as scheduled.        Psychiatry Follow-up:   Appointment: Psychiatry: Dr. Mayito Motley: 5/23/18 at 8:00am  [facility]  Psych[facility]ry Clinic: [facility]. [address]  [place] [address]  [phone]      Resources:   *[facility] Crisis: [phone]    24/7 crisis hotline for  "adults who are in the midst of a mental health crisis  *[facility]: [address] [place]  [address]    Walk-in services include access to an onsite team of psychiatrists, nurses, social workers and trained peer support staff that provide person-centered, recovery-focused care. Urgent Care for Adult Mental Health offers an alternative to visiting the emergency room during a mental health crisis.   Monday through Friday, 8 a.m. - 5:30 p.m.  Closed on Saturday, Sunday and holidays    General Medication Instructions:   See your medication sheet(s) for instructions.   Take all medicines as directed.  Make no changes unless your doctor suggests them.   Go to all your doctor visits.  Be sure to have all your required lab tests. This way, your medicines can be refilled on time.  Do not use any drugs not prescribed by your doctor.    The treatment team has appreciated the opportunity to work with you.  We wish you the best in the future.    If you have any questions or concerns our unit number is [phone].       Pending Results     No orders found from 5/14/2018 to 5/17/2018.            Admission Information     Date & Time Provider Department Dept. Phone    5/16/2018 Gregg Royal MD  [facility] [phone]      Your Vitals Were     Blood Pressure Pulse Temperature Respirations Height Weight    140/91 86 97.7  F (36.5  C) (Oral) 16 1.905 m (6' 3\") 98.4 kg (217 lb)    Pulse Oximetry BMI (Body Mass Index)                98% 27.12 kg/m2          [employer] Information     [facility] gives you secure access to your electronic health record. If you see a primary care provider, you can also send messages to your care team and make appointments. If you have questions, please call your primary care clinic.  If you do not have a primary care provider, please call [phone] and they will assist you.        Care EveryWhere ID     This is your Care EveryWhere ID. This could be used by other " organizations to access your [facility] medical records  [id number]        Equal Access to Services     BROCK RUEDA : Hadii milla brown Soameena, waaxda luqadaha, qaybta kaalmacici tilley, jermaine arguetamarkbarrie castaneda. So [facility] [phone].    ATENCIÓN: Si habla español, tiene a osorio disposición servicios gratuitos de asistencia lingüística. Llame al [phone].    We comply with applicable federal civil rights laws and [place] laws. We do not discriminate on the basis of race, color, national origin, age, disability, sex, sexual orientation, or gender identity.               Review of your medicines      START taking        Dose / Directions    divalproex sodium extended-release 500 MG 24 hr tablet   Commonly known as:  DEPAKOTE ER   Used for:  Bipolar affective disorder, currently manic, severe, with psychotic features (H)        Dose:  1000 mg   Take 2 tablets (1,000 mg) by mouth At Bedtime   Quantity:  60 tablet   Refills:  0       risperiDONE 2 MG tablet   Commonly known as:  risperDAL   Used for:  Bipolar affective disorder, currently manic, severe, with psychotic features (H)        Dose:  2 mg   Take 1 tablet (2 mg) by mouth At Bedtime   Quantity:  30 tablet   Refills:  0         STOP taking     lamoTRIgine 100 MG 24 hr tablet   Commonly known as:  LAMICTAL XR           lithium 300 MG tablet           OLANZapine 10 MG tablet   Commonly known as:  zyPREXA                Where to get your medicines      These medications were sent to [facility] [place], [place] - [address]  [address] [address]     Phone:  [phone]     divalproex sodium extended-release 500 MG 24 hr tablet    risperiDONE 2 MG tablet                Protect others around you: Learn how to safely use, store and throw away your medicines at www.disposemymeds.org.             Medication List: This is a list of all your medications and when to take them. Check marks below  indicate your daily home schedule. Keep this list as a reference.      Medications           Morning Afternoon Evening Bedtime As Needed    divalproex sodium extended-release 500 MG 24 hr tablet   Commonly known as:  DEPAKOTE ER   Take 2 tablets (1,000 mg) by mouth At Bedtime   Last time this was given:  500 mg on 5/20/2018  8:44 PM                                risperiDONE 2 MG tablet   Commonly known as:  risperDAL   Take 1 tablet (2 mg) by mouth At Bedtime   Last time this was given:  2 mg on 5/20/2018  8:43 PM

## 2018-05-16 NOTE — ED PROVIDER NOTES
History     Chief Complaint   Patient presents with     Psychiatric Evaluation     Pt was seen at psych clinic this morning and brought to ED by RN for manic behavior and recent weight loss      HPI  Esa Rivera is a 33 year old male who presents the emergency room from the psychiatry clinic with a history of bipolar disorder with manic episodes escalating and now endangering.  Patient has chronic history of bipolar disorder but has had acute exacerbation over the last 2 3 months with manic behaviors delusional thought disorders and grandiose thought disorder.  Patient is not sleeping and not eating has lost 30 pounds in the last 3 weeks and at this time is also experiencing problems with his medications with an elevated lithium level and a rash developing likely secondary to Lamictal.  Patient is not actively suicidal but is having difficulty doing normal activities secondary to his katie and delusional thought process.  Patient was seen and evaluated in the psychiatry clinic please refer to their documentation as well at this time.  Patient's bipolar is noted is been present for years but is severe in its exacerbation over the past 3-4 weeks associated symptoms of weight loss lack of sleep increasing tangential and grandiose thoughts.    I have reviewed the Medications, Allergies, Past Medical and Surgical History, and Social History in the Epic system.    PERSONAL MEDICAL HISTORY  History reviewed. No pertinent past medical history.  PAST SURGICAL HISTORY  Past Surgical History:   Procedure Laterality Date     BACK SURGERY       C EXPLORE SHOULDER JOINT  2003    Arthroplasty, Shoulder     SPINE SURGERY  2004    Fx thoracic, fusion      FAMILY HISTORY  Family History   Problem Relation Age of Onset     Cardiovascular Maternal Grandfather      DIABETES No family hx of      Coronary Artery Disease No family hx of      CEREBROVASCULAR DISEASE No family hx of      Hypertension No family hx of      SOCIAL  "HISTORY  Social History   Substance Use Topics     Smoking status: Never Smoker     Smokeless tobacco: Never Used     Alcohol use No     MEDICATIONS  Current Facility-Administered Medications   Medication     acetaminophen (TYLENOL) tablet 650 mg     [START ON 5/17/2018] divalproex sodium extended-release (DEPAKOTE ER) 24 hr tablet 250 mg     hydrOXYzine (ATARAX) tablet 25 mg     OLANZapine (zyPREXA) tablet 10 mg    Or     OLANZapine (zyPREXA) injection 10 mg     risperiDONE (risperDAL) tablet 1 mg     ALLERGIES  Allergies   Allergen Reactions     No Known Allergies        Review of Systems   Constitutional: Negative for fever.   Respiratory: Negative for shortness of breath.    Cardiovascular: Negative for chest pain.   Gastrointestinal: Negative for abdominal pain.   Psychiatric/Behavioral: Positive for agitation. The patient is nervous/anxious.    All other systems reviewed and are negative.      Physical Exam   BP: 136/80  Pulse: 56  Heart Rate: 66  Temp: 96.9  F (36.1  C)  Resp: 16  Height: 190.5 cm (6' 3\")  Weight: 97.1 kg (214 lb)  SpO2: 100 %  Sitting Orthostatic BP: 128/81  Sitting Orthostatic Pulse: 60 bpm  Standing Orthostatic BP: 127/86  Standing Orthostatic Pulse: 76 bpm      Physical Exam   Constitutional: He is oriented to person, place, and time. No distress.   HENT:   Head: Atraumatic.   Mouth/Throat: Oropharynx is clear and moist. No oropharyngeal exudate.   Eyes: Pupils are equal, round, and reactive to light. No scleral icterus.   Cardiovascular: Normal heart sounds and intact distal pulses.    Pulmonary/Chest: Breath sounds normal. No respiratory distress.   Abdominal: Soft. Bowel sounds are normal. There is no tenderness.   Musculoskeletal: He exhibits no edema or tenderness.   Neurological: He is alert and oriented to person, place, and time. No cranial nerve deficit. He exhibits normal muscle tone. Coordination normal.   Skin: Skin is warm. No rash noted. He is not diaphoretic.   Psychiatric: " His mood appears anxious. He is agitated. Thought content is paranoid and delusional. He expresses impulsivity.       ED Course     ED Course     Procedures         Critical Care time:  none        Labs/Imaging:    Results for orders placed or performed during the hospital encounter of 05/16/18 (from the past 24 hour(s))   CBC with platelets differential   Result Value Ref Range    WBC 7.0 4.0 - 11.0 10e9/L    RBC Count 4.72 4.4 - 5.9 10e12/L    Hemoglobin 14.1 13.3 - 17.7 g/dL    Hematocrit 43.1 40.0 - 53.0 %    MCV 91 78 - 100 fl    MCH 29.9 26.5 - 33.0 pg    MCHC 32.7 31.5 - 36.5 g/dL    RDW 12.4 10.0 - 15.0 %    Platelet Count 312 150 - 450 10e9/L    Diff Method Automated Method     % Neutrophils 77.2 %    % Lymphocytes 14.1 %    % Monocytes 6.0 %    % Eosinophils 2.3 %    % Basophils 0.3 %    % Immature Granulocytes 0.1 %    Nucleated RBCs 0 0 /100    Absolute Neutrophil 5.4 1.6 - 8.3 10e9/L    Absolute Lymphocytes 1.0 0.8 - 5.3 10e9/L    Absolute Monocytes 0.4 0.0 - 1.3 10e9/L    Absolute Eosinophils 0.2 0.0 - 0.7 10e9/L    Absolute Basophils 0.0 0.0 - 0.2 10e9/L    Abs Immature Granulocytes 0.0 0 - 0.4 10e9/L    Absolute Nucleated RBC 0.0    Comprehensive metabolic panel   Result Value Ref Range    Sodium 141 133 - 144 mmol/L    Potassium 3.6 3.4 - 5.3 mmol/L    Chloride 104 94 - 109 mmol/L    Carbon Dioxide 27 20 - 32 mmol/L    Anion Gap 10 3 - 14 mmol/L    Glucose 88 70 - 99 mg/dL    Urea Nitrogen 15 7 - 30 mg/dL    Creatinine 1.07 0.66 - 1.25 mg/dL    GFR Estimate 79 >60 mL/min/1.7m2    GFR Estimate If Black >90 >60 mL/min/1.7m2    Calcium 9.9 8.5 - 10.1 mg/dL    Bilirubin Total 1.3 0.2 - 1.3 mg/dL    Albumin 4.5 3.4 - 5.0 g/dL    Protein Total 7.6 6.8 - 8.8 g/dL    Alkaline Phosphatase 65 40 - 150 U/L    ALT 53 0 - 70 U/L    AST 34 0 - 45 U/L   TSH   Result Value Ref Range    TSH 3.50 0.40 - 4.00 mU/L   Lithium level   Result Value Ref Range    Lithium Level 1.66 (H) 0.60 - 1.20 mmol/L              Assessments & Plan (with Medical Decision Making)     I have reviewed the nursing notes.    I have reviewed the findings, diagnosis, plan and need for follow up with the patient.    Patient with history of bipolar disorder currently manic severe episode with psychotic features patient is actively delusional he is also having a reaction to his medications and has an elevated lithium level as well as a rash likely secondary to Lamictal patient will require inpatient stabilization and treatment he will be admitted to locked psychiatric unit patient is voluntarily being admitted at this time.    Final diagnoses:   Bipolar affective disorder, currently manic, severe, with psychotic features (H)       5/16/2018   [facility]     Xiang Ahmadi MD  05/16/18 [id number]

## 2018-05-16 NOTE — PROGRESS NOTES
May 16, 2018  Progress Note    Esa Rivera is a 33 year old year old male with Bipolar I Disorder, Most Recent Episode Manic Severe, with psychotic behavior (296.40) who presents for ongoing psychiatric care. He has no children. He and his wife (a psychologist) live on their own in [facility]. Esa works as an  for [employer].    Diagnosis    Axis 1: Bipolar I disorder, with severe manic episode with psychosis in Dec 2016. ADHD by history. Alcohol use disorder, moderate, in short-term remission  Axis 2: Nil  Axis 3: Remote concussion x 2  Axis 4: Moderate stressors  Axis 5: GAF at time of visit: 70    Assessment & Plan     There have been a number of significant issues since Esa's last visit.  - He is currently experiencing psychosis with grandiose delusions of a [place] nature.  This is likely in the context of a manic episode.  For what he refers to as spiritual reasons, Esa has markedly restricted his food and fluid intake, and increased his cardiovascular exercise.  He estimates that he has lost 30 pounds in the last 2-3 weeks, which based on his appearance is possible.  His blood pressure was on the low side today (106/73).  He is at risk of becoming medically compromised.   - Esa's lithium level became markedly elevated, most likely as a result of his fluid restriction.  It was 2.4 a couple of days ago, and dropped to 1.86 yesterday.  His kidney function appears to be unaffected.  His GFR 2 days ago was 84, and yesterday was 82.  It has trended between the mid 70s in the mid 80s over the last several months.  However, it is currently unsafe for him to take lithium.  - Esa is also experiencing a rash on his trunk and back, of several days duration.  I suspect-that with his reduced fluid intake, his lamotrigine concentration has also increased, and this is contributing to the rash.  His lamotrigine should be discontinued at least for the time being.  -Esa has apparently been adherent with olanzapine 20-30  mg at bedtime, but his psychosis has persisted.  He does not appear to be responding well to it.    Given the failure of outpatient management for Ward's current psychotic episode, and the risk of him becoming medically compromised if he continues his current dietary intake, fluid intake, and exercise levels, I have arranged for Esa to go to the ER today to be medically cleared, and then admitted to psychiatry.  He is doing this voluntarily.  His wife is aware and is in agreement.  Given the potential danger to his health if he is discharged home, I believe he would meet criteria for 72 hour hold if he changes his mind.    I recommend discontinuing Esa's lithium, olanzapine, and lamotrigine, and initiating a trial of risperidone 1 mg at bedtime for 1 night, then increasing to 2 mg at bedtime, and being titrated as needed.  Initiating Depakote at a low dose while carefully monitoring blood levels is also reasonable, if needed.  Care will need to be taken to ensure that Esa gets adequate food and fluid intake.  I suspect he will need some investigations to be medically cleared in the ER prior to being admitted to psychiatry.    Esa was reluctantly agreeable to being admitted to hospital today.    The patient understands the risks, benefits, adverse effects and alternatives. Agrees to treatment with the capacity to do so. No medical contraindications to treatment. Agrees to call clinic for any problems. The patient understands to call 911 or come to the nearest ED if life threatening or urgent symptoms present.    Attestation:  Patient has been seen and evaluated by me, Mayito Motley MD, PhD.    Interim History     Esa did not appear well during our assessments today.  He had noticeably lost a significant amount of weight.  He estimates 20-30 pounds in the last 2-3 weeks, and I think this is likely correct.  He looks tired, and his eyes are sunken.  He was clearly psychomotor slowed.  His blood pressure was on  "the low side (106/73).  Esa reported he has had a rash for the last several days on his trunk and back, and on examination he clearly does have a raised maculopapular rash in his mid trunk and mid back.    Esa admits to markedly restricting his food and fluid intake.  He eats a small amount of quinoa or vegetables at each meal.  I doubt his dietary intake exceeds 1000 tete per day, a marked reduction for him.  He is also been voluntarily restricting his fluid intake.  He has 1 glass of water with each meal, and no other fluid.  He has been exercising on a regular basis, almost daily, running 3-5 miles or lifting weights.  He notes that he becomes very fatigued when running, and has to walk periodically.    Esa reports that he is restricting his intake and exercising for \"spiritual\" reasons.  He is also reading the Bible regularly, which is uncharacteristic for him.  He believes that his current regimen will bring him closer to God.  He denies getting direct communications from God.  He has very limited insight into the nature of his psychosis.  He recognizes that his family does not agree with him about his believes, but he is trying to convince them that he is correct.    I reviewed my concerns with Esa, including about the likelihood of becoming medically compromised, the fact that his current delusions we will make him adhering to an outpatient management plan unlikely, and the fact that we need to make some medication changes as above.  He reluctantly accepts being admitted to hospital.  He communicated with his wife while I was making arrangements with the ER, and she is aware.  I spoke with the ER physician prior to Esa going to the emergency room.  He will need to be medically cleared, and then admitted to psychiatry.  I believe given the risks to his health if he is discharged home, I believe he meets criteria for 72 hour hold if he changes his mind about being admitted.          In Dec 2016 Esa " "experienced an episode of severe katie with psychosis (grandiose delusions). He was seen in the ER but not hospitalized. He had some mild depressive episodes and possible (but questionable) short hypomanic periods in the preceding years, but never received treatment for these.     Esa's manic symptoms began when he became excited about a new idea at work. 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 He became so excited about this the he was unable to sleep. As his katie escalated, his sleep decreased to 1-2 hours per night for 5-6 nights. His affect was elated and also markedly irritable. He would \"go off\" on people for minor things in very uncharacteristic ways. His thoughts were \"really fast, crisp, and clear\". He saw \"connections in everything.\" His energy was clearly elevated. He became grandiose, believing that his idea would change the world and as a result he would meet many \"powerful people\". Eventually he came to believe that sings on the radio were meant for him.     Esa was taking Adderall at the time. He has taken this, on and off, for many years. In my opinion it is not an adequate explanation for his manic symptoms.      His mother (a PCP) became concerned and brought him to hospital. He was assessed in the ER and prescribed Ativan for sleep. He was seen by Dr Burt (a psychiatrist) the next day. He was prescribed OLZ and LTG. The doses were titrated \"up and down\" for the next 6 months. There were significant side effects (sedation, cognitive impairment), likely from OLZ. Most recently, Esa stopped OLZ for a period of time. In the context of being on a honeymoon in [place], he develop racing thoughts and difficulty sleeping and restarted it, with benefit. He currently takes 10 mg HS.    Esa endorses several previous short (1-2 day) periods of reduced sleep and elevated mood, typically in the context of " getting involved in a work project. At most they represent subthreshold hypomanic periods.     He has also experienced several periods over the years, lasting about a week, of low mood, decreased energy and motivation, poor focus/concentration, and negative thoughts. He denies neurovegetative symptoms or SI. His wife thought that these represented depressive episodes.    Esa has consumed EtOH heavily at points during his life. It was most pronounced during his college years. He has sporadically binge-drank since then. He has not had a drink in 2 weeks.    Past Psych Hx: As above. Esa was assessed at [place] in Dec 2016 and diagnosed with BDI. He was seen by Psychiatry at age 17 and dxed with ADD. He took Adderall off and on (mostly on) with benefit over the years since then. He denies abusing it.     Past Med Hx: 2 concussion: 1) age 15 - skiing. Age 16: football. LOC x a couple of minutes with both.    MEDICATION ADHERENCE: Good.   Current Medications     Current Outpatient Prescriptions   Medication Sig Dispense Refill     lamoTRIgine (LAMICTAL XR) 100 MG 24 hr tablet Take 1 tablet (100 mg) by mouth daily (Take with 200 mg tablet for total of 300 mg daily) (Patient taking differently: Take 300 mg by mouth daily (Take with 200 mg tablet for total of 300 mg daily)) 30 tablet 0     lithium 300 MG tablet Take 6 tablets (1,800 mg) by mouth At Bedtime (Patient taking differently: Take 1,500 mg by mouth At Bedtime ) 180 tablet 2     OLANZapine (ZYPREXA) 10 MG tablet Take 1-2 tablets (10-20 mg) by mouth At Bedtime 10 tablet 0         Substance use     ETOH: Sporadic binge drinking  Cigarettes: NA  Street Drugs: Denies    Review of Systems     A comprehensive review of systems was performed and is negative other than noted above.     Allergies     Allergies   Allergen Reactions     No Known Allergies         Mental Status Exam     /73  Pulse 63  Wt 100.3 kg (221 lb 3.2 oz)  BMI 27.65 kg/m2    Alertness: alert  and  oriented  Appearance: well groomed  Behavior/Demeanor: cooperative and pleasant, with fair  eye contact  Speech: slowed  Language: intact  Psychomotor: slowed  Mood:  irritable and elevated  Affect: restricted; was not congruent to mood  Thought Process/Associations: unremarkable  Thought Content:  Denies suicidal ideation and violent ideation. He has grandiose delusions of a Jew nature.  Perception:  Denies auditory hallucinations and visual hallucinations  Insight: good  Judgment: good  Cognition:  does appear grossly intact.      PSYCHIATRY CLINIC INDIVIDUAL PSYCHOTHERAPY NOTE                                                     [16]   Start time - 1105        End time - 1130  Date reviewed - 2/7/2018       Date next due - 5/7/2018    Subjective: This supportive psychotherapy session addressed issues related to orientation to therapy plan and goals of therapy plan.  Patient's reaction: Maintenance in the context of mental status appropriate for ambulatory setting.  Progress: good  Plan: RTC 2 mos  Psychotherapy services during this visit included  myself and the patient.   TREATMENT  PLAN          SYMPTOMS; PROBLEMS   MEASURABLE GOALS;    FUNCTIONAL IMPROVEMENT INTERVENTIONS;   GAINS MADE DISCHARGE CRITERIA   Depression: depressed mood, anhedonia, low energy, insomnia and concentration problems   reduce depressive symptoms and reduce depressive episodes medications   psycho-education   self-care skills symptom resolution   Crystal/Hypomania: none   reduce manic/hypomanic episodes medications   psychotherapy  self-care skills symptom resolution

## 2018-05-16 NOTE — PHARMACY-ADMISSION MEDICATION HISTORY
Admission medication history for the May 16, 2018 admission is complete.     Interview sources:  Patient, chart review (office visit with Dr. Motley 5/16/18)    Reliability of source: Good - patient knew the names and doses of all medications, he also knew all doses changes that were made recently     Medication compliance: Good - per patient report he has been taking as prescribed    Changes made to PTA medication list (reason)  Added: none  Deleted: none  Changed: none    Additional medication history information:   - see recent Medication Therapy Management appointment with pharmacist on 5/15/18 for more information regarding medications.   - lamotrigine - Due to rash, Dr. Motley recommended he hold this starting today (5/16/18). Last dose of 300 mg was taken yesterday evening.   - lithium - patient was told to hold doses due to elevated lithium level. Patient reports he has not taken for 2 days. Last dose of 1500 mg was taken 5/13/18, per patient report.  - olanzapine - recommended dose was 25 mg at bedtime, however pt ran out so he was only able to take 20 mg yesterday evening.        Prior to Admission medications    Medication Sig Last Dose Taking? Auth Provider   lamoTRIgine (LAMICTAL XR) 100 MG 24 hr tablet Take 1 tablet (100 mg) by mouth daily (Take with 200 mg tablet for total of 300 mg daily)  Patient taking differently: Take 300 mg by mouth daily (Take with 200 mg tablet for total of 300 mg daily) 5/15/2018 at PM Yes Mayito Motley MD   lithium 300 MG tablet Take 6 tablets (1,800 mg) by mouth At Bedtime  Patient taking differently: Take 1,500 mg by mouth At Bedtime  5/13/2018 at PM Yes Mayito Motley MD   OLANZapine (ZYPREXA) 10 MG tablet Take 1-2 tablets (10-20 mg) by mouth At Bedtime  Patient taking differently: Take 20-30 mg by mouth At Bedtime  5/15/2018 at PM Yes Christian Amos MD       Time spent: 30 minutes    Medication history completed by:   Dimple Simmons, Esteban  [facility]  [facility]  Available daily from 1-9 PM: phone [phone], pager [phone]

## 2018-05-16 NOTE — H&P
"    -----------------------------------------------------------------------------------------------------------  Psychiatry History & Physical      Esa Rivera MRN# [id number]   Age: 33 year old YOB: 1985     Date of Admission: 5/16/2018                                  Interviewed at 2:06 PM at ER          Contacts:   Primary Outpatient Psychiatrist: Dr. Motley at [facility]  Family: Julia Holm, wife: [phone]         Assessment:   Esa Rivera is a 33 year old male with a history of Bipolar I disorder with psychotic features, history of ADD, and remote history of TBI who presented to the [facility] ED after being seen in clinic with psychiatrist, Dr. Motley with manic symptoms and grandiose delusions. This is the patient's first psychiatric hospitalization.  The patient is currently followed by Dr. Mayito Motley at [facility]. Family history is notable for ADD in sister and brother. Patient denies any current psychosocial stressors and denies any drug abuse. The MSE at admission is notable for a tired appearing man with sunken eyes with psychomotor slowing, delayed responses, and grandiose delusions. His symptoms are consistent with his historic diagnosis of Bipolar 1 Disorder, current episode manic. His presentation is likely influenced by his significantly reduced food and water consumption. His dizziness, fatigue, intermittent blurry vision, and mental slowing are consistent with malnutrition and dehydration. The sluggishness, confusion, and slight tremor can also be accounted for by lithium toxicity, though this is less likely as his lithium level is dropping rapidly.    Per 's note:\"I recommend discontinuing Shays lithium, olanzapine, and lamotrigine, and initiating a trial of risperidone 1 mg at bedtime for 1 night, then increasing to 2 mg at bedtime, and being titrated as needed.  Initiating Depakote at a low dose while carefully monitoring blood levels is also reasonable, if needed.  Care " "will need to be taken to ensure that Esa gets adequate food and fluid intake.  I suspect he will need some investigations to be medically cleared in the ER prior to being admitted to psychiatry.\"    PTA medications were discontinued ( Lithium, lamotrigine and olanzapine). Appropriate precautions were placed. Given that he is actively psychotic/manic, patient warrants inpatient psychiatric hospitalization to maintain his safety. Disposition pending clinical stabilization, medication optimization and development of an appropriate discharge plan.       Plan   - Admit to station 20 under the care of Dr. Royal. To be staffed by Dr. Royal in the AM.  - Patient will be treated in therapeutic milieu with appropriate individual and group therapies as described.  - Legal Status: Voluntary. The patient does represent a significant danger to himself as demonstrated by inability to care for himself with such reduced food and liquid intake.   - Safety Assessment:    - Checks: Status 15   - Precautions: None   - Pt has not required locked seclusion or restraints in the past 24 hours to maintain safety, please refer to RN documentation for further details.    Principal Diagnosis: Bipolar I disorder, severe, current episode manic with severe manic episode with psychosis in Dec 2016. ADHD by history. Alcohol use disorder, moderate, in short-term remission   2 : Remote concussion x 2    Medications:    New:     - Risperidone 1mg qhs   - Depakote 500mg qhs   - Olanzapine 10 mg IM/PO for psychiatric emergencies   - Hydroxyzine 25-50 mg PO for anxiety     Hold: Lithium, Lamotrigine and Olanzapine    Secondary psychiatric diagnoses of concern this admission:   H/o ADD in the past. Took Adderall which worsened his condition.   H/o Alcohol use disorder, moderate, in short-term remission  H/o remote TBI    Medical diagnoses to be addressed this admission:    # Dehydration. Patient limited the oral intake due to severity of the " "psychosis.    - Monitor for sxs of dehydration   - Might consider IVF    - Monitor blood pressure    # Malnutrition: Calorie restriction due to psychosis   - Regular diet with Ensure provided   - Nutrition consult  - Laboratory/Imaging/tests: CBC, CMP was unremarkable. Li level in AM.  - Consult: might consider medicine consult if the patient gets further decompensated or show some dehydration sxs.      The risks, benefits, alternatives and side effects have been discussed and are understood by the patient and/or other caregivers present at the time of the admission.     Disposition:  TBD pending clinical stabilization and establishment of a safe discharge plan.         Chief Concern:   \"I had a climax of spirituality on April 27th.\"    History is obtained from the patient and EMR    Esa Rivera is a 33 year old male with a history of Bipolar I disorder with psychotic features, history of ADD, and remote history of TBI who presented to the [facility] ED directly from psychiatry clinic for grandiose delusions and possible malnutrition and dehydration.     The patient was seen by Dr. Motley, at [facility] psychiatry clinic, today. Per Dr. Motley, it appeared that the patient had grandiose delusions of Advent nature. For what he refers to spiritual reasons, the patient has significantly restricted his food and fluid intake and increased his cardiovascular exercise over the past 2-3 weeks. He has been eating a small amount of quinoa or vegetables for each meal for likely less than 1000kcal/day. He has been drinking one glass of water with meals only and has been running 3-5 miles a day and lifting weights. More recently, he has been walking instead of running because he has become too fatigued to run. He estimates that he has lost 30lbs over this period and his BP in clinic was 106/73. He has been reading the Bible regularly which is uncharacteristic for him. He believes that his current regimen will bring him closer to God but " "denies direct communication from God. His [place] level was markedly elevated at 2.4 several days ago and was 1.86 yesterday, likely due to dehydration. He has also had a maculopapular rash on his trunk and back for several days that may be due to elevated lamictal levels. Given this, Dr. Motley recommended direct admission which his wife agreed with. The patient reluctantly agreed to voluntary admission. He was sent to the ED where he was calm and cooperative.    On admission, the patient endorses the above history. He appears very tired and pauses frequently in conversation. He describes, \"a climax of spirituality on April 27th\" and that he has been \"fasting\" since then to \"have a connection with God.\" He describes that he has been eating a \"vegan, natural diet without processed food and no packaging waste.\" He has been eating small portions and \"fasting water.\" He reports \"running/walking\" recently as well. He denies specifically hearing God but describes \"meditating to communicate with God and see the light.\" He reports \"a lot ideas recently about products and services.\" Some of these ideas relate to his work and some don't. He declines to share these ideas \"because in business you can't share them. I'm not worried about you. It's just business.\" He states that he has wanted to act on these ideas but hasn't because he has been so fatigued. He denies racing thoughts or impulsivity. He states that he does not like the Olanzapine that was started on April 27th because he has a lot of the side effects that he has read about it. He has them written on a sheet and lists that he has had fatigue, grogginess, lethargy, drooling, irritability, shakiness of hands, double vision, trouble focusing, dizziness, runny nose, blurred vision, and confusion. He specifically states that he has had progressive difficulty \"pulling thoughts from my mind.\" He has noticed a non-pruritic rash on his trunk and back for about 3 days now. His " "mood has been \"good.\" He has been irritable with his family as \"everyone has a recommendation of what I should do.\" He would like to see nutrition. He agrees with the plan to stop Lithium, Lamotrigine, and Olanzapine and start Depakote and Risperidone. He asks that these recommendations be written down as he has had mental fogging.     Esa Rivera's goal of this hospitalization is to stop Zyprexa and discharge as soon as possible.          Psychiatric History:   Past Diagnoses: Bipolar 1 disorder with psychotic features, history of ADD (diagnosed age 17), remote history of TBI (age 15, 17)  Past Hospitalizations: Denies. Last manic episode in December 2016 when had crystal at work regarding lots of ideas about \"fuel cells.\" Also elated affect, irritable mood, decreased sleep.  Prior ECT: Denies   Court Commitment: Denies   Past Suicidality: Denies  Past Suicide Attempt: Denies    Self-injurious Behavior: Denies   Past violence or homicidality/ legal complications: Denies    Prior use of Psychotropic Medication: Lithium, Lamotrigine, Olanzapine, Adderall, and Lorazepam (a long time ago)         Substance Use/ addiction History:   ETOH: \"Esa has consumed EtOH heavily at points during his life. It was most pronounced during his college years. He has sporadically binge-drank since then. He has not had a drink in 2 weeks.\" On admission, patient reports \"1 beer a week 2 months ago, no alcohol for the past month.\"  Cigarettes: NA  Street Drugs: Denies         Psychiatric Review of Systems:   Depression:   Reports: irritability, change in psychomotor activity, changes in appetite, changes in weight, impaired concentration, decreased energy  Denies: depressed mood, decreased interest, changes in sleep, guilt, hopelessness, helplessness, suicidal ideation, suicidal plan  Crystal:   Reports: grandiosity, increased goal-directed activities (many ideas but too fatigued to act on them)  Denies: impulsivity, recklessness, excessive " "energy, decreased need for sleep, spending beyond means, racing thoughts  Psychosis:   Reports: delusions (grandiosity), spiritual or cultural context of the symptoms  Denies: hallucinations (visual, auditory, olfactory, tactile), paranoia, delusions (TV, radio, thought broadcasting, mind control, ideas of reference, thought insertions)  Anxiety:   Denies: worries  ED:   Reports: restriction, excessive exercise.   Denies: binging, purging         Past Medical History   History reviewed. No pertinent past medical history.  Past Surgical History:   Procedure Laterality Date     BACK SURGERY       C EXPLORE SHOULDER JOINT  2003    Arthroplasty, Shoulder     SPINE SURGERY  2004    Fx thoracic, fusion          Medical Review of Systems:   The Review of Systems is negative other than noted above.           Medications:     Prescriptions Prior to Admission   Medication Sig Dispense Refill Last Dose     lamoTRIgine (LAMICTAL XR) 100 MG 24 hr tablet Take 1 tablet (100 mg) by mouth daily (Take with 200 mg tablet for total of 300 mg daily) (Patient taking differently: Take 300 mg by mouth daily (Take with 200 mg tablet for total of 300 mg daily)) 30 tablet 0 5/15/2018 at PM     lithium 300 MG tablet Take 6 tablets (1,800 mg) by mouth At Bedtime (Patient taking differently: Take 1,500 mg by mouth At Bedtime ) 180 tablet 2 5/13/2018 at PM     OLANZapine (ZYPREXA) 10 MG tablet Take 1-2 tablets (10-20 mg) by mouth At Bedtime (Patient taking differently: Take 20-30 mg by mouth At Bedtime ) 10 tablet 0 5/15/2018 at PM            Allergies:     Allergies   Allergen Reactions     No Known Allergies          Family History:    Sister and brother with ADD.  Family History   Problem Relation Age of Onset     Cardiovascular Maternal Grandfather      DIABETES No family hx of      Coronary Artery Disease No family hx of      CEREBROVASCULAR DISEASE No family hx of      Hypertension No family hx of          Social History   \"He has no " "children. He and his wife (a psychologist) live on their own in [place]. Esa works as an  for [employer].\"   Patient was raised in [place]. His parents live in [place]. He has 2 sisters and a brother. One sibling lives in [place], one in [place], and one in [place] [place]. He graduated from the [employer]. He has worked for [employer] for 7.5 years and only stopped working for them for 2 years when he lived in [place], [place].          Labs:     Results for orders placed or performed during the hospital encounter of 05/16/18 (from the past 24 hour(s))   CBC with platelets differential   Result Value Ref Range    WBC 7.0 4.0 - 11.0 10e9/L    RBC Count 4.72 4.4 - 5.9 10e12/L    Hemoglobin 14.1 13.3 - 17.7 g/dL    Hematocrit 43.1 40.0 - 53.0 %    MCV 91 78 - 100 fl    MCH 29.9 26.5 - 33.0 pg    MCHC 32.7 31.5 - 36.5 g/dL    RDW 12.4 10.0 - 15.0 %    Platelet Count 312 150 - 450 10e9/L    Diff Method Automated Method     % Neutrophils 77.2 %    % Lymphocytes 14.1 %    % Monocytes 6.0 %    % Eosinophils 2.3 %    % Basophils 0.3 %    % Immature Granulocytes 0.1 %    Nucleated RBCs 0 0 /100    Absolute Neutrophil 5.4 1.6 - 8.3 10e9/L    Absolute Lymphocytes 1.0 0.8 - 5.3 10e9/L    Absolute Monocytes 0.4 0.0 - 1.3 10e9/L    Absolute Eosinophils 0.2 0.0 - 0.7 10e9/L    Absolute Basophils 0.0 0.0 - 0.2 10e9/L    Abs Immature Granulocytes 0.0 0 - 0.4 10e9/L    Absolute Nucleated RBC 0.0    Comprehensive metabolic panel   Result Value Ref Range    Sodium 141 133 - 144 mmol/L    Potassium 3.6 3.4 - 5.3 mmol/L    Chloride 104 94 - 109 mmol/L    Carbon Dioxide 27 20 - 32 mmol/L    Anion Gap 10 3 - 14 mmol/L    Glucose 88 70 - 99 mg/dL    Urea Nitrogen 15 7 - 30 mg/dL    Creatinine 1.07 0.66 - 1.25 mg/dL    GFR Estimate 79 >60 mL/min/1.7m2    GFR Estimate If Black >90 >60 mL/min/1.7m2    Calcium 9.9 8.5 - 10.1 mg/dL    Bilirubin Total 1.3 0.2 - 1.3 mg/dL    Albumin 4.5 3.4 - 5.0 g/dL    Protein Total 7.6 " "6.8 - 8.8 g/dL    Alkaline Phosphatase 65 40 - 150 U/L    ALT 53 0 - 70 U/L    AST 34 0 - 45 U/L   TSH   Result Value Ref Range    TSH 3.50 0.40 - 4.00 mU/L   Lithium level   Result Value Ref Range    Lithium Level 1.66 (H) 0.60 - 1.20 mmol/L     Laboratory/Imaging/tests:   - Admission orders including CBC, CMP, TSH with T4 were unremarkable.   - Team might consider ordering Vitamin B12 levels , folate level, Vitamin D level          Psychiatric Examination:     Appearance:  alert, awake. Appears very tired. Sunken eyes. Well-groomed  Attitude:  cooperative and pleasant  Eye Contact:  fair, occasional blank stare  Mood:  \"good\"  Affect:  tired, blunted, incongruent with mood. Briefly becomes animated with large gesticulations when discussing his \"business ideas\"  Speech:   clear, coherent. Slow rate. Delayed responses. Occasionally pauses mid sentence and forgets what he was saying. Normal quantity and volume.  Psychomotor Behavior:  no evidence of tardive dyskinesia, dystonia, or tics. Psychomotor slowing  Thought Process:  logical, linear and goal oriented  Associations:  no loose associations  Thought Content:  grandiose delusions of Episcopalian nature present. no evidence of suicidal ideation or homicidal ideation, no auditory hallucinations present and no visual hallucinations present  Insight: limited  Judgment:  limited  Oriented to:  time, person, and place  Attention Span and Concentration:  intact to interview  Recent and Remote Memory:  fair \"I had a hard time remembering earlier if it was April or May\"  Language: Fluent in English  Fund of Knowledge: appropriate  Muscle Strength and Tone: did not asses  Gait and Station: Normal         Physical Examination:   Esa Rivera was medically cleared for admission to inpatient psychiatric unit. Please refer to note by, Dr. Xiang Ahmadi, dated 05/16/2018 for details of physical exam.    Attestation:  Patient has been seen and evaluated by me,  Anisha Gordon, " MD, psychiatry resident.     ATTENDIN18  Please see progress note 18.  Gregg Royal MD

## 2018-05-16 NOTE — IP AVS SNAPSHOT
[address] [address]    Phone:  [phone]                                       After Visit Summary   5/16/2018    Esa Rivera    MRN: [id number]           After Visit Summary Signature Page     I have received my discharge instructions, and my questions have been answered. I have discussed any challenges I see with this plan with the nurse or doctor.    ..........................................................................................................................................  Patient/Patient Representative Signature      ..........................................................................................................................................  Patient Representative Print Name and Relationship to Patient    ..................................................               ................................................  Date                                            Time    ..........................................................................................................................................  Reviewed by Signature/Title    ...................................................              ..............................................  Date                                                            Time

## 2018-05-16 NOTE — MR AVS SNAPSHOT
After Visit Summary   5/16/2018    Esa Rivera    MRN: [id number]           Patient Information     Date Of Birth          1985        Visit Information        Provider Department      5/16/2018 10:30 AM Mayito Motley MD Psychiatry Clinic        Today's Diagnoses     Bipolar I disorder, most recent episode (or current) manic (H)    -  1      Care Instructions    Esa will go to the ER today to be medically cleared, and then admitted to psychiatry   I recommend discontinuing his lithium, lamotrigine, and olanzapine  I recommend instituting a trial of risperidone 1 mg at bedtime tonight, then increasing to 2 mg at bedtime and being titrated as needed  Adding low-dose Depakote and carefully monitoring blood levels is reasonable, if needed  Esa will need an adequate food and fluid intake              Follow-ups after your visit        Follow-up notes from your care team     Return in about 4 weeks (around 6/13/2018).      Your next 10 appointments already scheduled     May 23, 2018  8:00 AM CDT   Adult Med Follow UP with Mayito Motley MD   Psychiatry Clinic ([facility])    [place] [id number] [address] [phone]              Who to contact     Please call your clinic at [phone] to:    Ask questions about your health    Make or cancel appointments    Discuss your medicines    Learn about your test results    Speak to your doctor            Additional Information About Your Visit        MyChart Information     [facility] gives you secure access to your electronic health record. If you see a primary care provider, you can also send messages to your care team and make appointments. If you have questions, please call your primary care clinic.  If you do not have a primary care provider, please call [phone] and they will assist you.      [facility] is an electronic gateway that provides easy, online access to your  medical records. With [facility], you can request a clinic appointment, read your test results, renew a prescription or communicate with your care team.     To access your existing account, please contact your [facility] or call [phone] for assistance.        Care EveryWhere ID     This is your Care EveryWhere ID. This could be used by other organizations to access your [facility] medical records  [id number]        Your Vitals Were     Pulse BMI (Body Mass Index)                63 27.65 kg/m2           Blood Pressure from Last 3 Encounters:   05/16/18 136/80   05/16/18 106/73   05/15/18 114/69    Weight from Last 3 Encounters:   05/16/18 100.3 kg (221 lb 3.2 oz)   05/15/18 100.2 kg (220 lb 12.8 oz)   05/01/18 108.8 kg (239 lb 12.8 oz)              Today, you had the following     No orders found for display         Today's Medication Changes          These changes are accurate as of 5/16/18 11:36 AM.  If you have any questions, ask your nurse or doctor.               These medicines have changed or have updated prescriptions.        Dose/Directions    lamoTRIgine 100 MG 24 hr tablet   Commonly known as:  LAMICTAL XR   This may have changed:    - how much to take  - additional instructions   Used for:  Bipolar disorder (H)        Dose:  100 mg   Take 1 tablet (100 mg) by mouth daily (Take with 200 mg tablet for total of 300 mg daily)   Quantity:  30 tablet   Refills:  0       lithium 300 MG tablet   This may have changed:  how much to take   Used for:  Bipolar 1 disorder, manic, full remission (H)        Dose:  1800 mg   Take 6 tablets (1,800 mg) by mouth At Bedtime   Quantity:  180 tablet   Refills:  2                Primary Care Provider Office Phone # Fax #    Aristides Jenkins [phone]       [address] [facility] [address]  [facility] [address]        Equal Access to Services     BROCK RUEDA AH: Bertha Clark, elizabeth dao, nakul wolf  jermaine tilleymarkbarrie la'aan ah. So [facility] [phone].    ATENCIÓN: Si ramanla sandro, tiene a osorio disposición servicios gratuitos de asistencia lingüística. Sadie al [phone].    We comply with applicable federal civil rights laws and [place] laws. We do not discriminate on the basis of race, color, national origin, age, disability, sex, sexual orientation, or gender identity.            Thank you!     Thank you for choosing PSYCHIATRY CLINIC  for your care. Our goal is always to provide you with excellent care. Hearing back from our patients is one way we can continue to improve our services. Please take a few minutes to complete the written survey that you may receive in the mail after your visit with us. Thank you!             Your Updated Medication List - Protect others around you: Learn how to safely use, store and throw away your medicines at www.disposemymeds.org.          This list is accurate as of 5/16/18 11:36 AM.  Always use your most recent med list.                   Brand Name Dispense Instructions for use Diagnosis    lamoTRIgine 100 MG 24 hr tablet    LAMICTAL XR    30 tablet    Take 1 tablet (100 mg) by mouth daily (Take with 200 mg tablet for total of 300 mg daily)    Bipolar disorder (H)       lithium 300 MG tablet     180 tablet    Take 6 tablets (1,800 mg) by mouth At Bedtime    Bipolar 1 disorder, manic, full remission (H)       OLANZapine 10 MG tablet    zyPREXA    10 tablet    Take 1-2 tablets (10-20 mg) by mouth At Bedtime

## 2018-05-16 NOTE — TELEPHONE ENCOUNTER
S: Dr Motley gave clinical saying he is is sending pt to er due to being in a manic episode w/ psychosis.   B: No hx of psych admits. Pt is currently in the psych clinic and is on his way to er for med clearance only. He recommends pt then admit to mh unit. Pt is in a manic episode w/ psychosis. 30 lb wt loss in last 2 wks. He has a rash which may be caused, per Dr Motley, by his meds. No chronic med prob's.   A: coop, vol, mauricio SI  R: MED CLEARANCE ONLY in er; author informed Windy in er. mbw  Dr Christianson called saying pt is med cleared. Paged franklin at 1:19 pm; #20/franklin accepted for himself             Author informed Alice Shafer on #20 at 1:33 pm; author informed er at 1:37 pm   jocelyne

## 2018-05-16 NOTE — TELEPHONE ENCOUNTER
Pt's wife, Julia Holm, calling with collateral information. # [phone]    Caller states pt's psychiatrist provided information to ED staff already. Please call wife with updates.

## 2018-05-16 NOTE — TELEPHONE ENCOUNTER
On 5/16/2018 the patient signed an IRMA authorizing medical records to be released from [facility] to [address] [place] [address] for the purpose of disability.  I sent the original  IRMA to Medical Records via the tube system and kept a copy in psychiatry until scanning is complete/confirmed. Henny Matta MA

## 2018-05-16 NOTE — PATIENT INSTRUCTIONS
Recommendations from today's MTM visit:                                                    MTM (medication therapy management) is a service provided by a clinical pharmacist designed to help you get the most of out of your medicines.   Today we reviewed what your medicines are for, how to know if they are working, that your medicines are safe and how to make your medicine regimen as easy as possible.     1. Continue holding your lithium dose tonight and follow up with Dr. Motley tomorrow, 5/16.    2. We discussed that your reduced fluid intake and weight loss could be the cause of your high lithium levels.  It's important to stay consistent with fluid intake when taking lithium.  Discuss with Dr. Motley tomorrow.    Next MTM visit: as needed    To schedule another MTM appointment, please call the clinic directly or you may call the MTM scheduling line at [phone] or toll-free at [phone].     My Clinical Pharmacist's contact information:                                                      It was a pleasure seeing you today!  Please feel free to contact me with any questions or concerns you have.      Karie Mendoza, PharmD  Medication Therapy Management Pharmacist  [facility]  Phone: [phone]    You may receive a survey about the MTM services you received.  I would appreciate your feedback to help me serve you better in the future. Please fill it out and return it when you can. Your comments will be anonymous.

## 2018-05-16 NOTE — ED NOTES
ED to Behavioral Floor Handoff    SITUATION  Esa Rivera is a 33 year old male who speaks English and lives in a home with a spouse The patient arrived in the ED by private car from clinic with a complaint of Psychiatric Evaluation (Pt was seen at psych clinic this morning and brought to ED by RN for manic behavior and recent weight loss )  .The patient's current symptoms started/worsened 3 week(s) ago and during this time the symptoms have increased.   In the ED, pt was diagnosed with   Final diagnoses:   Bipolar affective disorder, currently manic, severe, with psychotic features (H)        Initial vitals were: BP: 136/80  Heart Rate: 66  Temp: 96.9  F (36.1  C)  SpO2: 100 %   --------  Is the patient diabetic? No   If yes, last blood glucose? --     If yes, was this treated in the ED? --  --------  Is the patient inebriated (ETOH) No or Impaired on other substances? No  MSSA done? N/A  Last MSSA score: --    Were withdrawal symptoms treated? N/A  Does the patient have a seizure history? No. If yes, date of most recent seizure--  --------  Is the patient patient experiencing suicidal ideation? denies current or recent suicidal ideation     Homicidal ideation? denies current or recent homicidal ideation or behaviors.    Self-injurious behavior/urges? denies current or recent self injurious behavior or ideation.  ------  Was pt aggressive in the ED No  Was a code called No  Is the pt now cooperative? Yes  -------  Meds given in ED: Medications - No data to display   Family present during ED course? No  Family currently present? No    BACKGROUND  Does the patient have a cognitive impairment or developmental disability? No  Allergies:   Allergies   Allergen Reactions     No Known Allergies    .   Social demographics are   Social History     Social History     Marital status: Single     Spouse name: N/A     Number of children: N/A     Years of education: N/A     Occupational History      [address] [place] [address]     Social History  Main Topics     Smoking status: Never Smoker     Smokeless tobacco: Never Used     Alcohol use No     Drug use: No     Sexual activity: Yes     Partners: Female     Birth control/ protection: Implant     Other Topics Concern     Exercise Yes     Seat Belt Yes     Self-Exams Yes     Social History Narrative        ASSESSMENT  Labs results   Labs Ordered and Resulted from Time of ED Arrival Up to the Time of Departure from the ED   LITHIUM LEVEL - Abnormal; Notable for the following:        Result Value    Lithium Level 1.66 (*)     All other components within normal limits   CBC WITH PLATELETS DIFFERENTIAL   COMPREHENSIVE METABOLIC PANEL   TSH   LAMOTRIGINE LEVEL   MAY SALINE LOCK IV      Imaging Studies: No results found for this or any previous visit (from the past 24 hour(s)).   Most recent vital signs /80  Temp 96.9  F (36.1  C) (Oral)  SpO2 100%   Abnormal labs/tests/findings requiring intervention:---   Pain control: pt had none  Nausea control: pt had none    RECOMMENDATION  Are any infection precautions needed (MRSA, VRE, etc.)? No If yes, what infection? --  ---  Does the patient have mobility issues? independently. If yes, what device does the pt use? ---  ---  Is patient on 72 hour hold or commitment? No If on 72 hour hold, have hold and rights been given to patient? N/A  Are admitting orders written if after 10 p.m. ?N/A  Tasks needing to be completed:---     MARY LANTIGUA-- [id number] [facility]   [phone] [facility]

## 2018-05-16 NOTE — PROGRESS NOTES
Pt admitted to station 20 via ED. Pt cooperative with search vitals.  Pt given tour of unit.  Pt currently meeting with MD.

## 2018-05-16 NOTE — PROGRESS NOTES
Pt has been out on the fringes of the milieu. He denies any feelings of anxiety or depression. He does not agree that he should be inpt and verbalized minimal insight into his Mental Health. He verbalized concerns about the amount of Depakote that he is taking and writer encouraged pt to discuss this with his team tomorrow. Pt is oriented x3, He did eat fruit for dinner and did have 16 ounces of waster.Pt did report to writer that he is intentionally fasting for the next few weeks.

## 2018-05-16 NOTE — TELEPHONE ENCOUNTER
-Per Dr. Motley, he would like pt to go to the ED as pt is extremely manic and has lost approx. 30 lbs. After medical assessment in ED, provider would like pt to be admitted to inpatient  -Provider called ED to inform them about pt. Provider called intake to discuss admitting the pt  -Writer and security escorted pt to the ED  -Report and care of patient handed off to MAE Rick

## 2018-05-16 NOTE — PATIENT INSTRUCTIONS
Esa will go to the ER today to be medically cleared, and then admitted to psychiatry   I recommend discontinuing his lithium, lamotrigine, and olanzapine  I recommend instituting a trial of risperidone 1 mg at bedtime tonight, then increasing to 2 mg at bedtime and being titrated as needed  Adding low-dose Depakote and carefully monitoring blood levels is reasonable, if needed  Esa will need an adequate food and fluid intake

## 2018-05-16 NOTE — PROGRESS NOTES
05/16/18 1437   Valuables   Patient Belongings wallet;keys;money (see comment);cell phone/electronics;clothing   Disposition of Belongings Locker   Did you bring any home meds/supplements to the hospital?  No   A               Admission:   Placed in security envelope #[id number],120.00 cash ,2 wings credit card,  2 visa credit cards.  [place] DL in locker     (BROUGHT IN BY WIFE 5/17/18)- 1 light gray sweater, 1 dark gray sweater, 3 boxers, 1 white shirt, 1 blue shirt, 1 checkered pajama bottom, 1 pair of socks, 1 handkerchief, 1 bag of nuts     I am responsible for any personal items that are not sent to the safe or pharmacy.  [facility] is not responsible for loss, theft or damage of any property in my possession.    Signature:  _________________________________ Date: _______  Time: _____                                              Staff Signature:  ____________________________ Date: ________  Time: _____      2nd Staff person, if patient is unable/unwilling to sign:    Signature: ________________________________ Date: ________  Time: _____     Discharge:  [place] has returned all of my personal belongings:    Signature: _________________________________ Date: ________  Time: _____                                          Staff Signature:  ____________________________ Date: ________  Time: _____

## 2018-05-17 LAB — LITHIUM SERPL-SCNC: 1.23 MMOL/L (ref 0.6–1.2)

## 2018-05-17 PROCEDURE — 80178 ASSAY OF LITHIUM: CPT | Performed by: PSYCHIATRY & NEUROLOGY

## 2018-05-17 PROCEDURE — 90853 GROUP PSYCHOTHERAPY: CPT

## 2018-05-17 PROCEDURE — 99222 1ST HOSP IP/OBS MODERATE 55: CPT | Performed by: PHYSICIAN ASSISTANT

## 2018-05-17 PROCEDURE — 99223 1ST HOSP IP/OBS HIGH 75: CPT | Mod: AI | Performed by: PSYCHIATRY & NEUROLOGY

## 2018-05-17 PROCEDURE — 12400007 ZZH R&B MH INTERMEDIATE UMMC

## 2018-05-17 PROCEDURE — 99207 ZZC CONSULT E&M CHANGED TO INITIAL LEVEL: CPT | Performed by: PHYSICIAN ASSISTANT

## 2018-05-17 PROCEDURE — 25000132 ZZH RX MED GY IP 250 OP 250 PS 637: Performed by: STUDENT IN AN ORGANIZED HEALTH CARE EDUCATION/TRAINING PROGRAM

## 2018-05-17 PROCEDURE — 36415 COLL VENOUS BLD VENIPUNCTURE: CPT | Performed by: PSYCHIATRY & NEUROLOGY

## 2018-05-17 RX ORDER — RISPERIDONE 2 MG/1
2 TABLET ORAL AT BEDTIME
Status: DISCONTINUED | OUTPATIENT
Start: 2018-05-17 | End: 2018-05-21 | Stop reason: HOSPADM

## 2018-05-17 RX ADMIN — DIVALPROEX SODIUM 500 MG: 500 TABLET, FILM COATED, EXTENDED RELEASE ORAL at 21:21

## 2018-05-17 RX ADMIN — RISPERIDONE 2 MG: 2 TABLET ORAL at 21:21

## 2018-05-17 NOTE — PROGRESS NOTES
Patient was recently on Lamictal and Lithium. Levels were supra therapeutic, highly likely due to dehydration. Patient reported that he has started to develop the skin rash in different parts of his body. Including left ankle, back and abdomen. No rash in mouth. Rash is not painful or itching.     Please see the attached pictures.    Medicine is notified and Dermatology service requested the pictures.       Erik Campo MD  Psychiatry resident

## 2018-05-17 NOTE — PROGRESS NOTES
INITIAL PSYCHOSOCIAL ASSESSMENT   I have reviewed the chart met with the patient, and developed Care Plan.  Information for assessment was obtained from: Patient/patient chart    Presenting Problem:   The patient  is a 33 year old male with a history of Bipolar I disorder with psychotic features, history of ADD, and remote history of TBI who presented to the [facility] ED after being seen in clinic with psychiatrist, Dr. Motley with manic symptoms and grandiose delusions. The patient has significantly restricted his food and fluid intake (for spiritual reasons) and increased his cardiovascular exercise over the past 2-3 weeks. He has been eating a small amount of quinoa or vegetables for each meal for likely less than 1000kcal/day. He has been drinking one glass of water with meals only and has been running 3-5 miles a day and lifting weights. More recently, he has been walking instead of running because he has become too fatigued to run. He estimates that he has lost 30lbs over this period. He has been reading the Bible regularly which is uncharacteristic for him. He believes that his current regimen will bring him closer to God. His Lithium level was markedly elevated at 2.4 several days ago and was 1.86 yesterday, likely due to dehydration. He has also had a maculopapular rash on his trunk and back for several days that may be due to elevated lamictal levels.   The following areas have been assessed:  History of Mental Health and Chemical Dependency:  This is the patient's first psychiatric admission. He has been treated as an outpatient.  Patient was first diagnosed with ADD at age 17 and diagnosed with Bipolar Disorder at [facility] in 12/2016.   Patient has no history of suicide attempt/SIB    Patient reports a h/o heavy alcohol use during his college years.  Denies any current abuse of alcohol or illicit substances      Family Description (Constellation, Family Psychiatric History):   Patient was born/raised in [place].   Patient is one of 4 children .  Parents, siblings are alive and well.    Patient is  since Sept 2017, no children    Family h/o is significant for ADD in sister, brother.    Significant Life Events (Illness, Abuse, Trauma, Death):  Patient denies any  trauma history    Living Situation:     Patient lives in [place] with his wife.    Educational Background:   Patient has college degree    Occupational History:   Patient is employed as an  at .  Patient is currently on medical leave    Financial Status:    No immediate concerns    Legal Issues:    None    Ethnic/Cultural Issues:  No concerns identified    Spiritual Orientation:    Patient is Restorationism                       Service History:   N/A    Social Functioning (organization, interests):  Patient enjoys working out/running                                                      Current Treatment Providers are:  Psychiatrist: Dr. Mayito Collazo [facility]    Social Service Assessment/Plan:  Patient will have ongoing psychiatric assessment.  Medications will be reviewed and adjusted per MD as indicated. Outpatient providers, family will be contacted for care coordination.  Hospital staff will provide a safe environment and a therapeutic milieu. Patient will be encouraged to participate in unit groups and activities.  CTC will continue to assess needs and  ensure appropriate follow up care is in place.

## 2018-05-17 NOTE — PROGRESS NOTES
CLINICAL NUTRITION SERVICES - ASSESSMENT NOTE     Nutrition Prescription    RECOMMENDATIONS FOR MDs/PROVIDERS TO ORDER:  Recommend change back to regular diet if pt is agreeable (he is going to review the menu today).    Malnutrition Status:    Severe malnutrition in the context of acute illness    Recommendations already ordered by Registered Dietitian (RD):  Change Ensure Plus to with meals  Send regular menu for patient to review     Future/Additional Recommendations:  1. Provide education on following a vegan diet and how to meet nutritional needs if pt decides to follow this diet going forward.  2. Monitor po intake and weight trends. Adjust supplements as needed pending pt preferences.     REASON FOR ASSESSMENT  Esa Rivera is a/an 33 year old male assessed by the dietitian for Admission Nutrition Risk Screen for unintentional loss of 10# or more in the past two months and reduced oral intake over the last month and Provider Order - Pt w reduced calorie and fluid intake 2-3 weeks due to grandiose delusions. Has lost 20lbs over this time. Pt would like nutrition consult, team agrees to improve caloric intake    NUTRITION HISTORY  - Chart review suggests pt has had a restricted food intake for the past 2-3 weeks. Intake has consisted on quinoa or vegetables for meals and 1 glass of water with meals only. He has also been running/ walking 3-5 miles/day and has sustained significant weight loss.  - Patient reports he has been fasting for a few weeks. He is following a vegan diet and only eating small amounts (what he feels he needs). He also expresses some interest in following a vegan diet long term, however it is not clear if he plans to continue this in a fast format. He also has lots of questions about how to follow a vegan diet.    CURRENT NUTRITION ORDERS  Diet: Vegan, Boost Plus between meals  Intake/Tolerance: Patient reports he is still fasting, but he is willing to do supplements or even some meat if  "he needs to get more protein.     LABS  Labs reviewed    MEDICATIONS  Medications reviewed    ANTHROPOMETRICS  Height: 190.5 cm (6' 3\")  Most Recent Weight: 100 kg (220 lb 8 oz)    IBW: 86 kg  BMI: Overweight BMI 25-29.9  Weight History: Weight loss of 9% in the past month.  Wt Readings from Last 5 Encounters:   05/17/18 100 kg (220 lb 8 oz)   05/15/18 100.2 kg (220 lb 12.8 oz)   04/27/18 110 kg (242 lb 8 oz)   09/11/17 108.9 kg (240 lb)   07/24/17 112.9 kg (249 lb)       Dosing Weight: 100 kg (current weight)    ASSESSED NUTRITION NEEDS  Estimated Energy Needs: 6405-5159 kcals/day (25 - 30 kcals/kg)  Justification: Maintenance  Estimated Protein Needs:  grams protein/day (0.8 - 1 grams of pro/kg)  Justification: Maintenance  Estimated Fluid Needs: 1 mL/kcal or per MD goals   Justification: Maintenance    PHYSICAL FINDINGS  See malnutrition section below.     MALNUTRITION  % Intake: < 75% for > 7 days (non-severe)  % Weight Loss: > 5% in 1 month (severe)  Subcutaneous Fat Loss: None observed  Muscle Loss: Temporal:  moderate, Facial & jaw region:  mild, Thoracic region (clavicle, acromium bone, deltoid, trapezius, pectoral):  mild and Upper arm (bicep, tricep):  mild  Fluid Accumulation/Edema: None noted  Malnutrition Diagnosis: Severe malnutrition in the context of acute illness    NUTRITION DIAGNOSIS  Inadequate oral intake related to restricted diet preference, altered mental status as evidenced by weight loss of 9% in the past month.      INTERVENTIONS  Implementation  Nutrition Education: provided education on the importance of adequate nutritional intake, especially given severity of recent weight loss. Encouraged pt to avoid fasting at this time and focus on being healthy and taking in adequate nutrition. Educated on components of vegan diet (of which pt seemed surprisingly unclear) and ways to get protein. Encouraged use of oral nutrition supplements (such as Boost) at this time given nutrition and " weight loss concerns.   Collaboration with other providers  Medical food supplement therapy     Goals  1. Patient to consume % of nutritionally adequate meal trays TID, or the equivalent with supplements/snacks.  2. No further weight loss.     Monitoring/Evaluation  Progress toward goals will be monitored and evaluated per protocol.      Rozina Lion MS, RD, LD

## 2018-05-17 NOTE — PROGRESS NOTES
Patient was in milieu and was pleasant and social with peers and staff.  Patient attended group activities.  Denies any SI/SIB thoughts.  Patient states he feels his mood is stabilizing and thinks he will be ready for d/c in a few days.       05/17/18 1527   Behavioral Health   Hallucinations denies / not responding to hallucinations   Thinking distractable   Orientation person: oriented;place: oriented;date: oriented;time: oriented   Memory baseline memory   Insight insight appropriate to situation   Judgement impaired   Eye Contact at examiner   Affect full range affect   Mood mood is calm   Physical Appearance/Attire attire appropriate to age and situation   Hygiene well groomed   Suicidality other (see comments)  (denies)   1. Wish to be Dead No   2. Non-Specific Active Suicidal Thoughts  No   Self Injury other (see comment)  (denies)   Activity other (see comment)  (in milieu)   Speech clear;coherent   Medication Sensitivity no stated side effects   Psychomotor / Gait balanced;steady   Psycho Education   Type of Intervention 1:1 intervention   Response participates, initiates socially appropriate   Hours 0.5

## 2018-05-17 NOTE — PLAN OF CARE
Problem: Patient Care Overview  Goal: Individualization & Mutuality  Patient's goals:  Unable to provide due to acute katie    Plan for admission:  1. Stabilization of mood disorder symptoms  2. Absence of SI- safe with self  3. Medication management per MD's  4. Coordination of care with outpatient providers, family  5. Psychiatric follow up care in place

## 2018-05-17 NOTE — PROGRESS NOTES
Behavioral Health  Note   Behavioral Health  Spirituality Group Note     Unit 20    Name: Esa Rivera    YOB: 1985   MRN: [id number]    Age: 33 year old     Patient attended -led group, which included discussion of spirituality, coping with illness and building resilience.   Patient attended group for 1.0 hrs.   The patient actively participated in group discussion     [facility]  Staff    Page [phone]

## 2018-05-17 NOTE — CONSULTS
[facility], [place]  Internal Medicine Consult       Date of Admission:  5/16/2018  Consult Requested by: Psychiatry.  Reason for Consult: Rash.    Assessment & Plan   Esa Rivera is a 33 year old male admitted on 5/16/2018. He has a history of bipolar I disorder and is admitted for psychiatric decompensation. Medicine consulted for rash in setting of suspected supratherapeutic lamotrigine levels.     # Bipolar I Disorder with acute decompensation:  Will defer management to psychiatry.     # Papular rash:  Acute onset of papular rash on the abdomen 1.5 weeks ago, with acute worsening since admission now extending to the R scapular region and bilateral ankles. No pustules, vesicles or petechiae noted. There is not a significant macular component. No surrounding erythema. No lesions noted on the mucous membranes. No associated pain, pruritis, fever, chills or other systemic symptoms. Given suspicion for supratherapeutic lamotrigine levels, primary team concerned about possible SJS/TEN or DRESS. This is a very appropriate concern, however exam not highly suggestive of either. SJS/TEN most notable within first 8 weeks of therapy and normally involves mucous membranes (patient has been taking long term). Would suspect a more widespread maculopapular rash with pain or pruritis for DRESS, and possible eosinophilia (Eos normal on differential). Increased risk of serious skin reactions while on Depakote (per Up to Date), therefore cannot rule out completely. Other considerations include acne, heat rash, and contact dermatitis, although this would not explain the lesions on his ankles. Viral exanthem not suspected. Overall the patient is medically stable. At present there is a very low concern for serious skin reaction, therefore OK to remain on psychiatry unit. Photos uploaded below.   - Monitor clinically  - Recommend dermatology consultation and possible skin biopsy  - Cont to hold  lamotrigine for now; level pending      The patient's care was discussed with the Attending Physician, Dr. Gordon.    Fransisco Clemens PA-C  Internal Medicine Staff Hospitalist Service  [facility]  Pager: [phone]  After 5 PM, page gold team cross cover at [phone]. If no response, page job code [phone].  ______________________________________________________________________    Chief Complaint   Rash    History is obtained from the patient    History of Present Illness   Esa Rivera is a 33 year old male who has a history of bipolar disorder and is admitted for psychiatric decompensation, including delusions of grandeur. Patient was seen by his primary psychiatrist on 5/16, at which time he was noted to have acute decompensation. He had been following his lithium levels, which had risen to 2.41. They felt this was d/t volume depletion. Patient had been restricting his diet for Mu-ism purposes. They noted a 30 lbs weight loss in approximately 1 month. The patient denies intentional weight loss but admits to severely restricting his diet and eating extremely small proportions for the past month.     He reports onset of rash on his abdomen about 1.5 weeks ago. He denies any new medications. He denies any associated pain or pruritis. The rash had remained relatively stable, but now he reports spread to his R scapular region and bilateral ankles since admission to psychiatry. There was concern by the primary team that this could be d/t supratherapeutic lamotrigine levels. His outpatient medications were discontinued. He was admitted to inpatient psych. He was started on Depakote upon admission. The patient denies any history of similar rash in the past. He does admit to having acne on his back, but feels that his current rash is not the same as his acne. He denies any sick contact. No fevers, chills, sore throat, cough, chest pain, dyspnea, nausea, vomiting, diarrhea, or abdominal pain.     Review of Systems    The 10 point Review of Systems is negative other than noted in the HPI or here.     Past Medical History    Bipolar I Disorder with psychotic features  Hx ADD  Remote Hx TBI    Past Surgical History   Spine surgery  Shoulder surgery    Social History   Social History   Substance Use Topics     Smoking status: Never Smoker     Smokeless tobacco: Never Used     Alcohol use No       Family History   Family history reviewed with patient and is noncontributory.    Medications   I have reviewed this patient's current medications  Prescriptions Prior to Admission   Medication Sig Dispense Refill Last Dose     lamoTRIgine (LAMICTAL XR) 100 MG 24 hr tablet Take 1 tablet (100 mg) by mouth daily (Take with 200 mg tablet for total of 300 mg daily) (Patient taking differently: Take 300 mg by mouth daily (Take with 200 mg tablet for total of 300 mg daily)) 30 tablet 0 5/15/2018 at PM     lithium 300 MG tablet Take 6 tablets (1,800 mg) by mouth At Bedtime (Patient taking differently: Take 1,500 mg by mouth At Bedtime ) 180 tablet 2 5/13/2018 at PM     OLANZapine (ZYPREXA) 10 MG tablet Take 1-2 tablets (10-20 mg) by mouth At Bedtime (Patient taking differently: Take 20-30 mg by mouth At Bedtime ) 10 tablet 0 5/15/2018 at PM       Allergies   Allergies   Allergen Reactions     No Known Allergies        Physical Exam   Vital Signs: Temp: 98.5  F (36.9  C) Temp src: Oral                Weight: 220 lbs 8 oz    GENERAL:  Awake. Alert. Oriented x 3. NAD.   HEENT:  No scleral icterus. Mucous membranes moist.   CV:  RRR. S1, S2. No murmurs appreciated.   RESPIRATORY:  Lungs CTAB with no wheezing, rales, rhonchi.   GI:  Abdomen soft, non-distended. Active bowel sounds. No tenderness, guarding, or rebound. No mass or HSM.    NEUROLOGICAL:  No focal deficits. CN II-XII intact grossly. Moves all extremities.    EXTREMITIES:  No peripheral edema. No calf tenderness. Intact bilateral pedal pulses.   SKIN:  No jaundice, wounds or lesions.  Papular rash noted on the anterior abdomen, R scapular region, and bilateral medial malleoli. No pustules or vesicles. No surrounding erythema. No petechiae. No induration or swelling. Lesions are not tender.                           Data   Data reviewed today: I reviewed all medications, new labs and imaging results over the last 24 hours.       ROUTINE IP LABS (Last four results)  CMP   Recent Labs  Lab 05/16/18  1152 05/15/18  0920    139   POTASSIUM 3.6 3.6   CHLORIDE 104 105   CO2 27 24   ANIONGAP 10 10   GLC 88 95   BUN 15 13   CR 1.07 1.04   PAULINE 9.9 9.3   PROTTOTAL 7.6  --    ALBUMIN 4.5  --    BILITOTAL 1.3  --    ALKPHOS 65  --    AST 34  --    ALT 53  --      CBC   Recent Labs  Lab 05/16/18  1152   WBC 7.0   RBC 4.72   HGB 14.1   HCT 43.1   MCV 91   MCH 29.9   MCHC 32.7   RDW 12.4        INR No lab results found in last 7 days.    All labs personally reviewed in [facility].  See A&P for additional results.     Unresulted Labs Ordered in the Past 30 Days of this Admission     Date and Time Order Name Status Description    5/16/2018 1132 Lamotrigine Level In process

## 2018-05-17 NOTE — PLAN OF CARE
Problem: Patient Care Overview  Goal: Team Discussion  Team Plan:   BEHAVIORAL TEAM DISCUSSION    Participants:   Dr. Royal, Dr. Gordon, Dr. Campo, Isaura Morgan MA.LP, med students    Continued Stay Criteria/Rationale:   Acute katie, not eating/drinking adequate amounts    Medical/Physical:   Rash on trunk/back  Precautions:   Behavioral Orders   Procedures     Code 1 - Restrict to Unit     Routine Programming     As clinically indicated     Status 15     Every 15 minutes.     Plan:   Patient will have ongoing psychiatric assessment.  Medications will be reviewed and adjusted per MD as indicated. Outpatient providers will be contacted for care coordination.  Hospital staff will provide a safe environment and a therapeutic milieu. Patient will be encouraged to participate in unit groups and activities.   CTC will work with patient on after care planning.  CTC will continue to assess needs and ensure appropriate follow up care is in place.       Rationale for change in precautions or plan:   No change in plan/precautions

## 2018-05-17 NOTE — PROGRESS NOTES
"  ----------------------------------------------------------------------------------------------------------  [facility], [place]   Psychiatric Progress Note  Hospital Day #1     Interim History:   The patient's care was discussed with the treatment team and chart notes were reviewed.     Sleep: 6.5 hours   Scheduled meds: adherent  PRN meds: Hydroxyzine x1    Staff report: Patient was admitted to station 20 via ED yesterday.  He does not agree that he should be inpatient and verbalized minimal insight into his mental health. He stated concerns about the amount of Depakote that he is taking. He ate fruit for dinner and had 16 ounces of water. Patient reports that he is intentionally fasting for the next few weeks.     Patient interview: Patient was interviewed in the conference room by the team. He reports that on April 27th he felt \"a bit overzealous\" in his house and this was out of character for him. He agreed to go to the ED and was told that he was having a manic episode. He was prescribed olanzapine, lamotrigine, and lithium. He started fasting and significantly restricted his food and fluid intake. He says that he is feeling \"pretty good\" this morning. He woke up this morning and tried doing his normal route: wake up, shower, eat, read newspaper, and come here.  He is worried about starting the dose of Depakote. The team discussed that this is a typical dose of Depakote. He denies any medication side effects. He also wanted to know when he can go home. The team discussed that given the new medications, it makes sense to at least stay until tomorrow. He states that his energy is good today. The team expressed concern over his eating habits and he agreed to meet with nutrition to discuss increasing his protein consumption. He denied any SI/SIB. As he is leaving he apologizes for his loose pants as he is not allowed to wear a belt.     Later in the day, the patient reports that the " "rash on his back, trunk, and foot has worsened.     The risks, benefits, alternatives and side effects of any medication changes have been discussed and are understood by the patient and other caregivers.   The patient requires inpatient hospitalization due to inadequate food and fluid intake.     Psychiatric Review of systems:   The Review of Systems is negative other than noted in the HPI         Psychiatric Examination:   /81  Pulse 60  Temp 98.5  F (36.9  C) (Oral)  Resp 16  Ht 1.905 m (6' 3\")  Wt 100 kg (220 lb 8 oz)  SpO2 98%  BMI 27.56 kg/m2  Weight is 220 lbs 8 oz  Body mass index is 27.56 kg/(m^2).    Appearance:  awake, alert, well groomed and neatly groomed, sitting comfortably in chair, dressed in street clothes. Loose pants. Sunken eyes.   Attitude:  cooperative  Eye Contact:  good  Mood:  \"pretty good\"   Affect:  mood congruent and intensity is blunted but smiles appropriately intermittently. Appears fatigued.   Speech:  clear, coherent, normal rate, volume, and prosody  Psychomotor Behavior:  no evidence of tardive dyskinesia, dystonia, or tics. No psychomotor agitation  Thought Process:  linear and goal oriented  Associations:  no loose associations  Thought Content:  no evidence of suicidal ideation or homicidal ideation. Grandiose delusions of a Zoroastrianism nature present. Does not appear to be responding to internal stimuli. No other delusions or paranoia.   Insight:  limited  Judgment:  limited  Oriented to:  time, person, and place  Attention Span and Concentration:  intact  Recent and Remote Memory:  fair  Language: Fluent in English   Fund of Knowledge: appropriate   Muscle Strength and Tone: did not assess   Gait and Station: Normal     Assessment    DIagnostic Impression: Esa Rivera, a 33 year old male, with history of Bipolar I disorder with psychotic features, history of ADD, and remote history of TBI, presented to the [facility] ED directly from psychiatry clinic due to grandiose " delusions and possible malnutrition and dehydration. This is the patient's first psychiatric hospitalization.  The patient is followed by Dr. Mayito Motley at [facility] clinic. Family history is notable for ADD in sister and brother. Patient denies any current psychosocial stressors and denies any drug abuse. The MSE at admission is notable for a tired appearing man with sunken eyes with psychomotor slowing, delayed responses, and grandiose delusions. His symptoms are consistent with his historic diagnosis of Bipolar 1 Disorder, current episode manic. His presentation is likely influenced by his significantly reduced food and water consumption. His dizziness, fatigue, intermittent blurry vision, and mental slowing are consistent with malnutrition and dehydration. The sluggishness, confusion, and slight tremor can also be accounted for by lithium toxicity, though this is less likely as his lithium level is dropping rapidly.     Hospital course: Esa Rivera was admitted to station 20 as a voluntary patient. PTA medications, lithium, lamotrigine, and olanzapine, were discontinued given elevated lithium and lamotrigine levels. Risperidone was initiated and titrated to 2mg qhs to address delusions. Depakote was initiated and titrated to 500mg qhs to address katie and as a replacement maintenance medication for the patient's bipolar disorder.     Medical course: Esa Rivera was medically cleared by the ED prior to admission to the unit. Patient without past medical issues. Patient reported a papular rash on his trunk, back, and ankle at admission that he felt worsened that day after admission. Given concern for elevated lamotrigine and lithium levels, medicine was consulted to ensure no risk of pollock-paola syndrome or DRESS. It was felt that the rash was not due to this. Pictures of the rash were entered into [facility] and sent to dermatology who felt that the rash may be folliculitis.     Plan   Principal Diagnosis:  Bipolar 1  disorder, current episode manic with psychotic features   Secondary psychiatric diagnoses of concern this admission:   -ADD (diagnosed age 17)   -Remote history of TBI (15, 17)     Medications:   -Increase Risperidone from 1 mg qhs to 2mg qhs  -Depakote 500 mg qhs  -Olanzapine 10 mg IM/PO for psychiatric emergencies   -Hydroxyzine 25-50 mg PO for anxiety      Laboratory/Imaging: none  Plan:     - Patient will be treated in therapeutic milieu with appropriate individual and group therapies as described  -Consult dietician to improve diet and determine if rash could be related to a vitamin deficiency         Medical diagnoses to be addressed this admission:    # Dehydration:  Patient limited the oral intake due to severity of the psychosis   - Monitor vitals    # Malnutrition:   Calorie restriction due to psychosis   - Nutrition consult  - Vegan diet   - Ensure drinks  - Monitor weights and vital signs    # Papular rash:  To trunk, back, and bilateral ankles for 1.5 weeks. Nonpruritic, not painful. Concern for SJS/TEN vs DRESS given elevated lamotrigine levels and lithium levels. Medicine consulted and felt not SJS/TEN or DRESS. Pictures sent to dermatology who felt that likely represented folliculitis.  - Monitor  - Medicine following  - Discontinued lamotrigine and lithium    Legal Status: Voluntary. Patient represents a danger to himself given his inability to adequately feed himself and drink sufficient water.     Safety Assessment:   Behavioral Orders   Procedures     Code 1 - Restrict to Unit     Routine Programming     As clinically indicated     Status 15     Every 15 minutes.       #Disposition: TBD pending clinical stabilization and establishment of a safe discharge plan.     Scribed by Goran Thomas, MS3 for Dr. Anisha Gordon, resident.    I have reviewed and edited the documentation recorded by the scribe.  This documentation accurately reflects the services I personally performed and treatment decisions made  by me in consultation with the attending physician Gregg Royal MD.    Anisha Gordon MD  Psychiatry PGY-1    Attestation:  I have reviewed the resident admit note and confirmed by my exam today the HPI, past psych, PMH, ROS, meds, allergies, family and social histories.  I, Gregg Royal, saw and evaluated the patient with the resident physician.  I agree with the findings and plan of care as documented in the resident note.  I have reviewed all labs and vital signs.                 Labs since admission:     Results for orders placed or performed during the hospital encounter of 05/16/18   CBC with platelets differential   Result Value Ref Range    WBC 7.0 4.0 - 11.0 10e9/L    RBC Count 4.72 4.4 - 5.9 10e12/L    Hemoglobin 14.1 13.3 - 17.7 g/dL    Hematocrit 43.1 40.0 - 53.0 %    MCV 91 78 - 100 fl    MCH 29.9 26.5 - 33.0 pg    MCHC 32.7 31.5 - 36.5 g/dL    RDW 12.4 10.0 - 15.0 %    Platelet Count 312 150 - 450 10e9/L    Diff Method Automated Method     % Neutrophils 77.2 %    % Lymphocytes 14.1 %    % Monocytes 6.0 %    % Eosinophils 2.3 %    % Basophils 0.3 %    % Immature Granulocytes 0.1 %    Nucleated RBCs 0 0 /100    Absolute Neutrophil 5.4 1.6 - 8.3 10e9/L    Absolute Lymphocytes 1.0 0.8 - 5.3 10e9/L    Absolute Monocytes 0.4 0.0 - 1.3 10e9/L    Absolute Eosinophils 0.2 0.0 - 0.7 10e9/L    Absolute Basophils 0.0 0.0 - 0.2 10e9/L    Abs Immature Granulocytes 0.0 0 - 0.4 10e9/L    Absolute Nucleated RBC 0.0    Comprehensive metabolic panel   Result Value Ref Range    Sodium 141 133 - 144 mmol/L    Potassium 3.6 3.4 - 5.3 mmol/L    Chloride 104 94 - 109 mmol/L    Carbon Dioxide 27 20 - 32 mmol/L    Anion Gap 10 3 - 14 mmol/L    Glucose 88 70 - 99 mg/dL    Urea Nitrogen 15 7 - 30 mg/dL    Creatinine 1.07 0.66 - 1.25 mg/dL    GFR Estimate 79 >60 mL/min/1.7m2    GFR Estimate If Black >90 >60 mL/min/1.7m2    Calcium 9.9 8.5 - 10.1 mg/dL    Bilirubin Total 1.3 0.2 - 1.3 mg/dL    Albumin 4.5 3.4 - 5.0 g/dL     Protein Total 7.6 6.8 - 8.8 g/dL    Alkaline Phosphatase 65 40 - 150 U/L    ALT 53 0 - 70 U/L    AST 34 0 - 45 U/L   TSH   Result Value Ref Range    TSH 3.50 0.40 - 4.00 mU/L   Lithium level   Result Value Ref Range    Lithium Level 1.66 (H) 0.60 - 1.20 mmol/L   Lithium level   Result Value Ref Range    Lithium Level 1.23 (H) 0.60 - 1.20 mmol/L

## 2018-05-18 LAB — LAMOTRIGINE SERPL-MCNC: 7.4 UG/ML (ref 2.5–15)

## 2018-05-18 PROCEDURE — 12400007 ZZH R&B MH INTERMEDIATE UMMC

## 2018-05-18 PROCEDURE — 99232 SBSQ HOSP IP/OBS MODERATE 35: CPT | Mod: GC | Performed by: PSYCHIATRY & NEUROLOGY

## 2018-05-18 PROCEDURE — 90853 GROUP PSYCHOTHERAPY: CPT

## 2018-05-18 PROCEDURE — 25000132 ZZH RX MED GY IP 250 OP 250 PS 637: Performed by: STUDENT IN AN ORGANIZED HEALTH CARE EDUCATION/TRAINING PROGRAM

## 2018-05-18 RX ADMIN — DIVALPROEX SODIUM 500 MG: 500 TABLET, FILM COATED, EXTENDED RELEASE ORAL at 21:59

## 2018-05-18 RX ADMIN — RISPERIDONE 2 MG: 2 TABLET ORAL at 21:59

## 2018-05-18 ASSESSMENT — ACTIVITIES OF DAILY LIVING (ADL)
GROOMING: INDEPENDENT
ORAL_HYGIENE: INDEPENDENT
DRESS: INDEPENDENT

## 2018-05-18 NOTE — PROGRESS NOTES
"Brief Medicine Note    Internal medicine was asked to see this patient 5/17 due to concerns for papular rash. Case was reviewed with my colleague, Fransisco Cleemns, who saw and assessed this patient (please refer to his noted for details). Recommendation was to obtain dermatology consult, although this has not been placed.     At this point, would continue to monitor rash closely. If worsening, would recommend consulting dermatology.     No further recommendations from a medicine standpoint. We will sign off at this time. Please notify if any future medical questions or concerns arise.       /85  Pulse 89  Temp 98.4  F (36.9  C) (Oral)  Resp 16  Ht 1.905 m (6' 3\")  Wt 100 kg (220 lb 8 oz)  SpO2 98%  BMI 27.56 kg/m2      Sharri Blum PA-C  Hospitalist Service  Pager [phone]    "

## 2018-05-18 NOTE — PROGRESS NOTES
Pt attended group today and participated for 45 minutes. Discussed his current stressors as well as positive things that have been occurring in her stay in the hospital. Pt reported having a positive mood, but feeling stuck in the hospital. He received feedback on how to practice healthy lifestyle by keeping to treatment professionals' recommendations to avoid future relapse, risks behaviors, etc. Pt  participated in learning ways to activate and maintain positive thinking. Specifically, he identified hopeful and helpful activities that will help him maintain his health. Pt reported looking forward to traveling with his wife, and  deciding to stay positive and counteract his negative thoughts. Pt received feedback on how to engage family and support system as a way to cope with mental health symptoms.

## 2018-05-18 NOTE — PROGRESS NOTES
"  ----------------------------------------------------------------------------------------------------------  [facility], [place]   Psychiatric Progress Note  Hospital Day #2     Interim History:   The patient's care was discussed with the treatment team and chart notes were reviewed.     Sleep: 6.5 hours   Scheduled meds: adherent  PRN meds: None  Staff report: Reported that he attended OT discussion group, quiet but attentive. He was visible in the milieu, had selective interactions with peers. He had visitors in the evening and ate well. He denied SI and SIB.     Patient interview: Patient was interviewed in the conference room by the team. He says that he is doing well, \"off to a good start,\" and that it has been good since being here. His parents and wife came to visit him last night and they played a card game. He reports yesterday that he ate well, had dinner and made sure he ate the black beans to increase his protein intake. He tried to stick to fasting plus protein boost shakes. He also reports that he has increased his fluid intake to about 6 cups a day. When asked about any side effects from the divalproex and risperidone, he responds that he felt more groggy and off balance this morning. He is not sure if it is due to decreased amount of sleep last night due to the snoring of his roommate or side effects of the medications. He has not noticed any changes with his rash. He reports that he would rather not stay over the weekend and that the team should contact his wife or father. He denied any SI/SIB.     Following patient interview, his father, Mukund Rivera, was contacted. His father believes that Esa is doing much better though \"we have some reservation about him coming home.\" His father feels that he needs a little bit more recovery time. Last night when they visited, he stated that they noticed Esa lose track of conversation, \"sometimes staring off into space, not " "focused.\" He is concerned about his son and plans to visit every visiting time.     The risks, benefits, alternatives and side effects of any medication changes have been discussed and are understood by the patient and other caregivers.   The patient requires inpatient hospitalization due to inadequate food and fluid intake.     Psychiatric Review of systems:   The Review of Systems is negative other than noted in the HPI         Psychiatric Examination:   /85  Pulse 89  Temp 98.4  F (36.9  C) (Oral)  Resp 16  Ht 1.905 m (6' 3\")  Wt 100 kg (220 lb 8 oz)  SpO2 98%  BMI 27.56 kg/m2  Weight is 220 lbs 8 oz  Body mass index is 27.56 kg/(m^2).    Appearance:  awake, alert, well groomed and neatly groomed, sitting comfortably in chair, dressed in street clothes. Sunken eyes.   Attitude:  cooperative  Eye Contact:  good  Mood:  \"doing well\"   Affect:  mood congruent and intensity is blunted but smiles appropriately intermittently.  Speech:  clear, coherent, normal rate, volume, and prosody  Psychomotor Behavior:  no evidence of tardive dyskinesia, dystonia, or tics. No psychomotor agitation. Psychomotor slowing has significantly improved.   Thought Process:  linear and goal oriented  Associations:  no loose associations  Thought Content:  no evidence of suicidal ideation or homicidal ideation. Grandiose delusions of a Gnosticism nature present. Does not appear to be responding to internal stimuli. No other delusions or paranoia.   Insight:  limited  Judgment:  limited  Oriented to:  time, person, and place  Attention Span and Concentration:  intact, improved from admission. No longer staring off into space .  Recent and Remote Memory:  fair  Language: Fluent in English   Fund of Knowledge: appropriate   Muscle Strength and Tone: did not assess   Gait and Station: Normal     Assessment    DIagnostic Impression: Esa Rivera, a 33 year old male, with history of Bipolar I disorder with psychotic features, history " of ADD, and remote history of TBI, presented to the [facility] ED directly from psychiatry clinic due to grandiose delusions and possible malnutrition and dehydration. This is the patient's first psychiatric hospitalization.  The patient is followed by Dr. Mayito Motley at [facility]. Family history is notable for ADD in sister and brother. Patient denies any current psychosocial stressors and denies any drug abuse. The MSE at admission is notable for a tired appearing man with sunken eyes with psychomotor slowing, delayed responses, and grandiose delusions. His symptoms are consistent with his historic diagnosis of Bipolar 1 Disorder, current episode manic. His presentation is likely influenced by his significantly reduced food and water consumption. His dizziness, fatigue, intermittent blurry vision, and mental slowing are consistent with malnutrition and dehydration. The sluggishness, confusion, and slight tremor can also be accounted for by lithium toxicity, though this is less likely as his lithium level is dropping rapidly.     Hospital course: Esa Rivera was admitted to station 20 as a voluntary patient. PTA medications, lithium, lamotrigine, and olanzapine, were discontinued given elevated lithium and lamotrigine levels. Repeat Lithium level at admission was 1.23. Lamotrigine level at admission was normal at 7.4. Risperidone was initiated and titrated to 2mg qhs to address delusions. Depakote was initiated and titrated to 500mg qhs to address katie and as a replacement maintenance medication for the patient's bipolar disorder.     Medical course: Esa Rivera was medically cleared by the ED prior to admission to the unit. Patient without past medical issues. Patient reported a papular rash on his trunk, back, and ankle at admission that he felt worsened that day after admission. Given concern for elevated lamotrigine and lithium levels, medicine was consulted to ensure no risk of pollock-paola syndrome or  DRESS. It was felt that the rash was not due to this. Pictures of the rash were entered into [employer] and sent to dermatology who felt that the rash may be folliculitis.     Plan   Principal Diagnosis:  Bipolar 1 disorder, current episode manic with psychotic features   Secondary psychiatric diagnoses of concern this admission:   -ADD (diagnosed age 17)   -Remote history of TBI (15, 17)     Medications:   -Risperidone 2mg qhs  -Depakote 500 mg qhs  -Olanzapine 10 mg IM/PO for psychiatric emergencies   -Hydroxyzine 25-50 mg PO for anxiety      Laboratory/Imaging: Depakote level 5/20 AM  Plan:   - Continue to titrate Depakote and Risperidone to address katie and delusions  - Patient will be treated in therapeutic milieu with appropriate individual and group therapies as described    Medical diagnoses to be addressed this admission:    # Dehydration:  Patient limited the oral intake due to severity of the psychosis   - Monitor vitals    # Malnutrition:   Calorie restriction due to psychosis   - Nutrition consult, appreciate recs  - Vegan diet   - Ensure drinks  - Monitor weights and vital signs    # Papular rash:  To trunk, back, and bilateral ankles for 1.5 weeks. Nonpruritic, not painful. Concern for SJS/TEN vs DRESS given elevated lamotrigine levels and lithium levels. Medicine consulted and felt not SJS/TEN or DRESS. Pictures sent to dermatology who felt that likely represented folliculitis.  - Monitor  - Medicine following  - Discontinued lamotrigine and lithium    Legal Status: Voluntary    Safety Assessment:   Behavioral Orders   Procedures     Code 1 - Restrict to Unit     Routine Programming     As clinically indicated     Status 15     Every 15 minutes.       #Disposition: TBD pending clinical stabilization and establishment of a safe discharge plan. Likely discharge within a week.     Scribed by Goran Thomas, MS3 for Dr. Anisha Gordon, resident.    I have reviewed and edited the documentation recorded by the  edward.  This documentation accurately reflects the services I personally performed and treatment decisions made by me in consultation with the attending physician Gregg Royal MD.    Anisha Gordon MD  Psychiatry PGY-1    Attestation:  I, Gregg Royal, saw and evaluated the patient with the resident physician.  I agree with the findings and plan of care as documented in the resident note.  I have reviewed all labs and vital signs.               Labs since admission:     Results for orders placed or performed during the hospital encounter of 05/16/18   CBC with platelets differential   Result Value Ref Range    WBC 7.0 4.0 - 11.0 10e9/L    RBC Count 4.72 4.4 - 5.9 10e12/L    Hemoglobin 14.1 13.3 - 17.7 g/dL    Hematocrit 43.1 40.0 - 53.0 %    MCV 91 78 - 100 fl    MCH 29.9 26.5 - 33.0 pg    MCHC 32.7 31.5 - 36.5 g/dL    RDW 12.4 10.0 - 15.0 %    Platelet Count 312 150 - 450 10e9/L    Diff Method Automated Method     % Neutrophils 77.2 %    % Lymphocytes 14.1 %    % Monocytes 6.0 %    % Eosinophils 2.3 %    % Basophils 0.3 %    % Immature Granulocytes 0.1 %    Nucleated RBCs 0 0 /100    Absolute Neutrophil 5.4 1.6 - 8.3 10e9/L    Absolute Lymphocytes 1.0 0.8 - 5.3 10e9/L    Absolute Monocytes 0.4 0.0 - 1.3 10e9/L    Absolute Eosinophils 0.2 0.0 - 0.7 10e9/L    Absolute Basophils 0.0 0.0 - 0.2 10e9/L    Abs Immature Granulocytes 0.0 0 - 0.4 10e9/L    Absolute Nucleated RBC 0.0    Comprehensive metabolic panel   Result Value Ref Range    Sodium 141 133 - 144 mmol/L    Potassium 3.6 3.4 - 5.3 mmol/L    Chloride 104 94 - 109 mmol/L    Carbon Dioxide 27 20 - 32 mmol/L    Anion Gap 10 3 - 14 mmol/L    Glucose 88 70 - 99 mg/dL    Urea Nitrogen 15 7 - 30 mg/dL    Creatinine 1.07 0.66 - 1.25 mg/dL    GFR Estimate 79 >60 mL/min/1.7m2    GFR Estimate If Black >90 >60 mL/min/1.7m2    Calcium 9.9 8.5 - 10.1 mg/dL    Bilirubin Total 1.3 0.2 - 1.3 mg/dL    Albumin 4.5 3.4 - 5.0 g/dL    Protein Total 7.6 6.8 - 8.8 g/dL     Alkaline Phosphatase 65 40 - 150 U/L    ALT 53 0 - 70 U/L    AST 34 0 - 45 U/L   TSH   Result Value Ref Range    TSH 3.50 0.40 - 4.00 mU/L   Lithium level   Result Value Ref Range    Lithium Level 1.66 (H) 0.60 - 1.20 mmol/L   Lamotrigine Level   Result Value Ref Range    Lamotrigine Level 7.4 2.5 - 15.0 ug/mL   Lithium level   Result Value Ref Range    Lithium Level 1.23 (H) 0.60 - 1.20 mmol/L

## 2018-05-18 NOTE — PROGRESS NOTES
Pt was visible in the milieu, had selective interaction with peers and attended group tonight. Pt had visitors. Pt ate well. Pt denied SI and SIB. Flat affect and pleasant upon approach.      05/17/18 2200   Behavioral Health   Hallucinations denies / not responding to hallucinations   Thinking distractable   Orientation time: oriented;date: oriented;person: oriented;place: oriented   Memory baseline memory   Insight poor   Judgement impaired   Eye Contact at examiner   Affect blunted, flat   Mood mood is calm   Physical Appearance/Attire neat   Hygiene well groomed   Suicidality other (see comments)  (Denied)   1. Wish to be Dead No   2. Non-Specific Active Suicidal Thoughts  No   Self Injury other (see comment)  (Denied)   Activity withdrawn   Speech clear;coherent   Medication Sensitivity no stated side effects;no observed side effects   Psychomotor / Gait balanced;steady   Psycho Education   Type of Intervention 1:1 intervention   Response participates, initiates socially appropriate   Hours 0.5   Treatment Detail Check in

## 2018-05-18 NOTE — PROGRESS NOTES
"CLINICAL NUTRITION SERVICES BRIEF NOTE    Met with patient to discuss diet and pt preferences as he had requested RD come back to visit today (see note 5/17 for full assessment). Patient reports he continues to follow his \"fast\" and is on day 22 of 40. He reports eating \"1/2 kidney size portion\" per meal, TID. He likes Boost Plus. Patient had many questions about what writer thinks he should do (vegan vs regular diet, portion sizes, fasting, etc).     Nutrition Education - reiterated that fasting is not recommended currently as it will deprive his body of essential nutrition to ensure he has energy to focus on treatment. Discussed that it is difficult for a person at his height to get adequate protein on a vegan diet in the hospital, especially with the very small portions he is eating. Patient reports he would like to do the regular diet and make more conscious decisions, but is not very willing to stop his fast at this point.   RD changed diet order back to regular diet, pt may write-in vegan options as he prefers.     Yesenia Robert, TOBY, LD  Unit Pager: [phone]    "

## 2018-05-18 NOTE — PLAN OF CARE
Problem: Overarching Goals (Adult)  Goal: Optimized Coping Skills in Response to Life Stressors   Initially seen by OT on this date. Esa  attended an OT discussion group this afternoon on the topic of cause & effect and personal perspective. Quiet but attentive. Minimal contribution to the discussion. Will be given a written self-assessment upon increased group attendance. More observation needed to complete initial evaluation at this time.

## 2018-05-18 NOTE — PROGRESS NOTES
Patient was in milieu and was pleasant and social with peers and staff.  Patient attended groups.  Patient denies SI/SIB.       05/18/18 1522   Behavioral Health   Hallucinations denies / not responding to hallucinations   Thinking distractable   Orientation person: oriented;place: oriented;date: oriented;time: oriented   Memory baseline memory   Insight poor   Judgement impaired   Eye Contact at examiner   Affect full range affect   Mood mood is calm   Physical Appearance/Attire attire appropriate to age and situation   Hygiene well groomed   Suicidality other (see comments)  (denies)   1. Wish to be Dead No   2. Non-Specific Active Suicidal Thoughts  No   Self Injury other (see comment)  (denies)   Activity other (see comment)  (in milieu)   Speech clear;coherent   Medication Sensitivity no stated side effects   Psychomotor / Gait balanced;steady   Psycho Education   Type of Intervention 1:1 intervention   Response participates, initiates socially appropriate   Hours 0.5   Treatment Detail check-in

## 2018-05-19 PROCEDURE — 97150 GROUP THERAPEUTIC PROCEDURES: CPT | Mod: GO

## 2018-05-19 PROCEDURE — 12400007 ZZH R&B MH INTERMEDIATE UMMC

## 2018-05-19 PROCEDURE — 25000132 ZZH RX MED GY IP 250 OP 250 PS 637: Performed by: STUDENT IN AN ORGANIZED HEALTH CARE EDUCATION/TRAINING PROGRAM

## 2018-05-19 RX ADMIN — RISPERIDONE 2 MG: 2 TABLET ORAL at 20:15

## 2018-05-19 RX ADMIN — DIVALPROEX SODIUM 500 MG: 500 TABLET, FILM COATED, EXTENDED RELEASE ORAL at 20:15

## 2018-05-19 ASSESSMENT — ACTIVITIES OF DAILY LIVING (ADL)
LAUNDRY: WITH SUPERVISION
DRESS: INDEPENDENT
GROOMING: INDEPENDENT
ORAL_HYGIENE: INDEPENDENT
ORAL_HYGIENE: INDEPENDENT
DRESS: INDEPENDENT
GROOMING: INDEPENDENT

## 2018-05-19 NOTE — PROGRESS NOTES
Up at 0430. Requesting to work out in activity room, asking for a yoga mat and weights. Accepts that activities do not start until after 0730 due to staffing issues but was given a yoga mat and has been doing yoga in [facility].

## 2018-05-19 NOTE — PROGRESS NOTES
05/18/18 2222   Behavioral Health   Hallucinations denies / not responding to hallucinations   Thinking distractable   Orientation person: oriented;place: oriented;date: oriented;time: oriented   Memory baseline memory   Insight poor   Judgement impaired   Eye Contact at examiner   Affect full range affect   Mood mood is calm   Physical Appearance/Attire attire appropriate to age and situation   Hygiene well groomed   Suicidality other (see comments)  (denies)   Wish to be Dead Description (no)   Non-Specific Active Suicidal Thought Description (no)   Self Injury other (see comment)  (denies)   Elopement (none observed)   Activity other (see comment)  (visible in the milieu)   Speech clear;coherent   Medication Sensitivity no stated side effects;no observed side effects   Psychomotor / Gait balanced   Activities of Daily Living   Hygiene/Grooming independent   Oral Hygiene independent   Dress independent   Room Organization independent   Activity   Activity Assistance Provided independent     Pt was visible in the milieu, watched TV and socialized with peers.  Calm, cooperative and full range affect.  Independent with ADLs. Denies SI, and SIB.

## 2018-05-19 NOTE — PLAN OF CARE
Problem: Manic Symptoms  Goal: Manic Symptoms  Signs and symptoms of listed problems will be absent or manageable.  Outcome: Improving  Patient was admitted to the unit for increased manic symptoms. At this time, patient denied all psych symptoms, and rated his mood as stable. However, he woke up very early this morning and used his time to exercise. Patient did not exhibit manic symptoms during morning hours or during the interview. He appeared flat, and reported feeling more sedated from the medications he takes at HS. He said that he does not see a real problem with the sedation as he feels he can work and drive (unsure if patient is able to make a realistic conclusion at this time). Patient reported improved balance this morning. Denied all other side effects of scheduled medications.   Patient denied pain and all other physical issues. Appetite, hygiene, and grooming is good. Patient expects to be discharged on Monday. No concerns expressed at this time.

## 2018-05-19 NOTE — PLAN OF CARE
Problem: Manic Symptoms  Goal: Social and Therapeutic (Manic Symptoms)  Signs and symptoms of listed problems will be absent or manageable.   Esa  participated in OT clinic this a.m. and was able to initiate task, follow through with plan and ask for help as needed.  Worked on a simple creative project and chatted intermittently with peers. Sense of humor noted. Appreciative and polite.

## 2018-05-20 LAB — VALPROATE SERPL-MCNC: 30 MG/L (ref 50–100)

## 2018-05-20 PROCEDURE — 25000132 ZZH RX MED GY IP 250 OP 250 PS 637: Performed by: STUDENT IN AN ORGANIZED HEALTH CARE EDUCATION/TRAINING PROGRAM

## 2018-05-20 PROCEDURE — 36415 COLL VENOUS BLD VENIPUNCTURE: CPT | Performed by: STUDENT IN AN ORGANIZED HEALTH CARE EDUCATION/TRAINING PROGRAM

## 2018-05-20 PROCEDURE — 12400007 ZZH R&B MH INTERMEDIATE UMMC

## 2018-05-20 PROCEDURE — 80164 ASSAY DIPROPYLACETIC ACD TOT: CPT | Performed by: STUDENT IN AN ORGANIZED HEALTH CARE EDUCATION/TRAINING PROGRAM

## 2018-05-20 RX ADMIN — DIVALPROEX SODIUM 500 MG: 500 TABLET, FILM COATED, EXTENDED RELEASE ORAL at 20:44

## 2018-05-20 RX ADMIN — RISPERIDONE 2 MG: 2 TABLET ORAL at 20:43

## 2018-05-20 ASSESSMENT — ACTIVITIES OF DAILY LIVING (ADL)
ORAL_HYGIENE: INDEPENDENT
GROOMING: INDEPENDENT
DRESS: INDEPENDENT
LAUNDRY: WITH SUPERVISION

## 2018-05-20 NOTE — PROGRESS NOTES
"   05/20/18 1341   Behavioral Health   Hallucinations denies / not responding to hallucinations   Thinking poor concentration   Orientation person: oriented;place: oriented;date: oriented;time: oriented   Memory baseline memory   Insight admits / accepts   Judgement intact   Eye Contact at examiner   Affect blunted, flat   Mood mood is calm   Physical Appearance/Attire attire appropriate to age and situation   Hygiene well groomed   Suicidality other (see comments)  (None stated or observed)   1. Wish to be Dead No   2. Non-Specific Active Suicidal Thoughts  No   Duration (Lifetime) NA   Change in Protective Factors? No   Enviromental Risk Factors None   Self Injury other (see comment)  (None stated or observed)   Elopement (None stated or observed)   Activity withdrawn   Speech clear;coherent   Medication Sensitivity no stated side effects;no observed side effects   Psychomotor / Gait balanced;steady   Activities of Daily Living   Hygiene/Grooming independent   Oral Hygiene independent   Dress independent   Laundry with supervision   Room Organization independent      Pt was visible in the milieu and attending groups. However, when not engaged in a structured group, pt spent much of the shift in his room reading the paper. Pt was cooperative upon approach, but showed a restricted affect. Pt answered writer's questions appropriately but gave short answers. Answers were relevant and logical with no overt signs of psychosis. Pt reports feeling \"good\" with a more stable mood and endorsed feeling ready to discharge next week. Pt denied psychotic symptoms as well as SI and SIB. No acute behavioral episodes were observed. Bob BRAND 5/20/18  "

## 2018-05-20 NOTE — PROGRESS NOTES
05/19/18 2100   Behavioral Health   Hallucinations denies / not responding to hallucinations   Thinking distractable   Orientation person: oriented;place: oriented   Memory baseline memory   Insight poor   Judgement impaired   Eye Contact at examiner   Affect blunted, flat   Mood mood is calm   Physical Appearance/Attire neat   Hygiene well groomed   Suicidality other (see comments)  (denies )   1. Wish to be Dead No   2. Non-Specific Active Suicidal Thoughts  No   Self Injury other (see comment)  (denies )   Elopement (none reported or observed )   Activity other (see comment)  (attended greoup and socialized with others )   Speech clear;coherent   Activities of Daily Living   Hygiene/Grooming independent   Oral Hygiene independent   Dress independent   Laundry with supervision   Room Organization independent       Pt denied SI and SIB.  Pt seems calm, visible in the milieu, ate supper, attended group and socialized with others.

## 2018-05-21 VITALS
DIASTOLIC BLOOD PRESSURE: 91 MMHG | RESPIRATION RATE: 16 BRPM | TEMPERATURE: 97.7 F | HEIGHT: 75 IN | BODY MASS INDEX: 26.98 KG/M2 | SYSTOLIC BLOOD PRESSURE: 140 MMHG | HEART RATE: 86 BPM | OXYGEN SATURATION: 98 % | WEIGHT: 217 LBS

## 2018-05-21 PROCEDURE — 90853 GROUP PSYCHOTHERAPY: CPT

## 2018-05-21 PROCEDURE — 99238 HOSP IP/OBS DSCHRG MGMT 30/<: CPT | Mod: GC | Performed by: PSYCHIATRY & NEUROLOGY

## 2018-05-21 RX ORDER — DIVALPROEX SODIUM 500 MG/1
1000 TABLET, EXTENDED RELEASE ORAL AT BEDTIME
Status: DISCONTINUED | OUTPATIENT
Start: 2018-05-21 | End: 2018-05-21 | Stop reason: HOSPADM

## 2018-05-21 RX ORDER — RISPERIDONE 2 MG/1
2 TABLET ORAL AT BEDTIME
Qty: 30 TABLET | Refills: 0 | Status: SHIPPED | OUTPATIENT
Start: 2018-05-21 | End: 2018-06-21

## 2018-05-21 RX ORDER — DIVALPROEX SODIUM 500 MG/1
1000 TABLET, EXTENDED RELEASE ORAL AT BEDTIME
Qty: 60 TABLET | Refills: 0 | Status: SHIPPED | OUTPATIENT
Start: 2018-05-21 | End: 2018-06-18

## 2018-05-21 NOTE — PROGRESS NOTES
Plans are in place for patient to discharge today.  CTC met with patient and completed [facility] Relapse Prevention Planning meeting.

## 2018-05-21 NOTE — PROGRESS NOTES
Pt attended 1.0 out of 2.5 OT groups offered. Pt actively participated in a structured occupational therapy group with a focus on connecting song titles to life events and personal feelings. Using a list of song titles, pt identified I Am A Rock (explained this in the context of balancing life responsibilities), Amazing Anna, Yesterday, and Wild World as song titles that relate to his life, as well as [place] as a song title that indicates something about his future. Insightful in his explanations regarding why he selected each song. Calm and cooperative.

## 2018-05-21 NOTE — DISCHARGE SUMMARY
"    ----------------------------------------------------------------------------------------------------------  [facility], [place]   Discharge Summary  Hospital Day #5      Esa Rivera MRN# [id number]   Age: 33 year old YOB: 1985     Date of Admission:  5/16/2018  Date of Discharge:  5/21/2018  Admitting Physician:  Gregg Royal MD  Discharge Physician:  Gregg Royal MD         Event Leading to Hospitalization:   Esa Rivera is a 33 year old male with a history of Bipolar I disorder with psychotic features, history of ADD, and remote history of TBI who presented to the [facility] ED directly from psychiatry clinic with grandiose delusions of a Gnosticist nature and possible malnutrition and dehydration. For what he refers to spiritual reasons, the patient has significantly restricted his food and fluid intake and increased his cardiovascular exercise over 2-3 weeks. He has unintentionally lost 30 lbs. He began \"fasting\" because of \"a climax of spirituality on April 27th\" and believes that this will bring him closer to God. He denies AH or VH but has been meditating frequently. He reports many business ideas but has been too fatigued to act on these ideas as he would like. He has \"mental fogging,\" trouble focusing, dizziness, and confusion. His Lithium level was markedly elevated at 2.4 several days ago and was 1.86 yesterday, likely due to dehydration. He has also had a maculopapular rash on his trunk and back for several days.       See Admission note by Gregg Royal MD on 5/16/18 for additional details.          Diagnoses:     Bipolar 1 disorder, current episode manic with psychotic features          Labs:     Results for orders placed or performed during the hospital encounter of 05/16/18   CBC with platelets differential   Result Value Ref Range    WBC 7.0 4.0 - 11.0 10e9/L    RBC Count 4.72 4.4 - 5.9 10e12/L    Hemoglobin 14.1 13.3 - 17.7 g/dL    " Hematocrit 43.1 40.0 - 53.0 %    MCV 91 78 - 100 fl    MCH 29.9 26.5 - 33.0 pg    MCHC 32.7 31.5 - 36.5 g/dL    RDW 12.4 10.0 - 15.0 %    Platelet Count 312 150 - 450 10e9/L    Diff Method Automated Method     % Neutrophils 77.2 %    % Lymphocytes 14.1 %    % Monocytes 6.0 %    % Eosinophils 2.3 %    % Basophils 0.3 %    % Immature Granulocytes 0.1 %    Nucleated RBCs 0 0 /100    Absolute Neutrophil 5.4 1.6 - 8.3 10e9/L    Absolute Lymphocytes 1.0 0.8 - 5.3 10e9/L    Absolute Monocytes 0.4 0.0 - 1.3 10e9/L    Absolute Eosinophils 0.2 0.0 - 0.7 10e9/L    Absolute Basophils 0.0 0.0 - 0.2 10e9/L    Abs Immature Granulocytes 0.0 0 - 0.4 10e9/L    Absolute Nucleated RBC 0.0    Comprehensive metabolic panel   Result Value Ref Range    Sodium 141 133 - 144 mmol/L    Potassium 3.6 3.4 - 5.3 mmol/L    Chloride 104 94 - 109 mmol/L    Carbon Dioxide 27 20 - 32 mmol/L    Anion Gap 10 3 - 14 mmol/L    Glucose 88 70 - 99 mg/dL    Urea Nitrogen 15 7 - 30 mg/dL    Creatinine 1.07 0.66 - 1.25 mg/dL    GFR Estimate 79 >60 mL/min/1.7m2    GFR Estimate If Black >90 >60 mL/min/1.7m2    Calcium 9.9 8.5 - 10.1 mg/dL    Bilirubin Total 1.3 0.2 - 1.3 mg/dL    Albumin 4.5 3.4 - 5.0 g/dL    Protein Total 7.6 6.8 - 8.8 g/dL    Alkaline Phosphatase 65 40 - 150 U/L    ALT 53 0 - 70 U/L    AST 34 0 - 45 U/L   TSH   Result Value Ref Range    TSH 3.50 0.40 - 4.00 mU/L   Lithium level   Result Value Ref Range    Lithium Level 1.66 (H) 0.60 - 1.20 mmol/L   Lamotrigine Level   Result Value Ref Range    Lamotrigine Level 7.4 2.5 - 15.0 ug/mL   Lithium level   Result Value Ref Range    Lithium Level 1.23 (H) 0.60 - 1.20 mmol/L   Valproic acid   Result Value Ref Range    Valproic Acid Level 30 (L) 50 - 100 mg/L          Consults:   Medicine  Nutrition         Hospital Course:   DIagnostic Impression: Esa Rivera, a 33 year old male, with history of Bipolar I disorder with psychotic features, history of ADD, and remote history of TBI, presented to the  [facility] ED directly from psychiatry clinic due to grandiose delusions of a Samaritan nature and possible malnutrition and dehydration. This is the patient's first psychiatric hospitalization.  The patient is followed by Dr. Mayito Motley at [facility] clinic. Family history is notable for ADD in sister and brother. Patient denies any current psychosocial stressors and denies any drug abuse. The MSE at admission was notable for a tired appearing man with sunken eyes with psychomotor slowing, delayed responses, and grandiose delusions. His symptoms are consistent with his historic diagnosis of Bipolar 1 Disorder, current episode manic. His dizziness, fatigue, intermittent blurry vision, and mental slowing are consistent with malnutrition and dehydration. While sluggishness, confusion, and slight tremor could be due to lithium toxicity, this is less likely as his lithium level is dropping rapidly and was 1.23 at admission.     Psychiatric Course:  Esa Rivera was admitted to station 20 as a voluntary patient. PTA medications, lithium, lamotrigine, and olanzapine, were discontinued given elevated lithium and lamotrigine levels. Repeat Lithium level at admission was 1.23. Lamotrigine level at admission was normal at 7.4. Risperidone was initiated and titrated to 2mg qhs to address delusions. Depakote was initiated and titrated to 500mg qhs to address katie and as a replacement maintenance medication for the patient's bipolar disorder. Depakote level was 30mg/L after 5 days of Depakote, so at discharge, the patient was instructed to increase the dose to 1000mg qhs. The patient's diet improved as detailed below. As he ate more, he was observed to have improved focus with interviewing, resolution of psychomotor slowing, and improved fatigue. He was actively engaged in groups and treatment while hospitalized. The intensity of his grandiose delusions improved and the patient was not observed meditating. At discharge, the patient  continued to report that he plans to follow a vegan diet and discussed that he still prays every morning, however, he does not discuss a connection with God as he had at admission. Esa Rivera was discharged to home. At the time of discharge Esa Rivera was determined to not be a danger to himself or others.    Medical course: Esa Rivera was medically cleared by the ED prior to admission to the unit. Patient without past medical issues. Patient reported 1.5 weeks of an erythematous, non-pruritic papular rash on his trunk, back, and ankle at admission that he felt worsened the day after admission. Given concern for elevated lamotrigine and lithium levels, medicine was consulted to assess risk of pollock-paola syndrome or DRESS. It was felt that the rash was not due to this. Pictures of the rash were entered into [employer] and sent to dermatology who felt that the rash was likely folliculitis. The rash slowly improved while hospitalized without intervention. Additionally, patient was felt to have some dehydration and malnutrition at admission due to his poor PO intake, though admission labs were unremarkable. It was felt that some of the patient's psychomotor retardation, confusion, and fatigue was most likely due to malnutrition and dehydration. Patient's vitals remained normal. Nutrition was consulted and recommended Ensure Plus drinks and encouraged patient to consume more protein. Patient's food and liquid consumption significantly improved though the patient remained vegan. His energy, psychomotor retardation, and confusion significantly improved.     Risk Assessment:  Esa Rivera has notable risk factors for self-harm, including psychosis and recent history of inability to care for self. However, risk is mitigated by commitment to family, sobriety, absence of past attempts, and ability to volunteer a safety plan. The patient has notable risk factors for inability to care for self. However, his risk is  "mitigated by his commitment to family, improved PO intake while admitted, and improved insight into need to eat and drink. Additional steps taken to minimize risk include: discharge home with family, optimization of medications, and close follow-up with outpatient psychiatry. Therefore, based on all available evidence including the factors cited above, Esa Rivera does not appear to be at imminent risk for self-harm, and is appropriate for outpatient level of care.     This document serves as a transfer of care to Esa Rivera's outpatient providers.         Discharge Medications:     BOLDED Medications are NEW or CHANGED  Risperidone 2mg qhs  Depakote 1000 mg qhs         Psychiatric Examination:   BP (!) 140/91  Pulse 86  Temp 97.7  F (36.5  C) (Oral)  Resp 16  Ht 1.905 m (6' 3\")  Wt 98.4 kg (217 lb)  SpO2 98%  BMI 27.12 kg/m2    Appearance:  awake, alert, well groomed and neatly groomed, sitting comfortably in chair, dressed in street clothes.   Attitude:  cooperative  Eye Contact:  good  Mood:  \"doing well\"   Affect:  mood congruent and intensity is constricted but smiles appropriately intermittently.  Speech:  clear, coherent, normal rate, volume, and prosody  Psychomotor Behavior:  no evidence of tardive dyskinesia, dystonia, or tics. No psychomotor agitation or slowing.   Thought Process:  linear and goal oriented  Associations:  no loose associations  Thought Content:  no evidence of suicidal ideation or homicidal ideation. Grandiose delusions of a Zoroastrianism nature present though significantly improved. Does not appear to be responding to internal stimuli. No other delusions or paranoia.   Insight:  fair  Judgment:  fair  Oriented to:  time, person, and place  Attention Span and Concentration:  intact, improved from admission. No longer staring off into space .  Recent and Remote Memory:  fair  Language: Fluent in English   Fund of Knowledge: appropriate   Muscle Strength and Tone: did not assess "   Gait and Station: Normal         Discharge Plan:    Psychiatry Follow-up:   Appointment: Psychiatry: Dr. Mayito Motley: 5/23/18 at 8:00am  [facility]: [facility]. [address]  [facility] [place] [address]  [phone]    Patient seen and discussed with my attending, MD Anisha Fonseca MD  PGY-1 Resident    Attestation:  I, Gregg Royal, saw and evaluated the patient with the resident physician.  I agree with the findings and plan of care as documented in the resident note.  I have reviewed all labs and vital signs.  I, Gregg Royal, have reviewed this summary and agree with the findings and discharge plan as written.

## 2018-05-21 NOTE — PROGRESS NOTES
Pt discharged as per order.  Reviewed discharge paperwork with pt.  Pt pleasant and copperative. Pt denied SI.  Pt discharged with all belongings, discharge paperwork, and ordered discharge medications.

## 2018-05-21 NOTE — PROGRESS NOTES
"The pt was visible, but quiet and - possibly - guarded around this writer.  He did attend community meeting, where his answers were appropriate to the questions asked.  He asked this writer for his shoes so that he could do some walking around.  He was pleasant upon approach.  The pt was well groomed, social with others at times, and then withdrawn to his room at others.  His parents visited and the visit seemed to go well.  He denies any SI/SIB urges.  He reports feeling \"better than when I got here,\", and states that his goal for this upcoming week is \"to continue getting better and go home.\"     05/20/18 7394   Behavioral Health   Hallucinations denies / not responding to hallucinations   Thinking distractable   Orientation person: oriented;place: oriented;date: oriented;time: oriented   Memory baseline memory   Insight admits / accepts   Judgement impaired   Eye Contact at examiner   Affect blunted, flat   Mood mood is calm   Physical Appearance/Attire attire appropriate to age and situation   Hygiene well groomed   Suicidality other (see comments)  (none stated by the pt)   1. Wish to be Dead No   2. Non-Specific Active Suicidal Thoughts  No   Self Injury other (see comment)  (none stated or observed)   Elopement (no statements or attempts)   Activity other (see comment)  (visible, attended community meeting)   Speech clear;coherent   Medication Sensitivity no stated side effects;no observed side effects   Psychomotor / Gait balanced;steady     "

## 2018-05-21 NOTE — DISCHARGE INSTRUCTIONS
Behavioral Discharge Planning and Instructions    Summary:   You were admitted to [facility] on 5/16/18 with acute katie, poor intake of food/fluids under the care of Dr. Royal.      You met with Dr. Roayl and his team daily for ongoing psychiatric assessment and medication management.  You had opportunities to participate in therapeutic groups on the unit.   At this time you report your mood is stabilizing and you report you are not having thoughts or intent to harm yourself or others.   You will be discharged home and will resume care with your outpatient providers.    Disposition:  Home    Main Diagnosis:   Bipolar Affective Disorder- katie    Major Treatments, Procedures and Findings:   Medications were  managed throughout your stay. An internal medicine consult was completed during your stay. You had the opportunity to participate in treatment programming while on the unit including occupational therapy, mental health support and education and spiritual services.     Symptoms to Report:   Please report if you are experiencing increased aggression and/or confusion, problematic loss of sleep, worsening mood, or thoughts of suicide to your treatment team or notify your primary provider.   IF THE SYMPTOMS YOU ARE EXPERIENCING ARE A MEDICAL EMERGENCY, CALL 911 IMMEDIATELY    Lifestyle Adjustment:   1. Adjust your lifestyle to get enough sleep, relaxation, exercise and good nutrition.  Continue to develop healthy coping skills to decrease stress and promote a healthy lifestyle.  2. Abstain from all substances of abuse.  3. Take medications as prescribed.  Please work with your doctor to discuss any concerns you have with your medications or side effects you may be experiencing.  4. Follow up with appointments as scheduled.        Psychiatry Follow-up:   Appointment: Psychiatry: Dr. Mayito Motley: 5/23/18 at 8:00am  [facility]: [facility]. [address]  [place]  [address]  [phone]      Resources:   *[facility] Crisis: [phone]    24/7 crisis hotline for adults who are in the midst of a mental health crisis  *[facility]: [address] [place] [address]  [phone]    Walk-in services include access to an onsite team of psychiatrists, nurses, social workers and trained peer support staff that provide person-centered, recovery-focused care. Urgent Care for Adult Mental Health offers an alternative to visiting the emergency room during a mental health crisis.   Monday through Friday, 8 a.m. - 5:30 p.m.  Closed on Saturday, Sunday and holidays    General Medication Instructions:   See your medication sheet(s) for instructions.   Take all medicines as directed.  Make no changes unless your doctor suggests them.   Go to all your doctor visits.  Be sure to have all your required lab tests. This way, your medicines can be refilled on time.  Do not use any drugs not prescribed by your doctor.    The treatment team has appreciated the opportunity to work with you.  We wish you the best in the future.    If you have any questions or concerns our unit number is [phone].

## 2018-05-23 ENCOUNTER — TELEPHONE (OUTPATIENT)
Dept: PSYCHIATRY | Facility: CLINIC | Age: 33
End: 2018-05-23

## 2018-05-23 ENCOUNTER — OFFICE VISIT (OUTPATIENT)
Dept: PSYCHIATRY | Facility: CLINIC | Age: 33
End: 2018-05-23
Attending: PSYCHIATRY & NEUROLOGY
Payer: COMMERCIAL

## 2018-05-23 VITALS
WEIGHT: 219.2 LBS | DIASTOLIC BLOOD PRESSURE: 78 MMHG | BODY MASS INDEX: 27.4 KG/M2 | HEART RATE: 75 BPM | SYSTOLIC BLOOD PRESSURE: 113 MMHG

## 2018-05-23 DIAGNOSIS — F31.9 BIPOLAR 1 DISORDER (H): Primary | ICD-10-CM

## 2018-05-23 DIAGNOSIS — F31.10 BIPOLAR I DISORDER, MOST RECENT EPISODE (OR CURRENT) MANIC (H): Primary | ICD-10-CM

## 2018-05-23 DIAGNOSIS — Z51.81 ENCOUNTER FOR THERAPEUTIC DRUG MONITORING: ICD-10-CM

## 2018-05-23 PROCEDURE — G0463 HOSPITAL OUTPT CLINIC VISIT: HCPCS | Mod: ZF

## 2018-05-23 RX ORDER — RISPERIDONE 1 MG/1
1 TABLET ORAL AT BEDTIME
Qty: 30 TABLET | Refills: 0 | Status: SHIPPED | OUTPATIENT
Start: 2018-05-23 | End: 2018-06-21

## 2018-05-23 ASSESSMENT — PAIN SCALES - GENERAL: PAINLEVEL: NO PAIN (0)

## 2018-05-23 NOTE — PROGRESS NOTES
May 23, 2018  Progress Note    Esa Rivera is a 33 year old year old male with Bipolar I Disorder, Most Recent Episode Manic Severe, with psychotic behavior (296.40) who presents for ongoing psychiatric care. He has no children. He and his wife (a psychologist) live on their own in [facility]. Esa works as an  for [employer].    Diagnosis    Axis 1: Bipolar I disorder, with severe manic episode with psychosis in Dec 2016. ADHD by history. Alcohol use disorder, moderate, in short-term remission  Axis 2: Nil  Axis 3: Remote concussion x 2  Axis 4: Moderate stressors  Axis 5: GAF at time of visit: 70    Assessment & Plan     Since the last visit, Esa was hospitalized for 5 days on station 20, under the care of Dr. Royal and his team.  Esa's previous medications were discontinued and he was prescribed Depakote, which was titrated to a dose of 1000 mg at bedtime, and risperidone 2 mg at bedtime.  Esa saw the dietitian while in hospital, and has been eating healthier amount of food and keeping better hydrated.  There has been substantial improvement in Esa's mental state.  His symptoms of katie appear to have resolved.  His Advent delusions are reduced, but not resolved completely.  He is still reducing his food intake and eating a vegan diet for spiritual reasons, though he seems to be doing this in a more healthy way with better intake by volume.  Esa is agreeable to increase his risperidone to 3 mg at bedtime.  We will check his Depakote level next week, after a week on the 1000 mg dose, and make further decisions from there.  I have advised Esa to eat a healthy amount of food, with a good variety, and to maintain his hydration.  Esa will return for follow-up in 6 days.    The patient understands the risks, benefits, adverse effects and alternatives. Agrees to treatment with the capacity to do so. No medical contraindications to treatment. Agrees to call clinic for any problems. The patient understands to  call 911 or come to the nearest ED if life threatening or urgent symptoms present.    Attestation:  Patient has been seen and evaluated by me, Mayito Motley MD, PhD.    Interim History     I saw Esa on his own today, and then with Esa's permission had his wife Julia and father join the appointment.    Esa was hospitalized for 5 days on station 20 under the care of Dr. Royal.  His lithium, olanzapine, and lamotrigine were discontinued.  He was prescribed Depakote, which was gradually titrated to a dose of 1000 mg at bedtime.  Esa has been taking this amount for a couple of days.  His most recent blood level was below the therapeutic range, at 30.  Esa was also prescribed risperidone 2 mg at bedtime.  He saw the dietitian while in hospital, and was prescribed a healthy vegan diet, at his request.    Since Esa went home, he has continued to take his medications.  His family is monitoring his adherence.  He is continuing to eat a vegan diet, but the volume of food he is eating has improved, and he is drinking more fluids.  He looks much improved and more physically healthy today.    Esa denies current symptoms of katie.  His mental state state is consistent with euthymia.  However, his Mu-ism preoccupation persists.  It is perhaps not fully delusional now, but is still above Esa's usual degree of religiosity, and would represent at least an overvalued idea.  It is continuing to affect his behavior, in that he has modified his diet is markedly as noted above.    Esa is tolerating his current medications well.  He prefers them to his previous medications.  He feels less foggy.  His family notes that he is more interactive.  He is sleeping well at night.  He is getting along well with his wife and parents.    We had a lengthy discussion about the treatment plan going forward.  Esa is somewhat reluctantly agreeable to increase his risperidone to 3 mg at bedtime.  He will get his Depakote level checked  "next week.  We can make further decisions about the dosing of this medication at that point.  We had a lengthy discussion about Esa's diet and fluid intake, and I have advised him if he wishes to eat a vegan diet to make sure that he gets a good variety of foods, and a reasonable volume intake.  I have also counseled him regarding his fluid intake so that he maintains his hydration status.    Esa and his family are going to [facility] in about 10 days, for a low key vacation.  There will be a small number of family present, and I think this would be okay for Esa.  They also have a trip to [place] plan for a week after that, and we will make further decisions about the advisability of that next week.    Esa will return for follow-up in 1 week.        In Dec 2016 Esa experienced an episode of severe katie with psychosis (grandiose delusions). He was seen in the ER but not hospitalized. He had some mild depressive episodes and possible (but questionable) short hypomanic periods in the preceding years, but never received treatment for these.     Esa's manic symptoms began when he became excited about a new idea at work. 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 He became so excited about this the he was unable to sleep. As his katie escalated, his sleep decreased to 1-2 hours per night for 5-6 nights. His affect was elated and also markedly irritable. He would \"go off\" on people for minor things in very uncharacteristic ways. His thoughts were \"really fast, crisp, and clear\". He saw \"connections in everything.\" His energy was clearly elevated. He became grandiose, believing that his idea would change the world and as a result he would meet many \"powerful people\". Eventually he came to believe that sings on the radio were meant for him.     Esa was taking Adderall at the time. He has taken this, on and off, for " "many years. In my opinion it is not an adequate explanation for his manic symptoms.      His mother (a PCP) became concerned and brought him to hospital. He was assessed in the ER and prescribed Ativan for sleep. He was seen by Dr Burt (a psychiatrist) the next day. He was prescribed OLZ and LTG. The doses were titrated \"up and down\" for the next 6 months. There were significant side effects (sedation, cognitive impairment), likely from OLZ. Most recently, Esa stopped OLZ for a period of time. In the context of being on a honeymoon in [place], he develop racing thoughts and difficulty sleeping and restarted it, with benefit. He currently takes 10 mg HS.    Esa endorses several previous short (1-2 day) periods of reduced sleep and elevated mood, typically in the context of getting involved in a work project. At most they represent subthreshold hypomanic periods.     He has also experienced several periods over the years, lasting about a week, of low mood, decreased energy and motivation, poor focus/concentration, and negative thoughts. He denies neurovegetative symptoms or SI. His wife thought that these represented depressive episodes.    Esa has consumed EtOH heavily at points during his life. It was most pronounced during his college years. He has sporadically binge-drank since then. He has not had a drink in 2 weeks.    Past Psych Hx: As above. Esa was assessed at [facility] in Dec 2016 and diagnosed with BDI. He was seen by Psychiatry at age 17 and dxed with ADD. He took Adderall off and on (mostly on) with benefit over the years since then. He denies abusing it.     Past Med Hx: 2 concussion: 1) age 15 - skiing. Age 16: football. LOC x a couple of minutes with both.    MEDICATION ADHERENCE: Good.   Current Medications     Current Outpatient Prescriptions   Medication Sig Dispense Refill     divalproex sodium extended-release (DEPAKOTE ER) 500 MG 24 hr tablet Take 2 tablets (1,000 mg) by mouth At Bedtime 60 " tablet 0     risperiDONE (RISPERDAL) 2 MG tablet Take 1 tablet (2 mg) by mouth At Bedtime 30 tablet 0         Substance use     ETOH: Sporadic binge drinking  Cigarettes: NA  Street Drugs: Denies    Review of Systems     A comprehensive review of systems was performed and is negative other than noted above.     Allergies     Allergies   Allergen Reactions     No Known Allergies         Mental Status Exam     /78  Pulse 75  Wt 99.4 kg (219 lb 3.2 oz)  BMI 27.4 kg/m2    Alertness: alert  and oriented  Appearance: well groomed  Behavior/Demeanor: cooperative and pleasant, with fair  eye contact  Speech: slowed  Language: intact  Psychomotor: slowed  Mood:  irritable and elevated  Affect: restricted; was not congruent to mood  Thought Process/Associations: unremarkable  Thought Content:  Denies suicidal ideation and violent ideation. He has grandiose delusions of a Shinto nature.  Perception:  Denies auditory hallucinations and visual hallucinations  Insight: good  Judgment: good  Cognition:  does appear grossly intact.      PSYCHIATRY CLINIC INDIVIDUAL PSYCHOTHERAPY NOTE                                                     [16]   Start time - 0815        End time - 0845  Date reviewed - 2/7/2018       Date next due - 5/7/2018    Subjective: This supportive psychotherapy session addressed issues related to orientation to therapy plan and goals of therapy plan.  Patient's reaction: Maintenance in the context of mental status appropriate for ambulatory setting.  Progress: good  Plan: RTC 2 mos  Psychotherapy services during this visit included  myself and the patient.   TREATMENT  PLAN          SYMPTOMS; PROBLEMS   MEASURABLE GOALS;    FUNCTIONAL IMPROVEMENT INTERVENTIONS;   GAINS MADE DISCHARGE CRITERIA   Depression: depressed mood, anhedonia, low energy, insomnia and concentration problems   reduce depressive symptoms and reduce depressive episodes medications   psycho-education   self-care skills symptom  resolution   Crystal/Hypomania: none   reduce manic/hypomanic episodes medications   psychotherapy  self-care skills symptom resolution

## 2018-05-23 NOTE — NURSING NOTE
Chief Complaint   Patient presents with     Recheck Medication     Bipolar I disorder, most recent episode (or current) manic (H)

## 2018-05-23 NOTE — MR AVS SNAPSHOT
After Visit Summary   5/23/2018    Esa Rivera    MRN: [id number]           Patient Information     Date Of Birth          1985        Visit Information        Provider Department      5/23/2018 8:00 AM Mayito Motley MD [facility]        Today's Diagnoses     Bipolar I disorder, most recent episode (or current) manic (H)    -  1      Care Instructions    Increase risperidone to 3 mg at night  Continue Depakote and get a blood test on Monday or Tuesday to check the blood level  Return for follow-up in 1 week          Follow-ups after your visit        Follow-up notes from your care team     Return in about 1 week (around 5/30/2018).      Your next 10 appointments already scheduled     May 29, 2018 11:30 AM CDT   Adult Med Follow UP with Mayito Motley MD   Psychiatry Clinic ([facility])    [address] [address] [phone]            May 29, 2018  1:00 PM CDT   TELEMEDICINE with Karie Mendoza [facility] Psychiatry ([facility])    [address] [place] [address] [phone]           Note: this is not an onsite visit; there is no need to come to the facility.              Who to contact     Please call your clinic at [phone] to:    Ask questions about your health    Make or cancel appointments    Discuss your medicines    Learn about your test results    Speak to your doctor            Additional Information About Your Visit        MyChart Information     [employer] gives you secure access to your electronic health record. If you see a primary care provider, you can also send messages to your care team and make appointments. If you have questions, please call your primary care clinic.  If you do not have a primary care provider, please call [phone] and they will assist you.      MyChart  is an electronic gateway that provides easy, online access to your medical records. With [employer], you can request a clinic appointment, read your test results, renew a prescription or communicate with your care team.     To access your existing account, please contact your [facility] or call [phone] for assistance.        Care EveryWhere ID     This is your Care EveryWhere ID. This could be used by other organizations to access your [facility] medical records  [id number]        Your Vitals Were     Pulse BMI (Body Mass Index)                75 27.4 kg/m2           Blood Pressure from Last 3 Encounters:   05/23/18 113/78   05/20/18 (!) 140/91   05/16/18 106/73    Weight from Last 3 Encounters:   05/23/18 99.4 kg (219 lb 3.2 oz)   05/20/18 98.4 kg (217 lb)   05/16/18 100.3 kg (221 lb 3.2 oz)              Today, you had the following     No orders found for display         Today's Medication Changes          These changes are accurate as of 5/23/18 11:59 PM.  If you have any questions, ask your nurse or doctor.               These medicines have changed or have updated prescriptions.        Dose/Directions    * risperiDONE 2 MG tablet   Commonly known as:  risperDAL   This may have changed:  Another medication with the same name was added. Make sure you understand how and when to take each.   Used for:  Bipolar affective disorder, currently manic, severe, with psychotic features (H)   Changed by:  Mayito Motley MD        Dose:  2 mg   Take 1 tablet (2 mg) by mouth At Bedtime   Quantity:  30 tablet   Refills:  0       * risperiDONE 1 MG tablet   Commonly known as:  risperDAL   This may have changed:  You were already taking a medication with the same name, and this prescription was added. Make sure you understand how and when to take each.   Used for:  Bipolar I disorder, most recent episode (or current) manic (H)   Changed by:  Mayito Motley MD        Dose:  1 mg   Take 1  tablet (1 mg) by mouth At Bedtime   Quantity:  30 tablet   Refills:  0       * Notice:  This list has 2 medication(s) that are the same as other medications prescribed for you. Read the directions carefully, and ask your doctor or other care provider to review them with you.         Where to get your medicines      These medications were sent to [facility] [id number] [facility] - [place], [place] - [address]  [address] [facility] [address], [facility] [address]     Phone:  [phone]     risperiDONE 1 MG tablet                Primary Care Provider Office Phone # Fax #    Aristides Jenkins [phone]       [address] [facility] [address]  [facility] [address]        Equal Access to Services     [facility] CenterALEJANDRINA : Hadii milla kento Amber, waaxda luqadaha, qaybta kaalmada adilsonyacici, jermaine castillo . So [facility] [phone].    ATENCIÓN: Si habla español, tiene a osorio disposición servicios gratuitos de asistencia lingüística. Juan [facility] [phone].    We comply with applicable federal civil rights laws and [place] laws. We do not discriminate on the basis of race, color, national origin, age, disability, sex, sexual orientation, or gender identity.            Thank you!     Thank you for choosing PSYCHIATRY CLINIC  for your care. Our goal is always to provide you with excellent care. Hearing back from our patients is one way we can continue to improve our services. Please take a few minutes to complete the written survey that you may receive in the mail after your visit with us. Thank you!             Your Updated Medication List - Protect others around you: Learn how to safely use, store and throw away your medicines at www.disposemymeds.org.          This list is accurate as of 5/23/18 11:59 PM.  Always use your most recent med list.                   Brand Name Dispense Instructions for use Diagnosis    divalproex sodium extended-release 500 MG 24 hr tablet    DEPAKOTE ER    60  tablet    Take 2 tablets (1,000 mg) by mouth At Bedtime    Bipolar affective disorder, currently manic, severe, with psychotic features (H)       * risperiDONE 2 MG tablet    risperDAL    30 tablet    Take 1 tablet (2 mg) by mouth At Bedtime    Bipolar affective disorder, currently manic, severe, with psychotic features (H)       * risperiDONE 1 MG tablet    risperDAL    30 tablet    Take 1 tablet (1 mg) by mouth At Bedtime    Bipolar I disorder, most recent episode (or current) manic (H)       * Notice:  This list has 2 medication(s) that are the same as other medications prescribed for you. Read the directions carefully, and ask your doctor or other care provider to review them with you.

## 2018-05-23 NOTE — TELEPHONE ENCOUNTER
Message  Received: Today       Anisha Alcantara RN Pratt, Laura, RN                     Previous Messages       ----- Message -----      From: Mayito Motley MD      Sent: 5/23/2018   8:58 AM        To: MAE David     Can you order labs for Esa?  He needs a CBC and differential, AST, ALT, lithium level, BMP, and valproic acid level.     Thanks!     DB

## 2018-05-24 ASSESSMENT — PATIENT HEALTH QUESTIONNAIRE - PHQ9: SUM OF ALL RESPONSES TO PHQ QUESTIONS 1-9: 4

## 2018-05-25 NOTE — PATIENT INSTRUCTIONS
Increase risperidone to 3 mg at night  Continue Depakote and get a blood test on Monday or Tuesday to check the blood level  Return for follow-up in 1 week   right carotid endarterectomy

## 2018-05-29 ENCOUNTER — ALLIED HEALTH/NURSE VISIT (OUTPATIENT)
Dept: PHARMACY | Facility: CLINIC | Age: 33
End: 2018-05-29
Attending: PSYCHIATRY & NEUROLOGY
Payer: COMMERCIAL

## 2018-05-29 ENCOUNTER — TELEPHONE (OUTPATIENT)
Dept: PSYCHIATRY | Facility: CLINIC | Age: 33
End: 2018-05-29

## 2018-05-29 ENCOUNTER — HOSPITAL ENCOUNTER (OUTPATIENT)
Dept: LAB | Facility: CLINIC | Age: 33
Discharge: HOME | End: 2018-05-29
Attending: NURSE PRACTITIONER | Admitting: NURSE PRACTITIONER
Payer: COMMERCIAL

## 2018-05-29 ENCOUNTER — OFFICE VISIT (OUTPATIENT)
Dept: PSYCHIATRY | Facility: CLINIC | Age: 33
End: 2018-05-29
Attending: PSYCHIATRY & NEUROLOGY
Payer: COMMERCIAL

## 2018-05-29 VITALS
HEART RATE: 67 BPM | SYSTOLIC BLOOD PRESSURE: 96 MMHG | WEIGHT: 216.4 LBS | DIASTOLIC BLOOD PRESSURE: 62 MMHG | BODY MASS INDEX: 27.05 KG/M2

## 2018-05-29 DIAGNOSIS — Z51.81 THERAPEUTIC DRUG MONITORING: ICD-10-CM

## 2018-05-29 DIAGNOSIS — F31.12 BIPOLAR 1 DISORDER, MANIC, MODERATE (H): Primary | ICD-10-CM

## 2018-05-29 DIAGNOSIS — F31.9 BIPOLAR 1 DISORDER (H): Primary | ICD-10-CM

## 2018-05-29 DIAGNOSIS — Z51.81 ENCOUNTER FOR THERAPEUTIC DRUG MONITORING: ICD-10-CM

## 2018-05-29 DIAGNOSIS — F31.9 BIPOLAR 1 DISORDER (H): ICD-10-CM

## 2018-05-29 LAB
ALT SERPL W P-5'-P-CCNC: 32 U/L (ref 0–70)
ANION GAP SERPL CALCULATED.3IONS-SCNC: 8 MMOL/L (ref 3–14)
AST SERPL W P-5'-P-CCNC: 18 U/L (ref 0–45)
BASOPHILS # BLD AUTO: 0 10E9/L (ref 0–0.2)
BASOPHILS NFR BLD AUTO: 0.6 %
BUN SERPL-MCNC: 7 MG/DL (ref 7–30)
CALCIUM SERPL-MCNC: 8.8 MG/DL (ref 8.5–10.1)
CHLORIDE SERPL-SCNC: 109 MMOL/L (ref 94–109)
CO2 SERPL-SCNC: 24 MMOL/L (ref 20–32)
CREAT SERPL-MCNC: 0.84 MG/DL (ref 0.66–1.25)
DIFFERENTIAL METHOD BLD: ABNORMAL
EOSINOPHIL # BLD AUTO: 0.1 10E9/L (ref 0–0.7)
EOSINOPHIL NFR BLD AUTO: 2.8 %
ERYTHROCYTE [DISTWIDTH] IN BLOOD BY AUTOMATED COUNT: 12.3 % (ref 10–15)
GFR SERPL CREATININE-BSD FRML MDRD: >90 ML/MIN/1.7M2
GLUCOSE SERPL-MCNC: 90 MG/DL (ref 70–99)
HCT VFR BLD AUTO: 43.2 % (ref 40–53)
HGB BLD-MCNC: 14.4 G/DL (ref 13.3–17.7)
IMM GRANULOCYTES # BLD: 0 10E9/L (ref 0–0.4)
IMM GRANULOCYTES NFR BLD: 0.3 %
LITHIUM SERPL-SCNC: <0.2 MMOL/L (ref 0.6–1.2)
LYMPHOCYTES # BLD AUTO: 1.4 10E9/L (ref 0.8–5.3)
LYMPHOCYTES NFR BLD AUTO: 38.9 %
MCH RBC QN AUTO: 31 PG (ref 26.5–33)
MCHC RBC AUTO-ENTMCNC: 33.3 G/DL (ref 31.5–36.5)
MCV RBC AUTO: 93 FL (ref 78–100)
MONOCYTES # BLD AUTO: 0.4 10E9/L (ref 0–1.3)
MONOCYTES NFR BLD AUTO: 9.7 %
NEUTROPHILS # BLD AUTO: 1.7 10E9/L (ref 1.6–8.3)
NEUTROPHILS NFR BLD AUTO: 47.7 %
NRBC # BLD AUTO: 0 10*3/UL
NRBC BLD AUTO-RTO: 0 /100
PLATELET # BLD AUTO: 270 10E9/L (ref 150–450)
POTASSIUM SERPL-SCNC: 4.1 MMOL/L (ref 3.4–5.3)
RBC # BLD AUTO: 4.65 10E12/L (ref 4.4–5.9)
SODIUM SERPL-SCNC: 141 MMOL/L (ref 133–144)
VALPROATE SERPL-MCNC: 62 MG/L (ref 50–100)
WBC # BLD AUTO: 3.6 10E9/L (ref 4–11)

## 2018-05-29 PROCEDURE — G0463 HOSPITAL OUTPT CLINIC VISIT: HCPCS | Mod: ZF

## 2018-05-29 PROCEDURE — 80164 ASSAY DIPROPYLACETIC ACD TOT: CPT | Performed by: PSYCHIATRY & NEUROLOGY

## 2018-05-29 PROCEDURE — 36415 COLL VENOUS BLD VENIPUNCTURE: CPT | Performed by: PSYCHIATRY & NEUROLOGY

## 2018-05-29 PROCEDURE — 80048 BASIC METABOLIC PNL TOTAL CA: CPT | Performed by: PSYCHIATRY & NEUROLOGY

## 2018-05-29 PROCEDURE — 99606 MTMS BY PHARM EST 15 MIN: CPT | Performed by: PHARMACIST

## 2018-05-29 PROCEDURE — 99607 MTMS BY PHARM ADDL 15 MIN: CPT | Performed by: PHARMACIST

## 2018-05-29 PROCEDURE — 85025 COMPLETE CBC W/AUTO DIFF WBC: CPT | Performed by: PSYCHIATRY & NEUROLOGY

## 2018-05-29 PROCEDURE — 84450 TRANSFERASE (AST) (SGOT): CPT | Performed by: PSYCHIATRY & NEUROLOGY

## 2018-05-29 PROCEDURE — 84460 ALANINE AMINO (ALT) (SGPT): CPT | Performed by: PSYCHIATRY & NEUROLOGY

## 2018-05-29 PROCEDURE — 80178 ASSAY OF LITHIUM: CPT | Performed by: PSYCHIATRY & NEUROLOGY

## 2018-05-29 ASSESSMENT — PAIN SCALES - GENERAL: PAINLEVEL: NO PAIN (0)

## 2018-05-29 NOTE — PROGRESS NOTES
May 29, 2018  Progress Note    Esa Rivera is a 33 year old year old male with Bipolar I Disorder, Most Recent Episode Manic Severe, with psychotic behavior (296.40) who presents for ongoing psychiatric care. He has no children. He and his wife (a psychologist) live on their own in [facility]. Esa works as an  for [employer].    Diagnosis    Axis 1: Bipolar I disorder, with severe manic episode with psychosis in Dec 2016. ADHD by history. Alcohol use disorder, moderate, in short-term remission  Axis 2: Nil  Axis 3: Remote concussion x 2  Axis 4: Moderate stressors  Axis 5: GAF at time of visit: 70    Assessment & Plan     Since the last visit, Esa has continued taking Depakote 1000 mg at bedtime, and has increased risperidone to 3 mg at bedtime.  His mood seems stable, but he is markedly sedated and fatigued.  This is possibly her partly due to risperidone, and partly due to his ongoing restricted dietary intake.  Esa has lost 5 pounds in the last week.  We will reduce his risperidone to 2 mg at night.  I have strongly encouraged Esa to increase his food intake.  He will get a blood test in a couple of weeks to check his Depakote level, white count, which was slightly low at 3.6, and also his serum ammonia level.  For the time being, Esa will continue Depakote 1000 mg at night.  His blood level yesterday was 62.  He will return in 2 weeks.    The patient understands the risks, benefits, adverse effects and alternatives. Agrees to treatment with the capacity to do so. No medical contraindications to treatment. Agrees to call clinic for any problems. The patient understands to call 911 or come to the nearest ED if life threatening or urgent symptoms present.    Attestation:  Patient has been seen and evaluated by me, Mayito Motley MD, PhD.    Interim History     I saw Esa on his own today, and with his permission his mother joined the appointment.    Esa feels markedly sedated, possibly secondary to  "risperidone.  It may also be due to his ongoing restriction of his diet and fluid intake.  He has lost 5 pounds in the last week.    Esa's Shinto ideation persists.  It is continuing to influence his behavior, particularly his diet.  It is also causing conflict with his wife, who does not share his newfound Shinto beliefs.    Esa otherwise denies symptoms of depression or katie.  He is adherent with medications.  He denies any substance use.    Esa's mother confirmed the history provided above.  She emphasized the degree to which Esa is continuing to restrict his intake.    Esa on his mom both report that his trip to [place] with his family went well.  They have another trip planned to [place] later this week, again with family.  They have reasonable activities planned.  They are aware that there is a small risk that Esa's psychosis or mood could worsen on the trip, and they will seek appropriate care if this happens.    I have suggested that Esa reduce his risperidone back to 2 mg at night.  He will continue Depakote.  His blood level today was 62, at the lower end of the therapeutic range.  He will return for follow-up in 2 weeks.        In Dec 2016 Esa experienced an episode of severe katie with psychosis (grandiose delusions). He was seen in the ER but not hospitalized. He had some mild depressive episodes and possible (but questionable) short hypomanic periods in the preceding years, but never received treatment for these.     Esa's manic symptoms began when he became excited about a new idea at work. 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 He became so excited about this the he was unable to sleep. As his katie escalated, his sleep decreased to 1-2 hours per night for 5-6 nights. His affect was elated and also markedly irritable. He would \"go off\" on people for minor things in very " "uncharacteristic ways. His thoughts were \"really fast, crisp, and clear\". He saw \"connections in everything.\" His energy was clearly elevated. He became grandiose, believing that his idea would change the world and as a result he would meet many \"powerful people\". Eventually he came to believe that sings on the radio were meant for him.     Esa was taking Adderall at the time. He has taken this, on and off, for many years. In my opinion it is not an adequate explanation for his manic symptoms.      His mother (a PCP) became concerned and brought him to hospital. He was assessed in the ER and prescribed Ativan for sleep. He was seen by Dr Burt (a psychiatrist) the next day. He was prescribed OLZ and LTG. The doses were titrated \"up and down\" for the next 6 months. There were significant side effects (sedation, cognitive impairment), likely from OLZ. Most recently, Esa stopped OLZ for a period of time. In the context of being on a honeymoon in [place], he develop racing thoughts and difficulty sleeping and restarted it, with benefit. He currently takes 10 mg HS.    Esa endorses several previous short (1-2 day) periods of reduced sleep and elevated mood, typically in the context of getting involved in a work project. At most they represent subthreshold hypomanic periods.     He has also experienced several periods over the years, lasting about a week, of low mood, decreased energy and motivation, poor focus/concentration, and negative thoughts. He denies neurovegetative symptoms or SI. His wife thought that these represented depressive episodes.    Esa has consumed EtOH heavily at points during his life. It was most pronounced during his college years. He has sporadically binge-drank since then. He has not had a drink in 2 weeks.    Past Psych Hx: As above. Esa was assessed at [facility] in Dec 2016 and diagnosed with BDI. He was seen by Psychiatry at age 17 and dxed with ADD. He took Adderall off and on (mostly on) " with benefit over the years since then. He denies abusing it.     Past Med Hx: 2 concussion: 1) age 15 - skiing. Age 16: football. LOC x a couple of minutes with both.    MEDICATION ADHERENCE: Good.   Current Medications     Current Outpatient Prescriptions   Medication Sig Dispense Refill     divalproex sodium extended-release (DEPAKOTE ER) 500 MG 24 hr tablet Take 2 tablets (1,000 mg) by mouth At Bedtime 60 tablet 0     risperiDONE (RISPERDAL) 1 MG tablet Take 1 tablet (1 mg) by mouth At Bedtime 30 tablet 0     risperiDONE (RISPERDAL) 2 MG tablet Take 1 tablet (2 mg) by mouth At Bedtime 30 tablet 0         Substance use     ETOH: Sporadic binge drinking  Cigarettes: NA  Street Drugs: Denies    Review of Systems     A comprehensive review of systems was performed and is negative other than noted above.     Allergies     Allergies   Allergen Reactions     No Known Allergies         Mental Status Exam     BP 96/62  Pulse 67  Wt 98.2 kg (216 lb 6.4 oz)  BMI 27.05 kg/m2    Alertness: alert  and oriented  Appearance: well groomed  Behavior/Demeanor: cooperative and pleasant, with fair  eye contact  Speech: slowed  Language: intact  Psychomotor: slowed  Mood:  irritable and elevated  Affect: restricted; was not congruent to mood  Thought Process/Associations: unremarkable  Thought Content:  Denies suicidal ideation and violent ideation. He has grandiose delusions of a Congregation nature.  Perception:  Denies auditory hallucinations and visual hallucinations  Insight: good  Judgment: good  Cognition:  does appear grossly intact.      PSYCHIATRY CLINIC INDIVIDUAL PSYCHOTHERAPY NOTE                                                     [16]   Start time - 0815        End time - 0845  Date reviewed - 2/7/2018       Date next due - 5/7/2018    Subjective: This supportive psychotherapy session addressed issues related to orientation to therapy plan and goals of therapy plan.  Patient's reaction: Maintenance in the context of  mental status appropriate for ambulatory setting.  Progress: good  Plan: RTC 2 mos  Psychotherapy services during this visit included  myself and the patient.   TREATMENT  PLAN          SYMPTOMS; PROBLEMS   MEASURABLE GOALS;    FUNCTIONAL IMPROVEMENT INTERVENTIONS;   GAINS MADE DISCHARGE CRITERIA   Depression: depressed mood, anhedonia, low energy, insomnia and concentration problems   reduce depressive symptoms and reduce depressive episodes medications   psycho-education   self-care skills symptom resolution   Crystal/Hypomania: none   reduce manic/hypomanic episodes medications   psychotherapy  self-care skills symptom resolution

## 2018-05-29 NOTE — PROGRESS NOTES
"SUBJECTIVE/OBJECTIVE:                Esa Rivera is a 33 year old male called for a transitions of care visit.  He was discharged from [facility] on 5/21/18 for katie and reduced food/fluid intake.  Pt sees Dr. Motley at [facility].     Chief Complaint: \"I'm so lethargic, but Depakote was definitely a positive change\"    Allergies/ADRs: Reviewed in Epic  Tobacco: No tobacco use   Alcohol: not currently using  Caffeine: no caffeine  PMH: Reviewed in Epic    Medication Adherence/Access:  Patient takes medications directly from bottles.  Patient takes medications 1 time(s) per day.  Per patient, misses medication 0 times per week.     Bipolar: Pt was recently discharged from [facility] for grandiose delusions of a Tenriism nature in context of recent manic episode.  Pt had been taking lithium, lamotrigine, and olanzapine until recent hospitalization. At my first, and most recent, visit with the patient, he had been experiencing elevated lithium levels (up to 2.41) and rash across his back, which were attributed to markedly reduced food and fluid intake causing fluctuations in both lithium and lamotrigine serum concentrations.  He was admitted by Dr. Motley following clinic visit on 5/16/18.    Previous medications have been stopped patient is now taking Depakote ER 1000 mg nightly and risperidone 2 mg nightly.  Depakote was started and increased during hospitalization.  Risperidone had been increased from 2 mg to 3 mg nightly at appointment with Dr. Motley on 5/23.  The patient reported it was decreased back to 2 mg today's appt due to increased sedation (this is not yet updated in chart). Patient complains of feeling very lethargic and falls asleep within minutes of sitting down.  He also described feeling more irritable since dose increases which he thinks have gotten worse since hospital discharge.  He is hopeful that risperidone reduction from 3 mg to 2 mg is helpful in reducing sedation.  " "Patient denied missing any doses since hospital discharge.  He did see a dietitian in the hospital which he reported was helpful.  He endorsed increasing his fruits and vegetable intake since discharge and stated that \"portion sizes remain sufficient for my activity level,\" noting that they are larger portions than before hospital admission.  He also stated he has increased fluid intake and that his urine is \"not too light and not too dark.\"  Pt denied having any questions.    Current labs include:  BP Readings from Last 3 Encounters:   05/29/18 96/62   05/23/18 113/78   05/20/18 (!) 140/91     Lab Results   Component Value Date    CHOL 105 05/05/2018     Lab Results   Component Value Date    TRIG 71 05/05/2018     Lab Results   Component Value Date    HDL 48 05/05/2018     Lab Results   Component Value Date    LDL 43 05/05/2018       Liver Function Studies -   Recent Labs   Lab Test  05/29/18   0919  05/16/18   1152   PROTTOTAL   --   7.6   ALBUMIN   --   4.5   BILITOTAL   --   1.3   ALKPHOS   --   65   AST  18  34   ALT  32  53     Last Basic Metabolic Panel:  Lab Results   Component Value Date     05/29/2018      Lab Results   Component Value Date    POTASSIUM 4.1 05/29/2018     Lab Results   Component Value Date    CHLORIDE 109 05/29/2018     Lab Results   Component Value Date    BUN 7 05/29/2018     Lab Results   Component Value Date    CR 0.84 05/29/2018     GFR Estimate   Date Value Ref Range Status   05/29/2018 >90 >60 mL/min/1.7m2 Final     Comment:     Non  GFR Calc   05/16/2018 79 >60 mL/min/1.7m2 Final     Comment:     Non  GFR Calc   05/15/2018 82 >60 mL/min/1.7m2 Final     Comment:     Non  GFR Calc     TSH   Date Value Ref Range Status   05/16/2018 3.50 0.40 - 4.00 mU/L Final       ASSESSMENT:                 Current medications were reviewed today.      Medication Adherence: good, no issues identified    Bipolar: Continue current medication.  " Recent lipid panel and glucose within normal limits.  Depakote level today was 62.  Food portion and fluid intake seem improved.  Discussed finding a balance between mood stabilization and side effects and importance of continuing with currently prescribed doses-pt agreed.  Patient will follow up with Dr. Motley in 2 weeks.    PLAN:                  1. Continue current medication.    I spent 30 minutes with this patient today. A copy of the visit note was provided to the patient's psychiatrist.    Will follow up as needed.    The patient was sent via [employer] a summary of these recommendations as an after visit summary.    Karie Mendoza, PharmD  Medication Therapy Management Pharmacist  [facility]  Phone: [phone]

## 2018-05-29 NOTE — MR AVS SNAPSHOT
After Visit Summary   5/29/2018    Esa Rivera    MRN: [id number]           Patient Information     Date Of Birth          1985        Visit Information        Provider Department      5/29/2018 1:00 PM Karie Mendoza [facility]        Today's Diagnoses     Bipolar 1 disorder, manic, moderate (H)    -  1      Care Instructions    Recommendations from today's MTM visit:                                                    MTM (medication therapy management) is a service provided by a clinical pharmacist designed to help you get the most of out of your medicines.   Today we reviewed what your medicines are for, how to know if they are working, that your medicines are safe and how to make your medicine regimen as easy as possible.     1. Continue current medication    2. Follow up with Dr. Motley on 6/12/18. Please reach out in the meantime if sedation worsens.    Next MTM visit: as needed    To schedule another MTM appointment, please call the clinic directly or you may call the MTM scheduling line at [phone] or toll-free at [phone].     My Clinical Pharmacist's contact information:                                                      It was a pleasure seeing you today!  Please feel free to contact me with any questions or concerns you have.      Karie Mendoza, PharmD  Medication Therapy Management Pharmacist  [facility]  Phone: [phone]    You may receive a survey about the MTM services you received.  I would appreciate your feedback to help me serve you better in the future. Please fill it out and return it when you can. Your comments will be anonymous.            Follow-ups after your visit        Your next 10 appointments already scheduled     Jun 12, 2018  2:30 PM CDT   Adult Med Follow UP with Mayito Motley MD   Psychiatry Clinic ([facility])    [place] [id number] [facility]  [address] [place] [id number] [phone]              Future tests that were ordered for you today     Open Future Orders        Priority Expected Expires Ordered    CBC with Platelets Differential ([facility]) Routine  5/29/2019 5/29/2018    Ammonia Routine  5/29/2019 5/29/2018    ALT Routine  5/29/2019 5/29/2018    AST Routine  5/29/2019 5/29/2018    Valproic Acid Routine  5/29/2019 5/29/2018            Who to contact     If you have questions or need follow up information about today's clinic visit or your schedule please contact [facility] directly at [phone].  Normal or non-critical lab and imaging results will be communicated to you by [employer], letter or phone within 4 business days after the clinic has received the results. If you do not hear from us within 7 days, please contact the clinic through [employer] or phone. If you have a critical or abnormal lab result, we will notify you by phone as soon as possible.  Submit refill requests through [employer] or call your pharmacy and they will forward the refill request to us. Please allow 3 business days for your refill to be completed.          Additional Information About Your Visit        [employer] Information     [employer] gives you secure access to your electronic health record. If you see a primary care provider, you can also send messages to your care team and make appointments. If you have questions, please call your primary care clinic.  If you do not have a primary care provider, please call [phone] and they will assist you.        Care EveryWhere ID     This is your Care EveryWhere ID. This could be used by other organizations to access your [facility] medical records  [id number]         Blood Pressure from Last 3 Encounters:   05/29/18 96/62   05/23/18 113/78   05/20/18 (!) 140/91    Weight from Last 3 Encounters:   05/29/18 216 lb 6.4 oz (98.2 kg)   05/23/18 219 lb 3.2 oz (99.4 kg)   05/20/18 217 lb (98.4  kg)              Today, you had the following     No orders found for display       Primary Care Provider Office Phone # Fax #    Aristides Jenkins [phone]       [address] [address]        Equal Access to Services     WESLEYALEX MARYBETH : Hadii aad ku hadasho Soomaali, waaxda luqadaha, qaybta kaalmada adeegyada, waxay betseyin hayjcn adebassam christinabarrie castillo . So [facility] [phone].    ATENCIÓN: Si habla español, tiene a osorio disposición servicios gratuitos de asistencia lingüística. Juan Rame al [phone].    We comply with applicable federal civil rights laws and [place] laws. We do not discriminate on the basis of race, color, national origin, age, disability, sex, sexual orientation, or gender identity.            Thank you!     Thank you for choosing [facility]  for your care. Our goal is always to provide you with excellent care. Hearing back from our patients is one way we can continue to improve our services. Please take a few minutes to complete the written survey that you may receive in the mail after your visit with us. Thank you!             Your Updated Medication List - Protect others around you: Learn how to safely use, store and throw away your medicines at www.disposemymeds.org.          This list is accurate as of 5/29/18  2:26 PM.  Always use your most recent med list.                   Brand Name Dispense Instructions for use Diagnosis    divalproex sodium extended-release 500 MG 24 hr tablet    DEPAKOTE ER    60 tablet    Take 2 tablets (1,000 mg) by mouth At Bedtime    Bipolar affective disorder, currently manic, severe, with psychotic features (H)       * risperiDONE 2 MG tablet    risperDAL    30 tablet    Take 1 tablet (2 mg) by mouth At Bedtime    Bipolar affective disorder, currently manic, severe, with psychotic features (H)       * risperiDONE 1 MG tablet    risperDAL    30 tablet    Take 1 tablet (1 mg) by mouth At  Bedtime    Bipolar I disorder, most recent episode (or current) manic (H)       * Notice:  This list has 2 medication(s) that are the same as other medications prescribed for you. Read the directions carefully, and ask your doctor or other care provider to review them with you.

## 2018-05-29 NOTE — MR AVS SNAPSHOT
After Visit Summary   5/29/2018    Esa Rivera    MRN: [id number]           Patient Information     Date Of Birth          1985        Visit Information        Provider Department      5/29/2018 11:30 AM Mayito Motley MD Psychiatry Clinic        Today's Diagnoses     Bipolar 1 disorder (H)    -  1    Therapeutic drug monitoring          Care Instructions    Reduce risperidone to 2 mg at night  Continue Depakote at its current dose  Please get a blood test done in a week.    Please return for follow-up in 2 weeks.          Follow-ups after your visit        Follow-up notes from your care team     Return in about 2 years (around 5/29/2020).      Your next 10 appointments already scheduled     Jun 12, 2018  2:30 PM CDT   Adult Med Follow UP with Mayito Motley MD   Psychiatry Clinic ([facility])    [place] [id number] [address] [address] [phone]              Future tests that were ordered for you today     Open Future Orders        Priority Expected Expires Ordered    CBC with Platelets Differential ([facility]) Routine  5/29/2019 5/29/2018    Ammonia Routine  5/29/2019 5/29/2018    ALT Routine  5/29/2019 5/29/2018    AST Routine  5/29/2019 5/29/2018    Valproic Acid Routine  5/29/2019 5/29/2018            Who to contact     Please call your clinic at [phone] to:    Ask questions about your health    Make or cancel appointments    Discuss your medicines    Learn about your test results    Speak to your doctor            Additional Information About Your Visit        MyChart Information     "[employer]"hart gives you secure access to your electronic health record. If you see a primary care provider, you can also send messages to your care team and make appointments. If you have questions, please call your primary care clinic.  If you do not have a primary care provider, please call [phone] and they will assist you.      MyChart is  an electronic gateway that provides easy, online access to your medical records. With [employer], you can request a clinic appointment, read your test results, renew a prescription or communicate with your care team.     To access your existing account, please contact your [facility] or call [phone] for assistance.        Care EveryWhere ID     This is your Care EveryWhere ID. This could be used by other organizations to access your [place] medical records  [id number]        Your Vitals Were     Pulse BMI (Body Mass Index)                67 27.05 kg/m2           Blood Pressure from Last 3 Encounters:   05/29/18 96/62   05/23/18 113/78   05/20/18 (!) 140/91    Weight from Last 3 Encounters:   05/29/18 98.2 kg (216 lb 6.4 oz)   05/23/18 99.4 kg (219 lb 3.2 oz)   05/20/18 98.4 kg (217 lb)              Today, you had the following     No orders found for display       Primary Care Provider Office Phone # Fax #    Aristides Jenkins [phone]       [address] [address]        Equal Access to Services     [facility]: Hadii aad ku hadasho Soomaali, waaxda luqadaha, qaybta kaalmada adeegyada, jermaine castillo . So [facility] [phone].    ATENCIÓN: Si habla español, tiene a osorio disposición servicios gratuitos de asistencia lingüística. Llame al [phone].    We comply with applicable federal civil rights laws and [place] laws. We do not discriminate on the basis of race, color, national origin, age, disability, sex, sexual orientation, or gender identity.            Thank you!     Thank you for choosing PSYCHIATRY CLINIC  for your care. Our goal is always to provide you with excellent care. Hearing back from our patients is one way we can continue to improve our services. Please take a few minutes to complete the written survey that you may receive in the mail after your visit with us. Thank you!              Your Updated Medication List - Protect others around you: Learn how to safely use, store and throw away your medicines at www.disposemymeds.org.          This list is accurate as of 5/29/18 11:59 PM.  Always use your most recent med list.                   Brand Name Dispense Instructions for use Diagnosis    divalproex sodium extended-release 500 MG 24 hr tablet    DEPAKOTE ER    60 tablet    Take 2 tablets (1,000 mg) by mouth At Bedtime    Bipolar affective disorder, currently manic, severe, with psychotic features (H)       * risperiDONE 2 MG tablet    risperDAL    30 tablet    Take 1 tablet (2 mg) by mouth At Bedtime    Bipolar affective disorder, currently manic, severe, with psychotic features (H)       * risperiDONE 1 MG tablet    risperDAL    30 tablet    Take 1 tablet (1 mg) by mouth At Bedtime    Bipolar I disorder, most recent episode (or current) manic (H)       * Notice:  This list has 2 medication(s) that are the same as other medications prescribed for you. Read the directions carefully, and ask your doctor or other care provider to review them with you.

## 2018-05-29 NOTE — PATIENT INSTRUCTIONS
Recommendations from today's MTM visit:                                                    MTM (medication therapy management) is a service provided by a clinical pharmacist designed to help you get the most of out of your medicines.   Today we reviewed what your medicines are for, how to know if they are working, that your medicines are safe and how to make your medicine regimen as easy as possible.     1. Continue current medication    2. Follow up with Dr. Motley on 6/12/18. Please reach out in the meantime if sedation worsens.    Next MTM visit: as needed    To schedule another MTM appointment, please call the clinic directly or you may call the MTM scheduling line at [phone] or toll-free at [phone].     My Clinical Pharmacist's contact information:                                                      It was a pleasure seeing you today!  Please feel free to contact me with any questions or concerns you have.      Karie Mendoza, PharmD  Medication Therapy Management Pharmacist  [facility]  Phone: [phone]    You may receive a survey about the MTM services you received.  I would appreciate your feedback to help me serve you better in the future. Please fill it out and return it when you can. Your comments will be anonymous.

## 2018-05-29 NOTE — Clinical Note
Hello,  I had a phone visit with Esa this afternoon to check in after his recent discharge.  I know he saw you today as well, so just sending this note as an fyi.  He's mostly still concerned with his level of sedation, but seemed ok with seeing how feels after today's risperidone decrease.  Karie

## 2018-05-29 NOTE — TELEPHONE ENCOUNTER
----- Message from Mayito Motley MD sent at 5/29/2018 12:23 PM CDT -----  Chris Handy -     Can you order labs for Esa?  He gets them done at [facility].  He needs a CBC and differential, AST, ALT, valproic acid level, and serum ammonia level.    Thank you!    DB

## 2018-05-30 ENCOUNTER — TELEPHONE (OUTPATIENT)
Dept: PSYCHIATRY | Facility: CLINIC | Age: 33
End: 2018-05-30

## 2018-05-30 ASSESSMENT — PATIENT HEALTH QUESTIONNAIRE - PHQ9: SUM OF ALL RESPONSES TO PHQ QUESTIONS 1-9: 10

## 2018-05-30 NOTE — TELEPHONE ENCOUNTER
----- Message from Fani Carmela sent at 5/30/2018  1:42 PM CDT -----  Regarding: disability forms  Contact: [phone]  The pt is the caller. He was calling to check on the status of his disability forms and whether they have been sent in yet. He said they need to get in asap because he is currently not getting paid. Okay to leave a detailed vm.

## 2018-05-30 NOTE — PATIENT INSTRUCTIONS
Reduce risperidone to 2 mg at night  Continue Depakote at its current dose  Please get a blood test done in a week.    Please return for follow-up in 2 weeks.

## 2018-05-31 NOTE — TELEPHONE ENCOUNTER
-Rec'd call back from pt  -Relayed status of forms  -Pt is leaving for vacation on Saturday and hopes to have forms completed before then at the latest  -Msg sent to Dr. Motley via [employer] and email  -Offered to call pt once forms are completed   -Pt appreciative

## 2018-05-31 NOTE — TELEPHONE ENCOUNTER
-Will notify Dr. Motley that pt is calling back.     -Attempted to reach pt but no answer.    -Unable to leave message as vm is full.

## 2018-05-31 NOTE — TELEPHONE ENCOUNTER
-Rec'd completed forms from Dr. Motley  -Faxed to  Disability Center at [phone]:    1.  Employee's Serious Health Condition (FMLA) - due 6/7/18   2.  Attending Provider Statement (Disability) - due 5/19/18    -Pt notified by phone  -Because form was not completed in time his short-term disability was denied  -Pt will be appealing decision but states that it will likely require add'l forms to be completed  -Pt also inquires about records and if those have been sent   -It appears IRMA was sent to medical records 5/16 but uncertain that request had been completed  -Pt states that he has been told no records have been rec'd from our office  -Offered to resend IRMA to medical records  -IRMA printed and faxed to medical records at [phone]  -Requested that they send records asap from 4/27/18 to present (as initially requested- see note dated 5/7/18)

## 2018-05-31 NOTE — TELEPHONE ENCOUNTER
Message  Received: Today       Veronica Pearson Katherine J RN       Phone Number: [phone]                     Esa, calling to check the status on his FMLA forms and Short Disability.       Thanks!

## 2018-06-04 DIAGNOSIS — F31.9 BIPOLAR DISORDER (H): ICD-10-CM

## 2018-06-05 RX ORDER — LAMOTRIGINE 100 MG/1
100 TABLET, EXTENDED RELEASE ORAL DAILY
Qty: 30 TABLET | Refills: 0 | OUTPATIENT
Start: 2018-06-05

## 2018-06-11 ENCOUNTER — TELEPHONE (OUTPATIENT)
Dept: PSYCHIATRY | Facility: CLINIC | Age: 33
End: 2018-06-11

## 2018-06-11 DIAGNOSIS — F31.9 BIPOLAR 1 DISORDER (H): ICD-10-CM

## 2018-06-11 DIAGNOSIS — Z51.81 THERAPEUTIC DRUG MONITORING: ICD-10-CM

## 2018-06-11 LAB
ALT SERPL W P-5'-P-CCNC: 23 U/L (ref 0–70)
AMMONIA PLAS-SCNC: 12 UMOL/L (ref 10–50)
AST SERPL W P-5'-P-CCNC: 9 U/L (ref 0–45)
BASOPHILS # BLD AUTO: 0 10E9/L (ref 0–0.2)
BASOPHILS NFR BLD AUTO: 0.3 %
DIFFERENTIAL METHOD BLD: ABNORMAL
EOSINOPHIL # BLD AUTO: 0.1 10E9/L (ref 0–0.7)
EOSINOPHIL NFR BLD AUTO: 1.4 %
ERYTHROCYTE [DISTWIDTH] IN BLOOD BY AUTOMATED COUNT: 12.3 % (ref 10–15)
HCT VFR BLD AUTO: 44 % (ref 40–53)
HGB BLD-MCNC: 14.6 G/DL (ref 13.3–17.7)
IMM GRANULOCYTES # BLD: 0 10E9/L (ref 0–0.4)
IMM GRANULOCYTES NFR BLD: 0 %
LYMPHOCYTES # BLD AUTO: 1 10E9/L (ref 0.8–5.3)
LYMPHOCYTES NFR BLD AUTO: 25.9 %
MCH RBC QN AUTO: 30.4 PG (ref 26.5–33)
MCHC RBC AUTO-ENTMCNC: 33.2 G/DL (ref 31.5–36.5)
MCV RBC AUTO: 92 FL (ref 78–100)
MONOCYTES # BLD AUTO: 0.3 10E9/L (ref 0–1.3)
MONOCYTES NFR BLD AUTO: 8.4 %
NEUTROPHILS # BLD AUTO: 2.4 10E9/L (ref 1.6–8.3)
NEUTROPHILS NFR BLD AUTO: 64 %
NRBC # BLD AUTO: 0 10*3/UL
NRBC BLD AUTO-RTO: 0 /100
PLATELET # BLD AUTO: 161 10E9/L (ref 150–450)
RBC # BLD AUTO: 4.8 10E12/L (ref 4.4–5.9)
VALPROATE SERPL-MCNC: 61 MG/L (ref 50–100)
WBC # BLD AUTO: 3.7 10E9/L (ref 4–11)

## 2018-06-11 PROCEDURE — 84450 TRANSFERASE (AST) (SGOT): CPT | Performed by: PSYCHIATRY & NEUROLOGY

## 2018-06-11 PROCEDURE — 80164 ASSAY DIPROPYLACETIC ACD TOT: CPT | Performed by: PSYCHIATRY & NEUROLOGY

## 2018-06-11 PROCEDURE — 84460 ALANINE AMINO (ALT) (SGPT): CPT | Performed by: PSYCHIATRY & NEUROLOGY

## 2018-06-11 PROCEDURE — 82140 ASSAY OF AMMONIA: CPT | Performed by: PSYCHIATRY & NEUROLOGY

## 2018-06-11 PROCEDURE — 36415 COLL VENOUS BLD VENIPUNCTURE: CPT | Performed by: PSYCHIATRY & NEUROLOGY

## 2018-06-11 PROCEDURE — 85025 COMPLETE CBC W/AUTO DIFF WBC: CPT | Performed by: PSYCHIATRY & NEUROLOGY

## 2018-06-11 NOTE — TELEPHONE ENCOUNTER
----- Message from Pat Mata sent at 6/11/2018 12:10 PM CDT -----  Regarding: Office Visit Notes - Motley  Contact: [phone]  OK to werner    Pt needs the office visit notes soon and is requesting a c/b today 6/11

## 2018-06-11 NOTE — TELEPHONE ENCOUNTER
----- Message from Pat Chacon sent at 6/11/2018 10:48 AM CDT -----  Regarding: Pt requesting return call  Contact: [phone]  Pt would like to speak regarding some office notes.    Ok to leave VM: Yes

## 2018-06-11 NOTE — TELEPHONE ENCOUNTER
Spoke with pt.  Disability claim was denied which he is appealing.  Requesting to have office visit notes from 4/27/18 - present along with copy of hospital discharge summary faxed to [facility]/ Disability [place] at [phone].  Pt has already signed IRMA's which are on file.  Asks that his name and claim number also be provide - claim # [id number].  Office notes dated 5/1/18, 5/16/18, 5/23/18, 5/29/18 and recent discharge summary faxed to number pt provided.  Also included claim number on cover sheet.

## 2018-06-11 NOTE — TELEPHONE ENCOUNTER
Returned call to pt.  He asks if Dr. Motley's office notes mentions ER visit on 4/27/18 which was start date of time off work.  Reviewed office notes following ER visit (5/1 and 5/16) and relayed to pt that writer did not see mention of ER visit.  Pt requests that copy of ER visit note also be sent to [facility].  ER visit note dated 4/27/18 faxed to [place]/ Disability Center at [phone] and pt's claim number included on cover sheet.  Pt asks if office notes mention that Dr. Motley recommended pt not work.  Confirmed with pt that visit note from 5/1/18 did advise pt that he not work.  Pt appreciative.

## 2018-06-11 NOTE — TELEPHONE ENCOUNTER
Message  Received: Today       Veronica Pearson Katherine J RN       Phone Number: [phone]                     Esa, has some info that he would like to give you. He just stated that it's from his conversation earlier today. This is all the info given.     Thanks!

## 2018-06-12 ENCOUNTER — CARE COORDINATION (OUTPATIENT)
Dept: PSYCHIATRY | Facility: CLINIC | Age: 33
End: 2018-06-12

## 2018-06-12 ENCOUNTER — OFFICE VISIT (OUTPATIENT)
Dept: PSYCHIATRY | Facility: CLINIC | Age: 33
End: 2018-06-12
Attending: PSYCHIATRY & NEUROLOGY
Payer: COMMERCIAL

## 2018-06-12 VITALS
BODY MASS INDEX: 26.47 KG/M2 | SYSTOLIC BLOOD PRESSURE: 103 MMHG | DIASTOLIC BLOOD PRESSURE: 69 MMHG | HEART RATE: 51 BPM | WEIGHT: 211.8 LBS

## 2018-06-12 DIAGNOSIS — F31.30 BIPOLAR I DISORDER, MOST RECENT EPISODE DEPRESSED (H): Primary | ICD-10-CM

## 2018-06-12 PROCEDURE — G0463 HOSPITAL OUTPT CLINIC VISIT: HCPCS | Mod: ZF

## 2018-06-12 RX ORDER — QUETIAPINE FUMARATE 50 MG/1
TABLET, FILM COATED ORAL
Qty: 90 TABLET | Refills: 0 | Status: SHIPPED | OUTPATIENT
Start: 2018-06-12 | End: 2018-06-13

## 2018-06-12 ASSESSMENT — PAIN SCALES - GENERAL: PAINLEVEL: NO PAIN (0)

## 2018-06-12 NOTE — PATIENT INSTRUCTIONS
Seroquel 50 mg (1 tab) for 5 nights, then 100 mg for 5 nights, then 150 mg  Decrease risperidone to 1 mg for 5 nights, then stop  Continue Depakote   Return in 3 weeks    Thank you for coming to the PSYCHIATRY CLINIC.    Lab Testing:  If you had lab testing today and your results are reassuring or normal they will be mailed to you or sent through [employer] within 7 days.   If the lab tests need quick action we will call you with the results.  The phone number we will call with results is # [phone] (home) NONE (work). If this is not the best number please call our clinic and change the number.    Medication Refills:  If you need any refills please call your pharmacy and they will contact us. Our fax number for refills is [phone]. Please allow three business for refill processing.   If you need to  your refill at a new pharmacy, please contact the new pharmacy directly. The new pharmacy will help you get your medications transferred.     Scheduling:  If you have any concerns about today's visit or wish to schedule another appointment please call our office during normal business hours [phone] (8-5:00 M-F)    Contact Us:  Please call [phone] during business hours (8-5:00 M-F).  If after clinic hours, or on the weekend, please call  [phone].    Financial Assistance [phone]  [facility] Billing [phone]  [place] Billing [phone]  Medical Records [phone]      MENTAL HEALTH CRISIS NUMBERS:  [facility]:   [facility] - [phone]   Crisis Residence [facility] - [phone]   Walk-In Counseling Center [facility] - [phone]   COPE 24/7 [facility] Mobile Team for Adults - [[phone]]; Child - [[phone]]     Crisis Connection - [phone]     [facility]:   [facility] - [phone]   Walk-in counseling [facility] - [phone]   Walk-in counseling [facility] - [phone]   Crisis  Residence [facility] On license of [facility] - [phone]   Urgent Care Adult Mental Health:   --Drop-in, 24/7 crisis line, and Kofi Wagoner Mobile Team [[phone]]    CRISIS TEXT LINE: Text [phone] from anywhere, anytime, any crisis 24/7;    OR SEE www.crisistextline.org     Poison Control Center - [phone]    CHILD: [facility] needs assessment team - [phone]     [facility] Lifeline - 1-388.564.1019; or [employer] Lifeline - 1-883.459.3656    If you have a medical emergency please call 911or go to the nearest ER.                    _____________________________________________    Again thank you for choosing PSYCHIATRY CLINIC and please let us know how we can best partner with you to improve you and your family's health.  You may be receiving a survey in the mail regarding this appointment. We would love to have your feedback, both positive and negative, so please fill out the survey and return it using the provided envelope. The survey is done by an external company, so your answers are anonymous.

## 2018-06-12 NOTE — MR AVS SNAPSHOT
After Visit Summary   6/12/2018    Esa Rivera    MRN: [id number]           Patient Information     Date Of Birth          1985        Visit Information        Provider Department      6/12/2018 2:30 PM Mayito Motley MD Psychiatry Clinic        Today's Diagnoses     Bipolar I disorder, most recent episode depressed (H)    -  1      Care Instructions    Seroquel 50 mg (1 tab) for 5 nights, then 100 mg for 5 nights, then 150 mg  Decrease risperidone to 1 mg for 5 nights, then stop  Continue Depakote   Return in 3 weeks    Thank you for coming to the PSYCHIATRY CLINIC.    Lab Testing:  If you had lab testing today and your results are reassuring or normal they will be mailed to you or sent through [employer] within 7 days.   If the lab tests need quick action we will call you with the results.  The phone number we will call with results is # [phone] (home) NONE (work). If this is not the best number please call our clinic and change the number.    Medication Refills:  If you need any refills please call your pharmacy and they will contact us. Our fax number for refills is [phone]. Please allow three business for refill processing.   If you need to  your refill at a new pharmacy, please contact the new pharmacy directly. The new pharmacy will help you get your medications transferred.     Scheduling:  If you have any concerns about today's visit or wish to schedule another appointment please call our office during normal business hours [phone] (8-5:00 M-F)    Contact Us:  Please call [phone] during business hours (8-5:00 M-F).  If after clinic hours, or on the weekend, please call  [phone].    Financial Assistance [phone]  [facility] Billing [phone]  [place] Billing [phone]  Medical Records [phone]      MENTAL HEALTH CRISIS NUMBERS:  [facility]:   [facility] - [phone]   Crisis Residence [facility] -  [phone]   Walk-[facility] Counseling Center [facility] - [phone]   COPE 24/7 [facility] Mobile Team for Adults - [[phone]]; Child - [[phone]]     Crisis Connection - [phone]     [facility] - [phone]   Walk-in counseling [facility] - [phone]   Walk-in counseling [facility] Family [facility] - [phone]   Crisis Residence [facility] - [phone]   Urgent Care Adult Mental Health:   --Drop-in, 24/7 crisis line, and Kofi Wagoner Mobile Team [[phone]]    CRISIS TEXT LINE: Text 616-574 from anywhere, anytime, any crisis 24/7;    OR SEE www.crisistextline.org     Poison Control Center - [phone]    CHILD: [facility] needs assessment team - [phone]     [facility] Lifeline - 1-380.938.3152; or [employer] Lifeline - 1-751.349.8688    If you have a medical emergency please call 911or go to the nearest ER.                    _____________________________________________    Again thank you for choosing PSYCHIATRY CLINIC and please let us know how we can best partner with you to improve you and your family's health.  You may be receiving a survey in the mail regarding this appointment. We would love to have your feedback, both positive and negative, so please fill out the survey and return it using the provided envelope. The survey is done by an external company, so your answers are anonymous.             Follow-ups after your visit        Follow-up notes from your care team     Return in about 3 years (around 6/12/2021).      Your next 10 appointments already scheduled     Jul 03, 2018  2:30 PM CDT   Adult Med Follow UP with Mayito Motley MD   Psychiatry Clinic ([facility])    [place] [id number] [address] [address] [phone]              Who to contact     Please call your clinic at [phone] to:    Ask questions about your health    Make or cancel  appointments    Discuss your medicines    Learn about your test results    Speak to your doctor            Additional Information About Your Visit        [employer] Information     [employer] gives you secure access to your electronic health record. If you see a primary care provider, you can also send messages to your care team and make appointments. If you have questions, please call your primary care clinic.  If you do not have a primary care provider, please call [phone] and they will assist you.      [employer] is an electronic gateway that provides easy, online access to your medical records. With [employer], you can request a clinic appointment, read your test results, renew a prescription or communicate with your care team.     To access your existing account, please contact your [facility] or call [phone] for assistance.        Care EveryWhere ID     This is your Care EveryWhere ID. This could be used by other organizations to access your [place] medical records  [id number]        Your Vitals Were     Pulse BMI (Body Mass Index)                51 26.47 kg/m2           Blood Pressure from Last 3 Encounters:   06/12/18 103/69   05/29/18 96/62   05/23/18 113/78    Weight from Last 3 Encounters:   06/12/18 96.1 kg (211 lb 12.8 oz)   05/29/18 98.2 kg (216 lb 6.4 oz)   05/23/18 99.4 kg (219 lb 3.2 oz)              Today, you had the following     No orders found for display         Today's Medication Changes          These changes are accurate as of 6/12/18  3:32 PM.  If you have any questions, ask your nurse or doctor.               Start taking these medicines.        Dose/Directions    QUEtiapine 50 MG tablet   Commonly known as:  SEROquel   Used for:  Bipolar I disorder, most recent episode depressed (H)   Started by:  Mayito Motley MD        50 mg HS for 5 nights then 100 mg HS for 5 nights then 150 mg HS   Quantity:  90 tablet   Refills:  0            Where to get your  medicines      These medications were sent to [facility] [id number] [facility] - [place], [place] - [address] [facility]  [address] [facility] [address]     Phone:  [phone]     QUEtiapine 50 MG tablet                Primary Care Provider Office Phone # Fax #    Aristides Jenkins [phone]       [address]  [facility] [address]        Equal Access to Services     ALEX RUEDA : Hadii aad ku hadasho Soomaali, waaxda luqadaha, qaybta kaalmada adeegyada, waxay idiin hayaan adeeg kharash la'raphael . So [facility] [phone].    ATENCIÓN: Si habla español, tiene a osorio disposición servicios gratuitos de asistencia lingüística. Sadie al [phone].    We comply with applicable federal civil rights laws and [place] laws. We do not discriminate on the basis of race, color, national origin, age, disability, sex, sexual orientation, or gender identity.            Thank you!     Thank you for choosing PSYCHIATRY CLINIC  for your care. Our goal is always to provide you with excellent care. Hearing back from our patients is one way we can continue to improve our services. Please take a few minutes to complete the written survey that you may receive in the mail after your visit with us. Thank you!             Your Updated Medication List - Protect others around you: Learn how to safely use, store and throw away your medicines at www.disposemymeds.org.          This list is accurate as of 6/12/18  3:32 PM.  Always use your most recent med list.                   Brand Name Dispense Instructions for use Diagnosis    divalproex sodium extended-release 500 MG 24 hr tablet    DEPAKOTE ER    60 tablet    Take 2 tablets (1,000 mg) by mouth At Bedtime    Bipolar affective disorder, currently manic, severe, with psychotic features (H)       QUEtiapine 50 MG tablet    SEROquel    90 tablet    50 mg HS for 5 nights then 100 mg HS for 5 nights then 150 mg HS    Bipolar I disorder, most recent episode depressed (H)        * risperiDONE 2 MG tablet    risperDAL    30 tablet    Take 1 tablet (2 mg) by mouth At Bedtime    Bipolar affective disorder, currently manic, severe, with psychotic features (H)       * risperiDONE 1 MG tablet    risperDAL    30 tablet    Take 1 tablet (1 mg) by mouth At Bedtime    Bipolar I disorder, most recent episode (or current) manic (H)       * Notice:  This list has 2 medication(s) that are the same as other medications prescribed for you. Read the directions carefully, and ask your doctor or other care provider to review them with you.

## 2018-06-12 NOTE — PROGRESS NOTES
June 12, 2018  Progress Note    Esa Rivera is a 33 year old year old male with Bipolar I Disorder, Most Recent Episode Manic Severe, with psychotic behavior (296.40) who presents for ongoing psychiatric care. He has no children. He and his wife (a psychologist) live on their own in [facility]. Esa works as an  for [employer].    Diagnosis    Axis 1: Bipolar I disorder, with severe manic episode with psychosis in Dec 2016. ADHD by history. Alcohol use disorder, moderate, in short-term remission  Axis 2: Nil  Axis 3: Remote concussion x 2  Axis 4: Moderate stressors  Axis 5: GAF at time of visit: 70    Assessment & Plan     Since the last visit, Esa has continued Depakote 1000 mg at bedtime, and has reduced his risperidone to 2 mg at night.  He enjoyed his trip to [place] with his family.  However, he is experiencing symptoms of depression.  We discussed a number of options, including adding Latuda, restarting lamotrigine, or adding quetiapine.  Esa elects a trial of quetiapine 50 mg at bedtime for 5 nights, then 100 mg at bedtime for 5 nights, then 150 mg at bedtime.  He will reduce risperidone to 1 mg for 5 nights and then discontinue.  He has an appointment at the [facility] next week, and will possibly start a two-week group therapy program there.  He will return for follow-up here in 3 weeks.    The patient understands the risks, benefits, adverse effects and alternatives. Agrees to treatment with the capacity to do so. No medical contraindications to treatment. Agrees to call clinic for any problems. The patient understands to call 911 or come to the nearest ED if life threatening or urgent symptoms present.    Attestation:  Patient has been seen and evaluated by me, Mayito Motley MD, PhD.    Interim History     I saw Esa on his own today.  His trip to [place] with his family went well.  However, he endorses symptoms of depression.  His mood is low most of the time.  His motivation and energy are  "diminished.  He is sleeping excessively, typically about 10 hours per night.  His appetite is back to normal, although he continues to eat a vegan diet.  He feels despondent and worried that he will never get well and be able to get back to work.  He worries about whether he will be able to raise kids later in life.  He denies suicidal thoughts.    Esa continues to be somewhat focused on \"spirituality\", but it is clearly nondelusional at this point.  It seems that the Moravian delusions that Esa experienced during his recent manic episode have led to a change in how he views his spiritual life.    We had a lengthy conversation about Esa's diagnosis today, which I believe is bipolar 1 disorder.  We also had a lengthy discussion about Esa's depression and the treatment thereof.  We reviewed Latuda, lamotrigine, and Seroquel as possible options.  Esa will start Seroquel and we will titrated as above.  He is aware will take several weeks to start to work.  He has an appointment at the [facility] next week, and will return here for follow-up in 3 weeks.        In Dec 2016 Esa experienced an episode of severe katie with psychosis (grandiose delusions). He was seen in the ER but not hospitalized. He had some mild depressive episodes and possible (but questionable) short hypomanic periods in the preceding years, but never received treatment for these.     Esa's manic symptoms began when he became excited about a new idea at work. 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 He became so excited about this the he was unable to sleep. As his katie escalated, his sleep decreased to 1-2 hours per night for 5-6 nights. His affect was elated and also markedly irritable. He would \"go off\" on people for minor things in very uncharacteristic ways. His thoughts were \"really fast, crisp, and clear\". He saw \"connections in everything.\" " "His energy was clearly elevated. He became grandiose, believing that his idea would change the world and as a result he would meet many \"powerful people\". Eventually he came to believe that sings on the radio were meant for him.     Esa was taking Adderall at the time. He has taken this, on and off, for many years. In my opinion it is not an adequate explanation for his manic symptoms.      His mother (a PCP) became concerned and brought him to hospital. He was assessed in the ER and prescribed Ativan for sleep. He was seen by Dr Burt (a psychiatrist) the next day. He was prescribed OLZ and LTG. The doses were titrated \"up and down\" for the next 6 months. There were significant side effects (sedation, cognitive impairment), likely from OLZ. Most recently, Esa stopped OLZ for a period of time. In the context of being on a honeymoon in [place], he develop racing thoughts and difficulty sleeping and restarted it, with benefit. He currently takes 10 mg HS.    Esa endorses several previous short (1-2 day) periods of reduced sleep and elevated mood, typically in the context of getting involved in a work project. At most they represent subthreshold hypomanic periods.     He has also experienced several periods over the years, lasting about a week, of low mood, decreased energy and motivation, poor focus/concentration, and negative thoughts. He denies neurovegetative symptoms or SI. His wife thought that these represented depressive episodes.    Esa has consumed EtOH heavily at points during his life. It was most pronounced during his college years. He has sporadically binge-drank since then. He has not had a drink in 2 weeks.    Past Psych Hx: As above. Esa was assessed at [facility] in Dec 2016 and diagnosed with BDI. He was seen by Psychiatry at age 17 and dxed with ADD. He took Adderall off and on (mostly on) with benefit over the years since then. He denies abusing it.     Past Med Hx: 2 concussion: 1) age 15 - skiing. " Age 16: football. LOC x a couple of minutes with both.    MEDICATION ADHERENCE: Good.   Current Medications     Current Outpatient Prescriptions   Medication Sig Dispense Refill     divalproex sodium extended-release (DEPAKOTE ER) 500 MG 24 hr tablet Take 2 tablets (1,000 mg) by mouth At Bedtime 60 tablet 0     risperiDONE (RISPERDAL) 2 MG tablet Take 1 tablet (2 mg) by mouth At Bedtime 30 tablet 0     risperiDONE (RISPERDAL) 1 MG tablet Take 1 tablet (1 mg) by mouth At Bedtime (Patient not taking: Reported on 6/12/2018) 30 tablet 0         Substance use     ETOH: Sporadic binge drinking  Cigarettes: NA  Street Drugs: Denies    Review of Systems     A comprehensive review of systems was performed and is negative other than noted above.     Allergies     Allergies   Allergen Reactions     No Known Allergies         Mental Status Exam     /69  Pulse 51  Wt 96.1 kg (211 lb 12.8 oz)  BMI 26.47 kg/m2    Alertness: alert  and oriented  Appearance: well groomed  Behavior/Demeanor: cooperative and pleasant, with fair  eye contact  Speech: slowed  Language: intact  Psychomotor: slowed  Mood:  irritable and elevated  Affect: restricted; was not congruent to mood  Thought Process/Associations: unremarkable  Thought Content:  Denies suicidal ideation and violent ideation. He has grandiose delusions of a Restoration nature.  Perception:  Denies auditory hallucinations and visual hallucinations  Insight: good  Judgment: good  Cognition:  does appear grossly intact.      PSYCHIATRY CLINIC INDIVIDUAL PSYCHOTHERAPY NOTE                                                     [16]   Start time - 1440        End time - 1505  Date reviewed - 2/7/2018       Date next due - 5/7/2018    Subjective: This supportive psychotherapy session addressed issues related to orientation to therapy plan and goals of therapy plan.  Patient's reaction: Maintenance in the context of mental status appropriate for ambulatory setting.  Progress:  good  Plan: RTC 2 mos  Psychotherapy services during this visit included  myself and the patient.   TREATMENT  PLAN          SYMPTOMS; PROBLEMS   MEASURABLE GOALS;    FUNCTIONAL IMPROVEMENT INTERVENTIONS;   GAINS MADE DISCHARGE CRITERIA   Depression: depressed mood, anhedonia, low energy, insomnia and concentration problems   reduce depressive symptoms and reduce depressive episodes medications   psycho-education   self-care skills symptom resolution   [place]/Hypomania: none   reduce manic/hypomanic episodes medications   psychotherapy  self-care skills symptom resolution

## 2018-06-12 NOTE — PROGRESS NOTES
Update  Received: Today       Veronica Pearson Katherine J RN       Phone Number: [phone]                     Julia, patient's spouse, would like to give you and update on Esa, to pas on to Dr. Motley, before his appointment today at 2:30 pm with Dr. Motley.     Thanks!

## 2018-06-12 NOTE — PROGRESS NOTES
"Returned call to pt's spouse.  Overall feels pt is moving in a good direction and is \"a lot better.\"  She is seeing more instances of \"Esa.\"  Some concerns that still remain are that pt continues to be religiously focused and is following a vegan diet which is unlike him.  Confirms pt is no longer fasting and is getting more calories.  Pt has expressed worry that he is getting fat.  Julia denies that pt has been exercising other than going for an occasional walk with her.  Feels like pt is still lacking some insight.  Pt is not aware that she was calling with this update.  Will forward to Dr. Motley for appt today.   "

## 2018-06-13 ENCOUNTER — TELEPHONE (OUTPATIENT)
Dept: PSYCHIATRY | Facility: CLINIC | Age: 33
End: 2018-06-13

## 2018-06-13 DIAGNOSIS — F31.30 BIPOLAR I DISORDER, MOST RECENT EPISODE DEPRESSED (H): ICD-10-CM

## 2018-06-13 RX ORDER — QUETIAPINE FUMARATE 100 MG/1
TABLET, FILM COATED ORAL
Qty: 45 TABLET | Refills: 0 | Status: SHIPPED | OUTPATIENT
Start: 2018-06-13 | End: 2018-07-03

## 2018-06-13 NOTE — TELEPHONE ENCOUNTER
Rx sent to pharmacy for:   QUEtiapine (SEROQUEL) 50 MG tablet 90 tablet 0 2018  --   Si mg HS for 5 nights then 100 mg HS for 5 nights then 150 mg HS   Class: E-Prescribe       Returned call to pt:   -Discuss option of switching to 100 mg tab as insurance may cover less tabs/day  -Pt agreeable and is open to purchasing a pill cutter if needed   -New rx sent to pharmacy  -[facility] and verified that medication is going through for copay $.39  -Pt notified and is appreciative  -Discuss that new rx would be sent in at next appt

## 2018-06-13 NOTE — TELEPHONE ENCOUNTER
----- Message from Bradley Lam sent at 6/13/2018 12:41 PM CDT -----  Regarding: Care Coordination  Contact: [phone]  Pt call 6/13/18@12:41pm regarding prior authorization for Seroquel. Pt asked if either the nurse or provider Motley could please give him a call. Regarding update on prior authorization. Ok to leave msg if unavailable.    Thanks

## 2018-06-14 ASSESSMENT — PATIENT HEALTH QUESTIONNAIRE - PHQ9: SUM OF ALL RESPONSES TO PHQ QUESTIONS 1-9: 9

## 2018-06-18 ENCOUNTER — TELEPHONE (OUTPATIENT)
Dept: PSYCHIATRY | Facility: CLINIC | Age: 33
End: 2018-06-18

## 2018-06-18 DIAGNOSIS — F31.2 BIPOLAR AFFECTIVE DISORDER, CURRENTLY MANIC, SEVERE, WITH PSYCHOTIC FEATURES (H): ICD-10-CM

## 2018-06-18 RX ORDER — DIVALPROEX SODIUM 500 MG/1
1000 TABLET, EXTENDED RELEASE ORAL AT BEDTIME
Qty: 60 TABLET | Refills: 0 | Status: SHIPPED | OUTPATIENT
Start: 2018-06-18 | End: 2018-07-03

## 2018-06-18 NOTE — TELEPHONE ENCOUNTER
Rec'd call from pt who is requesting refill of Depakote ER.  Assured pt refill would be submitted today and refill is approved per protocol.    Pt also updates that he has been approved to be off work until 6/22/18.  Next clinic appt is 7/3/18.  States that disability company may need additional records from most recent visit.  If this is the case pt states he will call writer back this afternoon.

## 2018-06-19 ENCOUNTER — TELEPHONE (OUTPATIENT)
Dept: PSYCHIATRY | Facility: CLINIC | Age: 33
End: 2018-06-19

## 2018-06-19 NOTE — TELEPHONE ENCOUNTER
Letter signed by Dr. Motley    Letter and most recent office note faxed to:     [place]/[address] [place] [address]              Fax: [phone]              Claim: [id number]    Pt notified and is encouraged to c/b if anything further is needed.

## 2018-06-19 NOTE — LETTER
June 19, 2018      RE: Esa Rivera  [address] [place] [address]         To Whom It May Concern,    Esa Rivera is currently under my care and has been attending regular clinic appointments.  He is undergoing medication changes and I am recommending that he not work at this time.  I will reassess his workability status at the next clinic appointment scheduled for July 3, 2018.  Please contact the clinic with any questions.      Sincerely,        Mayito Motley MD, PhD

## 2018-06-19 NOTE — TELEPHONE ENCOUNTER
----- Message from Arabella Harry sent at 6/18/2018  4:54 PM CDT -----  Contact: [phone]  [facility]    Patient is caller. He says that he would like to talk to Rozina regarding something that he spoke to her about earlier this morning. He would like a call and says it is regarding notes that will need to be sent in.

## 2018-06-19 NOTE — TELEPHONE ENCOUNTER
Returned call to pt.  He spoke with  and they are requesting copy of most recent office note (6/12/18).  As this visit note does not indicate recommendations for pt to remain off work, pt asks for separate letter to be sent with records, indicating that pt not return to work at this time.  Per pt he and Dr. Motley have not set a return to work date.  Letter drafted and printed for Dr. Motley's review/signature.      Letter and records can be faxed to:    [facility] [employer] [facility]   Fax: [phone]   Claim: [id number]    Pt has previously signed a release    Will notify pt once letter/records have been sent.

## 2018-06-20 ENCOUNTER — TRANSFERRED RECORDS (OUTPATIENT)
Dept: HEALTH INFORMATION MANAGEMENT | Facility: CLINIC | Age: 33
End: 2018-06-20

## 2018-06-21 ENCOUNTER — TELEPHONE (OUTPATIENT)
Dept: PSYCHIATRY | Facility: CLINIC | Age: 33
End: 2018-06-21

## 2018-06-21 NOTE — TELEPHONE ENCOUNTER
----- Message from Georgi Stanford sent at 6/21/2018 10:14 AM CDT -----  Regarding: Follow-Up  Contact: [phone]  Caller:  [facility]   Relationship to pt: none  Medication:  Risperidone (RISPERDAL) 1 MG tablet  Concern: Following up on the med refill. The pharmacist stated they sent 2 refill request and haven't heard anything back.      Thank You!

## 2018-06-21 NOTE — TELEPHONE ENCOUNTER
Per visit note dated 6/12/18:  Decrease risperidone to 1 mg for 5 nights, then stop    [facility]:   -relayed that pt should not be on medication any more  -pharmacy staff confirm refill request was on auto refill  -pharmacy will delete request

## 2018-06-22 ENCOUNTER — TELEPHONE (OUTPATIENT)
Dept: PSYCHIATRY | Facility: CLINIC | Age: 33
End: 2018-06-22

## 2018-06-22 NOTE — TELEPHONE ENCOUNTER
----- Message from Pat Mata sent at 6/22/2018 12:01 PM CDT -----  Regarding: Question - [facility]  Contact: [phone]  OK to [facility]    Calling to make sure [facility] and Adilson are speaking.    Please call back.

## 2018-06-22 NOTE — TELEPHONE ENCOUNTER
Returned call to pt.  He had a recent evaluation and f/u appointment at [facility].  Wanting to see if [facility] has been in contact with Dr. Motley about his visit as they had recommend a program at their facility as well as medication options.  Pt uncertain if this communication would be via fax or phone.  Shared with pt that writer has not seen any calls or faxes come to clinic.  However, will check with Dr. Motley to see if he has had any direct communication w/ [facility].  Will call pt back w/ response.  Pt appreciative.

## 2018-06-25 ENCOUNTER — TELEPHONE (OUTPATIENT)
Dept: PSYCHIATRY | Facility: CLINIC | Age: 33
End: 2018-06-25

## 2018-06-25 NOTE — TELEPHONE ENCOUNTER
On 6/25/2018, this writer left a detailed VM at [phone], notifying pt that records from the [facility] were received today.Lizzeth Martel LPN

## 2018-06-25 NOTE — TELEPHONE ENCOUNTER
On 6/25/2018, 11 pages of records were received from [facility]. This writer left a detailed VM that records were received at [phone]. The original copies were sent to scanning and copies were placed in Dr. Motley's folder.Lizzeth Martel LPN

## 2018-06-26 ENCOUNTER — TELEPHONE (OUTPATIENT)
Dept: PSYCHIATRY | Facility: CLINIC | Age: 33
End: 2018-06-26

## 2018-06-26 ENCOUNTER — CARE COORDINATION (OUTPATIENT)
Dept: PSYCHIATRY | Facility: CLINIC | Age: 33
End: 2018-06-26

## 2018-06-26 NOTE — PROGRESS NOTES
Message  Received: Today       Mayito Motley MD Blake, Katherine J RN       Caller: Unspecified (Today,  9:27 AM)                     Chris Handy-       Sure, I can see him tomorrow at 9 AM.  Having 60 minutes would be good to allow us to go through the [facility] paperwork.     Thanks!     DB

## 2018-06-26 NOTE — PROGRESS NOTES
Message  Received: Yesterday       Arabella Harry Katherine J RN       Phone Number: [phone]                     [facility]oz[facility]     Patient is caller. He would like to talk to the dr or nurse. He says that it is regarding a concern about his work and his return to work date and the FMLA.

## 2018-06-26 NOTE — PROGRESS NOTES
Spoke with pt who is concerned that his FMLA will  on 7/3/18 which is the same date of his next appt with Dr. Motley.  At this appt it will be determined if Dr. Motley recommends pt return to work vs continued time off work to attend a mood program through [employer] which begins 18 and will last 9 business days.  We discuss possibility of sooner appt to allow adequate time for documentation/forms to be completed to extend FMLA if that is decision made.  An appt is placed on hold for tomorrow at 0900 for 60 min.  Will notify Dr. Motley to see if he would be agreeable to appt tomorrow or if he prefers pt to wait until scheduled appt on 7/3 to allow more time for pt to respond to medication changes made at last appt.  Pt will also contact disability company to alert them to situation.  Will f/u with pt later today.

## 2018-06-26 NOTE — PROGRESS NOTES
Was able to successfully reach pt relaying Dr. Motley's response.  Pt doesn't currently have any add'l paperwork that is needing to be completed.  States that likely his employer will need add'l office visit notes.  Accepts appt tomorrow at 0900.  Shared that writer would also keep appt on 7/3 scheduled just in case this is also needed.  Pt appreciative.

## 2018-06-26 NOTE — TELEPHONE ENCOUNTER
Social Work   Incoming/Outgoing Call  [facility]    Outgoing Call To: Esa Rivera    Reason for Call:  Patient called clinic 6/25/18 regarding questions about FMLA. Intake staff noted he requested to talk with social work.    Response/Plan:  CHILANGO noted that patient had talked with RNCC this morning. Esa and RNCC had found a solution to patient concern about appointment with provider on same day that FMLA ends. Esa reported that he was feeling anxious and wanted to talk through concerns.    Patient is worried about job security. SW encouraged him to talk with his employer's HR department as he may be eligible for long term disability benefits after the end of FMLA. Esa emailed his HR contact this morning and is waiting to hear back. SW provided reassurance that he is doing a good job in being proactive and communicating with his employer.     SW provided direct contact information if patient has follow up questions or concerns.      Will route to patient's current psychiatric provider(s) as an FYI.   Please call or EPIC message with any questions or concerns.    Mallory Celestin, JOSEE, [facility]  [phone]

## 2018-06-27 ENCOUNTER — OFFICE VISIT (OUTPATIENT)
Dept: PSYCHIATRY | Facility: CLINIC | Age: 33
End: 2018-06-27
Attending: PSYCHIATRY & NEUROLOGY
Payer: COMMERCIAL

## 2018-06-27 ENCOUNTER — TELEPHONE (OUTPATIENT)
Dept: PSYCHIATRY | Facility: CLINIC | Age: 33
End: 2018-06-27

## 2018-06-27 VITALS
SYSTOLIC BLOOD PRESSURE: 112 MMHG | BODY MASS INDEX: 25.92 KG/M2 | DIASTOLIC BLOOD PRESSURE: 70 MMHG | HEART RATE: 56 BPM | WEIGHT: 207.4 LBS

## 2018-06-27 DIAGNOSIS — F31.30 BIPOLAR I DISORDER, MOST RECENT EPISODE DEPRESSED (H): Primary | ICD-10-CM

## 2018-06-27 PROCEDURE — G0463 HOSPITAL OUTPT CLINIC VISIT: HCPCS | Mod: ZF

## 2018-06-27 ASSESSMENT — PAIN SCALES - GENERAL: PAINLEVEL: NO PAIN (0)

## 2018-06-27 NOTE — MR AVS SNAPSHOT
After Visit Summary   6/27/2018    Esa Rivera    MRN: [id number]           Patient Information     Date Of Birth          1985        Visit Information        Provider Department      6/27/2018 9:00 AM Mayito Motley MD Psychiatry Clinic        Today's Diagnoses     Bipolar I disorder, most recent episode depressed (H)    -  1      Care Instructions    Continue your current medications  Please return in 1 week          Follow-ups after your visit        Follow-up notes from your care team     Return in about 1 week (around 7/4/2018).      Your next 10 appointments already scheduled     Jul 03, 2018  2:30 PM CDT   Adult Med Follow UP with Mayito Motley MD   Psychiatry Clinic ([facility])    [address]  [address] [address] [phone]              Who to contact     Please call your clinic at [phone] to:    Ask questions about your health    Make or cancel appointments    Discuss your medicines    Learn about your test results    Speak to your doctor            Additional Information About Your Visit        [employer] Information     [employer] gives you secure access to your electronic health record. If you see a primary care provider, you can also send messages to your care team and make appointments. If you have questions, please call your primary care clinic.  If you do not have a primary care provider, please call [phone] and they will assist you.      [employer] is an electronic gateway that provides easy, online access to your medical records. With [employer], you can request a clinic appointment, read your test results, renew a prescription or communicate with your care team.     To access your existing account, please contact your [facility] or call [phone] for assistance.        Care EveryWhere ID     This is your Care EveryWhere ID. This could be used by other organizations to  access your [place] medical records  [id number]        Your Vitals Were     Pulse BMI (Body Mass Index)                56 25.92 kg/m2           Blood Pressure from Last 3 Encounters:   06/27/18 112/70   06/12/18 103/69   05/29/18 96/62    Weight from Last 3 Encounters:   06/27/18 94.1 kg (207 lb 6.4 oz)   06/12/18 96.1 kg (211 lb 12.8 oz)   05/29/18 98.2 kg (216 lb 6.4 oz)              Today, you had the following     No orders found for display       Primary Care Provider Office Phone # Fax #    Aristides Jenkins [phone]       [address] [address]        Equal Access to Services     BROCK RUEDA : Hadii milla manzo hadasho Soomaali, waaxda luqadaha, qaybta kaalmada adeegyada, jermaine castillo . So [facility] [phone].    ATENCIÓN: Si habla español, tiene a osorio disposición servicios gratuitos de asistencia lingüística. Llame al [phone].    We comply with applicable federal civil rights laws and [place] laws. We do not discriminate on the basis of race, color, national origin, age, disability, sex, sexual orientation, or gender identity.            Thank you!     Thank you for choosing PSYCHIATRY CLINIC  for your care. Our goal is always to provide you with excellent care. Hearing back from our patients is one way we can continue to improve our services. Please take a few minutes to complete the written survey that you may receive in the mail after your visit with us. Thank you!             Your Updated Medication List - Protect others around you: Learn how to safely use, store and throw away your medicines at www.disposemymeds.org.          This list is accurate as of 6/27/18 10:06 AM.  Always use your most recent med list.                   Brand Name Dispense Instructions for use Diagnosis    divalproex sodium extended-release 500 MG 24 hr tablet    DEPAKOTE ER    60 tablet    Take 2 tablets (1,000 mg) by mouth At Bedtime    Bipolar  affective disorder, currently manic, severe, with psychotic features (H)       QUEtiapine 100 MG tablet    SEROquel    45 tablet    Take 0.5 tab HS for 5 nights then 1 tab HS for 5 nights then 1.5 tabs HS    Bipolar I disorder, most recent episode depressed (H)

## 2018-06-27 NOTE — TELEPHONE ENCOUNTER
On 6/20/2018 the patient signed an IRMA authorizing [facility] to release/obtain medical records from [facility] for the purpose of treatment/ continued care per IRMA. On 6/27/2018 I faxed the form to [phone] and I sent this IRMA to Medical Records via the tube system and kept a copy in psychiatry until scanning is complete/confirmed. Henny Matta MA

## 2018-06-27 NOTE — PROGRESS NOTES
June 27, 2018  Progress Note    Esa Rivera is a 33 year old year old male with Bipolar I Disorder, Most Recent Episode Manic Severe, with psychotic behavior (296.40) who presents for ongoing psychiatric care. He has no children. He and his wife (a psychologist) live on their own in [facility]. Esa works as an  for [employer].    Diagnosis    Axis 1: Bipolar I disorder, with severe manic episode with psychosis in Dec 2016. ADHD by history. Alcohol use disorder, moderate, in short-term remission  Axis 2: Nil  Axis 3: Remote concussion x 2  Axis 4: Moderate stressors  Axis 5: GAF at time of visit: 70    Assessment & Plan     Since the last visit, Esa has continued Depakote 1000 mg at bedtime, and has started quetiapine 50 mg at bedtime for 5 nights, then 100 mg at bedtime for 5 nights, then 150 mg at bedtime.  He has been taking 150 mg for 5 days. He has also been behaviorally activating himself. He feels modestly improved from his depression, but he also remains at least moderately depressed. He will continue his current medications and return in 1 week. He is unable to work for medical reasons currently. We will reassess at his appointment next week.     The patient understands the risks, benefits, adverse effects and alternatives. Agrees to treatment with the capacity to do so. No medical contraindications to treatment. Agrees to call clinic for any problems. The patient understands to call 911 or come to the nearest ED if life threatening or urgent symptoms present.    Attestation:  Patient has been seen and evaluated by me, Mayito Motley MD, PhD.    Interim History     I saw Esa on his own today for 1 hour.  We did a lengthy review of the recommendations he got from his appointment with Dr Sylvester at the [facility], which I largely agree with. They are, more or less in order,  - increase Seroquel to max 300 mg/day  - a trial of Latuda  - switch Depakote to tegretol  - consider cautiously adding an SSRI  "antidepressant  - attending a 2 week group therapy program at [place]    We also had a lengthy discussion about what Esa can do to hasten his recovery, including the importance of healthy diet, physical activity, behavioral activation, avoiding substance use, moderating stress, and promoting healthy sleep.     Esa's depression is severe enough now that he cannot return to work. His FMLA runs out in 1 week and we will reassess his back to work status then.    I answered a number of questions Esa had about treatment and a possible disability application, if neede.    Esa will return in 1 week.        In Dec 2016 Esa experienced an episode of severe katie with psychosis (grandiose delusions). He was seen in the ER but not hospitalized. He had some mild depressive episodes and possible (but questionable) short hypomanic periods in the preceding years, but never received treatment for these.     Esa's manic symptoms began when he became excited about a new idea at work. 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 He became so excited about this the he was unable to sleep. As his katie escalated, his sleep decreased to 1-2 hours per night for 5-6 nights. His affect was elated and also markedly irritable. He would \"go off\" on people for minor things in very uncharacteristic ways. His thoughts were \"really fast, crisp, and clear\". He saw \"connections in everything.\" His energy was clearly elevated. He became grandiose, believing that his idea would change the world and as a result he would meet many \"powerful people\". Eventually he came to believe that sings on the radio were meant for him.     Esa was taking Adderall at the time. He has taken this, on and off, for many years. In my opinion it is not an adequate explanation for his manic symptoms.      His mother (a PCP) became concerned and brought him to hospital. He was " "assessed in the ER and prescribed Ativan for sleep. He was seen by Dr Burt (a psychiatrist) the next day. He was prescribed OLZ and LTG. The doses were titrated \"up and down\" for the next 6 months. There were significant side effects (sedation, cognitive impairment), likely from OLZ. Most recently, Esa stopped OLZ for a period of time. In the context of being on a honeymoon in [place], he develop racing thoughts and difficulty sleeping and restarted it, with benefit. He currently takes 10 mg HS.    Esa endorses several previous short (1-2 day) periods of reduced sleep and elevated mood, typically in the context of getting involved in a work project. At most they represent subthreshold hypomanic periods.     He has also experienced several periods over the years, lasting about a week, of low mood, decreased energy and motivation, poor focus/concentration, and negative thoughts. He denies neurovegetative symptoms or SI. His wife thought that these represented depressive episodes.    Esa has consumed EtOH heavily at points during his life. It was most pronounced during his college years. He has sporadically binge-drank since then. He has not had a drink in 2 weeks.    Past Psych Hx: As above. Esa was assessed at [place] in Dec 2016 and diagnosed with BDI. He was seen by Psychiatry at age 17 and dxed with ADD. He took Adderall off and on (mostly on) with benefit over the years since then. He denies abusing it.     Past Med Hx: 2 concussion: 1) age 15 - skiing. Age 16: football. LOC x a couple of minutes with both.    MEDICATION ADHERENCE: Good.   Current Medications     Current Outpatient Prescriptions   Medication Sig Dispense Refill     divalproex sodium extended-release (DEPAKOTE ER) 500 MG 24 hr tablet Take 2 tablets (1,000 mg) by mouth At Bedtime 60 tablet 0     QUEtiapine (SEROQUEL) 100 MG tablet Take 0.5 tab HS for 5 nights then 1 tab HS for 5 nights then 1.5 tabs HS 45 tablet 0         Substance use     ETOH: " Sporadic binge drinking  Cigarettes: NA  Street Drugs: Denies    Review of Systems     A comprehensive review of systems was performed and is negative other than noted above.     Allergies     Allergies   Allergen Reactions     No Known Allergies         Mental Status Exam     /70  Pulse 56  Wt 94.1 kg (207 lb 6.4 oz)  BMI 25.92 kg/m2    Alertness: alert  and oriented  Appearance: well groomed  Behavior/Demeanor: cooperative and pleasant, with fair  eye contact  Speech: slowed  Language: intact  Psychomotor: slowed  Mood:  depressed, anxious and irritable  Affect: restricted; was not congruent to mood  Thought Process/Associations: unremarkable  Thought Content:  Denies suicidal ideation and violent ideation. He has grandiose delusions of a Advent nature.  Perception:  Denies auditory hallucinations and visual hallucinations  Insight: good  Judgment: good  Cognition:  does appear grossly intact.      PSYCHIATRY CLINIC INDIVIDUAL PSYCHOTHERAPY NOTE                                                     [16]   Start time - 0900        End time - 0930  Date reviewed - 2/7/2018       Date next due - 5/7/2018    Subjective: This supportive psychotherapy session addressed issues related to orientation to therapy plan and goals of therapy plan.  Patient's reaction: Maintenance in the context of mental status appropriate for ambulatory setting.  Progress: good  Plan: RTC 2 mos  Psychotherapy services during this visit included  myself and the patient.   TREATMENT  PLAN          SYMPTOMS; PROBLEMS   MEASURABLE GOALS;    FUNCTIONAL IMPROVEMENT INTERVENTIONS;   GAINS MADE DISCHARGE CRITERIA   Depression: depressed mood, anhedonia, low energy, insomnia and concentration problems   reduce depressive symptoms and reduce depressive episodes medications   psycho-education   self-care skills symptom resolution   Crystal/Hypomania: none   reduce manic/hypomanic episodes medications   psychotherapy  self-care skills symptom  resolution

## 2018-06-27 NOTE — NURSING NOTE
Chief Complaint   Patient presents with     RECHECK     Bipolar I disorder, most recent episode depressed

## 2018-06-28 ASSESSMENT — PATIENT HEALTH QUESTIONNAIRE - PHQ9: SUM OF ALL RESPONSES TO PHQ QUESTIONS 1-9: 8

## 2018-07-03 ENCOUNTER — TELEPHONE (OUTPATIENT)
Dept: PSYCHIATRY | Facility: CLINIC | Age: 33
End: 2018-07-03

## 2018-07-03 ENCOUNTER — OFFICE VISIT (OUTPATIENT)
Dept: PSYCHIATRY | Facility: CLINIC | Age: 33
End: 2018-07-03
Attending: PSYCHIATRY & NEUROLOGY
Payer: COMMERCIAL

## 2018-07-03 VITALS
SYSTOLIC BLOOD PRESSURE: 121 MMHG | DIASTOLIC BLOOD PRESSURE: 78 MMHG | BODY MASS INDEX: 25.85 KG/M2 | HEART RATE: 50 BPM | WEIGHT: 206.8 LBS

## 2018-07-03 DIAGNOSIS — F31.30 BIPOLAR I DISORDER, MOST RECENT EPISODE DEPRESSED (H): ICD-10-CM

## 2018-07-03 DIAGNOSIS — F31.2 BIPOLAR AFFECTIVE DISORDER, CURRENTLY MANIC, SEVERE, WITH PSYCHOTIC FEATURES (H): ICD-10-CM

## 2018-07-03 PROCEDURE — G0463 HOSPITAL OUTPT CLINIC VISIT: HCPCS | Mod: ZF

## 2018-07-03 RX ORDER — QUETIAPINE FUMARATE 50 MG/1
150 TABLET, FILM COATED ORAL AT BEDTIME
Qty: 90 TABLET | Refills: 0 | Status: SHIPPED | OUTPATIENT
Start: 2018-07-03 | End: 2018-07-05

## 2018-07-03 RX ORDER — DIVALPROEX SODIUM 500 MG/1
1000 TABLET, EXTENDED RELEASE ORAL AT BEDTIME
Qty: 60 TABLET | Refills: 0 | Status: SHIPPED | OUTPATIENT
Start: 2018-07-03 | End: 2018-08-16

## 2018-07-03 ASSESSMENT — PAIN SCALES - GENERAL: PAINLEVEL: NO PAIN (0)

## 2018-07-03 NOTE — TELEPHONE ENCOUNTER
Rec'd call from pt who is requesting to have copy of visit note from today (7/3/18) sent to [place]/ Disability.  He asks that writer include on cover letter his next appt date of 7/18/18.  Assured pt this would be completed today.  IRMA is already on file.    Office note faxed to:     [address] [facility]              Fax: [phone]              Claim: [id number]

## 2018-07-03 NOTE — PROGRESS NOTES
July 3, 2018  Progress Note    Esa Rivera is a 33 year old year old male with Bipolar I Disorder, Most Recent Episode Manic Severe, with psychotic behavior (296.40) who presents for ongoing psychiatric care. He has no children. He and his wife (a psychologist) live on their own in [facility]. Esa works as an  for [employer].    Diagnosis    Axis 1: Bipolar I disorder, with severe manic episode with psychosis in Dec 2016. ADHD by history. Alcohol use disorder, moderate, in short-term remission  Axis 2: Nil  Axis 3: Remote concussion x 2  Axis 4: Moderate stressors  Axis 5: GAF at time of visit: 70    Assessment & Plan     Since the last visit, Esa has continued taking Depakote 1000 mg at bedtime and Seroquel 150 mg at bedtime.  He has been on the 150 mg dose of Seroquel for about 2 weeks.  Esa notices improvement in his symptoms of depression, but they are not yet remitted.  His bipolar disorder has been very unstable over the last year, with 3 significant manic episodes and at least one depression.  He will continue taking his current doses of medications.  He will benefit from additional information about keeping his bipolar illness under good control, and I have strongly encouraged him to attend the education day program at the [facility], which runs for 2 weeks.  Esa is scheduled to start on Thursday.  I will see him in follow-up in 3 weeks.  Esa is not yet ready to return to work and this can be reassessed at his next follow-up appointment.    The patient understands the risks, benefits, adverse effects and alternatives. Agrees to treatment with the capacity to do so. No medical contraindications to treatment. Agrees to call clinic for any problems. The patient understands to call 911 or come to the nearest ED if life threatening or urgent symptoms present.    Attestation:  Patient has been seen and evaluated by me, Mayito Motley MD, PhD.    Interim History     Since the last visit, Esa has been  "adherent with his medications.  He denies any substance use.  There has been improvement in his symptoms of depression, but they are not yet fully remitted.  He also reports periods of irritability, and I am not certain if these represent ongoing mixed symptoms.    Esa is agreeable to continue his current medications for the time being.  I strongly encouraged him to attend the [facility] education day program, which will provide Esa with information on how to recognize early warning signs of depression and katie, and what to do if they occur.  They will also provide a lot of lifestyle advice which will be helpful for him and keeping his illness under control.      We reviewed his medications.  I would like Esa to stay on the 150 mg dose of Seroquel for at least 2 more weeks before we consider a dose increase, or other treatment changes.    We discussed the possibility of making a wellness plan at Esa's next visit, and he is agreeable to do so.  He will return to see me after completion of the day program, and 3 weeks.          In Dec 2016 Esa experienced an episode of severe katie with psychosis (grandiose delusions). He was seen in the ER but not hospitalized. He had some mild depressive episodes and possible (but questionable) short hypomanic periods in the preceding years, but never received treatment for these.     Esa's manic symptoms began when he became excited about a new idea at work. 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 He became so excited about this the he was unable to sleep. As his katie escalated, his sleep decreased to 1-2 hours per night for 5-6 nights. His affect was elated and also markedly irritable. He would \"go off\" on people for minor things in very uncharacteristic ways. His thoughts were \"really fast, crisp, and clear\". He saw \"connections in everything.\" His energy was clearly elevated. " "He became grandiose, believing that his idea would change the world and as a result he would meet many \"powerful people\". Eventually he came to believe that sings on the radio were meant for him.     Esa was taking Adderall at the time. He has taken this, on and off, for many years. In my opinion it is not an adequate explanation for his manic symptoms.      His mother (a PCP) became concerned and brought him to hospital. He was assessed in the ER and prescribed Ativan for sleep. He was seen by Dr Burt (a psychiatrist) the next day. He was prescribed OLZ and LTG. The doses were titrated \"up and down\" for the next 6 months. There were significant side effects (sedation, cognitive impairment), likely from OLZ. Most recently, Esa stopped OLZ for a period of time. In the context of being on a honeymoon in [place], he develop racing thoughts and difficulty sleeping and restarted it, with benefit. He currently takes 10 mg HS.    Esa endorses several previous short (1-2 day) periods of reduced sleep and elevated mood, typically in the context of getting involved in a work project. At most they represent subthreshold hypomanic periods.     He has also experienced several periods over the years, lasting about a week, of low mood, decreased energy and motivation, poor focus/concentration, and negative thoughts. He denies neurovegetative symptoms or SI. His wife thought that these represented depressive episodes.    Esa has consumed EtOH heavily at points during his life. It was most pronounced during his college years. He has sporadically binge-drank since then. He has not had a drink in 2 weeks.    Past Psych Hx: As above. Esa was assessed at [place] in Dec 2016 and diagnosed with BDI. He was seen by Psychiatry at age 17 and dxed with ADD. He took Adderall off and on (mostly on) with benefit over the years since then. He denies abusing it.     Past Med Hx: 2 concussion: 1) age 15 - skiing. Age 16: football. LOC x a couple " of minutes with both.    MEDICATION ADHERENCE: Good.   Current Medications     Current Outpatient Prescriptions   Medication Sig Dispense Refill     divalproex sodium extended-release (DEPAKOTE ER) 500 MG 24 hr tablet Take 2 tablets (1,000 mg) by mouth At Bedtime 60 tablet 0     QUEtiapine (SEROQUEL) 100 MG tablet Take 0.5 tab HS for 5 nights then 1 tab HS for 5 nights then 1.5 tabs HS 45 tablet 0         Substance use     ETOH: Sporadic binge drinking  Cigarettes: NA  Street Drugs: Denies    Review of Systems     A comprehensive review of systems was performed and is negative other than noted above.     Allergies     Allergies   Allergen Reactions     No Known Allergies         Mental Status Exam     /78  Pulse 50  Wt 93.8 kg (206 lb 12.8 oz)  BMI 25.85 kg/m2    Alertness: alert  and oriented  Appearance: well groomed  Behavior/Demeanor: cooperative and pleasant, with fair  eye contact  Speech: slowed  Language: intact  Psychomotor: slowed  Mood:  depressed, anxious and irritable  Affect: restricted; was not congruent to mood  Thought Process/Associations: unremarkable  Thought Content:  Denies suicidal ideation and violent ideation. He has grandiose delusions of a Voodoo nature.  Perception:  Denies auditory hallucinations and visual hallucinations  Insight: good  Judgment: good  Cognition:  does appear grossly intact.      PSYCHIATRY CLINIC INDIVIDUAL PSYCHOTHERAPY NOTE                                                     [16]   Start time - 0900        End time - 0930  Date reviewed - 2/7/2018       Date next due - 5/7/2018    Subjective: This supportive psychotherapy session addressed issues related to orientation to therapy plan and goals of therapy plan.  Patient's reaction: Maintenance in the context of mental status appropriate for ambulatory setting.  Progress: good  Plan: RTC 2 mos  Psychotherapy services during this visit included  myself and the patient.   TREATMENT  PLAN          SYMPTOMS;  PROBLEMS   MEASURABLE GOALS;    FUNCTIONAL IMPROVEMENT INTERVENTIONS;   GAINS MADE DISCHARGE CRITERIA   Depression: depressed mood, anhedonia, low energy, insomnia and concentration problems   reduce depressive symptoms and reduce depressive episodes medications   psycho-education   self-care skills symptom resolution   Crystal/Hypomania: none   reduce manic/hypomanic episodes medications   psychotherapy  self-care skills symptom resolution

## 2018-07-03 NOTE — PATIENT INSTRUCTIONS
Continue your medications at their current doses  Please attend the education day program at the [facility]  Please return for follow-up in 3 weeks

## 2018-07-03 NOTE — MR AVS SNAPSHOT
After Visit Summary   7/3/2018    Esa Rivera    MRN: [id number]           Patient Information     Date Of Birth          1985        Visit Information        Provider Department      7/3/2018 2:30 PM Mayito Motley MD Psychiatry Clinic        Today's Diagnoses     Bipolar affective disorder, currently manic, severe, with psychotic features (H)        Bipolar I disorder, most recent episode depressed (H)           Follow-ups after your visit        Your next 10 appointments already scheduled     Jul 18, 2018  9:30 AM CDT   Adult Med Follow UP with Mayito Motley MD   Psychiatry Clinic ([facility])    [address]  [address] [id number] [phone]              Who to contact     Please call your clinic at [phone] to:    Ask questions about your health    Make or cancel appointments    Discuss your medicines    Learn about your test results    Speak to your doctor            Additional Information About Your Visit        MyChart Information     [employer] gives you secure access to your electronic health record. If you see a primary care provider, you can also send messages to your care team and make appointments. If you have questions, please call your primary care clinic.  If you do not have a primary care provider, please call [phone] and they will assist you.      [employer] is an electronic gateway that provides easy, online access to your medical records. With [employer], you can request a clinic appointment, read your test results, renew a prescription or communicate with your care team.     To access your existing account, please contact your [facility] or call [phone] for assistance.        Care EveryWhere ID     This is your Care EveryWhere ID. This could be used by other organizations to access your [place] medical records  [id number]        Your Vitals Were     Pulse BMI  (Body Mass Index)                50 25.85 kg/m2           Blood Pressure from Last 3 Encounters:   07/03/18 121/78   06/27/18 112/70   06/12/18 103/69    Weight from Last 3 Encounters:   07/03/18 93.8 kg (206 lb 12.8 oz)   06/27/18 94.1 kg (207 lb 6.4 oz)   06/12/18 96.1 kg (211 lb 12.8 oz)              Today, you had the following     No orders found for display         Today's Medication Changes          These changes are accurate as of 7/3/18  3:11 PM.  If you have any questions, ask your nurse or doctor.               These medicines have changed or have updated prescriptions.        Dose/Directions    QUEtiapine 50 MG tablet   Commonly known as:  SEROquel   This may have changed:    - medication strength  - how much to take  - how to take this  - when to take this   Used for:  Bipolar I disorder, most recent episode depressed (H)   Changed by:  Mayito Motley MD        Dose:  150 mg   Take 3 tablets (150 mg) by mouth At Bedtime Take 0.5 tab HS for 5 nights then 1 tab HS for 5 nights then 1.5 tabs HS   Quantity:  90 tablet   Refills:  0            Where to get your medicines      These medications were sent to [place] [facility] [address] [place], [place] - [address] [facility] [facility]  [address] [facility] [address]     Phone:  [phone]     divalproex sodium extended-release 500 MG 24 hr tablet    QUEtiapine 50 MG tablet                Primary Care Provider Office Phone # Fax #    Aristides Jenkins [phone]       [address] [place] [address] [address]  [facility] [address]        Equal Access to Services     [facility] AH: Hadii aad ku hadasho Soomaali, waaxda luqadaha, qaybta kaalmada adeegyada, jermaine castillo . So [facility] [phone].    ATENCIÓN: Si habla español, tiene a osorio disposición servicios gratuitos de asistencia lingüística. Llame al [phone].    We comply with applicable federal civil rights laws and [place] laws. We do not discriminate on the basis of race,  color, national origin, age, disability, sex, sexual orientation, or gender identity.            Thank you!     Thank you for choosing PSYCHIATRY CLINIC  for your care. Our goal is always to provide you with excellent care. Hearing back from our patients is one way we can continue to improve our services. Please take a few minutes to complete the written survey that you may receive in the mail after your visit with us. Thank you!             Your Updated Medication List - Protect others around you: Learn how to safely use, store and throw away your medicines at www.disposemymeds.org.          This list is accurate as of 7/3/18  3:11 PM.  Always use your most recent med list.                   Brand Name Dispense Instructions for use Diagnosis    divalproex sodium extended-release 500 MG 24 hr tablet    DEPAKOTE ER    60 tablet    Take 2 tablets (1,000 mg) by mouth At Bedtime    Bipolar affective disorder, currently manic, severe, with psychotic features (H)       QUEtiapine 50 MG tablet    SEROquel    90 tablet    Take 3 tablets (150 mg) by mouth At Bedtime Take 0.5 tab HS for 5 nights then 1 tab HS for 5 nights then 1.5 tabs HS    Bipolar I disorder, most recent episode depressed (H)

## 2018-07-05 ENCOUNTER — TELEPHONE (OUTPATIENT)
Dept: PSYCHIATRY | Facility: CLINIC | Age: 33
End: 2018-07-05

## 2018-07-05 DIAGNOSIS — F31.30 BIPOLAR I DISORDER, MOST RECENT EPISODE DEPRESSED (H): ICD-10-CM

## 2018-07-05 RX ORDER — QUETIAPINE FUMARATE 50 MG/1
150 TABLET, FILM COATED ORAL AT BEDTIME
Qty: 90 TABLET | Refills: 0
Start: 2018-07-05 | End: 2018-08-16

## 2018-07-05 NOTE — TELEPHONE ENCOUNTER
[facility] Call Center    Phone Message    May a detailed message be left on voicemail: yes    Reason for Call: Medication Question or concern regarding medication   Prescription Clarification  Name of Medication: Quetiapine 50 mg  Prescribing Provider: Mayito Motley MD   Pharmacy: [facility]   What on the order needs clarification? Conflicting orders.  Is patient to take 3 tabs at bedtime, or titrate medication?    Call Pharmacist at: [phone]      Action Taken: Message routed to:  Other: Moraima Canseco RN

## 2018-07-05 NOTE — TELEPHONE ENCOUNTER
Per med tab:   QUEtiapine (SEROQUEL) 50 MG tablet 90 tablet 0 7/3/2018  No   Sig - Route: Take 3 tablets (150 mg) by mouth At Bedtime Take 0.5 tab HS for 5 nights then 1 tab HS for 5 nights then 1.5 tabs HS - Oral   Class: E-Prescribe     Per last visit note dated 7/3/18:   I would like Esa to stay on the 150 mg dose of Seroquel for at least 2 more weeks     Returned call to pharmacist  Clarified orders should read take 3 tablets (150 mg) qhs  Med tab updated to reflect this

## 2018-07-06 ASSESSMENT — PATIENT HEALTH QUESTIONNAIRE - PHQ9: SUM OF ALL RESPONSES TO PHQ QUESTIONS 1-9: 7

## 2018-07-11 ENCOUNTER — TELEPHONE (OUTPATIENT)
Dept: PSYCHIATRY | Facility: CLINIC | Age: 33
End: 2018-07-11

## 2018-07-11 NOTE — TELEPHONE ENCOUNTER
On 7/9/2018, 13 pages of records were received from [facility]. The original copies were sent to scanning and copies were put in Dr. Motley's folder.Lizzeth Martel LPN

## 2018-07-17 ENCOUNTER — TRANSFERRED RECORDS (OUTPATIENT)
Dept: HEALTH INFORMATION MANAGEMENT | Facility: CLINIC | Age: 33
End: 2018-07-17

## 2018-07-17 NOTE — PROGRESS NOTES
SUBJECTIVE:   Esa Rivera is a 33 year old male who presents to clinic today for the following health issues:      Bipolar 1 disorder  Patient recently had an episode that resulted with a spiritual connection. He was fasting and lost about 50lbs. Patient does not eat pork or poultry. The weight loss is a result of a combination of a vegan diet and exercise. Patient notes that he occasionally eats beef, milk, and dairy products. He felt well after changing his diet. Getting enough nutrients has been a topic of concern. His motivation for this change was to do his part to sustain the world. He states that his vegan diet change is due to philosophical and Yazdanism reasons.    Patient notes that he has been out of work since April 27th, 2018. It has put a strain on family relationships. It has been tough for him.     He has had a medication change with Depakote and Seroquel. He was taking Risperidone, but needed to change medications as he was loosing weight. Patient also experienced depression while taking it. Once taking Seroquel, the depression subsided. He was hospitalized. Patient last saw Dr. Motley on 07-, and has an appointment Today. He also saw a counselor Dr. Vipul Coreas 2.5 weeks ago.     Low BP  Patient reports having low blood pressure since April 27th, 2018. He notes having a stress test a year ago. He felt light headed when he stood up. Patient has had low pulses in the 46 range. He expresses that no family members have had a pacemaker. He has been biking 3 days a week for the past 3-4 weeks. He averages 15-20 miles daily in an hour. Patient does not experience lightheadedness during exercise. He drinks water while exercising. Seldom does he feel an ache in his chest.     Lump  Patient reports having a lump on his left rib that he would like a referral to Dermatology for.     Social History  Patient is  living with his wife, but does not have children.     He quit drinking alcohol prior  to April 27th, 2018. He has not been to AA, but he is interested.       Problem list and histories reviewed & adjusted, as indicated.  Additional history: as documented    Patient Active Problem List   Diagnosis     Attention deficit disorder     Recurrent dislocation of shoulder joint     Other acne     Molluscum contagiosum     Inflammatory liver disease, unspecified     Bipolar 1 disorder, manic, full remission (H)     Crystal (H)     Past Surgical History:   Procedure Laterality Date     BACK SURGERY       C EXPLORE SHOULDER JOINT  2003    Arthroplasty, Shoulder     SPINE SURGERY  2004    Fx thoracic, fusion        Social History   Substance Use Topics     Smoking status: Never Smoker     Smokeless tobacco: Never Used     Alcohol use No     Family History   Problem Relation Age of Onset     Cardiovascular Maternal Grandfather      Diabetes No family hx of      Coronary Artery Disease No family hx of      Cerebrovascular Disease No family hx of      Hypertension No family hx of          Current Outpatient Prescriptions   Medication Sig Dispense Refill     divalproex sodium extended-release (DEPAKOTE ER) 500 MG 24 hr tablet Take 2 tablets (1,000 mg) by mouth At Bedtime 60 tablet 0     QUEtiapine (SEROQUEL) 50 MG tablet Take 3 tablets (150 mg) by mouth At Bedtime 90 tablet 0     Allergies   Allergen Reactions     No Known Allergies      BP Readings from Last 3 Encounters:   07/18/18 108/66   05/20/18 (!) 140/91   05/15/18 114/69    Wt Readings from Last 3 Encounters:   07/18/18 96.6 kg (213 lb)   05/20/18 98.4 kg (217 lb)   05/15/18 100.2 kg (220 lb 12.8 oz)                  Labs reviewed in EPIC    Reviewed and updated as needed this visit by clinical staff  Allergies  Meds       Reviewed and updated as needed this visit by Provider         ROS:  Denies headache, insomnia, shortness of breath, cough, heartburn, bowel issues, bladder issues, neck pain, back pain, hip pain, knee pain, ankle pain, or foot pain.  "Remainder of ROS is negative unless otherwise noted above or in HPI.    This document serves as a record of the services and decisions personally performed and made by Aristides Jenkins MD. It was created on his behalf by Lauren Palacios, a trained medical scribe. The creation of this document is based on the provider's statements to the medical scribe.  Lauren Palacios 8:55 AM July 18, 2018    OBJECTIVE:     /66 (BP Location: Right arm, Patient Position: Sitting, Cuff Size: Adult Regular)  Pulse 56  Temp 98.5  F (36.9  C) (Oral)  Resp 12  Ht 1.905 m (6' 3\")  Wt 96.6 kg (213 lb)  SpO2 98%  BMI 26.62 kg/m2  Body mass index is 26.62 kg/(m^2).  GENERAL: healthy, alert and no distress  HENT: ear canals and TM's normal, nose and mouth without ulcers or lesions  NECK: no adenopathy, no asymmetry, masses, or scars and thyroid normal to palpation  RESP: lungs clear to auscultation - no rales, rhonchi or wheezes  CV: heart rate slow and regular, no extra sounds, regular rate and rhythm, normal S1 S2, no S3 or S4, no murmur, click or rub, no peripheral edema and peripheral pulses strong  ABDOMEN: 1 inch module to left lateral lower costal margin, otherwise soft, nontender, no hepatosplenomegaly, no masses and bowel sounds normal  SKIN: no suspicious lesions or rashes  NEURO: Normal strength and tone, mentation intact and speech normal  PSYCH: mentation appears normal, affect normal/bright    Diagnostic Test Results:  No results found for this or any previous visit (from the past 24 hour(s)).    ASSESSMENT/PLAN:     (F31.74) Bipolar 1 disorder, manic, full remission (H)  (primary encounter diagnosis)  Comment: Stable, unchanged  Plan: encouraged attending alcohol treatment and AA.     (Z78.9) Vegan diet  Plan: Vitamin B12, Folate       (R00.1) Bradycardia  Plan: Patient is going to monitor lightheadedness, and will contact if lasting greater than a minute.           This document serves as a record of the services and " decisions personally performed and made by Aristides Jenkins MD. It was created on his behalf by Lauren Palacios, a trained medical scribe. The creation of this document is based on the provider's statements to the medical scribe.  Lauren Palacios 8:55 AM July 18, 2018    The information in this document, created by the medical scribe for me, accurately reflects the services I personally performed and the decisions made by me. I have reviewed and approved this document for accuracy prior to leaving the patient care area.  July 18, 2018 10:44 AM    Aristides Jenkins MD  [facility]

## 2018-07-18 ENCOUNTER — OFFICE VISIT (OUTPATIENT)
Dept: FAMILY MEDICINE | Facility: CLINIC | Age: 33
End: 2018-07-18
Payer: COMMERCIAL

## 2018-07-18 ENCOUNTER — OFFICE VISIT (OUTPATIENT)
Dept: PSYCHIATRY | Facility: CLINIC | Age: 33
End: 2018-07-18
Attending: PSYCHIATRY & NEUROLOGY
Payer: COMMERCIAL

## 2018-07-18 VITALS
DIASTOLIC BLOOD PRESSURE: 66 MMHG | HEIGHT: 75 IN | RESPIRATION RATE: 12 BRPM | SYSTOLIC BLOOD PRESSURE: 108 MMHG | HEART RATE: 56 BPM | OXYGEN SATURATION: 98 % | BODY MASS INDEX: 26.49 KG/M2 | TEMPERATURE: 98.5 F | WEIGHT: 213 LBS

## 2018-07-18 VITALS
WEIGHT: 210.6 LBS | HEART RATE: 50 BPM | DIASTOLIC BLOOD PRESSURE: 77 MMHG | SYSTOLIC BLOOD PRESSURE: 115 MMHG | BODY MASS INDEX: 26.32 KG/M2

## 2018-07-18 DIAGNOSIS — F31.74 BIPOLAR 1 DISORDER, MANIC, FULL REMISSION (H): Primary | ICD-10-CM

## 2018-07-18 DIAGNOSIS — F31.30 BIPOLAR I DISORDER, MOST RECENT EPISODE DEPRESSED (H): Primary | ICD-10-CM

## 2018-07-18 DIAGNOSIS — R00.1 BRADYCARDIA: ICD-10-CM

## 2018-07-18 DIAGNOSIS — Z78.9 VEGAN DIET: ICD-10-CM

## 2018-07-18 LAB
FOLATE SERPL-MCNC: 5.7 NG/ML
VIT B12 SERPL-MCNC: 329 PG/ML (ref 193–986)

## 2018-07-18 PROCEDURE — 82746 ASSAY OF FOLIC ACID SERUM: CPT | Performed by: FAMILY MEDICINE

## 2018-07-18 PROCEDURE — 36415 COLL VENOUS BLD VENIPUNCTURE: CPT | Performed by: FAMILY MEDICINE

## 2018-07-18 PROCEDURE — 82607 VITAMIN B-12: CPT | Performed by: FAMILY MEDICINE

## 2018-07-18 PROCEDURE — 99214 OFFICE O/P EST MOD 30 MIN: CPT | Performed by: FAMILY MEDICINE

## 2018-07-18 ASSESSMENT — PAIN SCALES - GENERAL: PAINLEVEL: NO PAIN (0)

## 2018-07-18 NOTE — PATIENT INSTRUCTIONS
Please contact if episodes of lightheadedness are greater than a minute.     Strongly encouraged attending alcohol treatment and AA.

## 2018-07-18 NOTE — MR AVS SNAPSHOT
After Visit Summary   7/18/2018    Esa Rivera    MRN: [id number]           Patient Information     Date Of Birth          1985        Visit Information        Provider Department      7/18/2018 9:30 AM Mayito Motley MD [facility]        Today's Diagnoses     Bipolar I disorder, most recent episode depressed (H)    -  1      Care Instructions    Continue your current medications at their usual doses  Please return for follow-up in 1 week          Follow-ups after your visit        Follow-up notes from your care team     Return in about 1 week (around 7/25/2018).      Who to contact     Please call your clinic at [phone] to:    Ask questions about your health    Make or cancel appointments    Discuss your medicines    Learn about your test results    Speak to your doctor            Additional Information About Your Visit        MyChart Information     [employer] gives you secure access to your electronic health record. If you see a primary care provider, you can also send messages to your care team and make appointments. If you have questions, please call your primary care clinic.  If you do not have a primary care provider, please call [phone] and they will assist you.      [employer] is an electronic gateway that provides easy, online access to your medical records. With [employer], you can request a clinic appointment, read your test results, renew a prescription or communicate with your care team.     To access your existing account, please contact your [facility] or call [phone] for assistance.        Care EveryWhere ID     This is your Care EveryWhere ID. This could be used by other organizations to access your [facility] medical records  [id number]        Your Vitals Were     Pulse BMI (Body Mass Index)                50 26.32 kg/m2           Blood Pressure from Last 3 Encounters:   07/18/18 115/77   07/18/18 108/66   07/03/18 121/78     Weight from Last 3 Encounters:   07/18/18 95.5 kg (210 lb 9.6 oz)   07/18/18 96.6 kg (213 lb)   07/03/18 93.8 kg (206 lb 12.8 oz)              Today, you had the following     No orders found for display       Primary Care Provider Office Phone # Fax #    Aristides Jenkins [phone]       [address] [facility] [address]  [facility] [address]        Equal Access to Services     BROCK RUEDA : Hadii aad ku hadasho Soomaali, waaxda luqadaha, qaybta kaalmada adeegyada, waxay idiin hayaan adeeg elliearabarrie castillo . So [facility] [phone].    ATENCIÓN: Si habla español, tiene a osorio disposición servicios gratuitos de asistencia lingüística. [facility] [phone].    We comply with applicable federal civil rights laws and [place] laws. We do not discriminate on the basis of race, color, national origin, age, disability, sex, sexual orientation, or gender identity.            Thank you!     Thank you for choosing [facility] CLINIC  for your care. Our goal is always to provide you with excellent care. Hearing back from our patients is one way we can continue to improve our services. Please take a few minutes to complete the written survey that you may receive in the mail after your visit with us. Thank you!             Your Updated Medication List - Protect others around you: Learn how to safely use, store and throw away your medicines at www.disposemymeds.org.          This list is accurate as of 7/18/18 10:11 AM.  Always use your most recent med list.                   Brand Name Dispense Instructions for use Diagnosis    divalproex sodium extended-release 500 MG 24 hr tablet    DEPAKOTE ER    60 tablet    Take 2 tablets (1,000 mg) by mouth At Bedtime    Bipolar affective disorder, currently manic, severe, with psychotic features (H)       QUEtiapine 50 MG tablet    SEROquel    90 tablet    Take 3 tablets (150 mg) by mouth At Bedtime    Bipolar I disorder, most recent episode depressed (H)

## 2018-07-18 NOTE — MR AVS SNAPSHOT
After Visit Summary   7/18/2018    Esa Rivera    MRN: [id number]           Patient Information     Date Of Birth          1985        Visit Information        Provider Department      7/18/2018 8:30 AM Aristides Jenkins MD [facility]        Today's Diagnoses     Bipolar 1 disorder, manic, full remission (H)    -  1    Vegan diet        Atypical mole        Bradycardia          Care Instructions    Please contact if episodes of light headedness are greater than a minute.     Strongly encouraged attending alcohol treatment and AA.           Follow-ups after your visit        Additional Services     DERMATOLOGY REFERRAL       Your provider has referred you to: [facility]: [facility] [phone]   [email or web address]    Please be aware that coverage of these services is subject to the terms and limitations of your health insurance plan.  Call member services at your health plan with any benefit or coverage questions.      Please bring the following with you to your appointment:    (1) Any X-Rays, CTs or MRIs which have been performed.  Contact the facility where they were done to arrange for  prior to your scheduled appointment.    (2) List of current medications  (3) This referral request   (4) Any documents/labs given to you for this referral                  Your next 10 appointments already scheduled     Jul 18, 2018  9:30 AM CDT   Adult Med Follow UP with Mayito Motley MD   Psychiatry Clinic ([facility])    [place] [id number] [address] [phone]              Who to contact     If you have questions or need follow up information about today's clinic visit or your schedule please contact [facility] [place] at [phone].  Normal or non-critical lab and imaging results will be communicated to you by MyChart, letter or phone within 4  "business days after the clinic has received the results. If you do not hear from us within 7 days, please contact the clinic through [employer] or phone. If you have a critical or abnormal lab result, we will notify you by phone as soon as possible.  Submit refill requests through [employer] or call your pharmacy and they will forward the refill request to us. Please allow 3 business days for your refill to be completed.          Additional Information About Your Visit        [employer] Information     [employer] gives you secure access to your electronic health record. If you see a primary care provider, you can also send messages to your care team and make appointments. If you have questions, please call your primary care clinic.  If you do not have a primary care provider, please call [phone] and they will assist you.        Care EveryWhere ID     This is your Care EveryWhere ID. This could be used by other organizations to access your [place] medical records  [id number]        Your Vitals Were     Pulse Temperature Respirations Height Pulse Oximetry BMI (Body Mass Index)    56 98.5  F (36.9  C) (Oral) 12 1.905 m (6' 3\") 98% 26.62 kg/m2       Blood Pressure from Last 3 Encounters:   07/18/18 108/66   05/20/18 (!) 140/91   05/15/18 114/69    Weight from Last 3 Encounters:   07/18/18 96.6 kg (213 lb)   05/20/18 98.4 kg (217 lb)   05/15/18 100.2 kg (220 lb 12.8 oz)              We Performed the Following     DERMATOLOGY REFERRAL     Folate     Vitamin B12        Primary Care Provider Office Phone # Fax #    Aristides Jenkins [phone]       [address] [facility] [address]  [facility] [address]        Equal Access to Services     [facility] Hospital[facility]ANDRINA : Hadii milla Clark, waphilippeda lujudahadaha, qaybta kaalmada treva, jermaine castaneda. So [facility] [phone].    ATENCIÓN: Si habla español, tiene a osorio disposición servicios gratuitos de asistencia lingüística. Llame al " [phone].    We comply with applicable federal civil rights laws and [place] laws. We do not discriminate on the basis of race, color, national origin, age, disability, sex, sexual orientation, or gender identity.            Thank you!     Thank you for choosing [facility]  for your care. Our goal is always to provide you with excellent care. Hearing back from our patients is one way we can continue to improve our services. Please take a few minutes to complete the written survey that you may receive in the mail after your visit with us. Thank you!             Your Updated Medication List - Protect others around you: Learn how to safely use, store and throw away your medicines at www.disposemymeds.org.          This list is accurate as of 7/18/18  9:20 AM.  Always use your most recent med list.                   Brand Name Dispense Instructions for use Diagnosis    divalproex sodium extended-release 500 MG 24 hr tablet    DEPAKOTE ER    60 tablet    Take 2 tablets (1,000 mg) by mouth At Bedtime    Bipolar affective disorder, currently manic, severe, with psychotic features (H)       QUEtiapine 50 MG tablet    SEROquel    90 tablet    Take 3 tablets (150 mg) by mouth At Bedtime    Bipolar I disorder, most recent episode depressed (H)

## 2018-07-18 NOTE — PROGRESS NOTES
July 18, 2018  Progress Note    Esa Rivera is a 33 year old year old male with Bipolar I Disorder, Most Recent Episode Manic Severe, with psychotic behavior (296.40) who presents for ongoing psychiatric care. He has no children. He and his wife (a psychologist) live on their own in [place]. Esa works as an  for [employer].    Diagnosis    Axis 1: Bipolar I disorder, with severe manic episode with psychosis in Dec 2016. ADHD by history. Alcohol use disorder, moderate, in short-term remission  Axis 2: Nil  Axis 3: Remote concussion x 2  Axis 4: Moderate stressors  Axis 5: GAF at time of visit: 70    Assessment & Plan     Since the last visit, Esa's depressive symptoms have continued to improve.  They have not yet fully remitted.  Esa just finished attending the two-week day program at the [facility], and found this to be beneficial.  He particularly benefited from learning about the importance of maintaining regular social and biological rhythms.  He will continue his current medications at their usual doses for now.  He will return for follow-up in 1 week.  I have recommended that he remain off work for the time being.  We will discuss this further at his next appointment.    The patient understands the risks, benefits, adverse effects and alternatives. Agrees to treatment with the capacity to do so. No medical contraindications to treatment. Agrees to call clinic for any problems. The patient understands to call 911 or come to the nearest ED if life threatening or urgent symptoms present.    Attestation:  Patient has been seen and evaluated by me, Mayito Motley MD, PhD.    Interim History     Since the last visit, Esa's depressive symptoms have continued to improve.  He still feels low in the mornings, in terms of his mood, motivation, and energy level, but this picks up as the day goes on.  He is sleeping reasonably well at night.    Esa attended the two-week day program at the [facility].  He found  "this to be beneficial.  We had a lengthy discussion today about maintaining regular biological and social rhythms, which was an important component of the day program.  Esa has observed a lot of important information from this.  I have encouraged him to follow through on what he learned.    Esa is tolerating his medications well.  He does note some increased appetite, but is doing his best to moderate his intake and to be physically active.  I have encouraged him to do so.    Esa will return for follow-up in 1 week.  Depending on his degree of wellness, we will start discussions about her return to work date, and back to work accommodations.          In Dec 2016 Esa experienced an episode of severe katie with psychosis (grandiose delusions). He was seen in the ER but not hospitalized. He had some mild depressive episodes and possible (but questionable) short hypomanic periods in the preceding years, but never received treatment for these.     Esa's manic symptoms began when he became excited about a new idea at work. 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 He became so excited about this the he was unable to sleep. As his katie escalated, his sleep decreased to 1-2 hours per night for 5-6 nights. His affect was elated and also markedly irritable. He would \"go off\" on people for minor things in very uncharacteristic ways. His thoughts were \"really fast, crisp, and clear\". He saw \"connections in everything.\" His energy was clearly elevated. He became grandiose, believing that his idea would change the world and as a result he would meet many \"powerful people\". Eventually he came to believe that sings on the radio were meant for him.     Esa was taking Adderall at the time. He has taken this, on and off, for many years. In my opinion it is not an adequate explanation for his manic symptoms.      His mother (a PCP) " "became concerned and brought him to hospital. He was assessed in the ER and prescribed Ativan for sleep. He was seen by Dr Burt (a psychiatrist) the next day. He was prescribed OLZ and LTG. The doses were titrated \"up and down\" for the next 6 months. There were significant side effects (sedation, cognitive impairment), likely from OLZ. Most recently, Esa stopped OLZ for a period of time. In the context of being on a honeymoon in [place], he develop racing thoughts and difficulty sleeping and restarted it, with benefit. He currently takes 10 mg HS.    Esa endorses several previous short (1-2 day) periods of reduced sleep and elevated mood, typically in the context of getting involved in a work project. At most they represent subthreshold hypomanic periods.     He has also experienced several periods over the years, lasting about a week, of low mood, decreased energy and motivation, poor focus/concentration, and negative thoughts. He denies neurovegetative symptoms or SI. His wife thought that these represented depressive episodes.    Esa has consumed EtOH heavily at points during his life. It was most pronounced during his college years. He has sporadically binge-drank since then. He has not had a drink in 2 weeks.    Past Psych Hx: As above. Esa was assessed at [facility] in Dec 2016 and diagnosed with BDI. He was seen by Psychiatry at age 17 and dxed with ADD. He took Adderall off and on (mostly on) with benefit over the years since then. He denies abusing it.     Past Med Hx: 2 concussion: 1) age 15 - skiing. Age 16: football. LOC x a couple of minutes with both.    MEDICATION ADHERENCE: Good.   Current Medications     Current Outpatient Prescriptions   Medication Sig Dispense Refill     divalproex sodium extended-release (DEPAKOTE ER) 500 MG 24 hr tablet Take 2 tablets (1,000 mg) by mouth At Bedtime 60 tablet 0     QUEtiapine (SEROQUEL) 50 MG tablet Take 3 tablets (150 mg) by mouth At Bedtime 90 tablet 0     "     Substance use     ETOH: Sporadic binge drinking  Cigarettes: NA  Street Drugs: Denies    Review of Systems     A comprehensive review of systems was performed and is negative other than noted above.     Allergies     Allergies   Allergen Reactions     No Known Allergies         Mental Status Exam     /77  Pulse 50  Wt 95.5 kg (210 lb 9.6 oz)  BMI 26.32 kg/m2    Alertness: alert  and oriented  Appearance: well groomed  Behavior/Demeanor: cooperative and pleasant, with fair  eye contact  Speech: slowed  Language: intact  Psychomotor: slowed  Mood:  depressed  Affect: restricted; was not congruent to mood  Thought Process/Associations: unremarkable  Thought Content:  Denies suicidal ideation and violent ideation. He has grandiose delusions of a Lutheran nature.  Perception:  Denies auditory hallucinations and visual hallucinations  Insight: good  Judgment: good  Cognition:  does appear grossly intact.      PSYCHIATRY CLINIC INDIVIDUAL PSYCHOTHERAPY NOTE                                                     [16]   Start time - 0900        End time - 0930  Date reviewed - 2/7/2018       Date next due - 5/7/2018    Subjective: This supportive psychotherapy session addressed issues related to orientation to therapy plan and goals of therapy plan.  Patient's reaction: Maintenance in the context of mental status appropriate for ambulatory setting.  Progress: good  Plan: RTC 2 mos  Psychotherapy services during this visit included  myself and the patient.   TREATMENT  PLAN          SYMPTOMS; PROBLEMS   MEASURABLE GOALS;    FUNCTIONAL IMPROVEMENT INTERVENTIONS;   GAINS MADE DISCHARGE CRITERIA   Depression: depressed mood, anhedonia, low energy, insomnia and concentration problems   reduce depressive symptoms and reduce depressive episodes medications   psycho-education   self-care skills symptom resolution   Crystal/Hypomania: none   reduce manic/hypomanic episodes medications   psychotherapy  self-care skills  symptom resolution

## 2018-07-18 NOTE — NURSING NOTE
Chief Complaint   Patient presents with     RECHECK     Bipolar affective disorder, currently manic, severe, with psychotic features

## 2018-07-19 ENCOUNTER — TELEPHONE (OUTPATIENT)
Dept: FAMILY MEDICINE | Facility: CLINIC | Age: 33
End: 2018-07-19

## 2018-07-19 ENCOUNTER — TELEPHONE (OUTPATIENT)
Dept: PSYCHIATRY | Facility: CLINIC | Age: 33
End: 2018-07-19

## 2018-07-19 DIAGNOSIS — R79.89 LOW VITAMIN B12 LEVEL: Primary | ICD-10-CM

## 2018-07-19 ASSESSMENT — PATIENT HEALTH QUESTIONNAIRE - PHQ9: SUM OF ALL RESPONSES TO PHQ QUESTIONS 1-9: 3

## 2018-07-19 NOTE — TELEPHONE ENCOUNTER
[facility] Call Center    Phone Message    May a detailed message be left on voicemail: yes    Reason for Call: Form or Letter   Type or form/letter needing completion: Pt called back to notify nurse that he needs his office visits sent to disability as they discussed during a phone call earlier today.     Provider:            Action Taken: Message routed to:  Other: Rozina Canseco

## 2018-07-19 NOTE — TELEPHONE ENCOUNTER
Spoke with pt who confirms he would like copy of visit note from yesterday faxed to disability.  He is also requesting add'l documentation as to 1) why provider feels he is not able to return to work at this time and 2) we are unable to see him for an earlier appt although it was recommended he return in a week, as he worries that he will be denied disability extension.   Letter drafted and sent to Dr. Motley.  Pt asks for call once information has been faxed.

## 2018-07-19 NOTE — PROGRESS NOTES
Chris See: Your folate level is normal as expected.  Although your B12 is in the low normal range, it is still under 400 which is where many of us believe to be a better threshold.  Recommend setting up an appointment for nurse only injection every 3 months with an annual B12 level checked.  Please contact if questions.  Nice to see you!    Aristides

## 2018-07-19 NOTE — LETTER
July 19, 2018      RE: Esa Rivera  [address] [place] [address]         To Whom It May Concern,    Mr. Rivera is currently under my care.  I have been seeing him for regular clinic appointments and he has been adherent with treatment recommendations.      Over the last year-and-a-half Esa has had several severe manic episodes with psychosis, requiring several ER visits and an inpatient hospitalization. He is currently recovering from a severe depression.      Although Esa's depressive symptoms are improving, I recommend that he not return to work at this time.  Given the severity of his recent illness course, if he were to return to work too early, before his symptoms have fully stabilized, it would place him at high risk for decompensation and possible re-hospitalization.      I last saw Esa on 7/18/18 and requested that he return to clinic in 1 week.  Unfortunately, I do not have any appointment openings within this time-frame so I will re-evaluate his workability status on 7/31/18 which is my next available appointment availability.      Please do not hesitate to contact the clinic with any questions.      Sincerely,            Mayito Motley, Ph.D., M.D.

## 2018-07-19 NOTE — TELEPHONE ENCOUNTER
Dr. Jenkins,     Called patient today to give lab results regarding B12 level. Patient stated that he is okay with doing the B12 injections, but he is wondering if there are any alternatives, because he stated that he doesn't want to do the injections long-term.    Please advise.     Order for B12 injections cued.    Pat Persaud RN  [facility]

## 2018-07-19 NOTE — TELEPHONE ENCOUNTER
[facility] Call Center    Phone Message    May a detailed message be left on voicemail: yes    Reason for Call: Form or Letter   Type or form/letter needing completion: Disability Forms  Provider: aMyito Motley    Patient requests call from Dr. Motley's nurse to discuss disability forms.      Action Taken: Message routed to:  Other: Moraima Canseco RN

## 2018-07-19 NOTE — TELEPHONE ENCOUNTER
Spoke with pt who expresses frustration and concern that Dr. Motley wanted him to return in a week (last seen 7/18/18) but there are no appt openings.  His disability is currently approved through 7/24/18.  Pt did make an appt for 7/31/18.  He is on wait list for an earlier appt should there be any cancellations next week.  Pt states he would need 30-60 min to get to clinic for an appt if there is a late cancellation.  Encouraged pt to contact disability company to request an extension since his next appt is not until 7/31/18.  If they need add'l documentation from us that he is unable to be seen sooner, d/t lack of appt availability, offered we could also send a letter explaining this.  Pt is appreciative and will call back if disability needs copy of visit note from 7/18 or any add'l documentation about next appt.  Will send to Dr. Motley as an FYI.

## 2018-07-19 NOTE — Clinical Note
Feel free to make edits and route back to me.  Once I hear from you I will print for you to sign.  Thanks !  Rozina

## 2018-07-20 RX ORDER — CYANOCOBALAMIN 1000 UG/ML
1 INJECTION, SOLUTION INTRAMUSCULAR; SUBCUTANEOUS
Qty: 4 ML | Refills: 0 | Status: CANCELLED | OUTPATIENT
Start: 2018-07-20 | End: 2019-07-20

## 2018-07-20 NOTE — TELEPHONE ENCOUNTER
Patient called clinic and was notified of provider message. Pt wants to think about what he would like to do and will call back if he would like order for B12 injections signed.    Pat Persaud RN  [facility]

## 2018-07-20 NOTE — TELEPHONE ENCOUNTER
-Letter completed/signed by Dr. Motley (see med tab)  -Faxed with copy of visit note dated 7/18/18 to:    [facility]              Fax: [phone]              Claim: [id number]  -Pt notified    -Pt also has questions about Vit B12  -His PCP is recommending B12 injections or oral supplement based on recent level  -Pt asks about potential interactions with his meds and if it can have any negative effect on mood  -Shared with pt that per [employer] there are no drug interactions  -Discuss that writer was unaware of any negative effects that it could have on mood  -Will notify Dr. Motley and call pt back if there are any concerns with him getting this supplement

## 2018-07-20 NOTE — TELEPHONE ENCOUNTER
Yes.  Okay to try OTC 50 mg dose once daily with a recheck of the level in 3-4 months.  Please notify patient, thanks Aristides

## 2018-07-20 NOTE — TELEPHONE ENCOUNTER
Message  Received: Today       Mayito Motley MD Blake, Katherine J RN       Caller: Unspecified (Yesterday, 10:24 AM)                     Chris Handy -     I agree with the advice you gave Esa.  If his B12 level is low, and his PCP is recommending supplementation, he should do that.  I am also not aware of any drug interactions with B12, nor negative effects on mood.  Low B12 levels can sometimes contribute to anemia and fatigue, and if so there is a possibility that these could even improve with supplementation.     DB

## 2018-07-30 ENCOUNTER — TELEPHONE (OUTPATIENT)
Dept: FAMILY MEDICINE | Facility: CLINIC | Age: 33
End: 2018-07-30

## 2018-07-30 ENCOUNTER — MYC MEDICAL ADVICE (OUTPATIENT)
Dept: FAMILY MEDICINE | Facility: CLINIC | Age: 33
End: 2018-07-30

## 2018-07-30 DIAGNOSIS — E53.8 VITAMIN B12 DEFICIENCY (NON ANEMIC): Primary | ICD-10-CM

## 2018-07-30 RX ORDER — CYANOCOBALAMIN 1000 UG/ML
1 INJECTION, SOLUTION INTRAMUSCULAR; SUBCUTANEOUS
Qty: 10 ML | Refills: 0 | OUTPATIENT
Start: 2018-07-30 | End: 2019-07-30

## 2018-07-30 NOTE — TELEPHONE ENCOUNTER
Dr. Jenkins,    Patient is requesting a prescription for Vit B12 for injections.     Order cued. Please review/sign or advise.    Pat Persaud RN  [facility]

## 2018-07-30 NOTE — TELEPHONE ENCOUNTER
Reason for call:  Order   Order or referral being requested: b12 shot  Reason for request: diet  Date needed: as soon as possible  Has the patient been seen by the PCP for this problem? YES    Additional comments: Important: pt wants to get the injection tomorrow 7/31 was informed he would need to get the prescription from a [harm[facility] and bring it here for the injection    Phone number to reach patient:  Home number on file [phone] (home)    Best Time:  n/a    Can we leave a detailed message on this number?  YES

## 2018-07-31 ENCOUNTER — OFFICE VISIT (OUTPATIENT)
Dept: PSYCHIATRY | Facility: CLINIC | Age: 33
End: 2018-07-31
Attending: PSYCHIATRY & NEUROLOGY
Payer: COMMERCIAL

## 2018-07-31 ENCOUNTER — TELEPHONE (OUTPATIENT)
Dept: FAMILY MEDICINE | Facility: CLINIC | Age: 33
End: 2018-07-31

## 2018-07-31 ENCOUNTER — ALLIED HEALTH/NURSE VISIT (OUTPATIENT)
Dept: NURSING | Facility: CLINIC | Age: 33
End: 2018-07-31
Payer: COMMERCIAL

## 2018-07-31 VITALS
BODY MASS INDEX: 26.55 KG/M2 | WEIGHT: 212.4 LBS | DIASTOLIC BLOOD PRESSURE: 74 MMHG | SYSTOLIC BLOOD PRESSURE: 113 MMHG | HEART RATE: 61 BPM

## 2018-07-31 DIAGNOSIS — F31.30 BIPOLAR I DISORDER, MOST RECENT EPISODE DEPRESSED (H): Primary | ICD-10-CM

## 2018-07-31 DIAGNOSIS — E53.8 VITAMIN B12 DEFICIENCY (NON ANEMIC): Primary | ICD-10-CM

## 2018-07-31 PROCEDURE — 99207 ZZC NO CHARGE NURSE ONLY: CPT

## 2018-07-31 PROCEDURE — G0463 HOSPITAL OUTPT CLINIC VISIT: HCPCS | Mod: ZF

## 2018-07-31 ASSESSMENT — PAIN SCALES - GENERAL: PAINLEVEL: NO PAIN (0)

## 2018-07-31 NOTE — MR AVS SNAPSHOT
After Visit Summary   7/31/2018    Esa Rivera    MRN: [id number]           Patient Information     Date Of Birth          1985        Visit Information        Provider Department      7/31/2018 3:30 PM RD FP RN [facility]        Today's Diagnoses     Vitamin B12 deficiency (non anemic)    -  1       Follow-ups after your visit        Your next 10 appointments already scheduled     Aug 08, 2018  4:00 PM CDT   Office Visit with Aristides Jenkins MD   [facility]    [address] [place] [id number] [phone]           Bring a current list of meds and any records pertaining to this visit. For Physicals, please bring immunization records and any forms needing to be filled out. Please arrive 10 minutes early to complete paperwork.            Aug 22, 2018  3:30 PM CDT   Adult Med Follow UP with Mayito Motley MD   Psychiatry Clinic ([facility])    [address] [address] [phone]              Who to contact     If you have questions or need follow up information about today's clinic visit or your schedule please contact [facility] directly at [phone].  Normal or non-critical lab and imaging results will be communicated to you by MyChart, letter or phone within 4 business days after the clinic has received the results. If you do not hear from us within 7 days, please contact the clinic through [employer] or phone. If you have a critical or abnormal lab result, we will notify you by phone as soon as possible.  Submit refill requests through [employer] or call your pharmacy and they will forward the refill request to us. Please allow 3 business days for your refill to be completed.          Additional Information About Your Visit        [employer] Information     [employer] gives you secure access to your electronic  health record. If you see a primary care provider, you can also send messages to your care team and make appointments. If you have questions, please call your primary care clinic.  If you do not have a primary care provider, please call [phone] and they will assist you.        Care EveryWhere ID     This is your Care EveryWhere ID. This could be used by other organizations to access your [place] medical records  [id number]         Blood Pressure from Last 3 Encounters:   07/18/18 108/66   05/20/18 (!) 140/91   05/15/18 114/69    Weight from Last 3 Encounters:   07/18/18 213 lb (96.6 kg)   05/20/18 217 lb (98.4 kg)   05/15/18 220 lb 12.8 oz (100.2 kg)              Today, you had the following     No orders found for display       Primary Care Provider Office Phone # Fax #    Aristides Jenkins [phone]       [address] [address]        Equal Access to Services     ALEX RUEDA : Hadii aad ku hadasho Soomaali, waaxda luqadaha, qaybta kaalmada adeegyada, waxay suzette hayraphael castillo . So [facility] [phone].    ATENCIÓN: Si habla español, tiene a osorio disposición servicios gratuitos de asistencia lingüística. [facility] [phone].    We comply with applicable federal civil rights laws and [place] laws. We do not discriminate on the basis of race, color, national origin, age, disability, sex, sexual orientation, or gender identity.            Thank you!     Thank you for choosing [facility]  for your care. Our goal is always to provide you with excellent care. Hearing back from our patients is one way we can continue to improve our services. Please take a few minutes to complete the written survey that you may receive in the mail after your visit with us. Thank you!             Your Updated Medication List - Protect others around you: Learn how to safely use, store and throw away your medicines at www.disposemymeds.org.          This  list is accurate as of 7/31/18  4:25 PM.  Always use your most recent med list.                   Brand Name Dispense Instructions for use Diagnosis    cyanocobalamin 1000 MCG/ML injection    VITAMIN B12    10 mL    Inject 1 mL (1,000 mcg) into the muscle every 3 months    Vitamin B12 deficiency (non anemic)       divalproex sodium extended-release 500 MG 24 hr tablet    DEPAKOTE ER    60 tablet    Take 2 tablets (1,000 mg) by mouth At Bedtime    Bipolar affective disorder, currently manic, severe, with psychotic features (H)       QUEtiapine 50 MG tablet    SEROquel    90 tablet    Take 3 tablets (150 mg) by mouth At Bedtime    Bipolar I disorder, most recent episode depressed (H)

## 2018-07-31 NOTE — PROGRESS NOTES
July 18, 2018  Progress Note    Esa Rivera is a 33 year old year old male with Bipolar I Disorder, Most Recent Episode Manic Severe, with psychotic behavior (296.40) who presents for ongoing psychiatric care. He has no children. He and his wife (a psychologist) live on their own in [place] [facility]. Esa works as an  for [employer].    Diagnosis    Axis 1: Bipolar I disorder, with severe manic episode with psychosis in Dec 2016. ADHD by history. Alcohol use disorder, moderate, in short-term remission  Axis 2: Nil  Axis 3: Remote concussion x 2  Axis 4: Moderate stressors  Axis 5: GAF at time of visit: 70    Assessment & Plan     Since the last visit, Esa's depressive symptoms appear to have remitted.  He denies any symptoms of katie or psychosis.  He feels ready to return to work.  I have written him a letter for work accommodations.  He will resume work part-time tomorrow, and return to full-time in 2 weeks.  He will continue taking his usual medications at their current doses.  He will return here for follow-up in 2 weeks    The patient understands the risks, benefits, adverse effects and alternatives. Agrees to treatment with the capacity to do so. No medical contraindications to treatment. Agrees to call clinic for any problems. The patient understands to call 911 or come to the nearest ED if life threatening or urgent symptoms present.    Attestation:  Patient has been seen and evaluated by me, Mayito Motley MD, PhD.    Interim History     Since the last visit, Esa reports that things have been going well.  He denies symptoms of depression, katie, or psychosis.  His mood is stable.  He is sleeping well.  His motivation and energy level are reasonable.    Esa feels best when he is being active.  He enjoys cycling.  He feels bored when he does not have much to do.  He enjoys spending time with family and friends.    Esa feels ready to return to work, and I also feel that this is appropriate.  Given the  "severity of his illness, and the number of recent mood episodes he has experienced, I feel it is important for him to have appropriate accommodations in place.  I have recommended that he return half-time for 2 weeks, and then to full-time duties if that goes well.  His work day should not exceed 8 hours per day for the first 3 months, and should be during daytime hours.  He needs a manageable workload to avoid excessive stress.    Esa also feels these accommodations are appropriate.  He will continue his usual medications and will return for follow-up in 2 weeks.          In Dec 2016 Esa experienced an episode of severe katie with psychosis (grandiose delusions). He was seen in the ER but not hospitalized. He had some mild depressive episodes and possible (but questionable) short hypomanic periods in the preceding years, but never received treatment for these.     Esa's manic symptoms began when he became excited about a new idea at work. 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 He became so excited about this the he was unable to sleep. As his katie escalated, his sleep decreased to 1-2 hours per night for 5-6 nights. His affect was elated and also markedly irritable. He would \"go off\" on people for minor things in very uncharacteristic ways. His thoughts were \"really fast, crisp, and clear\". He saw \"connections in everything.\" His energy was clearly elevated. He became grandiose, believing that his idea would change the world and as a result he would meet many \"powerful people\". Eventually he came to believe that sings on the radio were meant for him.     Esa was taking Adderall at the time. He has taken this, on and off, for many years. In my opinion it is not an adequate explanation for his manic symptoms.      His mother (a PCP) became concerned and brought him to hospital. He was assessed in the ER and " "prescribed Ativan for sleep. He was seen by Dr Burt (a psychiatrist) the next day. He was prescribed OLZ and LTG. The doses were titrated \"up and down\" for the next 6 months. There were significant side effects (sedation, cognitive impairment), likely from OLZ. Most recently, Esa stopped OLZ for a period of time. In the context of being on a honeymoon in [place], he develop racing thoughts and difficulty sleeping and restarted it, with benefit. He currently takes 10 mg HS.    Esa endorses several previous short (1-2 day) periods of reduced sleep and elevated mood, typically in the context of getting involved in a work project. At most they represent subthreshold hypomanic periods.     He has also experienced several periods over the years, lasting about a week, of low mood, decreased energy and motivation, poor focus/concentration, and negative thoughts. He denies neurovegetative symptoms or SI. His wife thought that these represented depressive episodes.    Esa has consumed EtOH heavily at points during his life. It was most pronounced during his college years. He has sporadically binge-drank since then. He has not had a drink in 2 weeks.    Past Psych Hx: As above. Esa was assessed at [place] in Dec 2016 and diagnosed with BDI. He was seen by Psychiatry at age 17 and dxed with ADD. He took Adderall off and on (mostly on) with benefit over the years since then. He denies abusing it.     Past Med Hx: 2 concussion: 1) age 15 - skiing. Age 16: football. LOC x a couple of minutes with both.    MEDICATION ADHERENCE: Good.   Current Medications     Current Outpatient Prescriptions   Medication Sig Dispense Refill     cyanocobalamin (VITAMIN B12) 1000 MCG/ML injection Inject 1 mL (1,000 mcg) into the muscle every 3 months 10 mL 0     divalproex sodium extended-release (DEPAKOTE ER) 500 MG 24 hr tablet Take 2 tablets (1,000 mg) by mouth At Bedtime 60 tablet 0     QUEtiapine (SEROQUEL) 50 MG tablet Take 3 tablets (150 mg) " by mouth At Bedtime 90 tablet 0         Substance use     ETOH: Sporadic binge drinking  Cigarettes: NA  Street Drugs: Denies    Review of Systems     A comprehensive review of systems was performed and is negative other than noted above.     Allergies     Allergies   Allergen Reactions     No Known Allergies         Mental Status Exam     /74  Pulse 61  Wt 96.3 kg (212 lb 6.4 oz)  BMI 26.55 kg/m2    Alertness: alert  and oriented  Appearance: well groomed  Behavior/Demeanor: cooperative and pleasant, with fair  eye contact  Speech: slowed  Language: intact  Psychomotor: slowed  Mood:  description consistent with euthymia  Affect: full range; was not congruent to mood  Thought Process/Associations: unremarkable  Thought Content:  Denies suicidal ideation and violent ideation. He has grandiose delusions of a Hinduism nature.  Perception:  Denies auditory hallucinations and visual hallucinations  Insight: good  Judgment: good  Cognition:  does appear grossly intact.      PSYCHIATRY CLINIC INDIVIDUAL PSYCHOTHERAPY NOTE                                                     [16]   Start time - 0900        End time - 0930  Date reviewed - 2/7/2018       Date next due - 5/7/2018    Subjective: This supportive psychotherapy session addressed issues related to orientation to therapy plan and goals of therapy plan.  Patient's reaction: Maintenance in the context of mental status appropriate for ambulatory setting.  Progress: good  Plan: RTC 2 mos  Psychotherapy services during this visit included  myself and the patient.   TREATMENT  PLAN          SYMPTOMS; PROBLEMS   MEASURABLE GOALS;    FUNCTIONAL IMPROVEMENT INTERVENTIONS;   GAINS MADE DISCHARGE CRITERIA   Depression: depressed mood, anhedonia, low energy, insomnia and concentration problems   reduce depressive symptoms and reduce depressive episodes medications   psycho-education   self-care skills symptom resolution   [facility]/Hypomania: none   reduce  manic/hypomanic episodes medications   psychotherapy  self-care skills symptom resolution

## 2018-07-31 NOTE — TELEPHONE ENCOUNTER
Reason for call:  Other   Patient called regarding (reason for call): prescription  Additional comments: Pt contacted his pharmacy and they have not received the prescription for the b12 injection. He is supposed to be coming into the clinic today to have that administered, and would like to make sure that he will be able to pick it up in time.     Phone number to reach patient:  Home number on file [phone] (home)    Best Time:  Any    Can we leave a detailed message on this number?  YES

## 2018-07-31 NOTE — PROGRESS NOTES
Pt wanted to learn how to self inject IM med.  Teaching was done prepare syringe, gave him 1 syringe and needle for home, wipe off top of vial, insert needle, save cap, fill vial with the 1ml of air. Then draw up the 1ml of med. Recap needle, prepare injection site by cleaning area on the big muscle of leg or arm with alcohol, let dry. Uncap needle, insert into the prepared area of choice. Slowly inject med into muscle. Remove needle from leg, dispose of sharp in container. Apply bandaid if needed.  If not able to do on self call clinic and make appointment, nurse only    Pt verbalized understanding of instructioins    Parul Meyer RN   [facility]

## 2018-07-31 NOTE — MR AVS SNAPSHOT
After Visit Summary   7/31/2018    Esa Rivera    MRN: [id number]           Patient Information     Date Of Birth          1985        Visit Information        Provider Department      7/31/2018 2:00 PM Mayito Motley MD [facility]        Today's Diagnoses     Bipolar I disorder, most recent episode depressed (H)    -  1      Care Instructions    Continue your current medications at their usual doses    Please return for follow-up in 2 weeks          Follow-ups after your visit        Follow-up notes from your care team     Return in about 2 weeks (around 8/14/2018).      Your next 10 appointments already scheduled     Aug 22, 2018  3:30 PM CDT   Adult Med Follow UP with Mayito Motley MD   Psychiatry Clinic ([facility])    [place] [id number] [address] [phone]              Who to contact     Please call your clinic at [phone] to:    Ask questions about your health    Make or cancel appointments    Discuss your medicines    Learn about your test results    Speak to your doctor            Additional Information About Your Visit        [employer] Information     [employer] gives you secure access to your electronic health record. If you see a primary care provider, you can also send messages to your care team and make appointments. If you have questions, please call your primary care clinic.  If you do not have a primary care provider, please call [phone] and they will assist you.      [employer] is an electronic gateway that provides easy, online access to your medical records. With [employer], you can request a clinic appointment, read your test results, renew a prescription or communicate with your care team.     To access your existing account, please contact your [facility] or call [phone] for assistance.        Care EveryWhere ID     This is your Care EveryWhere ID. This  could be used by other organizations to access your [place] medical records  [id number]        Your Vitals Were     Pulse BMI (Body Mass Index)                61 26.55 kg/m2           Blood Pressure from Last 3 Encounters:   07/31/18 113/74   07/18/18 115/77   07/18/18 108/66    Weight from Last 3 Encounters:   07/31/18 96.3 kg (212 lb 6.4 oz)   07/18/18 95.5 kg (210 lb 9.6 oz)   07/18/18 96.6 kg (213 lb)              Today, you had the following     No orders found for display       Primary Care Provider Office Phone # Fax #    Aristides Jenkins [phone]       [address] [address]        Equal Access to Services     BROCK RUEDA : Hadii milla kento Soameena, waaxda luqadaha, qaybta kaalmada adeegyada, jermaine castillo . So [facility] [phone].    ATENCIÓN: Si habla español, tiene a osorio disposición servicios gratuitos de asistencia lingüística. Llame al [phone].    We comply with applicable federal civil rights laws and [place] laws. We do not discriminate on the basis of race, color, national origin, age, disability, sex, sexual orientation, or gender identity.            Thank you!     Thank you for choosing [facility]  for your care. Our goal is always to provide you with excellent care. Hearing back from our patients is one way we can continue to improve our services. Please take a few minutes to complete the written survey that you may receive in the mail after your visit with us. Thank you!             Your Updated Medication List - Protect others around you: Learn how to safely use, store and throw away your medicines at www.disposemymeds.org.          This list is accurate as of 7/31/18  3:35 PM.  Always use your most recent med list.                   Brand Name Dispense Instructions for use Diagnosis    cyanocobalamin 1000 MCG/ML injection    VITAMIN B12    10 mL    Inject 1 mL (1,000 mcg) into the muscle every 3  months    Vitamin B12 deficiency (non anemic)       divalproex sodium extended-release 500 MG 24 hr tablet    DEPAKOTE ER    60 tablet    Take 2 tablets (1,000 mg) by mouth At Bedtime    Bipolar affective disorder, currently manic, severe, with psychotic features (H)       QUEtiapine 50 MG tablet    SEROquel    90 tablet    Take 3 tablets (150 mg) by mouth At Bedtime    Bipolar I disorder, most recent episode depressed (H)

## 2018-08-01 ENCOUNTER — TELEPHONE (OUTPATIENT)
Dept: PSYCHIATRY | Facility: CLINIC | Age: 33
End: 2018-08-01

## 2018-08-01 NOTE — TELEPHONE ENCOUNTER
[facility] Call Center    Phone Message    May a detailed message be left on voicemail: yes    Reason for Call: Form or Letter   Type or form/letter needing completion: short-term disability  Provider: Tolu  Once completed: Fax form to: The patient said you should already know the fax number      Action Taken: Message routed to:  Other: Moraima Canseco RN

## 2018-08-01 NOTE — TELEPHONE ENCOUNTER
Spoke with pt who is requesting to have office note from yesterday and workability letter faxed to disability company.  Pt okay with letter being faxed w/o provider signature saying that he has a signed copy if needed.   IRMA is already on file.    -Letter completed by Dr. Motley (see med tab)  -Faxed with copy of visit note dated 7/31/18 to:    [facility]              Fax: [phone]              Claim: [id number]

## 2018-08-02 ENCOUNTER — TELEPHONE (OUTPATIENT)
Dept: PSYCHIATRY | Facility: CLINIC | Age: 33
End: 2018-08-02

## 2018-08-02 ASSESSMENT — PATIENT HEALTH QUESTIONNAIRE - PHQ9: SUM OF ALL RESPONSES TO PHQ QUESTIONS 1-9: 4

## 2018-08-02 NOTE — TELEPHONE ENCOUNTER
On 8/2/2018, 9 pages of records were received from [facility]. The original copies were sent to scanning and copies were put in Dr. Motley's folder.Lizzeth Martel LPN

## 2018-08-03 ENCOUNTER — TRANSFERRED RECORDS (OUTPATIENT)
Dept: HEALTH INFORMATION MANAGEMENT | Facility: CLINIC | Age: 33
End: 2018-08-03

## 2018-08-06 NOTE — PROGRESS NOTES
"  SUBJECTIVE:   Esa Rivera is a 33 year old male who presents to clinic today for the following health issues:    Genitourinary - Male  Onset: ***    Description:   Dysuria (painful urination): { :487303}  Hematuria (blood in urine): { :384325}  Frequency: { :358543}  Are you urinating at night : { :150193}  Hesitancy (delay in urine): { :990733}  Retention (unable to empty): { :100572}  Decrease in urinary flow: { :114174}  Incontinence: { :040013}    Progression of Symptoms:  {.:435146}    Accompanying Signs & Symptoms:  Fever: { :770091}  Back/Flank pain: { :514026}  Urethral discharge: { :652251}  Testicle lumps/masses/pain: { :745860}  Nausea and/or vomiting: { :787626}  Abdominal pain: { :615570}    History:   History of frequent UTI's: { :563474}  History of kidney stones: { :556684}  History of hernias: { :051043}  Personal or Family history of Prostate problems: {.:066343::\"no\"}  Sexually active: { :180421}    Precipitating factors:   ***    Alleviating factors:  ***          Reviewed and updated as needed this visit by clinical staff       Reviewed and updated as needed this visit by Provider         ROS:      OBJECTIVE:     There were no vitals taken for this visit.  There is no height or weight on file to calculate BMI.    {Diagnostic Test Results:267217::\"Diagnostic Test Results:\",\"none \"}    ASSESSMENT/PLAN:     {Associate for [employer]}    {Quality Requirements:751123}    {Diag Picklist:860194}    {Follow-Up:756361}    Aristides Jenkins MD  [facility]    "

## 2018-08-08 ENCOUNTER — OFFICE VISIT (OUTPATIENT)
Dept: FAMILY MEDICINE | Facility: CLINIC | Age: 33
End: 2018-08-08
Payer: COMMERCIAL

## 2018-08-08 VITALS
SYSTOLIC BLOOD PRESSURE: 110 MMHG | RESPIRATION RATE: 18 BRPM | TEMPERATURE: 98.3 F | OXYGEN SATURATION: 100 % | HEART RATE: 61 BPM | WEIGHT: 219 LBS | BODY MASS INDEX: 27.37 KG/M2 | DIASTOLIC BLOOD PRESSURE: 70 MMHG

## 2018-08-08 DIAGNOSIS — Z87.828 HISTORY OF NECK INJURY: ICD-10-CM

## 2018-08-08 DIAGNOSIS — F31.74 BIPOLAR 1 DISORDER, MANIC, FULL REMISSION (H): ICD-10-CM

## 2018-08-08 DIAGNOSIS — G56.03 BILATERAL CARPAL TUNNEL SYNDROME: Primary | ICD-10-CM

## 2018-08-08 DIAGNOSIS — M77.01 MEDIAL EPICONDYLITIS OF ELBOW, RIGHT: ICD-10-CM

## 2018-08-08 PROCEDURE — 99215 OFFICE O/P EST HI 40 MIN: CPT | Performed by: FAMILY MEDICINE

## 2018-08-08 NOTE — MR AVS SNAPSHOT
After Visit Summary   8/8/2018    Esa Rivera    MRN: [id number]           Patient Information     Date Of Birth          1985        Visit Information        Provider Department      8/8/2018 4:00 PM Aristides Jenkins MD [facility]        Today's Diagnoses     Bilateral carpal tunnel syndrome    -  1    Medial epicondylitis of elbow, right        History of neck injury          Care Instructions    Cut volume of liquid intake to a 1/3 less, and try not to drink anything in the evening.     If symptoms are not better in 6 weeks, let me know.     Try to get Ace material sports brace for right elbow and carpal tunnel splints.           Follow-ups after your visit        Your next 10 appointments already scheduled     Aug 22, 2018  3:30 PM CDT   Adult Med Follow UP with Mayito Motley MD   Psychiatry Clinic ([facility])    [place] [id number]  [address] [place] [address] [phone]              Who to contact     If you have questions or need follow up information about today's clinic visit or your schedule please contact [facility] directly at [phone].  Normal or non-critical lab and imaging results will be communicated to you by [employer], letter or phone within 4 business days after the clinic has received the results. If you do not hear from us within 7 days, please contact the clinic through [employer] or phone. If you have a critical or abnormal lab result, we will notify you by phone as soon as possible.  Submit refill requests through [employer] or call your pharmacy and they will forward the refill request to us. Please allow 3 business days for your refill to be completed.          Additional Information About Your Visit        [employer] Information     [employer] gives you secure access to your electronic health record. If you see a primary care provider, you can also send messages to your care team and  make appointments. If you have questions, please call your primary care clinic.  If you do not have a primary care provider, please call [phone] and they will assist you.        Care EveryWhere ID     This is your Care EveryWhere ID. This could be used by other organizations to access your [place] medical records  [id number]        Your Vitals Were     Pulse Temperature Respirations Pulse Oximetry BMI (Body Mass Index)       61 98.3  F (36.8  C) (Oral) 18 100% 27.37 kg/m2        Blood Pressure from Last 3 Encounters:   08/08/18 110/70   07/31/18 113/74   07/18/18 115/77    Weight from Last 3 Encounters:   08/08/18 219 lb (99.3 kg)   07/31/18 212 lb 6.4 oz (96.3 kg)   07/18/18 210 lb 9.6 oz (95.5 kg)              Today, you had the following     No orders found for display       Primary Care Provider Office Phone # Fax #    Aristides Jenkins [phone]       [address] [address]        Equal Access to Services     ALEX [facility] SystemALEJANDRINA : Hadii aad ku hadasho Soameena, waaxda luqadaha, qaybta kaalmada adebassamyada, jermaine castillo . So [facility] [phone].    ATENCIÓN: Si habla español, tiene a osorio disposición servicios gratuitos de asistencia lingüística. [facility] [phone].    We comply with applicable federal civil rights laws and [place] laws. We do not discriminate on the basis of race, color, national origin, age, disability, sex, sexual orientation, or gender identity.            Thank you!     Thank you for choosing [facility]  for your care. Our goal is always to provide you with excellent care. Hearing back from our patients is one way we can continue to improve our services. Please take a few minutes to complete the written survey that you may receive in the mail after your visit with us. Thank you!             Your Updated Medication List - Protect others around you: Learn how to safely use, store and throw away your  medicines at www.disposemymeds.org.          This list is accurate as of 8/8/18  4:31 PM.  Always use your most recent med list.                   Brand Name Dispense Instructions for use Diagnosis    cyanocobalamin 1000 MCG/ML injection    VITAMIN B12    10 mL    Inject 1 mL (1,000 mcg) into the muscle every 3 months    Vitamin B12 deficiency (non anemic)       divalproex sodium extended-release 500 MG 24 hr tablet    DEPAKOTE ER    60 tablet    Take 2 tablets (1,000 mg) by mouth At Bedtime    Bipolar affective disorder, currently manic, severe, with psychotic features (H)       QUEtiapine 50 MG tablet    SEROquel    90 tablet    Take 3 tablets (150 mg) by mouth At Bedtime    Bipolar I disorder, most recent episode depressed (H)

## 2018-08-08 NOTE — PROGRESS NOTES
"  SUBJECTIVE:   Esa Rivera is a 33 year old male who presents to clinic today for the following health issues:    Onset: Month and a half- two months  Sensory pain and tingling in arm just doesn't go away. Not sharp pain  Will get worse if he rests his arm in one position for a while    Genitourinary - Male  Onset: Last few weeks    Description:   Dysuria (painful urination): no   Hematuria (blood in urine): no   Frequency: no   Are you urinating at night : no   Hesitancy (delay in urine): no   Retention (unable to empty): no   Decrease in urinary flow: no   Incontinence: YES- happened last night again. Has happened 4-5 times over the last few weeks. Waking up a little too late to get to the rest room    Progression of Symptoms:  intermittent    Accompanying Signs & Symptoms:  Fever: no   Back/Flank pain: Feels sometimes like his lower back is achy  Urethral discharge: no   Testicle lumps/masses/pain: no   Nausea and/or vomiting: no   Abdominal pain: no     History:   History of frequent UTI's: no   History of kidney stones: no   History of hernias: no   Personal or Family history of Prostate problems: grandpa prostate cancer. gene mutation   Sexually active: YES    Precipitating factors:   Drinking Herbal tea, noticed it happens after this     Patient reports having urinary leakage between 12:00am-3:00am at night. This will occur 3-4x each night. He was only having to urinate once a night.  He will wake up in the morning and have to urinate. This started 1.5 months ago. He will wakeup too late to go to the bathroom. It occurs mostly in his clothes. Patient notes drinking two hot teas in the morning, and drinking a craft of tea by supper time. The craft is about a liter. Patient has a medical history of CHEK2.     Musculoskeletal  Patient reports his \"right hand and arm become numb more easily than usual.  What is happening is very similar to a limb loosing feeling or falling asleep, but this is happening very " "easily for my right arm. For instance, if I am resting my right arm on the arm rest of the couch, my right forearm and hand start to tingle and loose sensation. Also, if I am lying on the couch or in bed and I fold my arm so that my elbow is all the way closed and my hand is up by my chest, my hand and forearm get tingly and loose sensation.  It seems to be most prevalent in my right arm, but I guess I could say that my other limbs may be more sensitive than usual.\" He is right handed. He has a medical history of a broken wrist. He had to wear a cast. Patient had a head injury where he lost consciousness. He broke his back from mountain biking. He has not had x-rays since.     Problem list and histories reviewed & adjusted, as indicated.  Additional history: as documented    Patient Active Problem List   Diagnosis     Attention deficit disorder     Recurrent dislocation of shoulder joint     Other acne     Molluscum contagiosum     Inflammatory liver disease, unspecified     Bipolar 1 disorder, manic, full remission (H)     [facility] (H)     Past Surgical History:   Procedure Laterality Date     BACK SURGERY       C EXPLORE SHOULDER JOINT  2003    Arthroplasty, Shoulder     SPINE SURGERY  2004    Fx thoracic, fusion        Social History   Substance Use Topics     Smoking status: Never Smoker     Smokeless tobacco: Never Used     Alcohol use No     Family History   Problem Relation Age of Onset     Cardiovascular Maternal Grandfather      Diabetes No family hx of      Coronary Artery Disease No family hx of      Cerebrovascular Disease No family hx of      Hypertension No family hx of          Current Outpatient Prescriptions   Medication Sig Dispense Refill     cyanocobalamin (VITAMIN B12) 1000 MCG/ML injection Inject 1 mL (1,000 mcg) into the muscle every 3 months 10 mL 0     divalproex sodium extended-release (DEPAKOTE ER) 500 MG 24 hr tablet Take 2 tablets (1,000 mg) by mouth At Bedtime 60 tablet 0     QUEtiapine " (SEROQUEL) 50 MG tablet Take 3 tablets (150 mg) by mouth At Bedtime 90 tablet 0     Allergies   Allergen Reactions     No Known Allergies      BP Readings from Last 3 Encounters:   08/08/18 110/70   07/18/18 108/66   05/20/18 (!) 140/91    Wt Readings from Last 3 Encounters:   08/08/18 99.3 kg (219 lb)   07/18/18 96.6 kg (213 lb)   05/20/18 98.4 kg (217 lb)         Labs reviewed in EPIC    Reviewed and updated as needed this visit by clinical staff       Reviewed and updated as needed this visit by Provider         ROS:  Remainder of ROS is negative unless otherwise noted above or in HPI.      This document serves as a record of the services and decisions personally performed and made by Aristides Jenkins MD. It was created on his behalf by Lauren Palacios, a trained medical scribe. The creation of this document is based on the provider's statements to the medical scribe.  Lauren Palacios 4:06 PM August 8, 2018    OBJECTIVE:     /70  Pulse 61  Temp 98.3  F (36.8  C) (Oral)  Resp 18  Wt 99.3 kg (219 lb)  SpO2 100%  BMI 27.37 kg/m2  Body mass index is 27.37 kg/(m^2).  GENERAL APPEARANCE: healthy, alert and no distress  MS: extremities normal- no gross deformities noted  NEURO: microfilament test: good sensation at thumb tips and little tips, noticeable sensitivity, positive percussion test at volar aspect of both wrists, full ROM in the neck, ulnar groove has tenderness to percussion, otherwise normal strength and tone, mentation intact and speech normal    Diagnostic Test Results:  No results found for this or any previous visit (from the past 24 hour(s)).    ASSESSMENT/PLAN:     (G56.03) Bilateral carpal tunnel syndrome  (primary encounter diagnosis)  Comment: Reported by Patient.   Plan: Directed Patient to get carpal tunnel splints. Patient is going to contact me if not better in 6 weeks.     (M77.01) Medial epicondylitis of elbow, right  Comment: Reported by Patient  Plan: Directed Patient to get Ace  material sports brace for right elbow. Patient is going to contact me if not better in 6 weeks.     (Z87.828) History of neck injury  Comment: Stable.     (F31.74) Bipolar 1 disorder, manic, full remission (H)  Comment: patient concerned about possible medication side effects. (nocturia 5-6x!)  Plan: reassured not serious, he agrees important to continue medication       Patient Instructions   Cut volume of liquid intake to a 1/3 less, and try not to drink anything in the evening.     If symptoms are not better in 6 weeks, let me know.     Try to get Ace material sports brace for right elbow and carpal tunnel splints.      45 minutes were spent with the patient with more than 50% of time spent in counseling and coordination of care regarding above assessment and plan.     The information in this document, created by the medical scribe for me, accurately reflects the services I personally performed and the decisions made by me. I have reviewed and approved this document for accuracy prior to leaving the patient care area.  August 8, 2018 5:41 PM    Aristides Jenkins MD  [facility]

## 2018-08-08 NOTE — PATIENT INSTRUCTIONS
Cut volume of liquid intake to a 1/3 less, and try not to drink anything in the evening.     If symptoms are not better in 6 weeks, let me know.     Try to get Ace material sports brace for right elbow and carpal tunnel splints.

## 2018-08-16 DIAGNOSIS — F31.30 BIPOLAR I DISORDER, MOST RECENT EPISODE DEPRESSED (H): ICD-10-CM

## 2018-08-16 DIAGNOSIS — F31.2 BIPOLAR AFFECTIVE DISORDER, CURRENTLY MANIC, SEVERE, WITH PSYCHOTIC FEATURES (H): ICD-10-CM

## 2018-08-16 RX ORDER — QUETIAPINE FUMARATE 50 MG/1
150 TABLET, FILM COATED ORAL AT BEDTIME
Qty: 90 TABLET | Refills: 0 | Status: SHIPPED | OUTPATIENT
Start: 2018-08-16 | End: 2018-08-22

## 2018-08-16 RX ORDER — DIVALPROEX SODIUM 500 MG/1
1000 TABLET, EXTENDED RELEASE ORAL AT BEDTIME
Qty: 60 TABLET | Refills: 0 | Status: SHIPPED | OUTPATIENT
Start: 2018-08-16 | End: 2018-08-22

## 2018-08-16 NOTE — TELEPHONE ENCOUNTER
Medication requested: depakote ER 500mg tab   Last refilled: 7/13/18  Qty: 60    Medication requested: seroquel 50mg tab  Last refilled: 7/5/18  Qty: 90      Last seen: 7/31/18  RTC: 2 weeks  Cancel: 0  No-show: 0  Next appt: 8/22/18    Refill decision:   Refilled for 30 days per protocol.

## 2018-08-22 ENCOUNTER — OFFICE VISIT (OUTPATIENT)
Dept: PSYCHIATRY | Facility: CLINIC | Age: 33
End: 2018-08-22
Attending: PSYCHIATRY & NEUROLOGY
Payer: COMMERCIAL

## 2018-08-22 VITALS
HEART RATE: 54 BPM | DIASTOLIC BLOOD PRESSURE: 80 MMHG | SYSTOLIC BLOOD PRESSURE: 119 MMHG | WEIGHT: 215 LBS | BODY MASS INDEX: 26.87 KG/M2

## 2018-08-22 DIAGNOSIS — F31.2 BIPOLAR AFFECTIVE DISORDER, CURRENTLY MANIC, SEVERE, WITH PSYCHOTIC FEATURES (H): ICD-10-CM

## 2018-08-22 DIAGNOSIS — F31.30 BIPOLAR I DISORDER, MOST RECENT EPISODE DEPRESSED (H): ICD-10-CM

## 2018-08-22 PROCEDURE — G0463 HOSPITAL OUTPT CLINIC VISIT: HCPCS | Mod: ZF

## 2018-08-22 RX ORDER — QUETIAPINE FUMARATE 50 MG/1
150 TABLET, FILM COATED ORAL AT BEDTIME
Qty: 180 TABLET | Refills: 0 | Status: SHIPPED | OUTPATIENT
Start: 2018-08-22 | End: 2018-12-07

## 2018-08-22 RX ORDER — DIVALPROEX SODIUM 500 MG/1
1000 TABLET, EXTENDED RELEASE ORAL AT BEDTIME
Qty: 120 TABLET | Refills: 0 | Status: SHIPPED | OUTPATIENT
Start: 2018-08-22 | End: 2018-10-22

## 2018-08-22 ASSESSMENT — PAIN SCALES - GENERAL: PAINLEVEL: NO PAIN (0)

## 2018-08-22 NOTE — PROGRESS NOTES
Aug 22, 2018  Progress Note    Esa Rivera is a 33 year old year old male with Bipolar I Disorder, Most Recent Episode Manic Severe, with psychotic behavior (296.40) who presents for ongoing psychiatric care. He has no children. He and his wife (a psychologist) live on their own in [facility]. Esa works as an  for [employer].    Diagnosis    Axis 1: Bipolar I disorder, with severe manic episode with psychosis in Dec 2016. ADHD by history. Alcohol use disorder, moderate, in short-term remission  Axis 2: Nil  Axis 3: Remote concussion x 2  Axis 4: Moderate stressors  Axis 5: GAF at time of visit: 70    Assessment & Plan     Since last visit, Esa return to work, initially part-time and then full-time.  He is dissatisfied with his job, and is looking for other opportunities.  He feels sometimes amotivated at work, and experiences a depressed mood while there, but it seems mostly situational.  At home, on evenings and weekends, when spending time with family and friends, Esa experiences normal happiness and excitement.  He will continue his current medications for the time being.  We discussed the pros and cons of switching to Latuda from Seroquel, mainly for tolerability reasons, but we deferred a decision on this for today.  Esa will return for follow-up in 6 weeks.    The patient understands the risks, benefits, adverse effects and alternatives. Agrees to treatment with the capacity to do so. No medical contraindications to treatment. Agrees to call clinic for any problems. The patient understands to call 911 or come to the nearest ED if life threatening or urgent symptoms present.    Attestation:  Patient has been seen and evaluated by me, Mayito Motley MD, PhD.    Interim History     Since the last visit, Esa returned to work as we discussed at his last appointment.  He returned part-time for a couple of weeks, and then back to full-time.  Esa finds his job stressful and somewhat unrewarding.  He is  "actively investigating new options within .  Esa reports sometimes feeling amotivated at work and finding it hard to concentrate, but he believes that this is largely situational.  I concur that this is likely the case.    On evenings and weekends, Esa reports that his mood, motivation, and energy level are good.  He enjoys spending time with family and friends, and he was able to give several examples of this today.  He denies neurovegetative symptoms of depression.  He denies symptoms of katie or psychosis.    Esa has been adherent with his medications.  He finds Seroquel to be sedating, and believes it has increased his appetite.  We discussed the pros and cons of switching to Latuda.  We agree that now may not be the best time to do this.  Esa has had several severe mood episodes in the last year or 2, and the switch would most likely go well after a longer period of wellness.  Esa is also aware that there is no guarantee that Latuda would work as well as Seroquel has.    Esa will continue his current medications and return for follow-up in 2 months.      In Dec 2016 Esa experienced an episode of severe katie with psychosis (grandiose delusions). He was seen in the ER but not hospitalized. He had some mild depressive episodes and possible (but questionable) short hypomanic periods in the preceding years, but never received treatment for these.     Esa's manic symptoms began when he became excited about a new idea at work. 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 He became so excited about this the he was unable to sleep. As his katie escalated, his sleep decreased to 1-2 hours per night for 5-6 nights. His affect was elated and also markedly irritable. He would \"go off\" on people for minor things in very uncharacteristic ways. His thoughts were \"really fast, crisp, and clear\". He saw \"connections in " "everything.\" His energy was clearly elevated. He became grandiose, believing that his idea would change the world and as a result he would meet many \"powerful people\". Eventually he came to believe that sings on the radio were meant for him.     Esa was taking Adderall at the time. He has taken this, on and off, for many years. In my opinion it is not an adequate explanation for his manic symptoms.      His mother (a PCP) became concerned and brought him to hospital. He was assessed in the ER and prescribed Ativan for sleep. He was seen by Dr Burt (a psychiatrist) the next day. He was prescribed OLZ and LTG. The doses were titrated \"up and down\" for the next 6 months. There were significant side effects (sedation, cognitive impairment), likely from OLZ. Most recently, Esa stopped OLZ for a period of time. In the context of being on a honeymoon in [place], he develop racing thoughts and difficulty sleeping and restarted it, with benefit. He currently takes 10 mg HS.    Esa endorses several previous short (1-2 day) periods of reduced sleep and elevated mood, typically in the context of getting involved in a work project. At most they represent subthreshold hypomanic periods.     He has also experienced several periods over the years, lasting about a week, of low mood, decreased energy and motivation, poor focus/concentration, and negative thoughts. He denies neurovegetative symptoms or SI. His wife thought that these represented depressive episodes.    Esa has consumed EtOH heavily at points during his life. It was most pronounced during his college years. He has sporadically binge-drank since then. He has not had a drink in 2 weeks.    Past Psych Hx: As above. Esa was assessed at [facility] in Dec 2016 and diagnosed with BDI. He was seen by Psychiatry at age 17 and dxed with ADD. He took Adderall off and on (mostly on) with benefit over the years since then. He denies abusing it.     Past Med Hx: 2 concussion: 1) age " 15 - skiing. Age 16: football. LOC x a couple of minutes with both.    MEDICATION ADHERENCE: Good.   Current Medications     Current Outpatient Prescriptions   Medication Sig Dispense Refill     cyanocobalamin (VITAMIN B12) 1000 MCG/ML injection Inject 1 mL (1,000 mcg) into the muscle every 3 months 10 mL 0     divalproex sodium extended-release (DEPAKOTE ER) 500 MG 24 hr tablet Take 2 tablets (1,000 mg) by mouth At Bedtime 60 tablet 0     QUEtiapine (SEROQUEL) 50 MG tablet Take 3 tablets (150 mg) by mouth At Bedtime 90 tablet 0         Substance use     ETOH: Sporadic binge drinking  Cigarettes: NA  Street Drugs: Denies    Review of Systems     A comprehensive review of systems was performed and is negative other than noted above.     Allergies     Allergies   Allergen Reactions     No Known Allergies         Mental Status Exam     /80  Pulse 54  Wt 97.5 kg (215 lb)  BMI 26.87 kg/m2    Alertness: alert  and oriented  Appearance: well groomed  Behavior/Demeanor: cooperative and pleasant, with fair  eye contact  Speech: slowed  Language: intact  Psychomotor: normal or unremarkable  Mood:  description consistent with euthymia  Affect: full range; was not congruent to mood  Thought Process/Associations: unremarkable  Thought Content:  Denies suicidal ideation and violent ideation. He has grandiose delusions of a Sikhism nature.  Perception:  Denies auditory hallucinations and visual hallucinations  Insight: good  Judgment: good  Cognition:  does appear grossly intact.      PSYCHIATRY CLINIC INDIVIDUAL PSYCHOTHERAPY NOTE                                                     [16]   Start time - 0900        End time - 0930  Date reviewed - 2/7/2018       Date next due - 5/7/2018    Subjective: This supportive psychotherapy session addressed issues related to orientation to therapy plan and goals of therapy plan.  Patient's reaction: Maintenance in the context of mental status appropriate for ambulatory setting.   Progress: good  Plan: RTC 2 mos  Psychotherapy services during this visit included  myself and the patient.   TREATMENT  PLAN          SYMPTOMS; PROBLEMS   MEASURABLE GOALS;    FUNCTIONAL IMPROVEMENT INTERVENTIONS;   GAINS MADE DISCHARGE CRITERIA   Depression: depressed mood, anhedonia, low energy, insomnia and concentration problems   reduce depressive symptoms and reduce depressive episodes medications   psycho-education   self-care skills symptom resolution   Crystal/Hypomania: none   reduce manic/hypomanic episodes medications   psychotherapy  self-care skills symptom resolution

## 2018-08-22 NOTE — MR AVS SNAPSHOT
After Visit Summary   8/22/2018    Esa Rivera    MRN: [id number]           Patient Information     Date Of Birth          1985        Visit Information        Provider Department      8/22/2018 3:30 PM Mayito Motley MD Psychiatry Clinic        Today's Diagnoses     Bipolar I disorder, most recent episode depressed (H)        Bipolar affective disorder, currently manic, severe, with psychotic features (H)          Care Instructions    Continue your current medications at their usual doses  Please return for follow-up in 2 months          Follow-ups after your visit        Follow-up notes from your care team     Return in about 2 months (around 10/22/2018).      Your next 10 appointments already scheduled     Sep 26, 2018  8:30 AM CDT   Adult Med Follow UP with Mayito Motley MD   Psychiatry Clinic ([facility])    [place] [id number] [place] [id number] [phone]              Who to contact     Please call your clinic at [phone] to:    Ask questions about your health    Make or cancel appointments    Discuss your medicines    Learn about your test results    Speak to your doctor            Additional Information About Your Visit        MyChart Information     Viss gives you secure access to your electronic health record. If you see a primary care provider, you can also send messages to your care team and make appointments. If you have questions, please call your primary care clinic.  If you do not have a primary care provider, please call [phone] and they will assist you.      Viss is an electronic gateway that provides easy, online access to your medical records. With Viss, you can request a clinic appointment, read your test results, renew a prescription or communicate with your care team.     To access your existing account, please contact your [facility] or call  [phone] for assistance.        Care EveryWhere ID     This is your Care EveryWhere ID. This could be used by other organizations to access your [facility] medical records  [id number]        Your Vitals Were     Pulse BMI (Body Mass Index)                54 26.87 kg/m2           Blood Pressure from Last 3 Encounters:   08/22/18 119/80   08/08/18 110/70   07/31/18 113/74    Weight from Last 3 Encounters:   08/22/18 97.5 kg (215 lb)   08/08/18 99.3 kg (219 lb)   07/31/18 96.3 kg (212 lb 6.4 oz)              Today, you had the following     No orders found for display         Where to get your medicines      These medications were sent to [place] [address] [place] [facility] [address] [facility]  [address] [facility] [address]     Phone:  [phone]     divalproex sodium extended-release 500 MG 24 hr tablet    QUEtiapine 50 MG tablet          Primary Care Provider Office Phone # Fax #    Aristides Jenkins [phone]       [address] [place] [address] [address]  [facility] [address]        Equal Access to Services     [facility]: Hadii aad ku hadasho Soomaali, waaxda luqadaha, qaybta kaalmada adeegyada, jermaine castillo . So [facility] [phone].    ATENCIÓN: Si habla español, tiene a osorio disposición servicios gratuitos de asistencia lingüística. Llame al [phone].    We comply with applicable federal civil rights laws and [place] laws. We do not discriminate on the basis of race, color, national origin, age, disability, sex, sexual orientation, or gender identity.            Thank you!     Thank you for choosing PSYCHIATRY CLINIC  for your care. Our goal is always to provide you with excellent care. Hearing back from our patients is one way we can continue to improve our services. Please take a few minutes to complete the written survey that you may receive in the mail after your visit with us. Thank you!             Your Updated Medication List - Protect others around  you: Learn how to safely use, store and throw away your medicines at www.disposemymeds.org.          This list is accurate as of 8/22/18 11:59 PM.  Always use your most recent med list.                   Brand Name Dispense Instructions for use Diagnosis    cyanocobalamin 1000 MCG/ML injection    VITAMIN B12    10 mL    Inject 1 mL (1,000 mcg) into the muscle every 3 months    Vitamin B12 deficiency (non anemic)       divalproex sodium extended-release 500 MG 24 hr tablet    DEPAKOTE ER    120 tablet    Take 2 tablets (1,000 mg) by mouth At Bedtime    Bipolar affective disorder, currently manic, severe, with psychotic features (H)       QUEtiapine 50 MG tablet    SEROquel    180 tablet    Take 3 tablets (150 mg) by mouth At Bedtime    Bipolar I disorder, most recent episode depressed (H)

## 2018-08-31 ENCOUNTER — TELEPHONE (OUTPATIENT)
Dept: PSYCHIATRY | Facility: CLINIC | Age: 33
End: 2018-08-31

## 2018-08-31 NOTE — TELEPHONE ENCOUNTER
"[facility]    Phone Message    May a detailed message be left on voicemail: yes    Reason for Call: Symptoms or Concerns     If patient has red-flag symptoms, warm transfer to triage line    Current symptom or concern: \"a good amount of depression symptoms\"    Symptoms have been present for:  1 week    Has patient previously been seen for this? Yes    By: Mayito Motley MD    Date: 8/22/18    Are there any new or worsening symptoms? Yes: The caller said that she is unsure as to whether his symptoms have been getting progressively worse or not      Action Taken: Message routed to: RONALD Lott  "

## 2018-09-04 NOTE — TELEPHONE ENCOUNTER
"- Writer telephoned pt's wife, Julia, who stated that pt was \"kind of in a bad mood last week\" with increased moodiness and irritability.  - Julia reported that pt mentioned that feels that he is seeking something out, consisting of some kind of excitement or something, which is similar to preludes to prior manic episodes.  - Julia reported feeling that she feels that pt is not manic currently, but wanted to relay this information due to their not having been proactive in past.  - Julia identified that pt is scheduled with psychologist 9/6 and that she is interested in genetic testing prior to med changes.    Routed to provider.    "

## 2018-09-11 NOTE — TELEPHONE ENCOUNTER
Mayito Motley MD Snyder, David J, RN        Caller: Unspecified (1 week ago)                     Thanks for the message Mayito.       We can certainly do Genesight testing.  I think Esa and his wife need to call their insurance company to see if it is covered.  If so, we can schedule a visit in clinic to take the cheek swab, and a medication therapy management visit to review the results with the pharmacists.     DB            Previous Messages       ----- Message -----      From: Mayito Phipps RN      Sent: 9/4/2018  12:16 PM        To: Mayito Motley MD     ----- Message from Mayito Phipps RN sent at 9/4/2018 12:16 PM CDT -----   Dr. Motley:   - Please see attached info from pt's wife.   - It sounded like there were no clear concerns at this time, but she is interested in genetic testing for the pt.   Mayito WALL          - Writer left voice mail message for pt's wife, requesting callback to further explore GeneSight testing.

## 2018-09-13 ENCOUNTER — TELEPHONE (OUTPATIENT)
Dept: PSYCHIATRY | Facility: CLINIC | Age: 33
End: 2018-09-13

## 2018-09-13 DIAGNOSIS — F31.30 BIPOLAR I DISORDER, MOST RECENT EPISODE DEPRESSED (H): Primary | ICD-10-CM

## 2018-09-13 RX ORDER — RISPERIDONE 2 MG/1
2 TABLET ORAL AT BEDTIME
Qty: 14 TABLET | Refills: 0 | Status: SHIPPED | OUTPATIENT
Start: 2018-09-13 | End: 2018-09-27

## 2018-09-13 NOTE — TELEPHONE ENCOUNTER
Mayito Motley MD Snyder, David J, RN        Caller: Unspecified (Today,  8:37 AM)                     Thanks for the message Mayito.  The last time Esa was in hospital, he was prescribed 2 mg of risperidone HS, which seemed to help.  I will suggest he restart this.     DB            Previous Messages       ----- Message -----      From: Mayito Phipps RN      Sent: 9/13/2018   9:02 AM        To: Mayito Motley MD     ----- Message from Mayito Phipps RN sent at 9/13/2018  9:02 AM CDT -----   Dr. Motley:   - Please see attached report from pt's wife about increased manic symptoms. They are interested in avoiding a crisis, if possible. Open to med change.   - He is not scheduled with you until 9/26.   Mayito RN          - Writer left voice mail message for Julia, identifying plan to send order for med to pharmacy.  - Writer e-prescribed 14-day supply risperidone 2mg to [facility].

## 2018-09-13 NOTE — TELEPHONE ENCOUNTER
"- Writer received telephone call from pt's wife, Julia.    - Julia reports that in the past few days the pt has identified that he felt like he was \"getting ramped up,\" with more manic than depressive symptoms.  - Julia identified that at these times, the pt becomes more interested in other job opportunities and can become obsessive about topics, especially technology.  Julia stated that the pt's sleep has been \"a little bit disturbed,\" with taking a bit longer to fall asleep as well as waking up at 1 or 2 AM and being restless. Pt took 1 or possibly 2 melatonin 5mg tablets recently, with unclear benefit.   - Julia reported that in the past when pt's symptoms increased, olanzapine was added, but the pt didn't care for the weight gain and sedation side effects, and that the med was less effective at time of latest katie. Julia identified that otherwise, pt is interested in medication change.  - Routed to provider.    - Writer reviewed [employer] process with Julia. She identified a plan to check with Commissioner to explore costs and schedule nursing appointment if interested.              "

## 2018-09-26 ENCOUNTER — OFFICE VISIT (OUTPATIENT)
Dept: PSYCHIATRY | Facility: CLINIC | Age: 33
End: 2018-09-26
Attending: PSYCHIATRY & NEUROLOGY
Payer: COMMERCIAL

## 2018-09-26 ENCOUNTER — CARE COORDINATION (OUTPATIENT)
Dept: PSYCHIATRY | Facility: CLINIC | Age: 33
End: 2018-09-26

## 2018-09-26 VITALS
BODY MASS INDEX: 28.82 KG/M2 | DIASTOLIC BLOOD PRESSURE: 81 MMHG | WEIGHT: 230.6 LBS | HEART RATE: 67 BPM | SYSTOLIC BLOOD PRESSURE: 124 MMHG

## 2018-09-26 DIAGNOSIS — Z51.81 ENCOUNTER FOR THERAPEUTIC DRUG MONITORING: ICD-10-CM

## 2018-09-26 DIAGNOSIS — F31.10 BIPOLAR I DISORDER, MOST RECENT EPISODE (OR CURRENT) MANIC (H): Primary | ICD-10-CM

## 2018-09-26 DIAGNOSIS — F31.9 BIPOLAR 1 DISORDER (H): Primary | ICD-10-CM

## 2018-09-26 PROCEDURE — G0463 HOSPITAL OUTPT CLINIC VISIT: HCPCS | Mod: ZF

## 2018-09-26 ASSESSMENT — PAIN SCALES - GENERAL: PAINLEVEL: NO PAIN (0)

## 2018-09-26 NOTE — NURSING NOTE
Chief Complaint   Patient presents with     Recheck Medication     Bipolar I disorder, most recent episode depressed

## 2018-09-26 NOTE — PROGRESS NOTES
Sept 26, 2018  Progress Note    Esa Rivera is a 33 year old year old male with Bipolar I Disorder, Most Recent Episode Manic Severe, with psychotic behavior (296.40) who presents for ongoing psychiatric care. He has no children. He and his wife (a psychologist) live on their own in [facility]. Esa works as an  for [employer].    Diagnosis    Axis 1: Bipolar I disorder, with severe manic episode with psychosis in Dec 2016. ADHD by history. Alcohol use disorder, moderate, in short-term remission  Axis 2: Nil  Axis 3: Remote concussion x 2  Axis 4: Moderate stressors  Axis 5: GAF at time of visit: 70    Assessment & Plan     Since the last visit, Esa had a short period of mild depression, followed by a period of hypomanic symptoms.  He contacted the clinic, and started taking risperidone 2 mg at night, in addition to his usual medications, and has continued taking this.  He is currently well and denies symptoms of depression or katie.  He is agreeable to increase his Depakote to 1250 mg at night, to get closer to the upper end of the therapeutic range.  His most recent level was 61.  If he responds well to this, we can do a gradual taper of risperidone, but Esa will continue it for the time being.  Longer term, we also discussed switching Seroquel to Latuda, as Esa is finding Seroquel sedating.  He is agreeable with the above medication changes, and will get his blood Depakote level checked in a week.  He will return for follow-up in 1 month.    The patient understands the risks, benefits, adverse effects and alternatives. Agrees to treatment with the capacity to do so. No medical contraindications to treatment. Agrees to call clinic for any problems. The patient understands to call 911 or come to the nearest ED if life threatening or urgent symptoms present.    Attestation:  Patient has been seen and evaluated by me, Mayito Motley MD, PhD.    Interim History     Since the last visit, Esa experienced several  "days of mild depressive symptoms.  His mood was flat, he was a motivated, his energy level was poor, and he felt negatively about himself and his situation.  He denied suicidal thoughts.    In the context of looking for a new job, Esa began developing hypomanic symptoms.  His thoughts were racing, he felt \"revved up\" with a higher energy level, and clearly had a more expansive affect.  He and his wife are watchful for manic symptoms, since Esa has had several psychotic manias previously, and so they contacted the clinic and Esa immediately began taking risperidone in conjunction with his Depakote and Seroquel.    Esa has continued risperidone for the last couple of weeks.  He is tolerating it well.  It was rapidly effective, and Esa currently feels well.  He denies symptoms of katie or depression.  His wife, who accompanied him to the appointment today, concurs.    We had a lengthy discussion about medications.  Esa's Depakote level is at the lower end of the therapeutic range, and he is agreeable to increase the dose to get closer to the higher end of the range.  He will increase to 1250 mg at night, and get his blood level checked in a week.  Once he has been at the upper end of the range for a period of time, we can do a gradual taper of risperidone.  We also discussed the possibility of switching Seroquel to Latuda, but we will hold off on this for the time being.    Esa and his wife are considering getting [facility] psych testing done, and this could be helpful.    Esa will return for follow-up in 1 month.      In Dec 2016 Esa experienced an episode of severe katie with psychosis (grandiose delusions). He was seen in the ER but not hospitalized. He had some mild depressive episodes and possible (but questionable) short hypomanic periods in the preceding years, but never received treatment for these.     Esa's manic symptoms began when he became excited about a new idea at work. It involved making fuel " "cells available to people at home so they could tap them for low-cost power during periods of peak power usage. He became so excited about this the he was unable to sleep. As his katie escalated, his sleep decreased to 1-2 hours per night for 5-6 nights. His affect was elated and also markedly irritable. He would \"go off\" on people for minor things in very uncharacteristic ways. His thoughts were \"really fast, crisp, and clear\". He saw \"connections in everything.\" His energy was clearly elevated. He became grandiose, believing that his idea would change the world and as a result he would meet many \"powerful people\". Eventually he came to believe that sings on the radio were meant for him.     Esa was taking Adderall at the time. He has taken this, on and off, for many years. In my opinion it is not an adequate explanation for his manic symptoms.      His mother (a PCP) became concerned and brought him to hospital. He was assessed in the ER and prescribed Ativan for sleep. He was seen by Dr Burt (a psychiatrist) the next day. He was prescribed OLZ and LTG. The doses were titrated \"up and down\" for the next 6 months. There were significant side effects (sedation, cognitive impairment), likely from OLZ. Most recently, Esa stopped OLZ for a period of time. In the context of being on a honeymoon in [place], he develop racing thoughts and difficulty sleeping and restarted it, with benefit. He currently takes 10 mg HS.    Esa endorses several previous short (1-2 day) periods of reduced sleep and elevated mood, typically in the context of getting involved in a work project. At most they represent subthreshold hypomanic periods.     He has also experienced several periods over the years, lasting about a week, of low mood, decreased energy and motivation, poor focus/concentration, and negative thoughts. He denies neurovegetative symptoms or SI. His wife thought that these represented depressive episodes.    Esa has " consumed EtOH heavily at points during his life. It was most pronounced during his college years. He has sporadically binge-drank since then. He has not had a drink in 2 weeks.    Past Psych Hx: As above. Esa was assessed at [place] in Dec 2016 and diagnosed with BDI. He was seen by Psychiatry at age 17 and dxed with ADD. He took Adderall off and on (mostly on) with benefit over the years since then. He denies abusing it.     Past Med Hx: 2 concussion: 1) age 15 - skiing. Age 16: football. LOC x a couple of minutes with both.    MEDICATION ADHERENCE: Good.   Current Medications     Current Outpatient Prescriptions   Medication Sig Dispense Refill     cyanocobalamin (VITAMIN B12) 1000 MCG/ML injection Inject 1 mL (1,000 mcg) into the muscle every 3 months 10 mL 0     divalproex sodium extended-release (DEPAKOTE ER) 500 MG 24 hr tablet Take 2 tablets (1,000 mg) by mouth At Bedtime 120 tablet 0     QUEtiapine (SEROQUEL) 50 MG tablet Take 3 tablets (150 mg) by mouth At Bedtime 180 tablet 0     risperiDONE (RISPERDAL) 2 MG tablet Take 1 tablet (2 mg) by mouth At Bedtime 14 tablet 0         Substance use     ETOH: Sporadic binge drinking  Cigarettes: NA  Street Drugs: Denies    Review of Systems     A comprehensive review of systems was performed and is negative other than noted above.     Allergies     Allergies   Allergen Reactions     No Known Allergies         Mental Status Exam     /81  Pulse 67  Wt 104.6 kg (230 lb 9.6 oz)  BMI 28.82 kg/m2    Alertness: alert  and oriented  Appearance: well groomed  Behavior/Demeanor: cooperative and pleasant, with fair  eye contact  Speech: slowed  Language: intact  Psychomotor: normal or unremarkable  Mood:  description consistent with euthymia  Affect: full range; was not congruent to mood  Thought Process/Associations: unremarkable  Thought Content:  Denies suicidal ideation and violent ideation. He has grandiose delusions of a Sabianist nature.  Perception:  Denies  auditory hallucinations and visual hallucinations  Insight: good  Judgment: good  Cognition:  does appear grossly intact.      PSYCHIATRY CLINIC INDIVIDUAL PSYCHOTHERAPY NOTE                                                     [16]   Start time - 0840        End time - 0900  Date reviewed - 2/7/2018       Date next due - 5/7/2018    Subjective: This supportive psychotherapy session addressed issues related to orientation to therapy plan and goals of therapy plan.  Patient's reaction: Maintenance in the context of mental status appropriate for ambulatory setting.  Progress: good  Plan: RTC 2 mos  Psychotherapy services during this visit included  myself and the patient.   TREATMENT  PLAN          SYMPTOMS; PROBLEMS   MEASURABLE GOALS;    FUNCTIONAL IMPROVEMENT INTERVENTIONS;   GAINS MADE DISCHARGE CRITERIA   Depression: depressed mood, anhedonia, low energy, insomnia and concentration problems   reduce depressive symptoms and reduce depressive episodes medications   psycho-education   self-care skills symptom resolution   Crystal/Hypomania: none   reduce manic/hypomanic episodes medications   psychotherapy  self-care skills symptom resolution

## 2018-09-26 NOTE — PROGRESS NOTES
Mayito Motley MD Snyder, David J, RN Hi David -     Can you order labs for Esa?  He needs a CBC and differential, valproic acid level, AST, and ALT.     Thanks!     DB       - Writer place orders.

## 2018-09-26 NOTE — PATIENT INSTRUCTIONS
Increase Depakote to 1250 mg at night  Please get a blood test to check her blood level in 1 week  Please return for follow-up in 1 month

## 2018-09-27 DIAGNOSIS — F31.30 BIPOLAR I DISORDER, MOST RECENT EPISODE DEPRESSED (H): ICD-10-CM

## 2018-09-27 RX ORDER — RISPERIDONE 2 MG/1
2 TABLET ORAL AT BEDTIME
Qty: 30 TABLET | Refills: 0 | Status: SHIPPED | OUTPATIENT
Start: 2018-09-27 | End: 2018-10-22

## 2018-09-27 NOTE — TELEPHONE ENCOUNTER
"Last seen: 9/26  RTC: 4 weeks  Cancel: None  No-show: None  Next appt: 10/22    Incoming refill from patient via telephone     Medication requested: risperiDONE (RISPERDAL) 2 MG tablet  Directions: Take 1 tablet (2 mg) by mouth At Bedtime   Qty: 14     Medication refill approved per refill protocol    Supporting info from last clinic visit note:  \"Since the last visit, Esa had a short period of mild depression, followed by a period of hypomanic symptoms.  He contacted the clinic, and started taking risperidone 2 mg at night, in addition to his usual medications, and has continued taking this.  He is currently well and denies symptoms of depression or katie.  He is agreeable to increase his Depakote to 1250 mg at night, to get closer to the upper end of the therapeutic range.  His most recent level was 61.  If he responds well to this, we can do a gradual taper of risperidone, but Esa will continue it for the time being.\"    \"Esa has continued risperidone for the last couple of weeks.  He is tolerating it well.  It was rapidly effective, and Esa currently feels well.  He denies symptoms of katie or depression.  His wife, who accompanied him to the appointment today, concurs.\"    \"We had a lengthy discussion about medications.  Esa's Depakote level is at the lower end of the therapeutic range, and he is agreeable to increase the dose to get closer to the higher end of the range.  He will increase to 1250 mg at night, and get his blood level checked in a week.  Once he has been at the upper end of the range for a period of time, we can do a gradual taper of risperidone.  We also discussed the possibility of switching Seroquel to Latuda, but we will hold off on this for the time being.\"    - Writer e-prescribed 30-day supply to [address] [facility] ([phone]). Qty 30, refills 0.        "

## 2018-10-02 ENCOUNTER — TELEPHONE (OUTPATIENT)
Dept: PSYCHIATRY | Facility: CLINIC | Age: 33
End: 2018-10-02

## 2018-10-02 DIAGNOSIS — F31.2 BIPOLAR AFFECTIVE DISORDER, CURRENTLY MANIC, SEVERE, WITH PSYCHOTIC FEATURES (H): ICD-10-CM

## 2018-10-02 DIAGNOSIS — F31.2 BIPOLAR AFFECTIVE DISORDER, CURRENT EPISODE MANIC WITH PSYCHOTIC SYMPTOMS (H): Primary | ICD-10-CM

## 2018-10-02 NOTE — TELEPHONE ENCOUNTER
[facility] Call Center    Phone Message    May a detailed message be left on voicemail: yes    Reason for Call: Medication Question or concern regarding medication   Prescription Clarification  Name of Medication: Depakote 1250mg  Prescribing Provider: Mayito Motley MD   Pharmacy: [address]   What on the order needs clarification? The patient stated that at his appt on 9/26/18, Dr. Motley increased his Depakote from 1000mg to 1250mg, but a prescription for the additional 250mg was never sent to his pharmacy.          Action Taken: Message routed to:  Other: Mayito Phipps RNCC

## 2018-10-03 RX ORDER — DIVALPROEX SODIUM 250 MG/1
250 TABLET, EXTENDED RELEASE ORAL AT BEDTIME
Qty: 30 TABLET | Refills: 0 | Status: SHIPPED | OUTPATIENT
Start: 2018-10-03 | End: 2018-10-22 | Stop reason: DRUGHIGH

## 2018-10-03 NOTE — TELEPHONE ENCOUNTER
"    From 9/26/18 visit note:  \"Increase Depakote to 1250 mg at night\"    - Writer e-prescribed 30-day supply to [address] [facility] ([phone]). Qty 30, refills 0.    - Writer telephoned pt to identify order had been sent.            "

## 2018-10-18 DIAGNOSIS — F31.9 BIPOLAR 1 DISORDER (H): ICD-10-CM

## 2018-10-18 DIAGNOSIS — Z51.81 ENCOUNTER FOR THERAPEUTIC DRUG MONITORING: ICD-10-CM

## 2018-10-18 LAB
ALT SERPL W P-5'-P-CCNC: 31 U/L (ref 0–70)
AST SERPL W P-5'-P-CCNC: 15 U/L (ref 0–45)
BASOPHILS # BLD AUTO: 0 10E9/L (ref 0–0.2)
BASOPHILS NFR BLD AUTO: 0.2 %
DIFFERENTIAL METHOD BLD: NORMAL
EOSINOPHIL # BLD AUTO: 0.1 10E9/L (ref 0–0.7)
EOSINOPHIL NFR BLD AUTO: 1.5 %
ERYTHROCYTE [DISTWIDTH] IN BLOOD BY AUTOMATED COUNT: 11.7 % (ref 10–15)
HCT VFR BLD AUTO: 43.4 % (ref 40–53)
HGB BLD-MCNC: 14.8 G/DL (ref 13.3–17.7)
IMM GRANULOCYTES # BLD: 0 10E9/L (ref 0–0.4)
IMM GRANULOCYTES NFR BLD: 0.2 %
LYMPHOCYTES # BLD AUTO: 1.8 10E9/L (ref 0.8–5.3)
LYMPHOCYTES NFR BLD AUTO: 38.8 %
MCH RBC QN AUTO: 31.3 PG (ref 26.5–33)
MCHC RBC AUTO-ENTMCNC: 34.1 G/DL (ref 31.5–36.5)
MCV RBC AUTO: 92 FL (ref 78–100)
MONOCYTES # BLD AUTO: 0.4 10E9/L (ref 0–1.3)
MONOCYTES NFR BLD AUTO: 8.7 %
NEUTROPHILS # BLD AUTO: 2.3 10E9/L (ref 1.6–8.3)
NEUTROPHILS NFR BLD AUTO: 50.6 %
NRBC # BLD AUTO: 0 10*3/UL
NRBC BLD AUTO-RTO: 0 /100
PLATELET # BLD AUTO: 219 10E9/L (ref 150–450)
RBC # BLD AUTO: 4.73 10E12/L (ref 4.4–5.9)
VALPROATE SERPL-MCNC: 49 MG/L (ref 50–100)
WBC # BLD AUTO: 4.6 10E9/L (ref 4–11)

## 2018-10-18 PROCEDURE — 85025 COMPLETE CBC W/AUTO DIFF WBC: CPT | Performed by: PSYCHIATRY & NEUROLOGY

## 2018-10-18 PROCEDURE — 36415 COLL VENOUS BLD VENIPUNCTURE: CPT | Performed by: PSYCHIATRY & NEUROLOGY

## 2018-10-18 PROCEDURE — 84460 ALANINE AMINO (ALT) (SGPT): CPT | Performed by: PSYCHIATRY & NEUROLOGY

## 2018-10-18 PROCEDURE — 80164 ASSAY DIPROPYLACETIC ACD TOT: CPT | Performed by: PSYCHIATRY & NEUROLOGY

## 2018-10-18 PROCEDURE — 84450 TRANSFERASE (AST) (SGOT): CPT | Performed by: PSYCHIATRY & NEUROLOGY

## 2018-10-22 ENCOUNTER — OFFICE VISIT (OUTPATIENT)
Dept: PSYCHIATRY | Facility: CLINIC | Age: 33
End: 2018-10-22
Attending: PSYCHIATRY & NEUROLOGY
Payer: COMMERCIAL

## 2018-10-22 ENCOUNTER — CARE COORDINATION (OUTPATIENT)
Dept: PSYCHIATRY | Facility: CLINIC | Age: 33
End: 2018-10-22

## 2018-10-22 VITALS
DIASTOLIC BLOOD PRESSURE: 81 MMHG | HEART RATE: 74 BPM | WEIGHT: 240.2 LBS | BODY MASS INDEX: 30.02 KG/M2 | SYSTOLIC BLOOD PRESSURE: 127 MMHG

## 2018-10-22 DIAGNOSIS — F31.2 BIPOLAR AFFECTIVE DISORDER, CURRENTLY MANIC, SEVERE, WITH PSYCHOTIC FEATURES (H): ICD-10-CM

## 2018-10-22 DIAGNOSIS — Z51.81 ENCOUNTER FOR THERAPEUTIC DRUG MONITORING: Primary | ICD-10-CM

## 2018-10-22 DIAGNOSIS — F31.30 BIPOLAR I DISORDER, MOST RECENT EPISODE DEPRESSED (H): ICD-10-CM

## 2018-10-22 PROCEDURE — G0463 HOSPITAL OUTPT CLINIC VISIT: HCPCS | Mod: ZF

## 2018-10-22 RX ORDER — RISPERIDONE 2 MG/1
2 TABLET ORAL AT BEDTIME
Qty: 30 TABLET | Refills: 0 | Status: SHIPPED | OUTPATIENT
Start: 2018-10-22 | End: 2018-11-13

## 2018-10-22 RX ORDER — DIVALPROEX SODIUM 500 MG/1
1500 TABLET, EXTENDED RELEASE ORAL AT BEDTIME
Qty: 90 TABLET | Refills: 0 | Status: SHIPPED | OUTPATIENT
Start: 2018-10-22 | End: 2018-11-13

## 2018-10-22 ASSESSMENT — PAIN SCALES - GENERAL: PAINLEVEL: NO PAIN (0)

## 2018-10-22 NOTE — PROGRESS NOTES
Oct 22, 2018  Progress Note    Esa Rivera is a 33 year old year old male with Bipolar I Disorder, Most Recent Episode Manic Severe, with psychotic behavior (296.40) who presents for ongoing psychiatric care. He has no children. He and his wife (a psychologist) live on their own in [facility]. Esa works as an  for [employer].    Diagnosis    Axis 1: Bipolar I disorder, with severe manic episode with psychosis in Dec 2016. ADHD by history. Alcohol use disorder, moderate, in short-term remission  Axis 2: Nil  Axis 3: Remote concussion x 2  Axis 4: Moderate stressors  Axis 5: GAF at time of visit: 70    Assessment & Plan     Since the last visit, Esa has increased his Depakote to 1250 mg at bedtime.  He has also been consistent with taking Seroquel 150 mg at night and risperidone 2 mg at night.  He no longer reports any manic symptoms.  He denies symptoms of depression.  He does feel sleepy, fatigued, and lethargic in the daytime, most likely due to the addition of risperidone.  Despite the increased dose of Depakote, Esa's blood level remains subtherapeutic at 49.  He will increase it further to 1500 mg at night.  He will get a blood test in 1 week to check his blood level again.  Once he is inside the therapeutic range, ideally towards the upper end of the therapeutic range, we will begin tapering and hopefully discontinuing his risperidone.  He will return for follow-up in 2 weeks.    The patient understands the risks, benefits, adverse effects and alternatives. Agrees to treatment with the capacity to do so. No medical contraindications to treatment. Agrees to call clinic for any problems. The patient understands to call 911 or come to the nearest ED if life threatening or urgent symptoms present.    Attestation:  Patient has been seen and evaluated by me, Mayito Motley MD, PhD.    Interim History     Since the last visit, Esa reports his mood has been stable.  He is sleeping well at night, typically about  "8 hours.  Nonetheless, he finds it hard to awaken in the morning, and is very sluggish for the first couple of hours of his day.  For the remainder of the day, he feels more fatigued than usual.  This seems to be due to the introduction of risperidone for Esa's recent hypomanic symptoms.  Otherwise, he denies symptoms of depression.  He denies symptoms of katie.  There is no evidence of psychosis.    Esa reports things are going well at work, except that he tends to go in late and stays late, due to his difficulty with getting started with the day.  Otherwise, he feels he is performing well there.  Things are going well at home.  Esa denies substance abuse and is consistent with taking his medications.    We reviewed Esa's labs.  He is agreeable to increase his dose of Depakote to 1500 mg at night.  He will get a blood test done in a week, 12 hours post dose, to check his blood level again.  We will also check his ammonia level.  The plan will be to get Esa's Depakote to a therapeutic blood level and then to taper and discontinue his risperidone.    Esa will return for follow-up in 2 weeks.        In Dec 2016 Esa experienced an episode of severe katie with psychosis (grandiose delusions). He was seen in the ER but not hospitalized. He had some mild depressive episodes and possible (but questionable) short hypomanic periods in the preceding years, but never received treatment for these.     Esa's manic symptoms began when he became excited about a new idea at work. 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 He became so excited about this the he was unable to sleep. As his katie escalated, his sleep decreased to 1-2 hours per night for 5-6 nights. His affect was elated and also markedly irritable. He would \"go off\" on people for minor things in very uncharacteristic ways. His thoughts were \"really fast, crisp, and " "clear\". He saw \"connections in everything.\" His energy was clearly elevated. He became grandiose, believing that his idea would change the world and as a result he would meet many \"powerful people\". Eventually he came to believe that sings on the radio were meant for him.     Esa was taking Adderall at the time. He has taken this, on and off, for many years. In my opinion it is not an adequate explanation for his manic symptoms.      His mother (a PCP) became concerned and brought him to hospital. He was assessed in the ER and prescribed Ativan for sleep. He was seen by Dr Burt (a psychiatrist) the next day. He was prescribed OLZ and LTG. The doses were titrated \"up and down\" for the next 6 months. There were significant side effects (sedation, cognitive impairment), likely from OLZ. Most recently, Esa stopped OLZ for a period of time. In the context of being on a honeymoon in [place], he develop racing thoughts and difficulty sleeping and restarted it, with benefit. He currently takes 10 mg HS.    Esa endorses several previous short (1-2 day) periods of reduced sleep and elevated mood, typically in the context of getting involved in a work project. At most they represent subthreshold hypomanic periods.     He has also experienced several periods over the years, lasting about a week, of low mood, decreased energy and motivation, poor focus/concentration, and negative thoughts. He denies neurovegetative symptoms or SI. His wife thought that these represented depressive episodes.    Esa has consumed EtOH heavily at points during his life. It was most pronounced during his college years. He has sporadically binge-drank since then. He has not had a drink in 2 weeks.    Past Psych Hx: As above. Esa was assessed at [place] in Dec 2016 and diagnosed with BDI. He was seen by Psychiatry at age 17 and dxed with ADD. He took Adderall off and on (mostly on) with benefit over the years since then. He denies abusing it. "     Past Med Hx: 2 concussion: 1) age 15 - skiing. Age 16: football. LOC x a couple of minutes with both.    MEDICATION ADHERENCE: Good.   Current Medications     Current Outpatient Prescriptions   Medication Sig Dispense Refill     cyanocobalamin (VITAMIN B12) 1000 MCG/ML injection Inject 1 mL (1,000 mcg) into the muscle every 3 months 10 mL 0     divalproex sodium extended-release (DEPAKOTE ER) 250 MG 24 hr tablet Take 1 tablet (250 mg) by mouth At Bedtime Along with two 500 mg tablets for a daily total of 1,250 mg 30 tablet 0     divalproex sodium extended-release (DEPAKOTE ER) 500 MG 24 hr tablet Take 2 tablets (1,000 mg) by mouth At Bedtime 120 tablet 0     QUEtiapine (SEROQUEL) 50 MG tablet Take 3 tablets (150 mg) by mouth At Bedtime 180 tablet 0     risperiDONE (RISPERDAL) 2 MG tablet Take 1 tablet (2 mg) by mouth At Bedtime 30 tablet 0         Substance use     ETOH: Sporadic binge drinking  Cigarettes: NA  Street Drugs: Denies    Review of Systems     A comprehensive review of systems was performed and is negative other than noted above.     Allergies     Allergies   Allergen Reactions     No Known Allergies         Mental Status Exam     /81  Pulse 74  Wt 109 kg (240 lb 3.2 oz)  BMI 30.02 kg/m2    Alertness: alert  and oriented  Appearance: well groomed  Behavior/Demeanor: cooperative and pleasant, with fair  eye contact  Speech: slowed  Language: intact  Psychomotor: normal or unremarkable  Mood:  description consistent with euthymia  Affect: full range; was not congruent to mood  Thought Process/Associations: unremarkable  Thought Content:  Denies suicidal ideation and violent ideation. He has grandiose delusions of a Jehovah's witness nature.  Perception:  Denies auditory hallucinations and visual hallucinations  Insight: good  Judgment: good  Cognition:  does appear grossly intact.      PSYCHIATRY CLINIC INDIVIDUAL PSYCHOTHERAPY NOTE                                                     [16]   Start  time - 0805        End time - 0825  Date reviewed - 10/22/2018       Date next due - 1/22/2019    Subjective: This supportive psychotherapy session addressed issues related to goals of therapy  and patient's history .  Patient's reaction: Maintenance in the context of mental status appropriate for ambulatory setting.  Progress: good  Plan: RTC 2 weeks  Psychotherapy services during this visit included  myself and the patient.   TREATMENT  PLAN          SYMPTOMS; PROBLEMS   MEASURABLE GOALS;    FUNCTIONAL IMPROVEMENT INTERVENTIONS;   GAINS MADE DISCHARGE CRITERIA   Depression: depressed mood, anhedonia, low energy, insomnia and concentration problems   reduce depressive symptoms and reduce depressive episodes medications   psycho-education   self-care skills symptom resolution   Crystal/Hypomania: none   reduce manic/hypomanic episodes medications   psychotherapy  self-care skills symptom resolution

## 2018-10-22 NOTE — PROGRESS NOTES
Lab order received from Dr. Motley- labs ordered as noted below.     ----- Message -----      From: Mayito Motley MD      Sent: 10/22/2018   8:29 AM        To: MAE Gifford-     Can you order labs for Esa?  He gets them done downstairs.  He needs a CBC and differential, AST, ALT, ammonia level, and Depakote level.     Thanks!     DB

## 2018-10-22 NOTE — PATIENT INSTRUCTIONS
Increase Depakote to 1500 mg at night    Please get a blood test in a week to check her Depakote level    Please return for follow-up in 2 weeks

## 2018-10-22 NOTE — MR AVS SNAPSHOT
After Visit Summary   10/22/2018    Esa Rivera    MRN: [id number]           Patient Information     Date Of Birth          1985        Visit Information        Provider Department      10/22/2018 8:00 AM Mayito Motley MD [facility]        Today's Diagnoses     Bipolar I disorder, most recent episode depressed (H)        Bipolar affective disorder, currently manic, severe, with psychotic features (H)          Care Instructions    Increase Depakote to 1500 mg at night    Please get a blood test in a week to check her Depakote level    Please return for follow-up in 2 weeks          Follow-ups after your visit        Follow-up notes from your care team     Return in about 2 weeks (around 11/5/2018).      Your next 10 appointments already scheduled     Nov 07, 2018  8:00 AM CST   Adult Med Follow UP with Mayito Motley MD   Psychiatry Clinic ([facility])    [place] [id number]  [address] [place] [id number] [phone]              Who to contact     Please call your clinic at [phone] to:    Ask questions about your health    Make or cancel appointments    Discuss your medicines    Learn about your test results    Speak to your doctor            Additional Information About Your Visit        MyChart Information     [employer] gives you secure access to your electronic health record. If you see a primary care provider, you can also send messages to your care team and make appointments. If you have questions, please call your primary care clinic.  If you do not have a primary care provider, please call [phone] and they will assist you.      [employer] is an electronic gateway that provides easy, online access to your medical records. With [employer], you can request a clinic appointment, read your test results, renew a prescription or communicate with your care team.     To access your existing account, please contact your [facility] or call [phone] for assistance.        Care EveryWhere ID     This is your Care EveryWhere ID. This could be used by other organizations to access your [facility] medical records  [id number]        Your Vitals Were     Pulse BMI (Body Mass Index)                74 30.02 kg/m2           Blood Pressure from Last 3 Encounters:   10/22/18 127/81   09/26/18 124/81   08/22/18 119/80    Weight from Last 3 Encounters:   10/22/18 109 kg (240 lb 3.2 oz)   09/26/18 104.6 kg (230 lb 9.6 oz)   08/22/18 97.5 kg (215 lb)              Today, you had the following     No orders found for display         Today's Medication Changes          These changes are accurate as of 10/22/18  8:33 AM.  If you have any questions, ask your nurse or doctor.               These medicines have changed or have updated prescriptions.        Dose/Directions    divalproex sodium extended-release 500 MG 24 hr tablet   Commonly known as:  DEPAKOTE ER   This may have changed:    - how much to take  - additional instructions  - Another medication with the same name was removed. Continue taking this medication, and follow the directions you see here.   Used for:  Bipolar affective disorder, currently manic, severe, with psychotic features (H)   Changed by:  Mayito Motley MD        Dose:  1500 mg   Take 3 tablets (1,500 mg) by mouth At Bedtime Stop 250mg tab. Increase Depakote dose to 1500mg (3x108qv tabs)   Quantity:  90 tablet   Refills:  0            Where to get your medicines      These medications were sent to [facility] [id number] [place] [facility] [place], [place] - [address] [place] [id number]     Phone:  [phone]     divalproex sodium extended-release 500 MG 24 hr tablet    risperiDONE 2 MG tablet                Primary Care Provider Office Phone # Fax #    Aristides Jenkins [phone]       [address] [facility] [address]  [facility] [address]        Equal Access to Services      BROCK RUEDA : Hadii aad ku stephanie Clark, waaxda luqadaha, qaybta kaalmada treva, jermaine suzette cieraveronica arguetamarkbarrie castillo . So [facility] [phone].    ATENCIÓN: Si habla sandro, tiene a osorio disposición servicios gratuitos de asistencia lingüística. Llame al [phone].    We comply with applicable federal civil rights laws and [place] laws. We do not discriminate on the basis of race, color, national origin, age, disability, sex, sexual orientation, or gender identity.            Thank you!     Thank you for choosing PSYCHIATRY CLINIC  for your care. Our goal is always to provide you with excellent care. Hearing back from our patients is one way we can continue to improve our services. Please take a few minutes to complete the written survey that you may receive in the mail after your visit with us. Thank you!             Your Updated Medication List - Protect others around you: Learn how to safely use, store and throw away your medicines at www.disposemymeds.org.          This list is accurate as of 10/22/18  8:33 AM.  Always use your most recent med list.                   Brand Name Dispense Instructions for use Diagnosis    cyanocobalamin 1000 MCG/ML injection    VITAMIN B12    10 mL    Inject 1 mL (1,000 mcg) into the muscle every 3 months    Vitamin B12 deficiency (non anemic)       divalproex sodium extended-release 500 MG 24 hr tablet    DEPAKOTE ER    90 tablet    Take 3 tablets (1,500 mg) by mouth At Bedtime Stop 250mg tab. Increase Depakote dose to 1500mg ([id number] tabs)    Bipolar affective disorder, currently manic, severe, with psychotic features (H)       QUEtiapine 50 MG tablet    SEROquel    180 tablet    Take 3 tablets (150 mg) by mouth At Bedtime    Bipolar I disorder, most recent episode depressed (H)       risperiDONE 2 MG tablet    risperDAL    30 tablet    Take 1 tablet (2 mg) by mouth At Bedtime    Bipolar I disorder, most recent episode depressed (H)

## 2018-11-10 DIAGNOSIS — F31.30 BIPOLAR I DISORDER, MOST RECENT EPISODE DEPRESSED (H): ICD-10-CM

## 2018-11-10 DIAGNOSIS — Z51.81 ENCOUNTER FOR THERAPEUTIC DRUG MONITORING: ICD-10-CM

## 2018-11-10 LAB
ALT SERPL W P-5'-P-CCNC: 37 U/L (ref 0–70)
AMMONIA PLAS-SCNC: 35 UMOL/L (ref 10–50)
AST SERPL W P-5'-P-CCNC: 21 U/L (ref 0–45)
BASOPHILS # BLD AUTO: 0 10E9/L (ref 0–0.2)
BASOPHILS NFR BLD AUTO: 0.4 %
DIFFERENTIAL METHOD BLD: NORMAL
EOSINOPHIL # BLD AUTO: 0.1 10E9/L (ref 0–0.7)
EOSINOPHIL NFR BLD AUTO: 1.9 %
ERYTHROCYTE [DISTWIDTH] IN BLOOD BY AUTOMATED COUNT: 11.6 % (ref 10–15)
HCT VFR BLD AUTO: 44.6 % (ref 40–53)
HGB BLD-MCNC: 15.4 G/DL (ref 13.3–17.7)
IMM GRANULOCYTES # BLD: 0 10E9/L (ref 0–0.4)
IMM GRANULOCYTES NFR BLD: 0.4 %
LYMPHOCYTES # BLD AUTO: 1.7 10E9/L (ref 0.8–5.3)
LYMPHOCYTES NFR BLD AUTO: 32.1 %
MCH RBC QN AUTO: 31 PG (ref 26.5–33)
MCHC RBC AUTO-ENTMCNC: 34.5 G/DL (ref 31.5–36.5)
MCV RBC AUTO: 90 FL (ref 78–100)
MONOCYTES # BLD AUTO: 0.5 10E9/L (ref 0–1.3)
MONOCYTES NFR BLD AUTO: 9.4 %
NEUTROPHILS # BLD AUTO: 2.9 10E9/L (ref 1.6–8.3)
NEUTROPHILS NFR BLD AUTO: 55.8 %
NRBC # BLD AUTO: 0 10*3/UL
NRBC BLD AUTO-RTO: 0 /100
PLATELET # BLD AUTO: 207 10E9/L (ref 150–450)
RBC # BLD AUTO: 4.96 10E12/L (ref 4.4–5.9)
VALPROATE SERPL-MCNC: 67 MG/L (ref 50–100)
WBC # BLD AUTO: 5.2 10E9/L (ref 4–11)

## 2018-11-10 PROCEDURE — 85025 COMPLETE CBC W/AUTO DIFF WBC: CPT | Performed by: PSYCHIATRY & NEUROLOGY

## 2018-11-10 PROCEDURE — 84450 TRANSFERASE (AST) (SGOT): CPT | Performed by: PSYCHIATRY & NEUROLOGY

## 2018-11-10 PROCEDURE — 80164 ASSAY DIPROPYLACETIC ACD TOT: CPT | Performed by: PSYCHIATRY & NEUROLOGY

## 2018-11-10 PROCEDURE — 84460 ALANINE AMINO (ALT) (SGPT): CPT | Performed by: PSYCHIATRY & NEUROLOGY

## 2018-11-10 PROCEDURE — 36415 COLL VENOUS BLD VENIPUNCTURE: CPT | Performed by: PSYCHIATRY & NEUROLOGY

## 2018-11-10 PROCEDURE — 82140 ASSAY OF AMMONIA: CPT | Performed by: PSYCHIATRY & NEUROLOGY

## 2018-11-13 ENCOUNTER — OFFICE VISIT (OUTPATIENT)
Dept: PSYCHIATRY | Facility: CLINIC | Age: 33
End: 2018-11-13
Attending: PSYCHIATRY & NEUROLOGY
Payer: COMMERCIAL

## 2018-11-13 VITALS
SYSTOLIC BLOOD PRESSURE: 129 MMHG | DIASTOLIC BLOOD PRESSURE: 84 MMHG | BODY MASS INDEX: 30.95 KG/M2 | HEART RATE: 56 BPM | WEIGHT: 247.6 LBS

## 2018-11-13 DIAGNOSIS — F31.2 BIPOLAR AFFECTIVE DISORDER, CURRENTLY MANIC, SEVERE, WITH PSYCHOTIC FEATURES (H): ICD-10-CM

## 2018-11-13 DIAGNOSIS — F31.30 BIPOLAR I DISORDER, MOST RECENT EPISODE DEPRESSED (H): Primary | ICD-10-CM

## 2018-11-13 PROCEDURE — G0463 HOSPITAL OUTPT CLINIC VISIT: HCPCS | Mod: ZF

## 2018-11-13 RX ORDER — RISPERIDONE 2 MG/1
2 TABLET ORAL AT BEDTIME
Qty: 30 TABLET | Refills: 3 | Status: SHIPPED | OUTPATIENT
Start: 2018-11-13 | End: 2019-02-20

## 2018-11-13 RX ORDER — LURASIDONE HYDROCHLORIDE 20 MG/1
TABLET, FILM COATED ORAL
Qty: 60 TABLET | Refills: 0 | Status: SHIPPED | OUTPATIENT
Start: 2018-11-13 | End: 2018-12-07 | Stop reason: DRUGHIGH

## 2018-11-13 RX ORDER — DIVALPROEX SODIUM 500 MG/1
1500 TABLET, EXTENDED RELEASE ORAL AT BEDTIME
Qty: 90 TABLET | Refills: 3 | Status: SHIPPED | OUTPATIENT
Start: 2018-11-13 | End: 2019-02-20

## 2018-11-13 ASSESSMENT — PAIN SCALES - GENERAL: PAINLEVEL: NO PAIN (0)

## 2018-11-13 NOTE — PROGRESS NOTES
Nov 13, 2018  Progress Note    Esa Rivera is a 33 year old year old male with Bipolar I Disorder, Most Recent Episode Manic Severe, with psychotic behavior (296.40) who presents for ongoing psychiatric care. He has no children. He and his wife (a psychologist) live on their own in [place]. Esa works as an  for [employer].    Diagnosis    Axis 1: Bipolar I disorder, with severe manic episode with psychosis in Dec 2016. ADHD by history. Alcohol use disorder, moderate, in short-term remission  Axis 2: Nil  Axis 3: Remote concussion x 2  Axis 4: Moderate stressors  Axis 5: GAF at time of visit: 70    Assessment & Plan     Since the last visit, Esa has been adherent with his Depakote, risperidone, and Seroquel.  He is experiencing some symptoms of depression, along with likely medication-induced sedation, which in combination are making him feel very sleepy and fatigued during the day.  We discussed switching Seroquel to Latuda to treat Esa's depression and improve tolerability.  He is uncertain if the medication will be covered, but accepts a prescription for it.  He will start at 20 mg for 5 nights and then increase to 40 mg.  He will take it with food.  He will concurrently reduce his Seroquel to 50 mg for 5 nights and then stop it.  He will continue Depakote and risperidone.  He will return for follow-up in 1 month.    The patient understands the risks, benefits, adverse effects and alternatives. Agrees to treatment with the capacity to do so. No medical contraindications to treatment. Agrees to call clinic for any problems. The patient understands to call 911 or come to the nearest ED if life threatening or urgent symptoms present.    Attestation:  Patient has been seen and evaluated by me, Mayito Motley MD, PhD.    Interim History     Since the last visit, Esa reports some symptoms of depression.  His mood is low and pessimistic.  He feels a motivated and tired through the day.  He describes this as  "feeling physically and mentally drained, as well as sleepy.  It is hard for him to think clearly.  He is more irritable than usual, and his wife has remarked on that.  He otherwise denies any symptoms suggestive of katie or hypomania.    Esa has been adherent with his medications.  He denies substance use.  He is unhappy with the situation at work, and there is strain in some of his important relationships, but he denies other stressors.    Seroquel is currently Esa's main antidepressant medication.  It does not appear to be working as well as I had hoped, and is likely contributing to his sedation.  He accepts a switch to Latuda, and the manner outlined above.  If the medication is not covered he will let us know.  He will otherwise continue Depakote and risperidone and return for follow-up in 1 month.        In Dec 2016 Esa experienced an episode of severe katie with psychosis (grandiose delusions). He was seen in the ER but not hospitalized. He had some mild depressive episodes and possible (but questionable) short hypomanic periods in the preceding years, but never received treatment for these.     Esa's manic symptoms began when he became excited about a new idea at work. 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 He became so excited about this the he was unable to sleep. As his katie escalated, his sleep decreased to 1-2 hours per night for 5-6 nights. His affect was elated and also markedly irritable. He would \"go off\" on people for minor things in very uncharacteristic ways. His thoughts were \"really fast, crisp, and clear\". He saw \"connections in everything.\" His energy was clearly elevated. He became grandiose, believing that his idea would change the world and as a result he would meet many \"powerful people\". Eventually he came to believe that sings on the radio were meant for him.     Esa was taking " "Adderall at the time. He has taken this, on and off, for many years. In my opinion it is not an adequate explanation for his manic symptoms.      His mother (a PCP) became concerned and brought him to hospital. He was assessed in the ER and prescribed Ativan for sleep. He was seen by Dr Burt (a psychiatrist) the next day. He was prescribed OLZ and LTG. The doses were titrated \"up and down\" for the next 6 months. There were significant side effects (sedation, cognitive impairment), likely from OLZ. Most recently, Esa stopped OLZ for a period of time. In the context of being on a honeymoon in [place], he develop racing thoughts and difficulty sleeping and restarted it, with benefit. He currently takes 10 mg HS.    Esa endorses several previous short (1-2 day) periods of reduced sleep and elevated mood, typically in the context of getting involved in a work project. At most they represent subthreshold hypomanic periods.     He has also experienced several periods over the years, lasting about a week, of low mood, decreased energy and motivation, poor focus/concentration, and negative thoughts. He denies neurovegetative symptoms or SI. His wife thought that these represented depressive episodes.    Esa has consumed EtOH heavily at points during his life. It was most pronounced during his college years. He has sporadically binge-drank since then. He has not had a drink in 2 weeks.    Past Psych Hx: As above. Esa was assessed at [facility] in Dec 2016 and diagnosed with BDI. He was seen by Psychiatry at age 17 and dxed with ADD. He took Adderall off and on (mostly on) with benefit over the years since then. He denies abusing it.     Past Med Hx: 2 concussion: 1) age 15 - skiing. Age 16: football. LOC x a couple of minutes with both.    MEDICATION ADHERENCE: Good.   Current Medications     Current Outpatient Prescriptions   Medication Sig Dispense Refill     divalproex sodium extended-release (DEPAKOTE ER) 500 MG 24 hr " tablet Take 3 tablets (1,500 mg) by mouth At Bedtime Stop 250mg tab. Increase Depakote dose to 1500mg ([id number] tabs) 90 tablet 0     QUEtiapine (SEROQUEL) 50 MG tablet Take 3 tablets (150 mg) by mouth At Bedtime 180 tablet 0     risperiDONE (RISPERDAL) 2 MG tablet Take 1 tablet (2 mg) by mouth At Bedtime 30 tablet 0     cyanocobalamin (VITAMIN B12) 1000 MCG/ML injection Inject 1 mL (1,000 mcg) into the muscle every 3 months (Patient not taking: Reported on 11/13/2018) 10 mL 0         Substance use     ETOH: Sporadic binge drinking  Cigarettes: NA  Street Drugs: Denies    Review of Systems     A comprehensive review of systems was performed and is negative other than noted above.     Allergies     Allergies   Allergen Reactions     No Known Allergies         Mental Status Exam     /84  Pulse 56  Wt 112.3 kg (247 lb 9.6 oz)  BMI 30.95 kg/m2    Alertness: alert  and oriented  Appearance: well groomed  Behavior/Demeanor: cooperative and pleasant, with fair  eye contact  Speech: slowed  Language: intact  Psychomotor: normal or unremarkable  Mood:  description consistent with euthymia  Affect: full range; was not congruent to mood  Thought Process/Associations: unremarkable  Thought Content:  Denies suicidal ideation and violent ideation. He has grandiose delusions of a Anabaptist nature.  Perception:  Denies auditory hallucinations and visual hallucinations  Insight: good  Judgment: good  Cognition:  does appear grossly intact.      PSYCHIATRY CLINIC INDIVIDUAL PSYCHOTHERAPY NOTE                                                     [16]   Start time - 1600        End time - 1625  Date reviewed - 10/22/2018       Date next due - 1/22/2019    Subjective: This supportive psychotherapy session addressed issues related to goals of therapy  and patient's history .  Patient's reaction: Maintenance in the context of mental status appropriate for ambulatory setting.  Progress: good  Plan: RTC 2 weeks  Psychotherapy  services during this visit included  myself and the patient.   TREATMENT  PLAN          SYMPTOMS; PROBLEMS   MEASURABLE GOALS;    FUNCTIONAL IMPROVEMENT INTERVENTIONS;   GAINS MADE DISCHARGE CRITERIA   Depression: depressed mood, anhedonia, low energy, insomnia and concentration problems   reduce depressive symptoms and reduce depressive episodes medications   psycho-education   self-care skills symptom resolution   Crystal/Hypomania: none   reduce manic/hypomanic episodes medications   psychotherapy  self-care skills symptom resolution

## 2018-11-13 NOTE — PATIENT INSTRUCTIONS
Start Latuda 20 mg with dinner for 5 days, then increase to 40 mg with dinner  Decrease Seroquel to 50 mg HS for 5 days, then stop  Continue Depakote  Return for follow-up in 4 weeks      Thank you for coming to the PSYCHIATRY CLINIC.    Lab Testing:  If you had lab testing today and your results are reassuring or normal they will be mailed to you or sent through [employer] within 7 days.   If the lab tests need quick action we will call you with the results.  The phone number we will call with results is # [phone] (home) NONE (work). If this is not the best number please call our clinic and change the number.    Medication Refills:  If you need any refills please call your pharmacy and they will contact us. Our fax number for refills is [phone]. Please allow three business for refill processing.   If you need to  your refill at a new pharmacy, please contact the new pharmacy directly. The new pharmacy will help you get your medications transferred.     Scheduling:  If you have any concerns about today's visit or wish to schedule another appointment please call our office during normal business hours [phone] (8-5:00 M-F)    Contact Us:  Please call [phone] during business hours (8-5:00 M-F).  If after clinic hours, or on the weekend, please call  [phone].    Financial Assistance [phone]  [email or web address] Billing [phone]  [facility]illing [phone]  Medical Records [phone]      MENTAL HEALTH CRISIS NUMBERS:  [facility]:   [facility] - [phone]   Crisis Residence [facility] - [phone]   Walk-In Counseling Center [facility] - [phone]   COPE 24/7 [facility] Mobile Team for Adults - [[phone]]; Child - [[phone]]     Crisis Connection - [phone]     [facility]:   [facility] - [phone]   Walk-in counseling [facility] - [phone]   Walk-in counseling [facility] -  [phone]   Crisis Residence [facility] - [phone]   Urgent Care Adult Mental Health:   --Drop-in, 24/7 crisis line, and [place] Mobile Team [[phone]]    CRISIS TEXT LINE: Text [phone] from anywhere, anytime, any crisis 24/7;    OR SEE www.crisistextline.org     Poison Control Center - [phone]    CHILD: [facility] needs assessment team - [phone]     [facility] Lifeline - [phone]; or [email or web address] Lifeline - 1-833.677.1992    If you have a medical emergency please call 911or go to the nearest ER.                    _____________________________________________    Again thank you for choosing PSYCHIATRY CLINIC and please let us know how we can best partner with you to improve you and your family's health.  You may be receiving a survey in the mail regarding this appointment. We would love to have your feedback, both positive and negative, so please fill out the survey and return it using the provided envelope. The survey is done by an external company, so your answers are anonymous.

## 2018-11-13 NOTE — MR AVS SNAPSHOT
After Visit Summary   11/13/2018    Esa Rivera    MRN: [id number]           Patient Information     Date Of Birth          1985        Visit Information        Provider Department      11/13/2018 4:00 PM Mayito Motley MD Psychiatry Clinic        Today's Diagnoses     Bipolar I disorder, most recent episode depressed (H)    -  1    Bipolar affective disorder, currently manic, severe, with psychotic features (H)          Care Instructions    Start Latuda 20 mg with dinner for 5 days, then increase to 40 mg with dinner  Decrease Seroquel to 50 mg HS for 5 days, then stop  Continue Depakote  Return for follow-up in 4 weeks      Thank you for coming to the PSYCHIATRY CLINIC.    Lab Testing:  If you had lab testing today and your results are reassuring or normal they will be mailed to you or sent through [employer] within 7 days.   If the lab tests need quick action we will call you with the results.  The phone number we will call with results is # [phone] (home) NONE (work). If this is not the best number please call our clinic and change the number.    Medication Refills:  If you need any refills please call your pharmacy and they will contact us. Our fax number for refills is [phone]. Please allow three business for refill processing.   If you need to  your refill at a new pharmacy, please contact the new pharmacy directly. The new pharmacy will help you get your medications transferred.     Scheduling:  If you have any concerns about today's visit or wish to schedule another appointment please call our office during normal business hours [phone] (8-5:00 M-F)    Contact Us:  Please call [phone] during business hours (8-5:00 M-F).  If after clinic hours, or on the weekend, please call  [phone].    Financial Assistance [phone]  [employer] [facility] [phone]  [employer] [facility] [phone]  Medical Records [phone]      MENTAL HEALTH CRISIS NUMBERS:  [place]:   [facility] - [phone]   Crisis Residence \[facility] [place] [facility]\"" [facility] - [phone]   Walk-In Counseling Center \[facility] [place] [facility]\"" - [phone]   COPE 24/7 [place] Mobile Team for Adults - [[phone]]; Child - [[phone]]     Crisis Connection - [phone]     [facility] - [phone]   Walk-in counseling [facility] - [phone]   Walk-in counseling [facility] - [phone]   Crisis Residence [facility] - [phone]   Urgent Care Adult Mental Health:   --Drop-in, 24/7 crisis line, and [employer] Mobile Team [[phone]]    CRISIS TEXT LINE: Text [phone] from anywhere, anytime, any crisis 24/7;    OR SEE www.crisistextline.org     Poison Control Center - [phone]    CHILD: [facility] needs assessment team - [phone]     [facility] Lifeline - [phone]; or [employer] Lifeline - [phone]    If you have a medical emergency please call 911or go to the nearest ER.                    _____________________________________________    Again thank you for choosing PSYCHIATRY CLINIC and please let us know how we can best partner with you to improve you and your family's health.  You may be receiving a survey in the mail regarding this appointment. We would love to have your feedback, both positive and negative, so please fill out the survey and return it using the provided envelope. The survey is done by an external company, so your answers are anonymous.             Follow-ups after your visit        Follow-up notes from your care team     Return in about 4 weeks (around 12/11/2018).      Your next 10 appointments already scheduled     Dec 05, 2018  8:30 AM CST   Adult Med Follow UP with Mayito Motley MD   Psychiatry Clinic ([facility])    [address] [id number] [phone]              Who to contact      Please call your clinic at [phone] to:    Ask questions about your health    Make or cancel appointments    Discuss your medicines    Learn about your test results    Speak to your doctor            Additional Information About Your Visit        [employer] Information     [employer] gives you secure access to your electronic health record. If you see a primary care provider, you can also send messages to your care team and make appointments. If you have questions, please call your primary care clinic.  If you do not have a primary care provider, please call [phone] and they will assist you.      [employer] is an electronic gateway that provides easy, online access to your medical records. With [employer], you can request a clinic appointment, read your test results, renew a prescription or communicate with your care team.     To access your existing account, please contact your [facility] or call [phone] for assistance.        Care EveryWhere ID     This is your Care EveryWhere ID. This could be used by other organizations to access your [facility] medical records  [id number]        Your Vitals Were     Pulse BMI (Body Mass Index)                56 30.95 kg/m2           Blood Pressure from Last 3 Encounters:   11/13/18 129/84   10/22/18 127/81   09/26/18 124/81    Weight from Last 3 Encounters:   11/13/18 112.3 kg (247 lb 9.6 oz)   10/22/18 109 kg (240 lb 3.2 oz)   09/26/18 104.6 kg (230 lb 9.6 oz)              Today, you had the following     No orders found for display         Today's Medication Changes          These changes are accurate as of 11/13/18  4:56 PM.  If you have any questions, ask your nurse or doctor.               Start taking these medicines.        Dose/Directions    lurasidone 20 MG Tabs tablet   Commonly known as:  LATUDA   Used for:  Bipolar I disorder, most recent episode depressed (H)   Started by:  Mayito Motley MD        20 mg with dinner for 5  days, then 40 mg with dinner   Quantity:  60 tablet   Refills:  0            Where to get your medicines      These medications were sent to [facility] [id number] [facility] - [place], [place] - [address]  [address] [facility] [address]     Phone:  [phone]     divalproex sodium extended-release 500 MG 24 hr tablet    lurasidone 20 MG Tabs tablet    risperiDONE 2 MG tablet                Primary Care Provider Office Phone # Fax #    Aristides Jenkins [phone]       [address]  [facility] [address]        Equal Access to Services     [facility]: Hadii aad ku hadasho Soomaali, waaxda luqadaha, qaybta kaalmada adeegyada, jermaine castillo . So [facility] [phone].    ATENCIÓN: Si habla español, tiene a osorio disposición servicios gratuitos de asistencia lingüística. [facility] [phone].    We comply with applicable federal civil rights laws and [place] laws. We do not discriminate on the basis of race, color, national origin, age, disability, sex, sexual orientation, or gender identity.            Thank you!     Thank you for choosing PSYCHIATRY CLINIC  for your care. Our goal is always to provide you with excellent care. Hearing back from our patients is one way we can continue to improve our services. Please take a few minutes to complete the written survey that you may receive in the mail after your visit with us. Thank you!             Your Updated Medication List - Protect others around you: Learn how to safely use, store and throw away your medicines at www.disposemymeds.org.          This list is accurate as of 11/13/18  4:56 PM.  Always use your most recent med list.                   Brand Name Dispense Instructions for use Diagnosis    cyanocobalamin 1000 MCG/ML injection    VITAMIN B12    10 mL    Inject 1 mL (1,000 mcg) into the muscle every 3 months    Vitamin B12 deficiency (non anemic)       divalproex sodium extended-release 500 MG 24  hr tablet    DEPAKOTE ER    90 tablet    Take 3 tablets (1,500 mg) by mouth At Bedtime Stop 250mg tab. Increase Depakote dose to 1500mg ([id number] tabs)    Bipolar affective disorder, currently manic, severe, with psychotic features (H)       lurasidone 20 MG Tabs tablet    LATUDA    60 tablet    20 mg with dinner for 5 days, then 40 mg with dinner    Bipolar I disorder, most recent episode depressed (H)       QUEtiapine 50 MG tablet    SEROquel    180 tablet    Take 3 tablets (150 mg) by mouth At Bedtime    Bipolar I disorder, most recent episode depressed (H)       risperiDONE 2 MG tablet    risperDAL    30 tablet    Take 1 tablet (2 mg) by mouth At Bedtime    Bipolar I disorder, most recent episode depressed (H)

## 2018-11-14 ASSESSMENT — PATIENT HEALTH QUESTIONNAIRE - PHQ9: SUM OF ALL RESPONSES TO PHQ QUESTIONS 1-9: 13

## 2018-12-05 ENCOUNTER — OFFICE VISIT (OUTPATIENT)
Dept: PSYCHIATRY | Facility: CLINIC | Age: 33
End: 2018-12-05
Attending: PSYCHIATRY & NEUROLOGY
Payer: COMMERCIAL

## 2018-12-05 VITALS
HEART RATE: 83 BPM | BODY MASS INDEX: 31.97 KG/M2 | SYSTOLIC BLOOD PRESSURE: 128 MMHG | WEIGHT: 255.8 LBS | DIASTOLIC BLOOD PRESSURE: 82 MMHG

## 2018-12-05 DIAGNOSIS — F31.30 BIPOLAR I DISORDER, MOST RECENT EPISODE DEPRESSED (H): Primary | ICD-10-CM

## 2018-12-05 PROCEDURE — G0463 HOSPITAL OUTPT CLINIC VISIT: HCPCS | Mod: ZF

## 2018-12-05 ASSESSMENT — PAIN SCALES - GENERAL: PAINLEVEL: NO PAIN (0)

## 2018-12-05 NOTE — NURSING NOTE
Chief Complaint   Patient presents with     Recheck Medication     Bipolar I disorder, most recent episode depressed (H)

## 2018-12-05 NOTE — MR AVS SNAPSHOT
After Visit Summary   12/5/2018    Esa Rivera    MRN: [id number]           Patient Information     Date Of Birth          1985        Visit Information        Provider Department      12/5/2018 8:30 AM Mayito Motley MD Psychiatry Clinic        Today's Diagnoses     Bipolar I disorder, most recent episode depressed (H)    -  1      Care Instructions    Increase Latuda to 60 mg with dinner    Please continue your other usual medications at the usual doses    Please return for follow-up in 1 month          Follow-ups after your visit        Follow-up notes from your care team     Return in about 4 weeks (around 1/2/2019).      Your next 10 appointments already scheduled     Jan 08, 2019  4:30 PM CST   Adult Med Follow UP with Mayito Motley MD   Psychiatry Clinic ([facility])    [place] [id number]  [address] [place] [address] [phone]              Who to contact     Please call your clinic at [phone] to:    Ask questions about your health    Make or cancel appointments    Discuss your medicines    Learn about your test results    Speak to your doctor            Additional Information About Your Visit        MyChart Information     [employer] gives you secure access to your electronic health record. If you see a primary care provider, you can also send messages to your care team and make appointments. If you have questions, please call your primary care clinic.  If you do not have a primary care provider, please call [phone] and they will assist you.      [employer] is an electronic gateway that provides easy, online access to your medical records. With [employer], you can request a clinic appointment, read your test results, renew a prescription or communicate with your care team.     To access your existing account, please contact your [facility] or call [phone] for assistance.        Care  EveryWhere ID     This is your Care EveryWhere ID. This could be used by other organizations to access your [facility] medical records  [id number]        Your Vitals Were     Pulse BMI (Body Mass Index)                83 31.97 kg/m2           Blood Pressure from Last 3 Encounters:   12/05/18 128/82   11/13/18 129/84   10/22/18 127/81    Weight from Last 3 Encounters:   12/05/18 116 kg (255 lb 12.8 oz)   11/13/18 112.3 kg (247 lb 9.6 oz)   10/22/18 109 kg (240 lb 3.2 oz)              Today, you had the following     No orders found for display       Primary Care Provider Office Phone # Fax #    Aristides Jenkins [phone]       [address] [address]        Equal Access to Services     [facility] : Hadii aad jovany hadasho Soomaali, waaxda luqadaha, qaybta kaalmada adeegyada, jermaine castillo . So [facility] [phone].    ATENCIÓN: Si habla español, tiene a osorio disposición servicios gratuitos de asistencia lingüística. Sadie al [phone].    We comply with applicable federal civil rights laws and [place] laws. We do not discriminate on the basis of race, color, national origin, age, disability, sex, sexual orientation, or gender identity.            Thank you!     Thank you for choosing PSYCHIATRY CLINIC  for your care. Our goal is always to provide you with excellent care. Hearing back from our patients is one way we can continue to improve our services. Please take a few minutes to complete the written survey that you may receive in the mail after your visit with us. Thank you!             Your Updated Medication List - Protect others around you: Learn how to safely use, store and throw away your medicines at www.disposemymeds.org.          This list is accurate as of 12/5/18 11:59 PM.  Always use your most recent med list.                   Brand Name Dispense Instructions for use Diagnosis    divalproex sodium extended-release 500 MG 24 hr  tablet    DEPAKOTE ER    90 tablet    Take 3 tablets (1,500 mg) by mouth At Bedtime Stop 250mg tab. Increase Depakote dose to 1500mg ([id number] tabs)    Bipolar affective disorder, currently manic, severe, with psychotic features (H)       lurasidone 20 MG Tabs tablet    LATUDA    60 tablet    20 mg with dinner for 5 days, then 40 mg with dinner    Bipolar I disorder, most recent episode depressed (H)       risperiDONE 2 MG tablet    risperDAL    30 tablet    Take 1 tablet (2 mg) by mouth At Bedtime    Bipolar I disorder, most recent episode depressed (H)       vitamin B-12 1000 MCG/ML injection    CYANOCOBALAMIN    10 mL    Inject 1 mL (1,000 mcg) into the muscle every 3 months    Vitamin B12 deficiency (non anemic)

## 2018-12-06 ENCOUNTER — MYC MEDICAL ADVICE (OUTPATIENT)
Dept: PSYCHIATRY | Facility: CLINIC | Age: 33
End: 2018-12-06

## 2018-12-06 DIAGNOSIS — F31.30 BIPOLAR I DISORDER, MOST RECENT EPISODE DEPRESSED (H): Primary | ICD-10-CM

## 2018-12-07 RX ORDER — LURASIDONE HYDROCHLORIDE 60 MG/1
TABLET, FILM COATED ORAL
Qty: 30 TABLET | Refills: 0 | Status: SHIPPED | OUTPATIENT
Start: 2018-12-07 | End: 2019-01-08

## 2018-12-07 NOTE — TELEPHONE ENCOUNTER
Writer received VORB from provider to order Latuda 60 mg at dinner.    - Writer e-prescribed 30-day supply to [facility] ([phone]). Qty 30, refills 0.  Writer telephoned pt and identified that the order had been sent to the pharmacy.  Writer telephoned pharmacy and confirmed that the med was successfully billed. Sent pt message in [employer].

## 2018-12-07 NOTE — PROGRESS NOTES
Dec 5, 2018  Progress Note    Esa Rivera is a 33 year old year old male with Bipolar I Disorder, Most Recent Episode Manic Severe, with psychotic behavior (296.40) who presents for ongoing psychiatric care. He has no children. He and his wife (a psychologist) live on their own in [place]. Esa works as an  for [employer].    Diagnosis    Axis 1: Bipolar I disorder, with severe manic episode with psychosis in Dec 2016. ADHD by history. Alcohol use disorder, moderate, in short-term remission  Axis 2: Nil  Axis 3: Remote concussion x 2  Axis 4: Moderate stressors  Axis 5: GAF at time of visit: 70    Assessment & Plan     Since the last visit, Esa has discontinued Seroquel and begin a trial of Latuda.  He has been taking 40 mg for about 3 weeks.  He notices significant improvement in his symptoms of depression.  However, they are not remitted.  He also continues to experience low grade Hoahaoism ideation which is out of character for him.  He is agreeable to increase Latuda to 60 mg.  He will continue Depakote and risperidone.  He will return for follow-up in 1 month.    The patient understands the risks, benefits, adverse effects and alternatives. Agrees to treatment with the capacity to do so. No medical contraindications to treatment. Agrees to call clinic for any problems. The patient understands to call 911 or come to the nearest ED if life threatening or urgent symptoms present.    Attestation:  Patient has been seen and evaluated by me, Mayito Motley MD, PhD.    Interim History     Since the last visit, Esa has tapered and discontinued Seroquel, and initiated Latuda as above.  There has been clear improvement in his symptoms of depression.  His mood is better.  He feels less pessimistic.  His motivation and energy level are improved.  He denies suicidal thoughts.  However, he still does have some times when he is more down than usual.  He also has ongoing Hoahaoism ideation, and is particularly  "concerned with the types of food he eats.  The symptoms have been present in the past, and have reached psychotic intensity at times.    Esa is tolerating Latuda well.  He is pleased with that and feels it has been effective in treating his depression.  He requests to increase the dose further to 60 mg daily.  He will continue his other medications.  He is going on a ski trip with his family for a couple of weeks, and will return for follow-up after the holidays in early January.          In Dec 2016 Esa experienced an episode of severe katie with psychosis (grandiose delusions). He was seen in the ER but not hospitalized. He had some mild depressive episodes and possible (but questionable) short hypomanic periods in the preceding years, but never received treatment for these.     Esa's manic symptoms began when he became excited about a new idea at work. 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 He became so excited about this the he was unable to sleep. As his katie escalated, his sleep decreased to 1-2 hours per night for 5-6 nights. His affect was elated and also markedly irritable. He would \"go off\" on people for minor things in very uncharacteristic ways. His thoughts were \"really fast, crisp, and clear\". He saw \"connections in everything.\" His energy was clearly elevated. He became grandiose, believing that his idea would change the world and as a result he would meet many \"powerful people\". Eventually he came to believe that sings on the radio were meant for him.     Esa was taking Adderall at the time. He has taken this, on and off, for many years. In my opinion it is not an adequate explanation for his manic symptoms.      His mother (a PCP) became concerned and brought him to hospital. He was assessed in the ER and prescribed Ativan for sleep. He was seen by Dr Burt (a psychiatrist) the next day. He was " "prescribed OLZ and LTG. The doses were titrated \"up and down\" for the next 6 months. There were significant side effects (sedation, cognitive impairment), likely from OLZ. Most recently, Esa stopped OLZ for a period of time. In the context of being on a honeymoon in [place], he develop racing thoughts and difficulty sleeping and restarted it, with benefit. He currently takes 10 mg HS.    Esa endorses several previous short (1-2 day) periods of reduced sleep and elevated mood, typically in the context of getting involved in a work project. At most they represent subthreshold hypomanic periods.     He has also experienced several periods over the years, lasting about a week, of low mood, decreased energy and motivation, poor focus/concentration, and negative thoughts. He denies neurovegetative symptoms or SI. His wife thought that these represented depressive episodes.    Esa has consumed EtOH heavily at points during his life. It was most pronounced during his college years. He has sporadically binge-drank since then. He has not had a drink in 2 weeks.    Past Psych Hx: As above. Esa was assessed at [facility] in Dec 2016 and diagnosed with BDI. He was seen by Psychiatry at age 17 and dxed with ADD. He took Adderall off and on (mostly on) with benefit over the years since then. He denies abusing it.     Past Med Hx: 2 concussion: 1) age 15 - skiing. Age 16: football. LOC x a couple of minutes with both.    MEDICATION ADHERENCE: Good.   Current Medications     Current Outpatient Prescriptions   Medication Sig Dispense Refill     cyanocobalamin (VITAMIN B12) 1000 MCG/ML injection Inject 1 mL (1,000 mcg) into the muscle every 3 months 10 mL 0     divalproex sodium extended-release (DEPAKOTE ER) 500 MG 24 hr tablet Take 3 tablets (1,500 mg) by mouth At Bedtime Stop 250mg tab. Increase Depakote dose to 1500mg ([id number] tabs) 90 tablet 3     risperiDONE (RISPERDAL) 2 MG tablet Take 1 tablet (2 mg) by mouth At Bedtime 30 tablet " 3     [DISCONTINUED] lurasidone (LATUDA) 20 MG TABS tablet 20 mg with dinner for 5 days, then 40 mg with dinner 60 tablet 0     lurasidone (LATUDA) 60 MG TABS tablet Take one tablet (60 mg) by mouth daily at dinner. 30 tablet 0     QUEtiapine (SEROQUEL) 50 MG tablet Take 3 tablets (150 mg) by mouth At Bedtime (Patient not taking: Reported on 12/5/2018) 180 tablet 0         Substance use     ETOH: Sporadic binge drinking  Cigarettes: NA  Street Drugs: Denies    Review of Systems     A comprehensive review of systems was performed and is negative other than noted above.     Allergies     Allergies   Allergen Reactions     No Known Allergies         Mental Status Exam     /82  Pulse 83  Wt 116 kg (255 lb 12.8 oz)  BMI 31.97 kg/m2    Alertness: alert  and oriented  Appearance: well groomed  Behavior/Demeanor: cooperative and pleasant, with fair  eye contact  Speech: slowed  Language: intact  Psychomotor: normal or unremarkable  Mood:  description consistent with euthymia  Affect: full range; was not congruent to mood  Thought Process/Associations: unremarkable  Thought Content:  Denies suicidal ideation and violent ideation. He has grandiose delusions of a Shinto nature.  Perception:  Denies auditory hallucinations and visual hallucinations  Insight: good  Judgment: good  Cognition:  does appear grossly intact.      PSYCHIATRY CLINIC INDIVIDUAL PSYCHOTHERAPY NOTE                                                     [16]   Start time - 0830        End time - 0850  Date reviewed - 10/22/2018       Date next due - 1/22/2019    Subjective: This supportive psychotherapy session addressed issues related to goals of therapy  and patient's history .  Patient's reaction: Maintenance in the context of mental status appropriate for ambulatory setting.  Progress: good  Plan: RTC 2 weeks  Psychotherapy services during this visit included  myself and the patient.   TREATMENT  PLAN          SYMPTOMS; PROBLEMS   MEASURABLE  GOALS;    FUNCTIONAL IMPROVEMENT INTERVENTIONS;   GAINS MADE DISCHARGE CRITERIA   Depression: depressed mood, anhedonia, low energy, insomnia and concentration problems   reduce depressive symptoms and reduce depressive episodes medications   psycho-education   self-care skills symptom resolution   Crystal/Hypomania: none   reduce manic/hypomanic episodes medications   psychotherapy  self-care skills symptom resolution

## 2018-12-07 NOTE — PATIENT INSTRUCTIONS
Increase Latuda to 60 mg with dinner    Please continue your other usual medications at the usual doses    Please return for follow-up in 1 month

## 2019-01-08 ENCOUNTER — OFFICE VISIT (OUTPATIENT)
Dept: PSYCHIATRY | Facility: CLINIC | Age: 34
End: 2019-01-08
Attending: PSYCHIATRY & NEUROLOGY
Payer: COMMERCIAL

## 2019-01-08 VITALS
DIASTOLIC BLOOD PRESSURE: 83 MMHG | WEIGHT: 255.4 LBS | BODY MASS INDEX: 31.92 KG/M2 | SYSTOLIC BLOOD PRESSURE: 127 MMHG | HEART RATE: 55 BPM

## 2019-01-08 DIAGNOSIS — F31.30 BIPOLAR I DISORDER, MOST RECENT EPISODE DEPRESSED (H): ICD-10-CM

## 2019-01-08 PROCEDURE — G0463 HOSPITAL OUTPT CLINIC VISIT: HCPCS | Mod: ZF

## 2019-01-08 RX ORDER — LURASIDONE HYDROCHLORIDE 60 MG/1
60 TABLET, FILM COATED ORAL
Qty: 30 TABLET | Refills: 3 | Status: SHIPPED | OUTPATIENT
Start: 2019-01-08 | End: 2019-04-24

## 2019-01-08 ASSESSMENT — PAIN SCALES - GENERAL: PAINLEVEL: MODERATE PAIN (4)

## 2019-01-08 NOTE — PROGRESS NOTES
Jan 8, 2019  Progress Note    Esa Rivera is a 33 year old year old male with Bipolar I Disorder, Most Recent Episode Manic Severe, with psychotic behavior (296.40) who presents for ongoing psychiatric care. He has no children. He and his wife (a psychologist) live on their own in [place] Esa works as an  for [employer].    Diagnosis    Axis 1: Bipolar I disorder, with severe manic episode with psychosis in Dec 2016. ADHD by history. Alcohol use disorder, moderate, in short-term remission  Axis 2: Nil  Axis 3: Remote concussion x 2  Axis 4: Moderate stressors  Axis 5: GAF at time of visit: 70    Assessment & Plan     Since the last visit, Esa has increased his Latuda to 60 mg daily.  His depressive symptoms seem to have resolved.  He felt very good during a ski trip over the holidays.  He feels a bit more down since returning, but this seems to be mostly related to him being unhappy with his work.  He is considering looking for other opportunities.  He will continue his current medications and return for follow-up in 1 month.    The patient understands the risks, benefits, adverse effects and alternatives. Agrees to treatment with the capacity to do so. No medical contraindications to treatment. Agrees to call clinic for any problems. The patient understands to call 911 or come to the nearest ED if life threatening or urgent symptoms present.    Attestation:  Patient has been seen and evaluated by me, Mayito Motley MD, PhD.    Interim History     Since the last visit, Esa increased his dose of Latuda to target depressive symptoms.  They seem to have resolved.  He felt very good during a ski trip to [place] for 2 weeks over the holidays.  Since his return to [place], he feels somewhat more down, but he is clearly unhappy with his current job, and has been for some time.  He is considering looking for other opportunities both internally and externally.    Eas is tolerating his current medications well.   "We discussed how long he will need to continue taking risperidone, and given that he has had several manic episodes over the last year to year and a half, he is agreeable to continue it for the time being.  After period of stability, hopefully he can do a gradual taper and discontinue it.    Esa will continue his medications at their current doses.  He will return for follow-up in 1 month.          In Dec 2016 Esa experienced an episode of severe katie with psychosis (grandiose delusions). He was seen in the ER but not hospitalized. He had some mild depressive episodes and possible (but questionable) short hypomanic periods in the preceding years, but never received treatment for these.     Esa's manic symptoms began when he became excited about a new idea at work. 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 He became so excited about this the he was unable to sleep. As his katie escalated, his sleep decreased to 1-2 hours per night for 5-6 nights. His affect was elated and also markedly irritable. He would \"go off\" on people for minor things in very uncharacteristic ways. His thoughts were \"really fast, crisp, and clear\". He saw \"connections in everything.\" His energy was clearly elevated. He became grandiose, believing that his idea would change the world and as a result he would meet many \"powerful people\". Eventually he came to believe that sings on the radio were meant for him.     Esa was taking Adderall at the time. He has taken this, on and off, for many years. In my opinion it is not an adequate explanation for his manic symptoms.      His mother (a PCP) became concerned and brought him to hospital. He was assessed in the ER and prescribed Ativan for sleep. He was seen by Dr Burt (a psychiatrist) the next day. He was prescribed OLZ and LTG. The doses were titrated \"up and down\" for the next 6 months. There were " significant side effects (sedation, cognitive impairment), likely from OLZ. Most recently, Esa stopped OLZ for a period of time. In the context of being on a honeymoon in [place], he develop racing thoughts and difficulty sleeping and restarted it, with benefit. He currently takes 10 mg HS.    Esa endorses several previous short (1-2 day) periods of reduced sleep and elevated mood, typically in the context of getting involved in a work project. At most they represent subthreshold hypomanic periods.     He has also experienced several periods over the years, lasting about a week, of low mood, decreased energy and motivation, poor focus/concentration, and negative thoughts. He denies neurovegetative symptoms or SI. His wife thought that these represented depressive episodes.    Esa has consumed EtOH heavily at points during his life. It was most pronounced during his college years. He has sporadically binge-drank since then. He has not had a drink in 2 weeks.    Past Psych Hx: As above. Esa was assessed at [facility] in Dec 2016 and diagnosed with BDI. He was seen by Psychiatry at age 17 and dxed with ADD. He took Adderall off and on (mostly on) with benefit over the years since then. He denies abusing it.     Past Med Hx: 2 concussion: 1) age 15 - skiing. Age 16: football. LOC x a couple of minutes with both.    MEDICATION ADHERENCE: Good.   Current Medications     Current Outpatient Medications   Medication Sig Dispense Refill     cyanocobalamin (VITAMIN B12) 1000 MCG/ML injection Inject 1 mL (1,000 mcg) into the muscle every 3 months 10 mL 0     divalproex sodium extended-release (DEPAKOTE ER) 500 MG 24 hr tablet Take 3 tablets (1,500 mg) by mouth At Bedtime Stop 250mg tab. Increase Depakote dose to 1500mg ([id number] tabs) 90 tablet 3     lurasidone (LATUDA) 60 MG TABS tablet Take one tablet (60 mg) by mouth daily at dinner. 30 tablet 0     risperiDONE (RISPERDAL) 2 MG tablet Take 1 tablet (2 mg) by mouth At Bedtime 30  tablet 3         Substance use     ETOH: Sporadic binge drinking  Cigarettes: NA  Street Drugs: Denies    Review of Systems     A comprehensive review of systems was performed and is negative other than noted above.     Allergies     Allergies   Allergen Reactions     No Known Allergies         Mental Status Exam     /83   Pulse 55   Wt 115.8 kg (255 lb 6.4 oz)   BMI 31.92 kg/m      Alertness: alert  and oriented  Appearance: well groomed  Behavior/Demeanor: cooperative and pleasant, with fair  eye contact  Speech: slowed  Language: intact  Psychomotor: normal or unremarkable  Mood:  description consistent with euthymia  Affect: full range; was not congruent to mood  Thought Process/Associations: unremarkable  Thought Content:  Denies suicidal ideation and violent ideation. He has grandiose delusions of a Denominational nature.  Perception:  Denies auditory hallucinations and visual hallucinations  Insight: good  Judgment: good  Cognition:  does appear grossly intact.      PSYCHIATRY CLINIC INDIVIDUAL PSYCHOTHERAPY NOTE                                                     [16]   Start time - 0830        End time - 0850  Date reviewed - 10/22/2018       Date next due - 1/22/2019    Subjective: This supportive psychotherapy session addressed issues related to goals of therapy  and patient's history .  Patient's reaction: Maintenance in the context of mental status appropriate for ambulatory setting.  Progress: good  Plan: RTC 2 weeks  Psychotherapy services during this visit included  myself and the patient.   TREATMENT  PLAN          SYMPTOMS; PROBLEMS   MEASURABLE GOALS;    FUNCTIONAL IMPROVEMENT INTERVENTIONS;   GAINS MADE DISCHARGE CRITERIA   Depression: depressed mood, anhedonia, low energy, insomnia and concentration problems   reduce depressive symptoms and reduce depressive episodes medications   psycho-education   self-care skills symptom resolution   Crystal/Hypomania: none   reduce manic/hypomanic episodes  medications   psychotherapy  self-care skills symptom resolution

## 2019-01-14 ENCOUNTER — TELEPHONE (OUTPATIENT)
Dept: PSYCHIATRY | Facility: CLINIC | Age: 34
End: 2019-01-14

## 2019-01-14 NOTE — TELEPHONE ENCOUNTER
Writer received a Prior Auth approval letter for Latuda tablets.     The effective dates are 01/09/2019-01/09/2022.    PA#  [id number]     Writer sent the original to scanning, kept a copy in psychiatry until scanning complete and a message was routed to Mayito Phipps RN

## 2019-01-15 NOTE — TELEPHONE ENCOUNTER
Received fax refill request for pt's lamotrigine, dated 1/11/19. It identified that the med was last filled on 4/2/18 and that the pt expects to  the prescription on 1/14/19.    Writer telephoned pt and confirmed that he is not currently taking lamotrigine and that the request is in error.    Writer telephoned pharmacy to report that the pt no longer takes this medication. [facility] identified a plan to note this on their system.    No further action needed here.

## 2019-02-20 ENCOUNTER — OFFICE VISIT (OUTPATIENT)
Dept: PSYCHIATRY | Facility: CLINIC | Age: 34
End: 2019-02-20
Attending: PSYCHIATRY & NEUROLOGY
Payer: COMMERCIAL

## 2019-02-20 VITALS
SYSTOLIC BLOOD PRESSURE: 118 MMHG | WEIGHT: 256.6 LBS | HEART RATE: 58 BPM | DIASTOLIC BLOOD PRESSURE: 81 MMHG | BODY MASS INDEX: 32.07 KG/M2

## 2019-02-20 DIAGNOSIS — F31.2 BIPOLAR AFFECTIVE DISORDER, CURRENTLY MANIC, SEVERE, WITH PSYCHOTIC FEATURES (H): ICD-10-CM

## 2019-02-20 DIAGNOSIS — F31.30 BIPOLAR I DISORDER, MOST RECENT EPISODE DEPRESSED (H): ICD-10-CM

## 2019-02-20 RX ORDER — DIVALPROEX SODIUM 500 MG/1
1500 TABLET, EXTENDED RELEASE ORAL AT BEDTIME
Qty: 90 TABLET | Refills: 3 | Status: SHIPPED | OUTPATIENT
Start: 2019-02-20 | End: 2019-04-24

## 2019-02-20 RX ORDER — RISPERIDONE 2 MG/1
2 TABLET ORAL AT BEDTIME
Qty: 30 TABLET | Refills: 3 | Status: SHIPPED | OUTPATIENT
Start: 2019-02-20 | End: 2019-04-24

## 2019-02-20 ASSESSMENT — PAIN SCALES - GENERAL: PAINLEVEL: NO PAIN (0)

## 2019-02-20 NOTE — PROGRESS NOTES
Progress Note    Esa Rivera is a 34 year old year old male with Bipolar I Disorder, Most Recent Episode Manic Severe, with psychotic behavior (296.40) who presents for ongoing psychiatric care. He has no children. He and his wife (a psychologist) live on their own in [facility]. Esa works as an  for [employer].    Diagnosis    Axis 1: Bipolar I disorder, with severe manic episode with psychosis in Dec 2016. ADHD by history. Alcohol use disorder, moderate, in short-term remission  Axis 2: Nil  Axis 3: Remote concussion x 2  Axis 4: Moderate stressors  Axis 5: GAF at time of visit: 70    Assessment & Plan     Since the last visit, Esa has been adherent with his usual medications, including Depakote 1500 mg at night, Latuda 60 mg with dinner, and risperidone 2 mg at bedtime.  He feels well and denies symptoms of depression or katie.  He may be slightly affectively flat.  We had a long discussion about the possibility of tapering and discontinuing risperidone, but Esa is reluctant to do so at this point.  He has had several severe manic episodes with psychosis, and so I think this is a reasonable decision.  We will continue his medications at the current doses and review risperidone again in 3 months.    The patient understands the risks, benefits, adverse effects and alternatives. Agrees to treatment with the capacity to do so. No medical contraindications to treatment. Agrees to call clinic for any problems. The patient understands to call 911 or come to the nearest ED if life threatening or urgent symptoms present.    Attestation:  Patient has been seen and evaluated by me, Mayito Motley MD, PhD.    Interim History     Since the last visit, Esa reports that things have been going well.  His mood is stable.  His motivation and energy level are reasonable.  He sleeps well at night.  He feels cognitively intact.  He denies symptoms of depression or katie.    Esa does note that he finds it a bit more difficult  "than usual to get excited about things, and also that he is sometimes delayed and laughing at jokes.  He may be experiencing mild affect of flattening, possibly secondary to risperidone.    Esa is looking for other jobs.  He had an interview at [employer].  He is also pursuing other possible opportunities.    We had a lengthy discussion about whether to taper or continue risperidone.  Given the frequency and severity of Esa's manic episodes over the last couple of years, he wishes to continue taking it.  He is agreeable to discuss again in another 3 months.  He will continue his medications and return for follow-up in 2 months.          In Dec 2016 Esa experienced an episode of severe katie with psychosis (grandiose delusions). He was seen in the ER but not hospitalized. He had some mild depressive episodes and possible (but questionable) short hypomanic periods in the preceding years, but never received treatment for these.     Esa's manic symptoms began when he became excited about a new idea at work. 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 He became so excited about this the he was unable to sleep. As his katie escalated, his sleep decreased to 1-2 hours per night for 5-6 nights. His affect was elated and also markedly irritable. He would \"go off\" on people for minor things in very uncharacteristic ways. His thoughts were \"really fast, crisp, and clear\". He saw \"connections in everything.\" His energy was clearly elevated. He became grandiose, believing that his idea would change the world and as a result he would meet many \"powerful people\". Eventually he came to believe that sings on the radio were meant for him.     Esa was taking Adderall at the time. He has taken this, on and off, for many years. In my opinion it is not an adequate explanation for his manic symptoms.      His mother (a PCP) became concerned and " "brought him to hospital. He was assessed in the ER and prescribed Ativan for sleep. He was seen by Dr Burt (a psychiatrist) the next day. He was prescribed OLZ and LTG. The doses were titrated \"up and down\" for the next 6 months. There were significant side effects (sedation, cognitive impairment), likely from OLZ. Most recently, Esa stopped OLZ for a period of time. In the context of being on a honeymoon in [place], he develop racing thoughts and difficulty sleeping and restarted it, with benefit. He currently takes 10 mg HS.    Esa endorses several previous short (1-2 day) periods of reduced sleep and elevated mood, typically in the context of getting involved in a work project. At most they represent subthreshold hypomanic periods.     He has also experienced several periods over the years, lasting about a week, of low mood, decreased energy and motivation, poor focus/concentration, and negative thoughts. He denies neurovegetative symptoms or SI. His wife thought that these represented depressive episodes.    Esa has consumed EtOH heavily at points during his life. It was most pronounced during his college years. He has sporadically binge-drank since then. He has not had a drink in 2 weeks.    Past Psych Hx: As above. Esa was assessed at [facility] in Dec 2016 and diagnosed with BDI. He was seen by Psychiatry at age 17 and dxed with ADD. He took Adderall off and on (mostly on) with benefit over the years since then. He denies abusing it.     Past Med Hx: 2 concussion: 1) age 15 - skiing. Age 16: football. LOC x a couple of minutes with both.    MEDICATION ADHERENCE: Good.   Current Medications     Current Outpatient Medications   Medication Sig Dispense Refill     cyanocobalamin (VITAMIN B12) 1000 MCG/ML injection Inject 1 mL (1,000 mcg) into the muscle every 3 months 10 mL 0     divalproex sodium extended-release (DEPAKOTE ER) 500 MG 24 hr tablet Take 3 tablets (1,500 mg) by mouth At Bedtime Stop 250mg tab. " Increase Depakote dose to 1500mg ([id number] tabs) 90 tablet 3     lurasidone (LATUDA) 60 MG TABS tablet Take 1 tablet (60 mg) by mouth daily (with dinner) 30 tablet 3     risperiDONE (RISPERDAL) 2 MG tablet Take 1 tablet (2 mg) by mouth At Bedtime 30 tablet 3         Substance use     ETOH: Sporadic binge drinking  Cigarettes: NA  Street Drugs: Denies    Review of Systems     A comprehensive review of systems was performed and is negative other than noted above.     Allergies     Allergies   Allergen Reactions     No Known Allergies         Mental Status Exam     /81   Pulse 58   Wt 116.4 kg (256 lb 9.6 oz)   BMI 32.07 kg/m      Alertness: alert  and oriented  Appearance: well groomed  Behavior/Demeanor: cooperative and pleasant, with fair  eye contact  Speech: slowed  Language: intact  Psychomotor: normal or unremarkable  Mood:  description consistent with euthymia  Affect: full range; was not congruent to mood  Thought Process/Associations: unremarkable  Thought Content:  Denies suicidal ideation and violent ideation. He has grandiose delusions of a Jain nature.  Perception:  Denies auditory hallucinations and visual hallucinations  Insight: good  Judgment: good  Cognition:  does appear grossly intact.      PSYCHIATRY CLINIC INDIVIDUAL PSYCHOTHERAPY NOTE                                                     [16]   Start time - 1600        End time - 1625  Date reviewed - 10/22/2018       Date next due - 1/22/2019    Subjective: This supportive psychotherapy session addressed issues related to goals of therapy  and patient's history .  Patient's reaction: Maintenance in the context of mental status appropriate for ambulatory setting.  Progress: good  Plan: RTC 2 weeks  Psychotherapy services during this visit included  myself and the patient.   TREATMENT  PLAN          SYMPTOMS; PROBLEMS   MEASURABLE GOALS;    FUNCTIONAL IMPROVEMENT INTERVENTIONS;   GAINS MADE DISCHARGE CRITERIA   Depression: depressed  mood, anhedonia, low energy, insomnia and concentration problems   reduce depressive symptoms and reduce depressive episodes medications   psycho-education   self-care skills symptom resolution   Crystal/Hypomania: none   reduce manic/hypomanic episodes medications   psychotherapy  self-care skills symptom resolution

## 2019-02-25 ENCOUNTER — NURSE TRIAGE (OUTPATIENT)
Dept: CALL CENTER | Age: 34
End: 2019-02-25

## 2019-04-24 ENCOUNTER — TELEPHONE (OUTPATIENT)
Dept: PSYCHIATRY | Facility: CLINIC | Age: 34
End: 2019-04-24

## 2019-04-24 ENCOUNTER — OFFICE VISIT (OUTPATIENT)
Dept: PSYCHIATRY | Facility: CLINIC | Age: 34
End: 2019-04-24
Attending: PSYCHIATRY & NEUROLOGY
Payer: COMMERCIAL

## 2019-04-24 VITALS
HEART RATE: 61 BPM | DIASTOLIC BLOOD PRESSURE: 84 MMHG | BODY MASS INDEX: 31.3 KG/M2 | WEIGHT: 250.4 LBS | SYSTOLIC BLOOD PRESSURE: 128 MMHG

## 2019-04-24 DIAGNOSIS — F31.2 BIPOLAR AFFECTIVE DISORDER, CURRENTLY MANIC, SEVERE, WITH PSYCHOTIC FEATURES (H): ICD-10-CM

## 2019-04-24 DIAGNOSIS — F31.30 BIPOLAR I DISORDER, MOST RECENT EPISODE DEPRESSED (H): ICD-10-CM

## 2019-04-24 DIAGNOSIS — F31.2 BIPOLAR AFFECTIVE DISORDER, CURRENTLY MANIC, SEVERE, WITH PSYCHOTIC FEATURES (H): Primary | ICD-10-CM

## 2019-04-24 DIAGNOSIS — Z51.81 ENCOUNTER FOR THERAPEUTIC DRUG MONITORING: ICD-10-CM

## 2019-04-24 PROCEDURE — G0463 HOSPITAL OUTPT CLINIC VISIT: HCPCS | Mod: ZF

## 2019-04-24 RX ORDER — RISPERIDONE 0.5 MG/1
TABLET ORAL
Qty: 180 TABLET | Refills: 0 | Status: SHIPPED | OUTPATIENT
Start: 2019-04-24 | End: 2019-08-07

## 2019-04-24 RX ORDER — DIVALPROEX SODIUM 500 MG/1
1500 TABLET, EXTENDED RELEASE ORAL AT BEDTIME
Qty: 270 TABLET | Refills: 3 | Status: SHIPPED | OUTPATIENT
Start: 2019-04-24 | End: 2020-03-18

## 2019-04-24 RX ORDER — LURASIDONE HYDROCHLORIDE 60 MG/1
60 TABLET, FILM COATED ORAL
Qty: 90 TABLET | Refills: 3 | Status: SHIPPED | OUTPATIENT
Start: 2019-04-24 | End: 2019-06-12 | Stop reason: DRUGHIGH

## 2019-04-24 ASSESSMENT — PAIN SCALES - GENERAL: PAINLEVEL: NO PAIN (0)

## 2019-04-24 NOTE — PROGRESS NOTES
Progress Note    Esa Rivera is a 34 year old year old male with Bipolar I Disorder, Most Recent Episode Manic Severe, with psychotic behavior (296.40) who presents for ongoing psychiatric care. He has no children. He and his wife (a psychologist) live on their own in [facility]. Esa works as an  for [employer].    Diagnosis    Axis 1: Bipolar I disorder, with severe manic episode with psychosis in Dec 2016. ADHD by history. Alcohol use disorder, moderate, in short-term remission  Axis 2: Nil  Axis 3: Remote concussion x 2  Axis 4: Moderate stressors  Axis 5: GAF at time of visit: 70    Assessment & Plan     Since the last visit, Esa reports he has mostly been doing well.  He suspects he had a low-grade depression over the winter, but over the last few weeks, with the longer days and the nicer weather, Esa has been outside and exercising more, and feels better.  He may be experiencing cognitive slowing, likely from risperidone, and he will do a gradual taper of this medication, decreasing by half a milligram each month, and discontinuing it in 3 months.  We discussed early warning signs of katie, and Esa will increase the dose back to 2 mg if these emerge.  He will return for follow-up in 6 weeks.  He will continue his other usual medications in the meantime.  He will get a blood test to check his Depakote level and other routine labs.    The patient understands the risks, benefits, adverse effects and alternatives. Agrees to treatment with the capacity to do so. No medical contraindications to treatment. Agrees to call clinic for any problems. The patient understands to call 911 or come to the nearest ED if life threatening or urgent symptoms present.    Attestation:  Patient has been seen and evaluated by me, Mayito Motley MD, PhD.    Interim History     Esa reports that over the last few weeks he has been feeling well.  His mood is stable.  His motivation and energy level are good.  He sleeps well at  "night.  He enjoys biking and has been doing so several times a week, typically 15-30 miles each time.  He feels that the longer days, good weather, and increased exercise have helped improve his mood.    Looking back, Esa suspects he had a low-grade depression through most of the winter.  He denies any recent manic or hypomanic symptoms.  He has given up alcohol and is not using any other substances.    We had a lengthy discussion about Esa's risperidone.  It has been an effective antimanic drug for him, but he has been free of katie for about the last 10 months.  Esa reports significant cognitive slowing, and I suspect this is likely from risperidone.  We discussed the pros and cons of discontinuing the medication over the course of 3 months.  Esa is agreeable to do so.  He will be very watchful for any signs of katie and will increase risperidone if these occur.    Otherwise Esa will continue his current medications.  He will return for follow-up in 6 weeks.          In Dec 2016 Esa experienced an episode of severe katie with psychosis (grandiose delusions). He was seen in the ER but not hospitalized. He had some mild depressive episodes and possible (but questionable) short hypomanic periods in the preceding years, but never received treatment for these.     Esa's manic symptoms began when he became excited about a new idea at work. 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 He became so excited about this the he was unable to sleep. As his katie escalated, his sleep decreased to 1-2 hours per night for 5-6 nights. His affect was elated and also markedly irritable. He would \"go off\" on people for minor things in very uncharacteristic ways. His thoughts were \"really fast, crisp, and clear\". He saw \"connections in everything.\" His energy was clearly elevated. He became grandiose, believing that his idea would change " "the world and as a result he would meet many \"powerful people\". Eventually he came to believe that sings on the radio were meant for him.     Esa was taking Adderall at the time. He has taken this, on and off, for many years. In my opinion it is not an adequate explanation for his manic symptoms.      His mother (a PCP) became concerned and brought him to hospital. He was assessed in the ER and prescribed Ativan for sleep. He was seen by Dr Burt (a psychiatrist) the next day. He was prescribed OLZ and LTG. The doses were titrated \"up and down\" for the next 6 months. There were significant side effects (sedation, cognitive impairment), likely from OLZ. Most recently, Esa stopped OLZ for a period of time. In the context of being on a honeymoon in [place], he develop racing thoughts and difficulty sleeping and restarted it, with benefit. He currently takes 10 mg HS.    Esa endorses several previous short (1-2 day) periods of reduced sleep and elevated mood, typically in the context of getting involved in a work project. At most they represent subthreshold hypomanic periods.     He has also experienced several periods over the years, lasting about a week, of low mood, decreased energy and motivation, poor focus/concentration, and negative thoughts. He denies neurovegetative symptoms or SI. His wife thought that these represented depressive episodes.    Esa has consumed EtOH heavily at points during his life. It was most pronounced during his college years. He has sporadically binge-drank since then. He has not had a drink in 2 weeks.    Past Psych Hx: As above. Esa was assessed at [facility] in Dec 2016 and diagnosed with BDI. He was seen by Psychiatry at age 17 and dxed with ADD. He took Adderall off and on (mostly on) with benefit over the years since then. He denies abusing it.     Past Med Hx: 2 concussion: 1) age 15 - skiing. Age 16: football. LOC x a couple of minutes with both.    MEDICATION ADHERENCE: Good. "   Current Medications     Current Outpatient Medications   Medication Sig Dispense Refill     cyanocobalamin (VITAMIN B12) 1000 MCG/ML injection Inject 1 mL (1,000 mcg) into the muscle every 3 months 10 mL 0     divalproex sodium extended-release (DEPAKOTE ER) 500 MG 24 hr tablet Take 3 tablets (1,500 mg) by mouth At Bedtime Stop 250mg tab. Increase Depakote dose to 1500mg ([id number] tabs) 90 tablet 3     lurasidone (LATUDA) 60 MG TABS tablet Take 1 tablet (60 mg) by mouth daily (with dinner) 30 tablet 3     risperiDONE (RISPERDAL) 2 MG tablet Take 1 tablet (2 mg) by mouth At Bedtime 30 tablet 3         Substance use     ETOH: Sporadic binge drinking  Cigarettes: NA  Street Drugs: Denies    Review of Systems     A comprehensive review of systems was performed and is negative other than noted above.     Allergies     Allergies   Allergen Reactions     No Known Allergies         Mental Status Exam     /84   Pulse 61   Wt 113.6 kg (250 lb 6.4 oz)   BMI 31.30 kg/m      Alertness: alert  and oriented  Appearance: well groomed  Behavior/Demeanor: cooperative and pleasant, with fair  eye contact  Speech: slowed  Language: intact  Psychomotor: normal or unremarkable  Mood:  description consistent with euthymia  Affect: full range; was not congruent to mood  Thought Process/Associations: unremarkable  Thought Content:  Denies suicidal ideation and violent ideation. He has grandiose delusions of a Muslim nature.  Perception:  Denies auditory hallucinations and visual hallucinations  Insight: good  Judgment: good  Cognition:  does appear grossly intact.      PSYCHIATRY CLINIC INDIVIDUAL PSYCHOTHERAPY NOTE                                                     [16]   Start time - 1600        End time - 1625  Date reviewed - 10/22/2018       Date next due - 1/22/2019    Subjective: This supportive psychotherapy session addressed issues related to goals of therapy  and patient's history .  Patient's reaction: Maintenance  in the context of mental status appropriate for ambulatory setting.  Progress: good  Plan: RTC 2 weeks  Psychotherapy services during this visit included  myself and the patient.   TREATMENT  PLAN          SYMPTOMS; PROBLEMS   MEASURABLE GOALS;    FUNCTIONAL IMPROVEMENT INTERVENTIONS;   GAINS MADE DISCHARGE CRITERIA   Depression: depressed mood, anhedonia, low energy, insomnia and concentration problems   reduce depressive symptoms and reduce depressive episodes medications   psycho-education   self-care skills symptom resolution   Crystal/Hypomania: none   reduce manic/hypomanic episodes medications   psychotherapy  self-care skills symptom resolution

## 2019-04-24 NOTE — TELEPHONE ENCOUNTER
----- Message from Mayito Motley MD sent at 4/24/2019  4:37 PM CDT -----  Chris Hammer -     Can you order labs for Esa?  He needs a CBC and differential, AST, ALT, and valproic acid level.    Thanks!    DB      Writer placed requested orders.

## 2019-04-24 NOTE — PATIENT INSTRUCTIONS
Reduce risperidone to 1.5 mg at bedtime for a month, then 1 mg at bedtime for a month, then 0.5 mg at bedtime for a month, then discontinue    Continue Depakote and Latuda at the usual doses    Please get a blood test to check your Depakote level    Please return for follow-up in 6 weeks

## 2019-04-25 ASSESSMENT — PATIENT HEALTH QUESTIONNAIRE - PHQ9: SUM OF ALL RESPONSES TO PHQ QUESTIONS 1-9: 2

## 2019-06-04 DIAGNOSIS — F31.2 BIPOLAR AFFECTIVE DISORDER, CURRENTLY MANIC, SEVERE, WITH PSYCHOTIC FEATURES (H): ICD-10-CM

## 2019-06-04 DIAGNOSIS — Z51.81 ENCOUNTER FOR THERAPEUTIC DRUG MONITORING: ICD-10-CM

## 2019-06-04 LAB
ALT SERPL W P-5'-P-CCNC: 41 U/L (ref 0–70)
AST SERPL W P-5'-P-CCNC: 24 U/L (ref 0–45)
BASOPHILS # BLD AUTO: 0 10E9/L (ref 0–0.2)
BASOPHILS NFR BLD AUTO: 0.2 %
DIFFERENTIAL METHOD BLD: NORMAL
EOSINOPHIL # BLD AUTO: 0.1 10E9/L (ref 0–0.7)
EOSINOPHIL NFR BLD AUTO: 1.1 %
ERYTHROCYTE [DISTWIDTH] IN BLOOD BY AUTOMATED COUNT: 12 % (ref 10–15)
HCT VFR BLD AUTO: 44.2 % (ref 40–53)
HGB BLD-MCNC: 14.9 G/DL (ref 13.3–17.7)
IMM GRANULOCYTES # BLD: 0 10E9/L (ref 0–0.4)
IMM GRANULOCYTES NFR BLD: 0.2 %
LYMPHOCYTES # BLD AUTO: 2.2 10E9/L (ref 0.8–5.3)
LYMPHOCYTES NFR BLD AUTO: 39.4 %
MCH RBC QN AUTO: 30.7 PG (ref 26.5–33)
MCHC RBC AUTO-ENTMCNC: 33.7 G/DL (ref 31.5–36.5)
MCV RBC AUTO: 91 FL (ref 78–100)
MONOCYTES # BLD AUTO: 0.4 10E9/L (ref 0–1.3)
MONOCYTES NFR BLD AUTO: 7.1 %
NEUTROPHILS # BLD AUTO: 2.9 10E9/L (ref 1.6–8.3)
NEUTROPHILS NFR BLD AUTO: 52 %
NRBC # BLD AUTO: 0 10*3/UL
NRBC BLD AUTO-RTO: 0 /100
PLATELET # BLD AUTO: 235 10E9/L (ref 150–450)
RBC # BLD AUTO: 4.85 10E12/L (ref 4.4–5.9)
VALPROATE SERPL-MCNC: 55 MG/L (ref 50–100)
WBC # BLD AUTO: 5.6 10E9/L (ref 4–11)

## 2019-06-04 PROCEDURE — 80164 ASSAY DIPROPYLACETIC ACD TOT: CPT | Performed by: PSYCHIATRY & NEUROLOGY

## 2019-06-04 PROCEDURE — 84460 ALANINE AMINO (ALT) (SGPT): CPT | Performed by: PSYCHIATRY & NEUROLOGY

## 2019-06-04 PROCEDURE — 36415 COLL VENOUS BLD VENIPUNCTURE: CPT | Performed by: PSYCHIATRY & NEUROLOGY

## 2019-06-04 PROCEDURE — 85025 COMPLETE CBC W/AUTO DIFF WBC: CPT | Performed by: PSYCHIATRY & NEUROLOGY

## 2019-06-04 PROCEDURE — 84450 TRANSFERASE (AST) (SGOT): CPT | Performed by: PSYCHIATRY & NEUROLOGY

## 2019-06-12 ENCOUNTER — OFFICE VISIT (OUTPATIENT)
Dept: PSYCHIATRY | Facility: CLINIC | Age: 34
End: 2019-06-12
Attending: PSYCHIATRY & NEUROLOGY
Payer: COMMERCIAL

## 2019-06-12 VITALS
WEIGHT: 255 LBS | BODY MASS INDEX: 31.87 KG/M2 | SYSTOLIC BLOOD PRESSURE: 123 MMHG | HEART RATE: 48 BPM | DIASTOLIC BLOOD PRESSURE: 79 MMHG

## 2019-06-12 DIAGNOSIS — F31.30 BIPOLAR I DISORDER, MOST RECENT EPISODE DEPRESSED (H): Primary | ICD-10-CM

## 2019-06-12 PROCEDURE — G0463 HOSPITAL OUTPT CLINIC VISIT: HCPCS | Mod: ZF

## 2019-06-12 RX ORDER — LURASIDONE HYDROCHLORIDE 80 MG/1
80 TABLET, FILM COATED ORAL DAILY
Qty: 30 TABLET | Refills: 1 | Status: SHIPPED | OUTPATIENT
Start: 2019-06-12 | End: 2019-08-07

## 2019-06-12 ASSESSMENT — PAIN SCALES - GENERAL: PAINLEVEL: NO PAIN (0)

## 2019-06-12 NOTE — PATIENT INSTRUCTIONS
Increase Latuda to 80 mg daily    Continue your other medications at the usual doses    Please return for follow-up in 1 month

## 2019-06-12 NOTE — PROGRESS NOTES
Progress Note    Esa Rivera is a 34 year old year old male with Bipolar I Disorder, Most Recent Episode Manic Severe, with psychotic behavior (296.40) who presents for ongoing psychiatric care. He has no children. He and his wife (a psychologist) live on their own in [facility] Esa works as an  for [employer]    Diagnosis    Axis 1: Bipolar I disorder, with severe manic episode with psychosis in Dec 2016. ADHD by history. Alcohol use disorder, moderate, in short-term remission  Axis 2: Nil  Axis 3: Remote concussion x 2  Axis 4: Moderate stressors  Axis 5: GAF at time of visit: 70    Assessment & Plan     Since the last visit, Esa has been tapering risperidone by 0.5 mg/month.  He is currently taking 1 mg and we will reduce it to 0.5 mg in a week.  He denies any symptoms of katie, but has had several episodes of mild to moderate depression over the last 6 or 8 months.  We will increase his Latuda to 80 mg to target these.  Other options include reintroducing a lamotrigine in (this was discontinued due to a rash after Esa had lost a large amount of weight and became lithium toxic), or switching to Vraylar.  Esa previously had to discontinue lithium and Seroquel due to side effects.  He will increase Latuda, continue his other medications, and return for follow-up in 1 month.    The patient understands the risks, benefits, adverse effects and alternatives. Agrees to treatment with the capacity to do so. No medical contraindications to treatment. Agrees to call clinic for any problems. The patient understands to call 911 or come to the nearest ED if life threatening or urgent symptoms present.    Attestation:  Patient has been seen and evaluated by me, Mayito Motley MD, PhD.    Interim History     Since the last visit, Esa has been tapering his risperidone as above.  It has gone well with no reemergence of manic symptoms.    Esa and his wife Julia, who accompanied him today, report that he has had several  "mild to moderate episodes of depression since last December.  There are no obvious triggers.  Esa is adherent with medications.  He has been substance free for about a year.  Work remains stressful, but no different from previously.  There are no obvious triggers for the depressions.    We discussed options.  Esa previously discontinued Seroquel to pronounced sedation.  He discontinued lithium after he became toxic from it, and also was experiencing affect of flattening from it.  He previously took lamotrigine at a maximum dose of 200 mg daily.  This was discontinued while Esa was hospitalized for lithium intoxication, when he experienced a rash.  It is not clear that the rash was lamotrigine related.  Esa has never had a trial of Vraylar.    Esa is agreeable to increase Latuda to 80 mg daily.  He will return for follow-up in 1 month.          In Dec 2016 Esa experienced an episode of severe katie with psychosis (grandiose delusions). He was seen in the ER but not hospitalized. He had some mild depressive episodes and possible (but questionable) short hypomanic periods in the preceding years, but never received treatment for these.     Esa's manic symptoms began when he became excited about a new idea at work. 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 He became so excited about this the he was unable to sleep. As his katie escalated, his sleep decreased to 1-2 hours per night for 5-6 nights. His affect was elated and also markedly irritable. He would \"go off\" on people for minor things in very uncharacteristic ways. His thoughts were \"really fast, crisp, and clear\". He saw \"connections in everything.\" His energy was clearly elevated. He became grandiose, believing that his idea would change the world and as a result he would meet many \"powerful people\". Eventually he came to believe that sings on the radio were meant " "for him.     Esa was taking Adderall at the time. He has taken this, on and off, for many years. In my opinion it is not an adequate explanation for his manic symptoms.      His mother (a PCP) became concerned and brought him to hospital. He was assessed in the ER and prescribed Ativan for sleep. He was seen by Dr Burt (a psychiatrist) the next day. He was prescribed OLZ and LTG. The doses were titrated \"up and down\" for the next 6 months. There were significant side effects (sedation, cognitive impairment), likely from OLZ. Most recently, Esa stopped OLZ for a period of time. In the context of being on a honeymoon in [place], he develop racing thoughts and difficulty sleeping and restarted it, with benefit. He currently takes 10 mg HS.    Esa endorses several previous short (1-2 day) periods of reduced sleep and elevated mood, typically in the context of getting involved in a work project. At most they represent subthreshold hypomanic periods.     He has also experienced several periods over the years, lasting about a week, of low mood, decreased energy and motivation, poor focus/concentration, and negative thoughts. He denies neurovegetative symptoms or SI. His wife thought that these represented depressive episodes.    Esa has consumed EtOH heavily at points during his life. It was most pronounced during his college years. He has sporadically binge-drank since then. He has not had a drink in 2 weeks.    Past Psych Hx: As above. Esa was assessed at [facility] in Dec 2016 and diagnosed with BDI. He was seen by Psychiatry at age 17 and dxed with ADD. He took Adderall off and on (mostly on) with benefit over the years since then. He denies abusing it.     Past Med Hx: 2 concussion: 1) age 15 - skiing. Age 16: football. LOC x a couple of minutes with both.    MEDICATION ADHERENCE: Good.   Current Medications     Current Outpatient Medications   Medication Sig Dispense Refill     cyanocobalamin (VITAMIN B12) 1000 " MCG/ML injection Inject 1 mL (1,000 mcg) into the muscle every 3 months 10 mL 0     divalproex sodium extended-release (DEPAKOTE ER) 500 MG 24 hr tablet Take 3 tablets (1,500 mg) by mouth At Bedtime Stop 250mg tab. Increase Depakote dose to 1500mg ([id number] tabs) 270 tablet 3     lurasidone (LATUDA) 60 MG TABS tablet Take 1 tablet (60 mg) by mouth daily (with dinner) 90 tablet 3     risperiDONE (RISPERDAL) 0.5 MG tablet 1.5mg HS for 1 month, then 1mg HS for 1 month, then 0.5mg HS for 1 month, then  tablet 0         Substance use     ETOH: Sporadic binge drinking  Cigarettes: NA  Street Drugs: Denies    Review of Systems     A comprehensive review of systems was performed and is negative other than noted above.     Allergies     Allergies   Allergen Reactions     No Known Allergies         Mental Status Exam     /79   Pulse (!) 48   Wt 115.7 kg (255 lb)   BMI 31.87 kg/m      Alertness: alert  and oriented  Appearance: well groomed  Behavior/Demeanor: cooperative and pleasant, with fair  eye contact  Speech: slowed  Language: intact  Psychomotor: normal or unremarkable  Mood:  description consistent with euthymia  Affect: full range; was not congruent to mood  Thought Process/Associations: unremarkable  Thought Content:  Denies suicidal ideation and violent ideation. He has grandiose delusions of a Taoism nature.  Perception:  Denies auditory hallucinations and visual hallucinations  Insight: good  Judgment: good  Cognition:  does appear grossly intact.      PSYCHIATRY CLINIC INDIVIDUAL PSYCHOTHERAPY NOTE                                                     [16]   Start time - 1600        End time - 1625  Date reviewed - 10/22/2018       Date next due - 1/22/2019    Subjective: This supportive psychotherapy session addressed issues related to goals of therapy  and patient's history .  Patient's reaction: Maintenance in the context of mental status appropriate for ambulatory setting.  Progress:  good  Plan: RTC 2 weeks  Psychotherapy services during this visit included  myself and the patient.   TREATMENT  PLAN          SYMPTOMS; PROBLEMS   MEASURABLE GOALS;    FUNCTIONAL IMPROVEMENT INTERVENTIONS;   GAINS MADE DISCHARGE CRITERIA   Depression: depressed mood, anhedonia, low energy, insomnia and concentration problems   reduce depressive symptoms and reduce depressive episodes medications   psycho-education   self-care skills symptom resolution   Crystal/Hypomania: none   reduce manic/hypomanic episodes medications   psychotherapy  self-care skills symptom resolution

## 2019-06-13 ASSESSMENT — PATIENT HEALTH QUESTIONNAIRE - PHQ9: SUM OF ALL RESPONSES TO PHQ QUESTIONS 1-9: 4

## 2019-07-10 ENCOUNTER — OFFICE VISIT (OUTPATIENT)
Dept: PSYCHIATRY | Facility: CLINIC | Age: 34
End: 2019-07-10
Attending: PSYCHIATRY & NEUROLOGY
Payer: COMMERCIAL

## 2019-07-10 VITALS
WEIGHT: 257 LBS | HEART RATE: 48 BPM | DIASTOLIC BLOOD PRESSURE: 83 MMHG | BODY MASS INDEX: 32.12 KG/M2 | SYSTOLIC BLOOD PRESSURE: 131 MMHG

## 2019-07-10 DIAGNOSIS — F31.30 BIPOLAR I DISORDER, MOST RECENT EPISODE DEPRESSED (H): Primary | ICD-10-CM

## 2019-07-10 PROCEDURE — G0463 HOSPITAL OUTPT CLINIC VISIT: HCPCS | Mod: ZF

## 2019-07-10 ASSESSMENT — PAIN SCALES - GENERAL: PAINLEVEL: NO PAIN (0)

## 2019-07-10 NOTE — PATIENT INSTRUCTIONS
Continue medications at current doses    Discontinue risperidone in 10 days    Please return for follow-up in 1 month

## 2019-07-10 NOTE — PROGRESS NOTES
Progress Note    Esa Rivera is a 34 year old year old male with Bipolar I Disorder, Most Recent Episode Manic Severe, with psychotic behavior (296.40) who presents for ongoing psychiatric care. He has no children. He and his wife (a psychologist) live on their own in [facility]. Esa works as an  for [employer].    Diagnosis    Axis 1: Bipolar I disorder, with severe manic episode with psychosis in Dec 2016. ADHD by history. Alcohol use disorder, moderate, in short-term remission  Axis 2: Nil  Axis 3: Remote concussion x 2  Axis 4: Moderate stressors  Axis 5: GAF at time of visit: 70    Assessment & Plan     Since the last visit, Esa reports that things have been going well.  He has been on risperidone 0.5 mg for several weeks, and is scheduled to discontinue it in 10 days.  He is aware to be watchful for any signs of emerging katie when he does so.  At his last visit, he increased Latuda to 80 mg and thinks it is helping with his depression.  He currently feels euthymic.  He will continue Depakote and Latuda at the current doses, discontinue risperidone in 10 days, and return for follow-up in 1 month.    The patient understands the risks, benefits, adverse effects and alternatives. Agrees to treatment with the capacity to do so. No medical contraindications to treatment. Agrees to call clinic for any problems. The patient understands to call 911 or come to the nearest ED if life threatening or urgent symptoms present.    Attestation:  Patient has been seen and evaluated by me, Mayito Motley MD, PhD.    Interim History     Since the last visit, sEa reports that things have been going well.  His mood is stable.  His motivation and energy level are good.  He enjoys running.  He sleeps well at night.  Things are going well at work.  He denies symptoms of depression or katie.    Esa recently returned from a vacation to [place] with his family.  He had a good time.  He and Julia recently got a new dog and they  "are enjoying being outside with it.    Esa plans to discontinue risperidone in 10 days as scheduled.  He has remained well as the dose has gradually been reduced.  He is aware to watch out for any early signs of emerging katie, and we reviewed what these are today.  He has some extra risperidone at home and will restart it immediately if he is concerned.  He knows to contact me as well.    Esa is agreeable with the above plan.  He will return for follow-up in 1 month.          In Dec 2016 Esa experienced an episode of severe katie with psychosis (grandiose delusions). He was seen in the ER but not hospitalized. He had some mild depressive episodes and possible (but questionable) short hypomanic periods in the preceding years, but never received treatment for these.     Esa's manic symptoms began when he became excited about a new idea at work. 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 He became so excited about this the he was unable to sleep. As his katie escalated, his sleep decreased to 1-2 hours per night for 5-6 nights. His affect was elated and also markedly irritable. He would \"go off\" on people for minor things in very uncharacteristic ways. His thoughts were \"really fast, crisp, and clear\". He saw \"connections in everything.\" His energy was clearly elevated. He became grandiose, believing that his idea would change the world and as a result he would meet many \"powerful people\". Eventually he came to believe that sings on the radio were meant for him.     Esa was taking Adderall at the time. He has taken this, on and off, for many years. In my opinion it is not an adequate explanation for his manic symptoms.      His mother (a PCP) became concerned and brought him to hospital. He was assessed in the ER and prescribed Ativan for sleep. He was seen by Dr Burt (a psychiatrist) the next day. He was prescribed OLZ " "and LTG. The doses were titrated \"up and down\" for the next 6 months. There were significant side effects (sedation, cognitive impairment), likely from OLZ. Most recently, Esa stopped OLZ for a period of time. In the context of being on a honeymoon in [place], he develop racing thoughts and difficulty sleeping and restarted it, with benefit. He currently takes 10 mg HS.    Esa endorses several previous short (1-2 day) periods of reduced sleep and elevated mood, typically in the context of getting involved in a work project. At most they represent subthreshold hypomanic periods.     He has also experienced several periods over the years, lasting about a week, of low mood, decreased energy and motivation, poor focus/concentration, and negative thoughts. He denies neurovegetative symptoms or SI. His wife thought that these represented depressive episodes.    Esa has consumed EtOH heavily at points during his life. It was most pronounced during his college years. He has sporadically binge-drank since then. He has not had a drink in 2 weeks.    Past Psych Hx: As above. Esa was assessed at [facility] in Dec 2016 and diagnosed with BDI. He was seen by Psychiatry at age 17 and dxed with ADD. He took Adderall off and on (mostly on) with benefit over the years since then. He denies abusing it.     Past Med Hx: 2 concussion: 1) age 15 - skiing. Age 16: football. LOC x a couple of minutes with both.    MEDICATION ADHERENCE: Good.   Current Medications     Current Outpatient Medications   Medication Sig Dispense Refill     divalproex sodium extended-release (DEPAKOTE ER) 500 MG 24 hr tablet Take 3 tablets (1,500 mg) by mouth At Bedtime Stop 250mg tab. Increase Depakote dose to 1500mg ([id number] tabs) 270 tablet 3     lurasidone (LATUDA) 80 MG TABS tablet Take 1 tablet (80 mg) by mouth daily 30 tablet 1     risperiDONE (RISPERDAL) 0.5 MG tablet 1.5mg HS for 1 month, then 1mg HS for 1 month, then 0.5mg HS for 1 month, then  " tablet 0     cyanocobalamin (VITAMIN B12) 1000 MCG/ML injection Inject 1 mL (1,000 mcg) into the muscle every 3 months (Patient not taking: Reported on 7/10/2019) 10 mL 0         Substance use     ETOH: Sporadic binge drinking  Cigarettes: NA  Street Drugs: Denies    Review of Systems     A comprehensive review of systems was performed and is negative other than noted above.     Allergies     Allergies   Allergen Reactions     No Known Allergies         Mental Status Exam     /83   Pulse (!) 48   Wt 116.6 kg (257 lb)   BMI 32.12 kg/m      Alertness: alert  and oriented  Appearance: well groomed  Behavior/Demeanor: cooperative and pleasant, with fair  eye contact  Speech: slowed  Language: intact  Psychomotor: normal or unremarkable  Mood:  description consistent with euthymia  Affect: full range; was not congruent to mood  Thought Process/Associations: unremarkable  Thought Content:  Denies suicidal ideation and violent ideation. He has grandiose delusions of a Episcopal nature.  Perception:  Denies auditory hallucinations and visual hallucinations  Insight: good  Judgment: good  Cognition:  does appear grossly intact.      PSYCHIATRY CLINIC INDIVIDUAL PSYCHOTHERAPY NOTE                                                     [16]   Start time - 1600        End time - 1625  Date reviewed - 10/22/2018       Date next due - 1/22/2019    Subjective: This supportive psychotherapy session addressed issues related to goals of therapy  and patient's history .  Patient's reaction: Maintenance in the context of mental status appropriate for ambulatory setting.  Progress: good  Plan: RTC 2 weeks  Psychotherapy services during this visit included  myself and the patient.   TREATMENT  PLAN          SYMPTOMS; PROBLEMS   MEASURABLE GOALS;    FUNCTIONAL IMPROVEMENT INTERVENTIONS;   GAINS MADE DISCHARGE CRITERIA   Depression: depressed mood, anhedonia, low energy, insomnia and concentration problems   reduce depressive  symptoms and reduce depressive episodes medications   psycho-education   self-care skills symptom resolution   Crystal/Hypomania: none   reduce manic/hypomanic episodes medications   psychotherapy  self-care skills symptom resolution

## 2019-07-16 DIAGNOSIS — F31.30 BIPOLAR I DISORDER, MOST RECENT EPISODE DEPRESSED (H): ICD-10-CM

## 2019-07-17 RX ORDER — RISPERIDONE 0.5 MG/1
TABLET ORAL
Qty: 180 TABLET | Refills: 0 | OUTPATIENT
Start: 2019-07-17

## 2019-08-04 DIAGNOSIS — F31.30 BIPOLAR I DISORDER, MOST RECENT EPISODE DEPRESSED (H): ICD-10-CM

## 2019-08-06 RX ORDER — LURASIDONE HYDROCHLORIDE 80 MG/1
TABLET, FILM COATED ORAL
Qty: 30 TABLET | Refills: 1 | OUTPATIENT
Start: 2019-08-06

## 2019-08-07 ENCOUNTER — OFFICE VISIT (OUTPATIENT)
Dept: PSYCHIATRY | Facility: CLINIC | Age: 34
End: 2019-08-07
Attending: PSYCHIATRY & NEUROLOGY
Payer: COMMERCIAL

## 2019-08-07 VITALS
WEIGHT: 253 LBS | SYSTOLIC BLOOD PRESSURE: 135 MMHG | HEART RATE: 61 BPM | BODY MASS INDEX: 31.62 KG/M2 | DIASTOLIC BLOOD PRESSURE: 87 MMHG

## 2019-08-07 DIAGNOSIS — F31.2 BIPOLAR AFFECTIVE DISORDER, CURRENTLY MANIC, SEVERE, WITH PSYCHOTIC FEATURES (H): ICD-10-CM

## 2019-08-07 DIAGNOSIS — F31.30 BIPOLAR I DISORDER, MOST RECENT EPISODE DEPRESSED (H): ICD-10-CM

## 2019-08-07 PROCEDURE — G0463 HOSPITAL OUTPT CLINIC VISIT: HCPCS | Mod: ZF

## 2019-08-07 RX ORDER — DIVALPROEX SODIUM 500 MG/1
1500 TABLET, EXTENDED RELEASE ORAL AT BEDTIME
Qty: 270 TABLET | Refills: 3 | Status: CANCELLED | OUTPATIENT
Start: 2019-08-07

## 2019-08-07 RX ORDER — LURASIDONE HYDROCHLORIDE 80 MG/1
80 TABLET, FILM COATED ORAL DAILY
Qty: 90 TABLET | Refills: 3 | Status: SHIPPED | OUTPATIENT
Start: 2019-08-07 | End: 2020-03-18

## 2019-08-07 RX ORDER — RISPERIDONE 0.5 MG/1
TABLET ORAL
Qty: 180 TABLET | Refills: 0 | Status: CANCELLED | OUTPATIENT
Start: 2019-08-07

## 2019-08-07 ASSESSMENT — PAIN SCALES - GENERAL: PAINLEVEL: NO PAIN (0)

## 2019-08-07 ASSESSMENT — PATIENT HEALTH QUESTIONNAIRE - PHQ9: SUM OF ALL RESPONSES TO PHQ QUESTIONS 1-9: 2

## 2019-08-07 NOTE — PROGRESS NOTES
Progress Note    Esa Rivera is a 34 year old year old male with Bipolar I Disorder, Most Recent Episode Manic Severe, with psychotic behavior (296.40) who presents for ongoing psychiatric care. He has no children. He and his wife (a psychologist) live on their own in [facility]. Esa works as an  for [employer].    Diagnosis    Axis 1: Bipolar I disorder, with severe manic episode with psychosis in Dec 2016. ADHD by history. Alcohol use disorder, moderate, in short-term remission  Axis 2: Nil  Axis 3: Remote concussion x 2  Axis 4: Moderate stressors  Axis 5: GAF at time of visit: 70    Assessment & Plan     Since the last visit, Esa discontinued risperidone.  He had slowly tapered, and had only been taking 0.5 mg.  He continues to feel well and has a stable mood.  He feels his cognition and speech production have improved even after stopping the small amount of risperidone.  He is tolerating Depakote and Latuda well and feels that they are effective.  He will continue them at the usual doses.  He will return for follow-up in 3 months.    The patient understands the risks, benefits, adverse effects and alternatives. Agrees to treatment with the capacity to do so. No medical contraindications to treatment. Agrees to call clinic for any problems. The patient understands to call 911 or come to the nearest ED if life threatening or urgent symptoms present.    Attestation:  Patient has been seen and evaluated by me, Mayito Motley MD, PhD.    Interim History     Since the last visit, Esa reports that things have been going well.  His mood is stable.  His motivation and energy level are good.  He sleeps well.  He is coping well with a busy, stressful time at work.  He feels reasonably optimistic.  He denies symptoms of depression, katie, or psychosis.    Esa discontinued risperidone and has remained well.  He thinks that some motor and cognitive side effects have improved even with discontinuing the low dose he had  "been taking.    Things have been busy at work.  Esa is at critical stages of a few projects.  He has been traveling.  He feels he is coping very well with the increased workload.  He sometimes feels a motivated, but recognizes that this is likely simply a result of \"crashing\" after being so busy.  He denies other depressive symptoms.    Esa is pleased with how things are going.  He will continue his medications at the usual doses.  He will return for follow-up in 3 months.          In Dec 2016 Esa experienced an episode of severe katie with psychosis (grandiose delusions). He was seen in the ER but not hospitalized. He had some mild depressive episodes and possible (but questionable) short hypomanic periods in the preceding years, but never received treatment for these.     Esa's manic symptoms began when he became excited about a new idea at work. 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 He became so excited about this the he was unable to sleep. As his katie escalated, his sleep decreased to 1-2 hours per night for 5-6 nights. His affect was elated and also markedly irritable. He would \"go off\" on people for minor things in very uncharacteristic ways. His thoughts were \"really fast, crisp, and clear\". He saw \"connections in everything.\" His energy was clearly elevated. He became grandiose, believing that his idea would change the world and as a result he would meet many \"powerful people\". Eventually he came to believe that sings on the radio were meant for him.     Esa was taking Adderall at the time. He has taken this, on and off, for many years. In my opinion it is not an adequate explanation for his manic symptoms.      His mother (a PCP) became concerned and brought him to hospital. He was assessed in the ER and prescribed Ativan for sleep. He was seen by Dr Burt (a psychiatrist) the next day. He was " "prescribed OLZ and LTG. The doses were titrated \"up and down\" for the next 6 months. There were significant side effects (sedation, cognitive impairment), likely from OLZ. Most recently, Esa stopped OLZ for a period of time. In the context of being on a honeymoon in [place], he develop racing thoughts and difficulty sleeping and restarted it, with benefit. He currently takes 10 mg HS.    Esa endorses several previous short (1-2 day) periods of reduced sleep and elevated mood, typically in the context of getting involved in a work project. At most they represent subthreshold hypomanic periods.     He has also experienced several periods over the years, lasting about a week, of low mood, decreased energy and motivation, poor focus/concentration, and negative thoughts. He denies neurovegetative symptoms or SI. His wife thought that these represented depressive episodes.    Esa has consumed EtOH heavily at points during his life. It was most pronounced during his college years. He has sporadically binge-drank since then. He has not had a drink in 2 weeks.    Past Psych Hx: As above. Esa was assessed at [facility] in Dec 2016 and diagnosed with BDI. He was seen by Psychiatry at age 17 and dxed with ADD. He took Adderall off and on (mostly on) with benefit over the years since then. He denies abusing it.     Past Med Hx: 2 concussion: 1) age 15 - skiing. Age 16: football. LOC x a couple of minutes with both.    MEDICATION ADHERENCE: Good.   Current Medications     Current Outpatient Medications   Medication Sig Dispense Refill     divalproex sodium extended-release (DEPAKOTE ER) 500 MG 24 hr tablet Take 3 tablets (1,500 mg) by mouth At Bedtime Stop 250mg tab. Increase Depakote dose to 1500mg (7q637nf tabs) 270 tablet 3     lurasidone (LATUDA) 80 MG TABS tablet Take 1 tablet (80 mg) by mouth daily 30 tablet 1     risperiDONE (RISPERDAL) 0.5 MG tablet 1.5mg HS for 1 month, then 1mg HS for 1 month, then 0.5mg HS for 1 month, " then DC (Patient not taking: Reported on 8/7/2019) 180 tablet 0         Substance use     ETOH: Sporadic binge drinking  Cigarettes: NA  Street Drugs: Denies    Review of Systems     A comprehensive review of systems was performed and is negative other than noted above.     Allergies     Allergies   Allergen Reactions     No Known Allergies         Mental Status Exam     /87   Pulse 61   Wt 114.8 kg (253 lb)   BMI 31.62 kg/m      Alertness: alert  and oriented  Appearance: well groomed  Behavior/Demeanor: cooperative and pleasant, with fair  eye contact  Speech: slowed  Language: intact  Psychomotor: normal or unremarkable  Mood:  description consistent with euthymia  Affect: full range; was not congruent to mood  Thought Process/Associations: unremarkable  Thought Content:  Denies suicidal ideation and violent ideation. He has grandiose delusions of a Tenriism nature.  Perception:  Denies auditory hallucinations and visual hallucinations  Insight: good  Judgment: good  Cognition:  does appear grossly intact.      PSYCHIATRY CLINIC INDIVIDUAL PSYCHOTHERAPY NOTE                                                     [16]   Start time - 1600        End time - 1625  Date reviewed - 10/22/2018       Date next due - 1/22/2019    Subjective: This supportive psychotherapy session addressed issues related to goals of therapy  and patient's history .  Patient's reaction: Maintenance in the context of mental status appropriate for ambulatory setting.  Progress: good  Plan: RTC 2 weeks  Psychotherapy services during this visit included  myself and the patient.   TREATMENT  PLAN          SYMPTOMS; PROBLEMS   MEASURABLE GOALS;    FUNCTIONAL IMPROVEMENT INTERVENTIONS;   GAINS MADE DISCHARGE CRITERIA   Depression: depressed mood, anhedonia, low energy, insomnia and concentration problems   reduce depressive symptoms and reduce depressive episodes medications   psycho-education   self-care skills symptom resolution    Crystal/Hypomania: none   reduce manic/hypomanic episodes medications   psychotherapy  self-care skills symptom resolution

## 2019-12-08 ENCOUNTER — HEALTH MAINTENANCE LETTER (OUTPATIENT)
Age: 34
End: 2019-12-08

## 2020-01-01 NOTE — MR AVS SNAPSHOT
After Visit Summary   10/25/2017    Esa Rivera    MRN: [id number]           Patient Information     Date Of Birth          1985        Visit Information        Provider Department      10/25/2017 10:00 AM Mayito Motley MD Psychiatry Clinic        Care Instructions    Thank you for coming to the PSYCHIATRY CLINIC.    Lab Testing:  If you had lab testing today and your results are reassuring or normal they will be mailed to you or sent through [employer] within 7 days.   If the lab tests need quick action we will call you with the results.  The phone number we will call with results is # [phone] (home) . If this is not the best number please call our clinic and change the number.    Medication Refills:  If you need any refills please call your pharmacy and they will contact us. Our fax number for refills is [phone]. Please allow three business for refill processing.   If you need to  your refill at a new pharmacy, please contact the new pharmacy directly. The new pharmacy will help you get your medications transferred.     Scheduling:  If you have any concerns about today's visit or wish to schedule another appointment please call our office during normal business hours [phone] (8-5:00 M-F)    Contact Us:  Please call [phone] during business hours (8-5:00 M-F).  If after clinic hours, or on the weekend, please call  [phone].    Financial Assistance [phone]  [facility]illing [phone]  [facility]illing [phone]  Medical Records [phone]      MENTAL HEALTH CRISIS NUMBERS:  [facility]:   [facility] - [phone]   Crisis Residence [facility] - [phone]   Walk-In Counseling Center [facility] - [phone]   COPE 24/7 [place] Mobile Team for Adults - [[phone]]; Child - [[phone]]     Crisis Connection - [phone]     [facility]:   [facility] - [phone]   Walk-in counseling   Pt mom called, please call back   [facility] - [phone]   Walk-in counseling [facility] - [phone]   Crisis Residence  [facility] - [phone]   Urgent Care Adult Mental Health:   --Drop-in, 24/7 crisis line, and [facility] Mobile Team [[phone]]    CRISIS TEXT LINE: Text [phone] from anywhere, anytime, any crisis 24/7;    OR SEE www.crisistextline.org     Poison Control Center - [phone]    CHILD: [facility] needs assessment team - [phone]     Trans Lifeline - [phone]; or [facility] [facility] - [phone]    If you have a medical emergency please call 911or go to the nearest ER.                    _____________________________________________    Again thank you for choosing PSYCHIATRY CLINIC and please let us know how we can best partner with you to improve you and your family's health.  You may be receiving a survey in the mail regarding this appointment. We would love to have your feedback, both positive and negative, so please fill out the survey and return it using the provided envolpe. The survey is done by an external company, so your answers are anonymous.             Follow-ups after your visit        Your next 10 appointments already scheduled     Nov 08, 2017  2:30 PM CST   Adult Med Follow UP with Mayito Motley MD   Psychiatry Clinic ([facility])    [address]  [address] [facility] [id number] [phone]              Who to contact     Please call your clinic at [phone] to:    Ask questions about your health    Make or cancel appointments    Discuss your medicines    Learn about your test results    Speak to your doctor   If you have compliments or concerns about an experience at your clinic, or if you wish to file a complaint, please contact [facility] Patient Relations at [phone] or email us at [email or web address]         Additional  Information About Your Visit        [employer] Information     [employer] gives you secure access to your electronic health record. If you see a primary care provider, you can also send messages to your care team and make appointments. If you have questions, please call your primary care clinic.  If you do not have a primary care provider, please call [phone] and they will assist you.      [employer] is an electronic gateway that provides easy, online access to your medical records. With [employer], you can request a clinic appointment, read your test results, renew a prescription or communicate with your care team.     To access your existing account, please contact your [facility] or call [phone] for assistance.        Care EveryWhere ID     This is your Care EveryWhere ID. This could be used by other organizations to access your [facility] medical records  [id number]        Your Vitals Were     Pulse BMI (Body Mass Index)                72 32.37 kg/m2           Blood Pressure from Last 3 Encounters:   10/25/17 133/81   09/11/17 120/76   07/24/17 134/80    Weight from Last 3 Encounters:   10/25/17 109.8 kg (242 lb)   09/11/17 108.9 kg (240 lb)   07/24/17 112.9 kg (249 lb)              Today, you had the following     No orders found for display       Primary Care Provider Office Phone # Fax #    Aristides Jenkins [phone]       [address]  [facility] [address]        Equal Access to Services     ALEX [facility] CenterALEJANDRINA : Hadii milla kento Soameena, waaxda luqadaha, qaybta kaalmada adeegyada, jermaine castaneda. So [facility] [phone].    ATENCIÓN: Si habla español, tiene a osorio disposición servicios gratuitos de asistencia lingüística. Llame al [phone].    We comply with applicable federal civil rights laws and [place] laws. We do not discriminate on the basis of race, color, national origin, age, disability, sex, sexual  orientation, or gender identity.            Thank you!     Thank you for choosing PSYCHIATRY CLINIC  for your care. Our goal is always to provide you with excellent care. Hearing back from our patients is one way we can continue to improve our services. Please take a few minutes to complete the written survey that you may receive in the mail after your visit with us. Thank you!             Your Updated Medication List - Protect others around you: Learn how to safely use, store and throw away your medicines at www.disposemymeds.org.          This list is accurate as of: 10/25/17 11:12 AM.  Always use your most recent med list.                   Brand Name Dispense Instructions for use Diagnosis    LamoTRIgine 200 MG Tb24    LAMICTAL XR    30 tablet    Take 200 mg by mouth every evening    Bipolar 1 disorder, manic, full remission (H)       OLANZAPINE PO      Take 2.5 mg by mouth as needed 2.5 mg at night

## 2020-01-03 NOTE — PROGRESS NOTES
3  SUBJECTIVE:   CC: Esa Rivera is an 34 year old male who presents for preventive health visit.     Healthy Habits:    Do you get at least three servings of calcium containing foods daily (dairy, green leafy vegetables, etc.)? yes and 2 serving for sure.     Amount of exercise or daily activities, outside of work: Just started, right now 3 times a week.     Problems taking medications regularly No    Medication side effects: Yes nasuea    Have you had an eye exam in the past two years? no    Do you see a dentist twice per year? yes    Do you have sleep apnea, excessive snoring or daytime drowsiness?no    Skin   Patient reports an area of small red circles on his right hand. Patient notes that he he uses a mouse with his computer for work, although he has not had any changes to his computer set up. He has another spot on his left hand he would like checked. States that he has seen dermatology approximately one year ago for a mole check.    Respiratory  States that he and his wife have been experiencing a nagging cough recently.    Genetics  Patient reports that he tested positive for the CHEK2 gene that causes him to be at greater risk for certain cancers like breast cancer or prostate cancer. Notes that one of the recommendations for this was earlier screening for colon and prostate cancer.    Medications  He takes 80 mg Latuda and 1500 mg Depakote ER daily, feels he is doing well on these.    Today's PHQ-2 Score:   PHQ-2 ( 1999 Pf[employer]) 1/6/2020 5/1/2017   Q1: Little interest or pleasure in doing things 1 0   Q2: Feeling down, depressed or hopeless 1 0   PHQ-2 Score 2 0   Some encounter information is confidential and restricted. Go to Review Flowsheets activity to see all data.     Abuse: Current or Past(Physical, Sexual or Emotional)- No  Do you feel safe in your environment? Yes        Social History     Tobacco Use     Smoking status: Never Smoker     Smokeless tobacco: Never Used   Substance Use Topics      Alcohol use: No     Alcohol/week: 2.5 standard drinks     Types: 3 Standard drinks or equivalent per week     If you drink alcohol do you typically have >3 drinks per day or >7 drinks per week? Not Applicable                      Last PSA: No results found for: PSA    Reviewed orders with patient. Reviewed health maintenance and updated orders accordingly - Yes  Labs reviewed in EPIC  BP Readings from Last 3 Encounters:   01/06/20 122/78   08/08/18 110/70   07/18/18 108/66    Wt Readings from Last 3 Encounters:   01/06/20 123.8 kg (273 lb)   08/08/18 99.3 kg (219 lb)   07/18/18 96.6 kg (213 lb)                  Patient Active Problem List   Diagnosis     Attention deficit disorder     Recurrent dislocation of shoulder joint     Other acne     Molluscum contagiosum     Inflammatory liver disease, unspecified     Bipolar 1 disorder, manic, full remission (H)     Crystal (H)     History of neck injury     Medial epicondylitis of elbow, right     Bilateral carpal tunnel syndrome     Past Surgical History:   Procedure Laterality Date     BACK SURGERY       C EXPLORE SHOULDER JOINT  2003    Arthroplasty, Shoulder     SPINE SURGERY  2004    Fx thoracic, fusion        Social History     Tobacco Use     Smoking status: Never Smoker     Smokeless tobacco: Never Used   Substance Use Topics     Alcohol use: No     Alcohol/week: 2.5 standard drinks     Types: 3 Standard drinks or equivalent per week     Family History   Problem Relation Age of Onset     Cardiovascular Maternal Grandfather      Diabetes No family hx of      Coronary Artery Disease No family hx of      Cerebrovascular Disease No family hx of      Hypertension No family hx of          Current Outpatient Medications   Medication Sig Dispense Refill     divalproex sodium extended-release (DEPAKOTE ER) 500 MG 24 hr tablet Take 3 tablets (1,500 mg) by mouth At Bedtime Stop 250mg tab. Increase Depakote dose to 1500mg ([id number] tabs) 270 tablet 3     lurasidone (LATUDA)  "80 MG TABS tablet Take 1 tablet (80 mg) by mouth daily 90 tablet 3     Allergies   Allergen Reactions     No Known Allergies        Reviewed and updated as needed this visit by clinical staff  Tobacco  Allergies  Meds         Reviewed and updated as needed this visit by Provider        No past medical history on file.   Past Surgical History:   Procedure Laterality Date     BACK SURGERY       C EXPLORE SHOULDER JOINT  2003    Arthroplasty, Shoulder     SPINE SURGERY  2004    Fx thoracic, fusion        ROS:  Denies headache, insomnia, chest pain, shortness of breath, cough, heartburn, bowel issues, bladder issues, neck pain, back pain, hip pain, knee pain, ankle pain, or foot pain. Remainder of ROS is negative unless otherwise noted above or in HPI.    This document serves as a record of the services and decisions personally performed and made by Aristides Jenkins MD. It was created on his behalf by Oracio Werner and Shawn Varghese, trained medical scribes. The creation of this document is based on the provider's statements to the medical scribes.  Oracio Varghese 4:37 PM January 6, 2020    OBJECTIVE:   /78   Pulse 63   Temp 97.9  F (36.6  C) (Oral)   Ht 1.882 m (6' 2.11\")   Wt 123.8 kg (273 lb)   SpO2 96%   BMI 34.94 kg/m    EXAM:  GENERAL: healthy, alert and no distress  EYES: Eyes grossly normal to inspection, PERRL and conjunctivae and sclerae normal  HENT: ear canals and TM's normal, nose and mouth without ulcers or lesions  NECK: no adenopathy, no asymmetry, masses, or scars and thyroid normal to palpation  RESP: lungs clear to auscultation - no rales, rhonchi or wheezes  CV: regular rate and rhythm, normal S1 S2, no S3 or S4, no murmur, click or rub, no peripheral edema and peripheral pulses strong  ABDOMEN: soft, nontender, no hepatosplenomegaly, no masses and bowel sounds normal  MS: no gross musculoskeletal defects noted, no edema  SKIN: no suspicious lesions, faint papulosquamous rash " at the base of the palm of the right hand  NEURO: Normal strength and tone, mentation intact and speech normal, reflexes normal, no tremor  PSYCH: mentation appears normal, affect normal/bright    Diagnostic Test Results:  Labs reviewed in Epic  No results found for this or any previous visit (from the past 24 hour(s)).    ASSESSMENT/PLAN:     Encounter Diagnoses   Name Primary?     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 Routine physical completed.  Plan: Follow up as needed.    Derm referral. Prostate/colon ca screening starting at 40. Annual appointment with genetics- overdue.    Patient Instructions     Preventive Health Recommendations  Male Ages 26 - 39    Yearly exam:             See your health care provider every year in order to  o   Review health changes.   o   Discuss preventive care.    o   Review your medicines if your doctor has prescribed any.    You should be tested each year for STDs (sexually transmitted diseases), if you re at risk.     After age 35, talk to your provider about cholesterol testing. If you are at risk for heart disease, have your cholesterol tested at least every 5 years.     If you are at risk for diabetes, you should have a diabetes test (fasting glucose).  Shots: Get a flu shot each year. Get a tetanus shot every 10 years.     Nutrition:    Eat at least 5 servings of fruits and vegetables daily.     Eat whole-grain bread, whole-wheat pasta and brown rice instead of white grains and rice.     Get adequate Calcium and Vitamin D.     Lifestyle    Exercise for at least 150 minutes a week (30 minutes a day, 5 days a week). This will help you control your weight and prevent disease.     Limit alcohol to one drink per day.  "    No smoking.     Wear sunscreen to prevent skin cancer.     See your dentist every six months for an exam and cleaning.           COUNSELING:  Reviewed preventive health counseling, as reflected in patient instructions       Regular exercise       Healthy diet/nutrition       Colon cancer screening       Prostate cancer screening    Estimated body mass index is 34.94 kg/m  as calculated from the following:    Height as of this encounter: 1.882 m (6' 2.11\").    Weight as of this encounter: 123.8 kg (273 lb).    Weight management plan: Discussed healthy diet and exercise guidelines     reports that he has never smoked. He has never used smokeless tobacco.      The information in this document, created by the medical scribe for me, accurately reflects the services I personally performed and the decisions made by me. I have reviewed and approved this document for accuracy prior to leaving the patient care area.  January 6, 2020 4:37 PM    Aristides Jenkins MD  [facility]  "

## 2020-01-06 ENCOUNTER — OFFICE VISIT (OUTPATIENT)
Dept: FAMILY MEDICINE | Facility: CLINIC | Age: 35
End: 2020-01-06
Payer: COMMERCIAL

## 2020-01-06 VITALS
BODY MASS INDEX: 35.04 KG/M2 | SYSTOLIC BLOOD PRESSURE: 122 MMHG | WEIGHT: 273 LBS | DIASTOLIC BLOOD PRESSURE: 78 MMHG | HEIGHT: 74 IN | OXYGEN SATURATION: 96 % | TEMPERATURE: 97.9 F | HEART RATE: 63 BPM

## 2020-01-06 DIAGNOSIS — Z15.01 MONOALLELIC MUTATION OF CHEK2 GENE IN MALE PATIENT: ICD-10-CM

## 2020-01-06 DIAGNOSIS — Z15.89 MONOALLELIC MUTATION OF CHEK2 GENE IN MALE PATIENT: ICD-10-CM

## 2020-01-06 DIAGNOSIS — Z15.09 MONOALLELIC MUTATION OF CHEK2 GENE IN MALE PATIENT: ICD-10-CM

## 2020-01-06 DIAGNOSIS — Z00.00 ROUTINE GENERAL MEDICAL EXAMINATION AT A HEALTH CARE FACILITY: Primary | ICD-10-CM

## 2020-01-06 DIAGNOSIS — R21 RASH AND NONSPECIFIC SKIN ERUPTION: ICD-10-CM

## 2020-01-06 DIAGNOSIS — Z15.03 MONOALLELIC MUTATION OF CHEK2 GENE IN MALE PATIENT: ICD-10-CM

## 2020-01-06 PROBLEM — B08.1 MOLLUSCUM CONTAGIOSUM: Status: RESOLVED | Noted: 2017-01-02 | Resolved: 2020-01-06

## 2020-01-06 PROBLEM — M77.01 MEDIAL EPICONDYLITIS OF ELBOW, RIGHT: Status: RESOLVED | Noted: 2018-08-08 | Resolved: 2020-01-06

## 2020-01-06 PROBLEM — G56.03 BILATERAL CARPAL TUNNEL SYNDROME: Status: RESOLVED | Noted: 2018-08-08 | Resolved: 2020-01-06

## 2020-01-06 PROBLEM — K75.9 INFLAMMATORY LIVER DISEASE, UNSPECIFIED: Status: RESOLVED | Noted: 2017-01-09 | Resolved: 2020-01-06

## 2020-01-06 PROCEDURE — 99395 PREV VISIT EST AGE 18-39: CPT | Performed by: FAMILY MEDICINE

## 2020-01-06 PROCEDURE — 99213 OFFICE O/P EST LOW 20 MIN: CPT | Mod: 25 | Performed by: FAMILY MEDICINE

## 2020-01-06 ASSESSMENT — MIFFLIN-ST. JEOR: SCORE: 2249.88

## 2020-01-14 ENCOUNTER — OFFICE VISIT (OUTPATIENT)
Dept: PSYCHIATRY | Facility: CLINIC | Age: 35
End: 2020-01-14
Attending: PSYCHIATRY & NEUROLOGY
Payer: COMMERCIAL

## 2020-01-14 VITALS
SYSTOLIC BLOOD PRESSURE: 148 MMHG | DIASTOLIC BLOOD PRESSURE: 88 MMHG | BODY MASS INDEX: 35.17 KG/M2 | WEIGHT: 274.8 LBS | HEART RATE: 61 BPM

## 2020-01-14 DIAGNOSIS — F31.30 BIPOLAR I DISORDER, MOST RECENT EPISODE DEPRESSED (H): Primary | ICD-10-CM

## 2020-01-14 PROCEDURE — G0463 HOSPITAL OUTPT CLINIC VISIT: HCPCS | Mod: ZF

## 2020-01-14 ASSESSMENT — PAIN SCALES - GENERAL: PAINLEVEL: NO PAIN (0)

## 2020-01-14 NOTE — NURSING NOTE
Chief Complaint   Patient presents with     Recheck Medication     Bipolar affective disorder, currently manic, severe, with psychotic features

## 2020-01-14 NOTE — LETTER
January 14, 2020      RE: Esa Rivera  [address] [place] [address]       To whom it may concern:    Esa Rivera was seen in our clinic today.  He is experiencing a seasonal depressive episode in the context of a bipolar disorder diagnosis.  In addition to his usual mood stabilizing medications, I have recommended that he start light therapy 10,000 lux, 30 to 45 minutes each morning    Sincerely,      Mayito Motley MD  , [employer]

## 2020-01-14 NOTE — PROGRESS NOTES
"Progress Note    Esa Rivera is a 34 year old year old male with Bipolar I Disorder, Most Recent Episode Manic Severe, with psychotic behavior (296.40) who presents for ongoing psychiatric care. He has no children. He and his wife (a psychologist) live on their own in [facility]. Esa works as an  for [employer].    Diagnosis    Axis 1: Bipolar I disorder, with severe manic episode with psychosis in Dec 2016. ADHD by history. Alcohol use disorder, moderate, in short-term remission  Axis 2: Nil  Axis 3: Remote concussion x 2  Axis 4: Moderate stressors  Axis 5: GAF at time of visit: 70    Assessment & Plan     Since the last visit Esa reports he has been feeling more down, with reduced motivation and energy, for the past couple of weeks.  It feels more effortful to do things and he often feels like \"what is the point\"?  He notes he often feels this way around this time of year.  He denies any manic or hypomanic symptoms.  He accepts a trial of light therapy 10,000 lux 30 to 45 minutes each morning as an adjunct to his current treatments to target seasonal depression.  He is aware of the low risk of triggering manic symptoms.  He will continue his usual medications and return for follow-up in 1 month.     Esa's mood has mostly been stable since his last visit.  He does note over the last 2 weeks  The patient understands the risks, benefits, adverse effects and alternatives. Agrees to treatment with the capacity to do so. No medical contraindications to treatment. Agrees to call clinic for any problems. The patient understands to call 911 or come to the nearest ED if life threatening or urgent symptoms present.    Attestation:  Patient has been seen and evaluated by me, Mayito Motley MD, PhD.    Interim History     Since the last visit, Esa reports some symptoms of depression over the last 2 weeks.  He notes it is not unusual for him to feel down, lethargic, and a motivated around this time of year.  Work " "related stressors, particularly a recent slowdown in his projects, are contributing but Esa's symptoms seem disproportionate to his stressors.    Esa denies current manic or hypomanic symptoms.  He denies alcohol or substance abuse.  Things are going well at home.    After discussing the potential benefits, risks, and side effects, Esa accepts a trial of light therapy as outlined above.  He is aware to be watchful for any manic or hypomanic symptoms emerging.  He will continue his other usual medications and return for follow-up in 1 month.          In Dec 2016 Esa experienced an episode of severe katie with psychosis (grandiose delusions). He was seen in the ER but not hospitalized. He had some mild depressive episodes and possible (but questionable) short hypomanic periods in the preceding years, but never received treatment for these.     Esa's manic symptoms began when he became excited about a new idea at work. 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 He became so excited about this the he was unable to sleep. As his katie escalated, his sleep decreased to 1-2 hours per night for 5-6 nights. His affect was elated and also markedly irritable. He would \"go off\" on people for minor things in very uncharacteristic ways. His thoughts were \"really fast, crisp, and clear\". He saw \"connections in everything.\" His energy was clearly elevated. He became grandiose, believing that his idea would change the world and as a result he would meet many \"powerful people\". Eventually he came to believe that sings on the radio were meant for him.     Esa was taking Adderall at the time. He has taken this, on and off, for many years. In my opinion it is not an adequate explanation for his manic symptoms.      His mother (a PCP) became concerned and brought him to hospital. He was assessed in the ER and prescribed Ativan for sleep. He " "was seen by Dr Burt (a psychiatrist) the next day. He was prescribed OLZ and LTG. The doses were titrated \"up and down\" for the next 6 months. There were significant side effects (sedation, cognitive impairment), likely from OLZ. Most recently, Esa stopped OLZ for a period of time. In the context of being on a honeymoon in [place], he develop racing thoughts and difficulty sleeping and restarted it, with benefit. He currently takes 10 mg HS.    Esa endorses several previous short (1-2 day) periods of reduced sleep and elevated mood, typically in the context of getting involved in a work project. At most they represent subthreshold hypomanic periods.     He has also experienced several periods over the years, lasting about a week, of low mood, decreased energy and motivation, poor focus/concentration, and negative thoughts. He denies neurovegetative symptoms or SI. His wife thought that these represented depressive episodes.    Esa has consumed EtOH heavily at points during his life. It was most pronounced during his college years. He has sporadically binge-drank since then. He has not had a drink in 2 weeks.    Past Psych Hx: As above. Esa was assessed at [facility] in Dec 2016 and diagnosed with BDI. He was seen by Psychiatry at age 17 and dxed with ADD. He took Adderall off and on (mostly on) with benefit over the years since then. He denies abusing it.     Past Med Hx: 2 concussion: 1) age 15 - skiing. Age 16: football. LOC x a couple of minutes with both.    MEDICATION ADHERENCE: Good.   Current Medications     Current Outpatient Medications   Medication Sig Dispense Refill     divalproex sodium extended-release (DEPAKOTE ER) 500 MG 24 hr tablet Take 3 tablets (1,500 mg) by mouth At Bedtime Stop 250mg tab. Increase Depakote dose to 1500mg ([id number] tabs) 270 tablet 3     lurasidone (LATUDA) 80 MG TABS tablet Take 1 tablet (80 mg) by mouth daily 90 tablet 3         Substance use     ETOH: Sporadic binge " drinking  Cigarettes: NA  Street Drugs: Denies    Review of Systems     A comprehensive review of systems was performed and is negative other than noted above.     Allergies     Allergies   Allergen Reactions     No Known Allergies         Mental Status Exam     BP (!) 148/88   Pulse 61   Wt 124.6 kg (274 lb 12.8 oz)   BMI 35.17 kg/m      Alertness: alert  and oriented  Appearance: well groomed  Behavior/Demeanor: cooperative and pleasant, with fair  eye contact  Speech: slowed  Language: intact  Psychomotor: normal or unremarkable  Mood:  depressed  Affect: restricted; was not congruent to mood  Thought Process/Associations: unremarkable  Thought Content:  Denies suicidal ideation and violent ideation. He has grandiose delusions of a Religion nature.  Perception:  Denies auditory hallucinations and visual hallucinations  Insight: good  Judgment: good  Cognition:  does appear grossly intact.      PSYCHIATRY CLINIC INDIVIDUAL PSYCHOTHERAPY NOTE                                                     [16]   Start time - 1600        End time - 1625  Date reviewed - 10/22/2018       Date next due - 1/22/2019    Subjective: This supportive psychotherapy session addressed issues related to goals of therapy  and patient's history .  Patient's reaction: Maintenance in the context of mental status appropriate for ambulatory setting.  Progress: good  Plan: RTC 2 weeks  Psychotherapy services during this visit included  myself and the patient.   TREATMENT  PLAN          SYMPTOMS; PROBLEMS   MEASURABLE GOALS;    FUNCTIONAL IMPROVEMENT INTERVENTIONS;   GAINS MADE DISCHARGE CRITERIA   Depression: depressed mood, anhedonia, low energy, insomnia and concentration problems   reduce depressive symptoms and reduce depressive episodes medications   psycho-education   self-care skills symptom resolution   Crystal/Hypomania: none   reduce manic/hypomanic episodes medications   psychotherapy  self-care skills symptom resolution

## 2020-01-14 NOTE — PATIENT INSTRUCTIONS
Start light therapy for winter depression  - 10,000 lux light  - 30-45 min  - light should be 12-18 inches away, at an angle  - in the morning (before lunch)  - every day  - Verilux is the brand I know but any 10,000 lux light is fine    Continue your medications at the usual doses    Please get a blood test to check your Depakote level and other routine labs.      Please return for follow-up in 1 month    Thank you for coming to the PSYCHIATRY CLINIC.    Lab Testing:  If you had lab testing today and your results are reassuring or normal they will be mailed to you or sent through xzoops within 7 days.   If the lab tests need quick action we will call you with the results.  The phone number we will call with results is # [phone] (home) NONE (work). If this is not the best number please call our clinic and change the number.    Medication Refills:  If you need any refills please call your pharmacy and they will contact us. Our fax number for refills is [phone]. Please allow three business for refill processing.   If you need to  your refill at a new pharmacy, please contact the new pharmacy directly. The new pharmacy will help you get your medications transferred.     Scheduling:  If you have any concerns about today's visit or wish to schedule another appointment please call our office during normal business hours [phone] (8-5:00 M-F)    Contact Us:  Please call [phone] during business hours (8-5:00 M-F).  If after clinic hours, or on the weekend, please call  [phone].    Financial Assistance [phone]  [facility] Billing [phone]  Central Billing Office, [facility]: [phone]  [facility] Billing [phone]  Medical Records [phone]      MENTAL HEALTH CRISIS NUMBERS:  [facility]:   [facility] - [phone]   Crisis Residence [facility] - [phone]   Walk-In Counseling Center [facility] - [phone]   COPE 24/7 [facility] Team for  Adults - [[phone]]; Child - [[phone]]        [facility]:   [facility] - [phone]   Walk-in counseling [facility] - [phone]   Walk-in counseling [facility] - [phone]   Crisis Residence [facility] - [phone]   Urgent Care Adult Mental Health:   --Drop-in, 24/7 crisis line, and [facility] Mobile Team [[phone]]    CRISIS TEXT LINE: Text 762-730 from anywhere, anytime, any crisis 24/7;    OR SEE www.crisistextline.org     National Suicide Prevention Lifeline: 794-336-DYCH (828-764-2155) or dial 988    Poison Control Center - 1-935.531.9559    CHILD: [facility] needs assessment team - [phone]     [facility] Lifeline - 1-116.211.6310; or [facility] [employer] Lifeline - 1-465.178.7711    If you have a medical emergency please call 911or go to the nearest ER.                    _____________________________________________    Again thank you for choosing PSYCHIATRY CLINIC and please let us know how we can best partner with you to improve you and your family's health.  You may be receiving a survey regarding this appointment. We would love to have your feedback, both positive and negative. The survey is done by an external company, so your answers are anonymous.

## 2020-01-15 ENCOUNTER — TELEPHONE (OUTPATIENT)
Dept: PSYCHIATRY | Facility: CLINIC | Age: 35
End: 2020-01-15

## 2020-01-15 DIAGNOSIS — F31.2 BIPOLAR AFFECTIVE DISORDER, CURRENTLY MANIC, SEVERE, WITH PSYCHOTIC FEATURES (H): Primary | ICD-10-CM

## 2020-01-15 DIAGNOSIS — Z51.81 ENCOUNTER FOR THERAPEUTIC DRUG MONITORING: ICD-10-CM

## 2020-01-15 NOTE — TELEPHONE ENCOUNTER
----- Message from Mayito Motley MD sent at 1/14/2020  5:15 PM CST -----  Chris Hammer,    Can you order labs for Esa?  He gets them done in the [facility] system.  He needs a CBC and differential, AST, ALT, valproic acid level, and fasting glucose, cholesterol panel, and lipids.Thanks,    DB        Writer placed requested orders.

## 2020-03-16 DIAGNOSIS — F31.30 BIPOLAR I DISORDER, MOST RECENT EPISODE DEPRESSED (H): ICD-10-CM

## 2020-03-16 DIAGNOSIS — F31.30 BIPOLAR I DISORDER, MOST RECENT EPISODE DEPRESSED (H): Primary | ICD-10-CM

## 2020-03-16 RX ORDER — RISPERIDONE 2 MG/1
2 TABLET ORAL AT BEDTIME
Qty: 90 TABLET | Refills: 3 | Status: SHIPPED | OUTPATIENT
Start: 2020-03-16 | End: 2020-05-29

## 2020-03-16 RX ORDER — RISPERIDONE 2 MG/1
2 TABLET ORAL AT BEDTIME
Qty: 30 TABLET | Refills: 3 | Status: SHIPPED | OUTPATIENT
Start: 2020-03-16 | End: 2020-03-16

## 2020-03-18 ENCOUNTER — ALLIED HEALTH/NURSE VISIT (OUTPATIENT)
Dept: PSYCHIATRY | Facility: CLINIC | Age: 35
End: 2020-03-18
Attending: PSYCHIATRY & NEUROLOGY
Payer: COMMERCIAL

## 2020-03-18 DIAGNOSIS — F31.30 BIPOLAR I DISORDER, MOST RECENT EPISODE DEPRESSED (H): ICD-10-CM

## 2020-03-18 DIAGNOSIS — F31.2 BIPOLAR AFFECTIVE DISORDER, CURRENTLY MANIC, SEVERE, WITH PSYCHOTIC FEATURES (H): ICD-10-CM

## 2020-03-18 RX ORDER — LURASIDONE HYDROCHLORIDE 80 MG/1
80 TABLET, FILM COATED ORAL DAILY
Qty: 180 TABLET | Refills: 1 | Status: SHIPPED | OUTPATIENT
Start: 2020-03-18 | End: 2020-05-29

## 2020-03-18 RX ORDER — DIVALPROEX SODIUM 500 MG/1
1500 TABLET, EXTENDED RELEASE ORAL AT BEDTIME
Qty: 540 TABLET | Refills: 1 | Status: SHIPPED | OUTPATIENT
Start: 2020-03-18 | End: 2020-03-24

## 2020-03-18 NOTE — PROGRESS NOTES
Progress Note    Esa Rivera is a 35 year old year old male with Bipolar I Disorder, Most Recent Episode Manic Severe, with psychotic behavior (296.40) who presents for ongoing psychiatric care. He has no children. He and his wife (a psychologist) live on their own in [facility]. Esa works as an  for [employer].    Diagnosis    Axis 1: Bipolar I disorder, with severe manic episode with psychosis in Dec 2016. ADHD by history. Alcohol use disorder, moderate, in short-term remission  Axis 2: Nil  Axis 3: Remote concussion x 2  Axis 4: Moderate stressors  Axis 5: GAF at time of visit: 70    Assessment & Plan     Due to [facility] and [facility] guidance regarding social distancing as a result of the Covid-19 pandemic, I called Esa on the phone for his follow-up visit today.  Since the last visit Esa has remained adherent with Depakote 1500 mg at bedtime and Latuda 80 mg at bedtime.  Until a few days ago he was feeling well.  He then began ruminating about Covid-19, became extremely anxious, and had difficulty sleeping.  He was worried he would start to become manic.  He began taking risperidone 2 mg at bedtime and this has been enormously beneficial.  He is now sleeping through the night and is much less anxious.  He plans to continue taking this medication and I concur that it is reasonable for him to do so.  I recommended he take it for a total of at least 2 weeks and if he discontinues it at that point and feels worse than he should restart it.  He will continue his other medications as usual.  He will return for follow-up in 2 weeks.  We should do a blood test at that point to check his Depakote level and other routine labs.    The patient understands the risks, benefits, adverse effects and alternatives. Agrees to treatment with the capacity to do so. No medical contraindications to treatment. Agrees to call clinic for any problems. The patient understands to call 911 or come to the nearest ED if life  "threatening or urgent symptoms present.    Attestation:  Patient has been evaluated by me, Mayito Motley MD, PhD.    Interim History     Since the last visit, Esa was doing well until a few days ago.  At that point, he began ruminating about the novel coronavirus and began imagining \"worse case scenarios\" in which the hospital system became overwhelmed and many people .  Needless to say this was a very anxiety provoking.  Esa began to experience racing thoughts and poor sleep.  He was worried that he would become manic.  He began taking risperidone 2 mg at bedtime and has felt much better while taking it.    I have encouraged Esa to continue taking risperidone for a total of at least 2 weeks.  At that point he can try discontinuing it.  If his anxiety and poor sleep return he should go back to taking risperidone.  He has responded well to it in the past.  He currently denies side effects except for mildly increased appetite.    Esa currently denies symptoms of depression or katie.  Over the phone he sounded well.  His speech was spontaneous and goal-directed.  There was no formal thought disorder or pressured speech.  He sounded calm and rational.    Esa is agreeable with the above treatment plan.  He will return for follow-up in 2 weeks.          In Dec 2016 Esa experienced an episode of severe katie with psychosis (grandiose delusions). He was seen in the ER but not hospitalized. He had some mild depressive episodes and possible (but questionable) short hypomanic periods in the preceding years, but never received treatment for these.     Esa's manic symptoms began when he became excited about a new idea at work. 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 He became so excited about this the he was unable to sleep. As his katie escalated, his sleep decreased to 1-2 hours per night for 5-6 nights. His affect " "was elated and also markedly irritable. He would \"go off\" on people for minor things in very uncharacteristic ways. His thoughts were \"really fast, crisp, and clear\". He saw \"connections in everything.\" His energy was clearly elevated. He became grandiose, believing that his idea would change the world and as a result he would meet many \"powerful people\". Eventually he came to believe that sings on the radio were meant for him.     Esa was taking Adderall at the time. He has taken this, on and off, for many years. In my opinion it is not an adequate explanation for his manic symptoms.      His mother (a PCP) became concerned and brought him to hospital. He was assessed in the ER and prescribed Ativan for sleep. He was seen by Dr Burt (a psychiatrist) the next day. He was prescribed OLZ and LTG. The doses were titrated \"up and down\" for the next 6 months. There were significant side effects (sedation, cognitive impairment), likely from OLZ. Most recently, Esa stopped OLZ for a period of time. In the context of being on a honeymoon in [place], he develop racing thoughts and difficulty sleeping and restarted it, with benefit. He currently takes 10 mg HS.    Esa endorses several previous short (1-2 day) periods of reduced sleep and elevated mood, typically in the context of getting involved in a work project. At most they represent subthreshold hypomanic periods.     He has also experienced several periods over the years, lasting about a week, of low mood, decreased energy and motivation, poor focus/concentration, and negative thoughts. He denies neurovegetative symptoms or SI. His wife thought that these represented depressive episodes.    Esa has consumed EtOH heavily at points during his life. It was most pronounced during his college years. He has sporadically binge-drank since then. He has not had a drink in 2 weeks.    Past Psych Hx: As above. Esa was assessed at [facility] in Dec 2016 and diagnosed with BDI. He " was seen by Psychiatry at age 17 and dxed with ADD. He took Adderall off and on (mostly on) with benefit over the years since then. He denies abusing it.     Past Med Hx: 2 concussion: 1) age 15 - skiing. Age 16: football. LOC x a couple of minutes with both.    MEDICATION ADHERENCE: Good.   Current Medications     Current Outpatient Medications   Medication Sig Dispense Refill     divalproex sodium extended-release (DEPAKOTE ER) 500 MG 24 hr tablet Take 3 tablets (1,500 mg) by mouth At Bedtime Stop 250mg tab. Increase Depakote dose to 1500mg ([id number] tabs) 270 tablet 3     lurasidone (LATUDA) 80 MG TABS tablet Take 1 tablet (80 mg) by mouth daily 90 tablet 3     risperiDONE (RISPERDAL) 2 MG tablet Take 1 tablet (2 mg) by mouth At Bedtime 90 tablet 3         Substance use     ETOH: Sporadic binge drinking  Cigarettes: NA  Street Drugs: Denies    Review of Systems     A comprehensive review of systems was performed and is negative other than noted above.     Allergies     Allergies   Allergen Reactions     No Known Allergies

## 2020-03-18 NOTE — PATIENT INSTRUCTIONS
Continue your current medications including risperidone 2 mg at night    Please return for follow-up in 2 weeks

## 2020-03-24 DIAGNOSIS — F31.2 BIPOLAR AFFECTIVE DISORDER, CURRENTLY MANIC, SEVERE, WITH PSYCHOTIC FEATURES (H): ICD-10-CM

## 2020-03-24 RX ORDER — DIVALPROEX SODIUM 500 MG/1
1500 TABLET, EXTENDED RELEASE ORAL AT BEDTIME
Qty: 540 TABLET | Refills: 1 | Status: SHIPPED | OUTPATIENT
Start: 2020-03-24 | End: 2020-12-08

## 2020-03-24 NOTE — TELEPHONE ENCOUNTER
Writer received call from pt, requesting his Depakote order be sent to [facility] mail order pharmacy. He provided number of [phone]. Pt identified having sufficient supply on hand until mail order delivery.  System provided recommendation of [facility] in [facility], based upon his insurance.  Writer called [facility] at number above and was directed to send order to the [place] location.    Last seen: 3/18  RTC: 2 weeks  Non-provider cancel: None  No-show: None  Next appt: 4/8     Incoming refill from patient via telephone     Medication requested:  Disp  Refills  Start  End  CAMERON     divalproex sodium extended-release (DEPAKOTE ER) 500 MG 24 hr tablet  540 tablet  1  3/18/2020   No    Sig - Route: Take 3 tablets (1,500 mg) by mouth At Bedtime Stop 250mg tab. Increase Depakote dose to 1500mg ([id number] tabs     Medication refill approved per refill protocol.  Writer called local pharmacy ([phone] ) and cancelled the 3/18 order.  Writer e-prescribed 180-day supply to [facility] ([phone]). Qty 540, refills 1.  Writer called pt and identified the order having been sent.

## 2020-04-08 ENCOUNTER — VIRTUAL VISIT (OUTPATIENT)
Dept: PSYCHIATRY | Facility: CLINIC | Age: 35
End: 2020-04-08
Attending: PSYCHIATRY & NEUROLOGY
Payer: COMMERCIAL

## 2020-04-08 ENCOUNTER — TELEPHONE (OUTPATIENT)
Dept: PSYCHIATRY | Facility: CLINIC | Age: 35
End: 2020-04-08

## 2020-04-08 DIAGNOSIS — F31.30 BIPOLAR I DISORDER, MOST RECENT EPISODE DEPRESSED (H): Primary | ICD-10-CM

## 2020-04-08 DIAGNOSIS — Z51.81 ENCOUNTER FOR THERAPEUTIC DRUG MONITORING: ICD-10-CM

## 2020-04-08 NOTE — TELEPHONE ENCOUNTER
Mayito Motley MD Labossiere, Laura, MAE [place],     Can you order labs for Esa?  He gets them done at the lab on the ground floor of the [facility].  He needs a CBC and differential, AST, ALT, and valproic acid level.    Thanks!    DB        Writer entered labs per provider's request.

## 2020-04-08 NOTE — PROGRESS NOTES
Progress Note    Esa Rivera is a 35 year old year old male with Bipolar I Disorder, Most Recent Episode Manic Severe, with psychotic behavior (296.40) who presents for ongoing psychiatric care. He has no children. He and his wife (a psychologist) live on their own in [facility]. Esa works as an  for [employer].    Diagnosis    Axis 1: Bipolar I disorder, with severe manic episode with psychosis in Dec 2016. ADHD by history. Alcohol use disorder, moderate, in short-term remission  Axis 2: Nil  Axis 3: Remote concussion x 2  Axis 4: Moderate stressors  Axis 5: GAF at time of visit: 70    Assessment & Plan     Due to [facility] and [facility] guidance regarding social distancing as a result of the Covid-19 pandemic, I called Esa on the phone for his follow-up visit today.  Since the last visit Esa has remained adherent with Depakote 1500 mg at bedtime, Latuda 80 mg at bedtime, and risperidone 2 mg at bedtime.  He reports feeling mostly well and he denies symptoms of depression, katie, or psychosis.  His anxiety and sleep are much better since he has been taking risperidone regularly.  He is watching his diet, exercising, and is actually lost about 10 pounds in the last 6 weeks, so weight gain is not an issue.  He agrees to continue his medications at the current doses.  He will return for follow-up in 2 weeks.  Once the shelter in place order is lifted, Esa will get a blood test to check his Depakote level and other routine labs.    The patient understands the risks, benefits, adverse effects and alternatives. Agrees to treatment with the capacity to do so. No medical contraindications to treatment. Agrees to call clinic for any problems. The patient understands to call 911 or come to the nearest ED if life threatening or urgent symptoms present.    Attestation:  Patient has been evaluated by me, Mayito Motley MD, PhD.    Start Time:  1605                     End Time:  1630    Esa Rivera is a 35  "year old male who is being evaluated via a billable telephone visit.      The patient has been notified of following:     \"This telephone visit will be conducted via a call between you and your physician/provider. We have found that certain health care needs can be provided without the need for a physical exam.  This service lets us provide the care you need with a short phone conversation.  If a prescription is necessary we can send it directly to your pharmacy.  If lab work is needed we can place an order for that and you can then stop by our lab to have the test done at a later time.  Telephone visits are billed at different rates depending on your insurance coverage. During this emergency period, for some insurers they may be billed the same as an in-person visit.  Please reach out to your insurance provider with any questions.  If during the course of the call the physician/provider feels a telephone visit is not appropriate, you will not be charged for this service.\"    Patient has given verbal consent for Telephone visit?  Yes    Esa Rivera complains of bipolar disorder    I have reviewed and updated the patient's Past Medical History, Social History, Family History and Medication List.    ALLERGIES  No known allergies    Phone call duration: 25 minutes    Interim History     Since the last visit Esa has been taking Depakote, Latuda, and risperidone as outlined above.  He continues to find the addition of risperidone 2 mg at bedtime to be very beneficial for his anxiety and sleep.  He has had severe manic episodes with psychosis triggered in the past by sleep deprivation and so this is critically important.    Esa is tolerating risperidone well.  He is watching his diet, is exercising more now that the weather has improved, and has actually lost 10 pounds in the last 4 to 6 weeks.  He denies extraparametal symptoms or any other side effects from risperidone.    Esa is working from home.  He continues to " "have occasional spikes of anxiety but they are not severe.  For example he is worried about his partner Julia who has 2 go to her workplace 3 days/week.  However overall Esa feels his anxiety level is manageable.    Esa is pleased with how things are going.  He is agreeable to continue his medications at the current doses.  I feel it is wise to leave risperidone in place for the time being.  Once the shelter in place order is lifted Esa is agreeable to get a blood test to check his Depakote level and other routine labs.  I have placed the order today.    Esa will return for follow-up in 1 month.          In Dec 2016 Esa experienced an episode of severe katie with psychosis (grandiose delusions). He was seen in the ER but not hospitalized. He had some mild depressive episodes and possible (but questionable) short hypomanic periods in the preceding years, but never received treatment for these.     Esa's manic symptoms began when he became excited about a new idea at work. 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 He became so excited about this the he was unable to sleep. As his katie escalated, his sleep decreased to 1-2 hours per night for 5-6 nights. His affect was elated and also markedly irritable. He would \"go off\" on people for minor things in very uncharacteristic ways. His thoughts were \"really fast, crisp, and clear\". He saw \"connections in everything.\" His energy was clearly elevated. He became grandiose, believing that his idea would change the world and as a result he would meet many \"powerful people\". Eventually he came to believe that sings on the radio were meant for him.     Esa was taking Adderall at the time. He has taken this, on and off, for many years. In my opinion it is not an adequate explanation for his manic symptoms.      His mother (a PCP) became concerned and brought him to hospital. He " "was assessed in the ER and prescribed Ativan for sleep. He was seen by Dr Burt (a psychiatrist) the next day. He was prescribed OLZ and LTG. The doses were titrated \"up and down\" for the next 6 months. There were significant side effects (sedation, cognitive impairment), likely from OLZ. Most recently, Esa stopped OLZ for a period of time. In the context of being on a honeymoon in [place], he develop racing thoughts and difficulty sleeping and restarted it, with benefit. He currently takes 10 mg HS.    Esa endorses several previous short (1-2 day) periods of reduced sleep and elevated mood, typically in the context of getting involved in a work project. At most they represent subthreshold hypomanic periods.     He has also experienced several periods over the years, lasting about a week, of low mood, decreased energy and motivation, poor focus/concentration, and negative thoughts. He denies neurovegetative symptoms or SI. His wife thought that these represented depressive episodes.    Esa has consumed EtOH heavily at points during his life. It was most pronounced during his college years. He has sporadically binge-drank since then. He has not had a drink in 2 weeks.    Past Psych Hx: As above. Esa was assessed at [facility] in Dec 2016 and diagnosed with BDI. He was seen by Psychiatry at age 17 and dxed with ADD. He took Adderall off and on (mostly on) with benefit over the years since then. He denies abusing it.     Past Med Hx: 2 concussion: 1) age 15 - skiing. Age 16: football. LOC x a couple of minutes with both.    MEDICATION ADHERENCE: Good.   Current Medications     Current Outpatient Medications   Medication Sig Dispense Refill     divalproex sodium extended-release (DEPAKOTE ER) 500 MG 24 hr tablet Take 3 tablets (1,500 mg) by mouth At Bedtime 540 tablet 1     lurasidone (LATUDA) 80 MG TABS tablet Take 1 tablet (80 mg) by mouth daily 180 tablet 1     risperiDONE (RISPERDAL) 2 MG tablet Take 1 tablet (2 mg) " by mouth At Bedtime 90 tablet 3         Substance use     ETOH: Sporadic binge drinking  Cigarettes: NA  Street Drugs: Denies    Review of Systems     A comprehensive review of systems was performed and is negative other than noted above.     Allergies     Allergies   Allergen Reactions     No Known Allergies

## 2020-04-08 NOTE — PATIENT INSTRUCTIONS
Continue your medications at the current doses    Once the shelter in place order is lifted please get a blood test to check your Depakote level and other routine labs    Please return for follow-up in 1 month

## 2020-05-29 ENCOUNTER — TELEPHONE (OUTPATIENT)
Facility: CLINIC | Age: 35
End: 2020-05-29

## 2020-05-29 DIAGNOSIS — F31.30 BIPOLAR I DISORDER, MOST RECENT EPISODE DEPRESSED (H): ICD-10-CM

## 2020-05-29 RX ORDER — RISPERIDONE 2 MG/1
2 TABLET ORAL AT BEDTIME
Qty: 90 TABLET | Refills: 0 | Status: SHIPPED | OUTPATIENT
Start: 2020-05-29 | End: 2020-09-02

## 2020-05-29 RX ORDER — LURASIDONE HYDROCHLORIDE 80 MG/1
80 TABLET, FILM COATED ORAL DAILY
Qty: 90 TABLET | Refills: 0 | Status: SHIPPED | OUTPATIENT
Start: 2020-05-29 | End: 2020-09-22
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
## 2020-05-29 NOTE — TELEPHONE ENCOUNTER
Last seen: 4/8  RTC: 1 month  Non-provider cancel: None  No-show: None  Next appt: None     Incoming refill from patient via telephone     Medication requested:   Disp  Refills  Start  End  CAMERON    lurasidone (LATUDA) 80 MG TABS tablet  180 tablet  1  3/18/2020   No    Sig - Route: Take 1 tablet (80 mg) by mouth daily      Disp  Refills  Start  End  CAMERON    risperiDONE (RISPERDAL) 2 MG tablet  90 tablet  3  3/16/2020   No    Sig - Route: Take 1 tablet (2 mg) by mouth At Bedtime      Medication refill approved per refill protocol.  Writer routed message to scheduling to contact patient for appointment.  Writer e-prescribed 90-day supply to [facility] mail [facility] ([phone]). Qty 90, refills 0.  Writer left voice mail message for pt, identifying that the refills had been sent.

## 2020-06-05 ENCOUNTER — MYC MEDICAL ADVICE (OUTPATIENT)
Dept: FAMILY MEDICINE | Facility: CLINIC | Age: 35
End: 2020-06-05

## 2020-06-08 ENCOUNTER — NURSE TRIAGE (OUTPATIENT)
Dept: FAMILY MEDICINE | Facility: CLINIC | Age: 35
End: 2020-06-08

## 2020-06-08 NOTE — TELEPHONE ENCOUNTER
Additional Information    Lumpy area discovered inside the scrotum and no pain    Protocols used: SCROTUM SWELLING-A-OH    See mychart enc. Used triage guidelines.     Candace Tello RN   [facility]

## 2020-06-08 NOTE — TELEPHONE ENCOUNTER
Response sent to patient via [employer].     Candace Tello RN   [facility]

## 2020-06-22 ENCOUNTER — VIRTUAL VISIT (OUTPATIENT)
Dept: PSYCHIATRY | Facility: CLINIC | Age: 35
End: 2020-06-22
Attending: PSYCHIATRY & NEUROLOGY
Payer: COMMERCIAL

## 2020-06-22 DIAGNOSIS — F31.30 BIPOLAR I DISORDER, MOST RECENT EPISODE DEPRESSED (H): Primary | ICD-10-CM

## 2020-06-22 NOTE — PROGRESS NOTES
Progress Note    TELEPHONE VISIT  Esa Rivera is a 35 year old pt. who is being evaluated via a billable telephone visit.      The patient has been notified of the following:    We have found that certain health care needs can be provided without the need for a physical exam. This service lets us provide the care you need with a short phone conversation. If a prescription is necessary we can send it directly to your pharmacy. If lab work is needed we can place an order for that and you can then stop by our lab to have the test done at a later time. Insurers are generally covering virtual visits as they would in-office visits so billing should not be different than normal.  If for some reason you do get billed incorrectly, you should contact the billing office to correct it and that number is in the AVS .    Patient has given verbal consent for a telephone visit?:  Yes   How would the pt like to obtain the AVS?:  [employer] SmartPhrase [PsychAVS] has been placed in 'Patient Instructions':  Yes     Start Time:  8:28 AM          End Time:  8:55 AM      Esa Rivera is a 35 year old year old male with Bipolar I Disorder, Most Recent Episode Manic Severe, with psychotic behavior (296.40) who presents for ongoing psychiatric care. He has no children. He and his wife (a psychologist) live on their own in [place] [facility]. Esa works as an  for [employer].    Diagnosis    Axis 1: Bipolar I disorder, with severe manic episode with psychosis in Dec 2016. ADHD by history. Alcohol use disorder, moderate, in short-term remission  Axis 2: Nil  Axis 3: Remote concussion x 2  Axis 4: Moderate stressors  Axis 5: GAF at time of visit: 70    Assessment & Plan     Due to [facility] and [facility] guidance regarding social distancing as a result of the Covid-19 pandemic, I called Esa on the phone for his follow-up visit today.  Since the last visit Esa has remained adherent with Depakote 1500 mg at bedtime, Latuda 80 mg at bedtime,  and risperidone 2 mg at bedtime.  Esa reports that his mood is stable and he denies symptoms of depression, katie, or psychosis.  He does report excessive anxiety about catching coronavirus, leading him to wash his hands excessively and feel marked anxiety at any trigger that causes him to think about coronavirus.  We discussed a number of options to target Esa's anxiety.  I cannot see that he has had trials of antidepressants in the past, which can certainly be helpful with ruminative anxiety, but run the risk of triggering manic episodes.  Gabapentin could also be considered.  Esa would like to try CBD as a first-line option.  I let him know that I cannot give him much advice one way or the other about this, as there are no good clinical trials at this point, but that I do not object to him trying it.  He will continue his other medications as usual.  He will return for follow-up in 2 weeks.    The patient understands the risks, benefits, adverse effects and alternatives. Agrees to treatment with the capacity to do so. No medical contraindications to treatment. Agrees to call clinic for any problems. The patient understands to call 911 or come to the nearest ED if life threatening or urgent symptoms present.    Interim History       Since the last visit Esa has been taking Depakote, Latuda, and risperidone as outlined above.  His mood is stable.  His motivation and energy level are good.  He sleeps well most nights.  He is cognitively intact.  He denies symptoms of depression, katie, or psychosis.    Esa's chief complaints today, which his family also requested that he bring up, is hit that he is experiencing excessive anxiety about coronavirus.  He feels very anxious about isaiah this.  As a result, he is washing his hands and using hand  excessively.  He feels very anxious if any of his family members are out in public as he worries about them isaiah coronavirus.  Esa reports that he has  some degree of anxiety about this during most of his waking hours, and occasionally it becomes quite intense.    Esa denies previous episodes of concern regarding contamination.  He denies other symptoms suggestive of OCD.  There were no other main triggers for his anxiety.  On    Esa is working from home and this is going reasonably well.  He has a lot of anxiety about having to go back, also for fear of isaiah coronavirus.  He is currently on vacation with his family in a cabin in [place] and is enjoying his time off.    We had a lengthy discussion about how to target Esa's anxiety.  I cannot see that he has had past trials of antidepressants.  An SSRI like Prozac or Lexapro would certainly be one option.  Esa is aware that there is a risk of the triggering manic or mixed symptoms.  Gabapentin could also be considered.  Esa himself wonders about using CBD.  He has talked to his family about this, and they have requested he get an okay for me.  I have let Esa know that I cannot make a strong recommendation one way or the other as there are no good clinical trials at this point.  From what I have seen of people using it, it can help with anxiety and sleep and seems to be well tolerated.    Esa will have a trial of CBD to target his anxiety.  He will continue his other medications at the usual doses.  He will get a Depakote level when the coronavirus situation has stabilized.  He will return for follow-up in 2 weeks.          In Dec 2016 Esa experienced an episode of severe katie with psychosis (grandiose delusions). He was seen in the ER but not hospitalized. He had some mild depressive episodes and possible (but questionable) short hypomanic periods in the preceding years, but never received treatment for these.     Esa's manic symptoms began when he became excited about a new idea at work. It involved making fuel cells available to people at home so they could tap them for low-cost power  "during periods of peak power usage. He became so excited about this the he was unable to sleep. As his katie escalated, his sleep decreased to 1-2 hours per night for 5-6 nights. His affect was elated and also markedly irritable. He would \"go off\" on people for minor things in very uncharacteristic ways. His thoughts were \"really fast, crisp, and clear\". He saw \"connections in everything.\" His energy was clearly elevated. He became grandiose, believing that his idea would change the world and as a result he would meet many \"powerful people\". Eventually he came to believe that sings on the radio were meant for him.     Esa was taking Adderall at the time. He has taken this, on and off, for many years. In my opinion it is not an adequate explanation for his manic symptoms.      His mother (a PCP) became concerned and brought him to hospital. He was assessed in the ER and prescribed Ativan for sleep. He was seen by Dr Burt (a psychiatrist) the next day. He was prescribed OLZ and LTG. The doses were titrated \"up and down\" for the next 6 months. There were significant side effects (sedation, cognitive impairment), likely from OLZ. Most recently, Esa stopped OLZ for a period of time. In the context of being on a honeymoon in [place], he develop racing thoughts and difficulty sleeping and restarted it, with benefit. He currently takes 10 mg HS.    Esa endorses several previous short (1-2 day) periods of reduced sleep and elevated mood, typically in the context of getting involved in a work project. At most they represent subthreshold hypomanic periods.     He has also experienced several periods over the years, lasting about a week, of low mood, decreased energy and motivation, poor focus/concentration, and negative thoughts. He denies neurovegetative symptoms or SI. His wife thought that these represented depressive episodes.    Esa has consumed EtOH heavily at points during his life. It was most pronounced during " his college years. He has sporadically binge-drank since then. He has not had a drink in 2 weeks.    Past Psych Hx: As above. Esa was assessed at [facility] in Dec 2016 and diagnosed with BDI. He was seen by Psychiatry at age 17 and dxed with ADD. He took Adderall off and on (mostly on) with benefit over the years since then. He denies abusing it.     Past Med Hx: 2 concussion: 1) age 15 - skiing. Age 16: football. LOC x a couple of minutes with both.    MEDICATION ADHERENCE: Good.   Current Medications     Current Outpatient Medications   Medication Sig Dispense Refill     divalproex sodium extended-release (DEPAKOTE ER) 500 MG 24 hr tablet Take 3 tablets (1,500 mg) by mouth At Bedtime 540 tablet 1     lurasidone (LATUDA) 80 MG TABS tablet Take 1 tablet (80 mg) by mouth daily 90 tablet 0     risperiDONE (RISPERDAL) 2 MG tablet Take 1 tablet (2 mg) by mouth At Bedtime 90 tablet 0         Substance use     ETOH: Sporadic binge drinking  Cigarettes: NA  Street Drugs: Denies    Review of Systems     A comprehensive review of systems was performed and is negative other than noted above.     Allergies     Allergies   Allergen Reactions     No Known Allergies

## 2020-06-22 NOTE — PATIENT INSTRUCTIONS
Treatment Plan Today:   As discussed    Continue your medications at the current doses    Please return for follow-up in 2 weeks    ------------------------------------------------------------------------    Thank you for coming to the PSYCHIATRY CLINIC.    Lab Testing:  If you had lab testing today and your results are reassuring or normal they will be mailed to you or sent through [facility] within 7 days. If the lab tests need quick action we will call you with the results. The phone number we will call with results is # [phone] (home) NONE (work). If this is not the best number please call our clinic and change the number.    Medication Refills:  If you need any refills please call your pharmacy and they will contact us. Our fax number for refills is [phone]. Please allow three business for refill processing. If you need to  your refill at a new pharmacy, please contact the new pharmacy directly. The new pharmacy will help you get your medications transferred.     Scheduling:  If you have any concerns about today's visit or wish to schedule another appointment please call our office during normal business hours [phone] (8-5:00 M-F)    Contact Us:  Please call [phone] during business hours (8-5:00 M-F).  If after clinic hours, or on the weekend, please call  [phone].    Financial Assistance [phone]  [facility] Billing [phone]  Central Billing Office, [facility]: [phone]  [facility] Billing [phone]  Medical Records [phone]      MENTAL HEALTH CRISIS NUMBERS:  For a medical emergency please call  911 or go to the nearest ER.     [facility]:   [facility] -[phone]   Crisis Residence [facility] -[phone]   Walk-In Counseling Center [facility] -[phone]   COPE 24/7 [facility] Mobile Team -[phone] (adults)/[phone] (child)  CHILD: [facility] needs assessment team - [phone]      [facility] -  [phone]   Walk-in counseling [facility] - [phone]   Walk-in counseling [facility] - [phone]   Crisis Residence [facility] - [phone]  Urgent Care Adult Mental Hpfrnb-[phone] mobile unit/ 24/7 crisis line    National Crisis Numbers:   National Suicide Prevention Lifeline: 7-775-667-TALK (073-190-6882)  Poison Control Center - 3-853-455-9373  SnapAppointments/resources for a list of additional resources (SOS)  Trans Lifeline a hotline for transgender people 1-909-270-5030  The Shaun Project a hotline for LGBT youth 1-104.489.6653  Crisis Text Line: For any crisis 24/7   To: 361741  see www.crisistextline.org  - IF MAKING A CALL FEELS TOO HARD, send a text!         Again thank you for choosing [facility]YCHIATRY CLINIC and please let us know how we can best partner with you to improve you and your family's health.    You may be receiving a survey regarding this appointment. We would love to have your feedback, both positive and negative. The survey is done by an external company, so your answers are anonymous.

## 2020-07-15 ENCOUNTER — VIRTUAL VISIT (OUTPATIENT)
Dept: PSYCHIATRY | Facility: CLINIC | Age: 35
End: 2020-07-15
Attending: PSYCHIATRY & NEUROLOGY
Payer: COMMERCIAL

## 2020-07-15 DIAGNOSIS — F31.30 BIPOLAR I DISORDER, MOST RECENT EPISODE DEPRESSED (H): Primary | ICD-10-CM

## 2020-07-15 NOTE — PROGRESS NOTES
Progress Note    TELEPHONE VISIT  Esa Rivera is a 35 year old pt. who is being evaluated via a billable telephone visit.      The patient has been notified of the following:    We have found that certain health care needs can be provided without the need for a physical exam. This service lets us provide the care you need with a short phone conversation. If a prescription is necessary we can send it directly to your pharmacy. If lab work is needed we can place an order for that and you can then stop by our lab to have the test done at a later time. Insurers are generally covering virtual visits as they would in-office visits so billing should not be different than normal.  If for some reason you do get billed incorrectly, you should contact the billing office to correct it and that number is in the AVS .    Patient has given verbal consent for a telephone visit?:  Yes   How would the pt like to obtain the AVS?:  [employer] SmartPhrase [PsychAVS] has been placed in 'Patient Instructions':  Yes     Start Time:  3:36 PM          End Time:  4:00 PM      Esa Rivera is a 35 year old year old male with Bipolar I Disorder, Most Recent Episode Manic Severe, with psychotic behavior (296.40) who presents for ongoing psychiatric care. He has no children. He and his wife (a psychologist) live on their own in [place]. Esa works as an  for [email or web address].    Diagnosis    Axis 1: Bipolar I disorder, with severe manic episode with psychosis in Dec 2016. ADHD by history. Alcohol use disorder, moderate, in short-term remission  Axis 2: Nil  Axis 3: Remote concussion x 2  Axis 4: Moderate stressors  Axis 5: GAF at time of visit: 70    Assessment & Plan     Due to the Covid-19 pandemic, I called Esa on the phone for his follow-up visit today.  Since the last visit Esa has remained adherent with Depakote 1500 mg at bedtime, Latuda 80 mg at bedtime, and risperidone 2 mg at bedtime.  His mood is stable but he continues to experience  intense ruminative anxiety about Covid-19.  He tried using CBD oil and he finds a slight mood elevating effect but limited impact on his anxiety.  He is agreeable to a trial of N-acetylcysteine 600 mg twice daily to target anxiety.  If it is helpful hopefully we can taper and discontinue risperidone in the future.  Esa will return for follow-up in 1 month.    The patient understands the risks, benefits, adverse effects and alternatives. Agrees to treatment with the capacity to do so. No medical contraindications to treatment. Agrees to call clinic for any problems. The patient understands to call 911 or come to the nearest ED if life threatening or urgent symptoms present.    Interim History     Since the last visit Esa has been taking Depakote, Latuda, and risperidone as outlined above.   His mood is stable and he denies symptoms of depression, katie, or psychosis.    Esa continues to experience intense ruminative anxiety about the Covid-19 outbreak.  He is fearful about going outside, and fearful of any member of his family goes outside.  It is intense and debilitating.  It has only to a limited degree by CBD.    Esa does find a slight reduction in his anxiety with CBD.  He also finds a mild mood elevating effect.  He plans to continue using it.  He asked me about medical marijuana but I steered him away from this.    We discussed gabapentin and N-acetylcysteine as possible options to target Esa's Bala.  He accepts a trial of N-acetylcysteine 600 mg twice daily.  Gabapentin remains an option if needed.    Esa is agreeable with our plan.  He will return for follow-up in 1 month.          In Dec 2016 Esa experienced an episode of severe katie with psychosis (grandiose delusions). He was seen in the ER but not hospitalized. He had some mild depressive episodes and possible (but questionable) short hypomanic periods in the preceding years, but never received treatment for these.     Esa's manic symptoms began  "when he became excited about a new idea at work. 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 He became so excited about this the he was unable to sleep. As his katie escalated, his sleep decreased to 1-2 hours per night for 5-6 nights. His affect was elated and also markedly irritable. He would \"go off\" on people for minor things in very uncharacteristic ways. His thoughts were \"really fast, crisp, and clear\". He saw \"connections in everything.\" His energy was clearly elevated. He became grandiose, believing that his idea would change the world and as a result he would meet many \"powerful people\". Eventually he came to believe that sings on the radio were meant for him.     Esa was taking Adderall at the time. He has taken this, on and off, for many years. In my opinion it is not an adequate explanation for his manic symptoms.      His mother (a PCP) became concerned and brought him to hospital. He was assessed in the ER and prescribed Ativan for sleep. He was seen by Dr Burt (a psychiatrist) the next day. He was prescribed OLZ and LTG. The doses were titrated \"up and down\" for the next 6 months. There were significant side effects (sedation, cognitive impairment), likely from OLZ. Most recently, Esa stopped OLZ for a period of time. In the context of being on a honeymoon in [place], he develop racing thoughts and difficulty sleeping and restarted it, with benefit. He currently takes 10 mg HS.    Esa endorses several previous short (1-2 day) periods of reduced sleep and elevated mood, typically in the context of getting involved in a work project. At most they represent subthreshold hypomanic periods.     He has also experienced several periods over the years, lasting about a week, of low mood, decreased energy and motivation, poor focus/concentration, and negative thoughts. He denies neurovegetative symptoms or SI. His " wife thought that these represented depressive episodes.    Esa has consumed EtOH heavily at points during his life. It was most pronounced during his college years. He has sporadically binge-drank since then. He has not had a drink in 2 weeks.    Past Psych Hx: As above. Esa was assessed at [facility] in Dec 2016 and diagnosed with BDI. He was seen by Psychiatry at age 17 and dxed with ADD. He took Adderall off and on (mostly on) with benefit over the years since then. He denies abusing it.     Past Med Hx: 2 concussion: 1) age 15 - skiing. Age 16: football. LOC x a couple of minutes with both.    MEDICATION ADHERENCE: Good.   Current Medications     Current Outpatient Medications   Medication Sig Dispense Refill     divalproex sodium extended-release (DEPAKOTE ER) 500 MG 24 hr tablet Take 3 tablets (1,500 mg) by mouth At Bedtime 540 tablet 1     lurasidone (LATUDA) 80 MG TABS tablet Take 1 tablet (80 mg) by mouth daily 90 tablet 0     risperiDONE (RISPERDAL) 2 MG tablet Take 1 tablet (2 mg) by mouth At Bedtime 90 tablet 0         Substance use     ETOH: Sporadic binge drinking  Cigarettes: NA  Street Drugs: Denies    Review of Systems     A comprehensive review of systems was performed and is negative other than noted above.     Allergies     Allergies   Allergen Reactions     No Known Allergies

## 2020-07-15 NOTE — PATIENT INSTRUCTIONS
Treatment Plan Today:   As discussed    Start N-acetylcysteine (NAC) 600 mg twice daily    Continue your other medications as usual    Please return for follow-up in 1 month    ------------------------------------------------------------------------    Thank you for coming to the PSYCHIATRY CLINIC.    Lab Testing:  If you had lab testing today and your results are reassuring or normal they will be mailed to you or sent through [employer] within 7 days. If the lab tests need quick action we will call you with the results. The phone number we will call with results is # [phone] (home) NONE (work). If this is not the best number please call our clinic and change the number.    Medication Refills:  If you need any refills please call your pharmacy and they will contact us. Our fax number for refills is [phone]. Please allow three business for refill processing. If you need to  your refill at a new pharmacy, please contact the new pharmacy directly. The new pharmacy will help you get your medications transferred.     Scheduling:  If you have any concerns about today's visit or wish to schedule another appointment please call our office during normal business hours [phone] (8-5:00 M-F)    Contact Us:  Please call [phone] during business hours (8-5:00 M-F).  If after clinic hours, or on the weekend, please call  [phone].    Financial Assistance [phone]  [facility] Billing [phone]  Central Billing Office, [facility]: [phone]  [facility] Billing [phone]  Medical Records [phone]      MENTAL HEALTH CRISIS NUMBERS:  For a medical emergency please call  911 or go to the nearest ER.     [facility]:   [facility] -[phone]   Crisis Residence [facility] -[phone]   Walk-In Counseling Center [facility] -[phone]   COPE 24/7 [facility] Team -[phone] (adults)/[phone] (child)  CHILD: [facility] needs assessment team - [phone]       [facility]:   [facility] - [phone]   Walk-in counseling [facility] - [phone]   Walk-in counseling [facility] - [phone]   Crisis Residence [facility] - [phone]  Urgent Care Adult Mental Uwdzhc-[phone] mobile unit/ 24/7 crisis line    National Crisis Numbers:   National Suicide Prevention Lifeline: 7-123-386-TALK (254-551-9104)  Poison Control Center - 1-871-358-7020  [employer]/resources for a list of additional resources (SOS)  Trans Lifeline a hotline for transgender people 1-276.112.5375  The Shaun Project a hotline for LGBT youth 1-734.102.7522  Crisis Text Line: For any crisis 24/7   To: 450573  see www.crisistextline.org  - IF MAKING A CALL FEELS TOO HARD, send a text!         Again thank you for choosing PSYCHIATRY CLINIC and please let us know how we can best partner with you to improve you and your family's health.    You may be receiving a survey regarding this appointment. We would love to have your feedback, both positive and negative. The survey is done by an external company, so your answers are anonymous.

## 2020-08-12 ENCOUNTER — VIRTUAL VISIT (OUTPATIENT)
Dept: PSYCHIATRY | Facility: CLINIC | Age: 35
End: 2020-08-12
Attending: PSYCHIATRY & NEUROLOGY
Payer: COMMERCIAL

## 2020-08-12 DIAGNOSIS — F31.30 BIPOLAR I DISORDER, MOST RECENT EPISODE DEPRESSED (H): Primary | ICD-10-CM

## 2020-08-12 NOTE — PROGRESS NOTES
Progress Note    TELEPHONE VISIT  Esa Rivera is a 35 year old pt. who is being evaluated via a billable telephone visit.      The patient has been notified of the following:    We have found that certain health care needs can be provided without the need for a physical exam. This service lets us provide the care you need with a short phone conversation. If a prescription is necessary we can send it directly to your pharmacy. If lab work is needed we can place an order for that and you can then stop by our lab to have the test done at a later time. Insurers are generally covering virtual visits as they would in-office visits so billing should not be different than normal.  If for some reason you do get billed incorrectly, you should contact the billing office to correct it and that number is in the AVS .    Patient has given verbal consent for a telephone visit?:  Yes   How would the pt like to obtain the AVS?:  [employer] Smart[employer]rase [PsychAVS] has been placed in 'Patient Instructions':  Yes     Start Time:  3:43 PM          End Time:  4:00 PM      Esa Rivera is a 35 year old year old male with Bipolar I Disorder, Most Recent Episode Manic Severe, with psychotic behavior (296.40) who presents for ongoing psychiatric care. He has no children. He and his wife (a psychologist) live on their own in [facility]. Esa works as an  for [email or web address].    Diagnosis    Axis 1: Bipolar I disorder, with severe manic episode with psychosis in Dec 2016. ADHD by history. Alcohol use disorder, moderate, in short-term remission  Axis 2: Nil  Axis 3: Remote concussion x 2  Axis 4: Moderate stressors  Axis 5: GAF at time of visit: 70    Assessment & Plan     Due to the Covid-19 pandemic, I called Esa on the phone for his follow-up visit today.  Since the last visit Esa began a trial of N-acetylcysteine 600 mg twice daily to target intense ruminative anxiety about the Covid-19 outbreak.  His anxiety has improved markedly over the  last couple of weeks.  He has continued his other usual medications.  He will reduce risperidone from 2 mg to 1 mg going forward.  This was started several months ago to target COVID related anxiety, but was less effective than N-acetylcysteine.  He is aware he can increase N-acetylcysteine to a maximum dose of 1200 mg twice daily if needed.  He will continue his other medications as usual.  He will return for follow-up in 4 to 6 weeks.    The patient understands the risks, benefits, adverse effects and alternatives. Agrees to treatment with the capacity to do so. No medical contraindications to treatment. Agrees to call clinic for any problems. The patient understands to call 911 or come to the nearest ED if life threatening or urgent symptoms present.    Interim History     Since the last visit Esa reports that his mood has been stable.  His motivation and energy level are good.  He sleeps well.  He feels cognitively intact.  He denies symptoms of depression, katie, or psychosis.    About 3 weeks ago Esa began taking N-acetylcysteine 600 mg twice daily to target intense ruminative anxiety about the Covid-19 outbreak.  His anxiety has reduced markedly.  At his last visit he spoke about feeling intensely worried when his wife went to the store, but he is much less so now.  He thinks that he himself may be able to start shopping for the family when needed, which would have previously been very difficult for him.  He is aware of the importance of masking, not touching mucous membranes, and handwashing.    Things are going well at work.  Esa is going through a busy time, but feels that he is handling it well.  He enjoys spending time with family and friends.    Esa will reduce his dose of risperidone, which was less effective for targeting COVID related anxiety.  He is aware he can increase N-acetylcysteine if needed.  He will continue his other medications as usual.  He will return for follow-up in 4 to 6  "weeks.          In Dec 2016 Esa experienced an episode of severe katie with psychosis (grandiose delusions). He was seen in the ER but not hospitalized. He had some mild depressive episodes and possible (but questionable) short hypomanic periods in the preceding years, but never received treatment for these.     Esa's manic symptoms began when he became excited about a new idea at work. 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 He became so excited about this the he was unable to sleep. As his katie escalated, his sleep decreased to 1-2 hours per night for 5-6 nights. His affect was elated and also markedly irritable. He would \"go off\" on people for minor things in very uncharacteristic ways. His thoughts were \"really fast, crisp, and clear\". He saw \"connections in everything.\" His energy was clearly elevated. He became grandiose, believing that his idea would change the world and as a result he would meet many \"powerful people\". Eventually he came to believe that sings on the radio were meant for him.     Esa was taking Adderall at the time. He has taken this, on and off, for many years. In my opinion it is not an adequate explanation for his manic symptoms.      His mother (a PCP) became concerned and brought him to hospital. He was assessed in the ER and prescribed Ativan for sleep. He was seen by Dr Burt (a psychiatrist) the next day. He was prescribed OLZ and LTG. The doses were titrated \"up and down\" for the next 6 months. There were significant side effects (sedation, cognitive impairment), likely from OLZ. Most recently, Esa stopped OLZ for a period of time. In the context of being on a honeymoon in [place], he develop racing thoughts and difficulty sleeping and restarted it, with benefit. He currently takes 10 mg HS.    Esa endorses several previous short (1-2 day) periods of reduced sleep and elevated mood, " typically in the context of getting involved in a work project. At most they represent subthreshold hypomanic periods.     He has also experienced several periods over the years, lasting about a week, of low mood, decreased energy and motivation, poor focus/concentration, and negative thoughts. He denies neurovegetative symptoms or SI. His wife thought that these represented depressive episodes.    Esa has consumed EtOH heavily at points during his life. It was most pronounced during his college years. He has sporadically binge-drank since then. He has not had a drink in 2 weeks.    Past Psych Hx: As above. Esa was assessed at [place] in Dec 2016 and diagnosed with BDI. He was seen by Psychiatry at age 17 and dxed with ADD. He took Adderall off and on (mostly on) with benefit over the years since then. He denies abusing it.     Past Med Hx: 2 concussion: 1) age 15 - skiing. Age 16: football. LOC x a couple of minutes with both.    MEDICATION ADHERENCE: Good.   Current Medications     Current Outpatient Medications   Medication Sig Dispense Refill     divalproex sodium extended-release (DEPAKOTE ER) 500 MG 24 hr tablet Take 3 tablets (1,500 mg) by mouth At Bedtime 540 tablet 1     lurasidone (LATUDA) 80 MG TABS tablet Take 1 tablet (80 mg) by mouth daily 90 tablet 0     risperiDONE (RISPERDAL) 2 MG tablet Take 1 tablet (2 mg) by mouth At Bedtime 90 tablet 0         Substance use     ETOH: Sporadic binge drinking  Cigarettes: NA  Street Drugs: Denies    Review of Systems     A comprehensive review of systems was performed and is negative other than noted above.     Allergies     Allergies   Allergen Reactions     No Known Allergies

## 2020-08-28 DIAGNOSIS — F31.30 BIPOLAR I DISORDER, MOST RECENT EPISODE DEPRESSED (H): ICD-10-CM

## 2020-09-02 NOTE — TELEPHONE ENCOUNTER
"Medication requested: RISPERIDONE  TAB 2MG    Last refilled: 5/29/20  Qty: 90/0      Last seen: 8/12/20 \"He will reduce risperidone from 2 mg to 1 mg going forward\"  RTC: 4-6 weeks  Cancel: 0  No-show: 0  Next appt: 9/222/20    Refill decision:   Refill pended and routed to the provider for review/determination due to  dosage recommendation change 8/22/20 visit. Pended as per recommendation  \"He will reduce risperidone from 2 mg to 1 mg going forward\"      "

## 2020-09-03 RX ORDER — RISPERIDONE 1 MG/1
1 TABLET ORAL AT BEDTIME
Qty: 90 TABLET | Refills: 0 | Status: SHIPPED | OUTPATIENT
Start: 2020-09-03 | End: 2020-12-08

## 2020-09-22 ENCOUNTER — VIRTUAL VISIT (OUTPATIENT)
Dept: PSYCHIATRY | Facility: CLINIC | Age: 35
End: 2020-09-22
Attending: PSYCHIATRY & NEUROLOGY
Payer: COMMERCIAL

## 2020-09-22 DIAGNOSIS — F31.30 BIPOLAR I DISORDER, MOST RECENT EPISODE DEPRESSED (H): ICD-10-CM

## 2020-09-22 DIAGNOSIS — F31.2 BIPOLAR AFFECTIVE DISORDER, CURRENTLY MANIC, SEVERE, WITH PSYCHOTIC FEATURES (H): ICD-10-CM

## 2020-09-22 RX ORDER — LURASIDONE HYDROCHLORIDE 80 MG/1
80 TABLET, FILM COATED ORAL DAILY
Qty: 90 TABLET | Refills: 3 | Status: SHIPPED | OUTPATIENT
Start: 2020-09-22 | End: 2020-12-08

## 2020-09-22 NOTE — PROGRESS NOTES
Progress Note    TELEPHONE VISIT  Esa Rivera is a 35 year old pt. who is being evaluated via a billable telephone visit.      The patient has been notified of the following:    We have found that certain health care needs can be provided without the need for a physical exam. This service lets us provide the care you need with a short phone conversation. If a prescription is necessary we can send it directly to your pharmacy. If lab work is needed we can place an order for that and you can then stop by our lab to have the test done at a later time. Insurers are generally covering virtual visits as they would in-office visits so billing should not be different than normal.  If for some reason you do get billed incorrectly, you should contact the billing office to correct it and that number is in the AVS .    Patient has given verbal consent for a telephone visit?:  Yes   How would the pt like to obtain the AVS?:  [employer] SmartPhrase [PsychAVS] has been placed in 'Patient Instructions':  Yes     Start Time:  3:42 PM          End Time:  4:00 PM      Esa Rivera is a 35 year old year old male with Bipolar I Disorder, Most Recent Episode Manic Severe, with psychotic behavior (296.40) who presents for ongoing psychiatric care. He has no children. He and his wife (a psychologist) live on their own in [facility] [place] [facility]. Esa works as an  for [employer].    Diagnosis    Axis 1: Bipolar I disorder, with severe manic episode with psychosis in Dec 2016. ADHD by history. Alcohol use disorder, moderate, in short-term remission  Axis 2: Nil  Axis 3: Remote concussion x 2  Axis 4: Moderate stressors  Axis 5: GAF at time of visit: 70    Assessment & Plan     Since the last visit Esa has been adherent with his usual medications including Depakote 1500 mg at bedtime, Latuda 80 mg at bedtime and risperidone which he reduced to 1 mg daily.  He was unable to get N-acetylcysteine 600 mg capsules and so has increased the dose to 900 mg  twice daily.  Esa reports that things are going well.  He denies symptoms of depression or katie.  His anxiety about COVID is at a moderate and manageable level.  He will discontinue risperidone.  He has a supply on hand in case he experiences any manic symptoms.  He will start using light therapy again if his mood drops.  He will return for follow-up in 3 month.    The patient understands the risks, benefits, adverse effects and alternatives. Agrees to treatment with the capacity to do so. No medical contraindications to treatment. Agrees to call clinic for any problems. The patient understands to call 911 or come to the nearest ED if life threatening or urgent symptoms present.    Interim History     Since the last visit Esa reports things have been going well.  His mood is stable.  He denies symptoms of depression or katie.  He is not experiencing psychotic symptoms.  His anxiety about COVID is at a moderate level.    Esa enjoyed a vacation to [place].  He is now getting back into work.    We discussed Esa's medications.  Reducing risperidone from 2 mg to 1 mg went well.  We have prescribed it due to anxiety regarding COVID, but N-acetylcysteine seems like it has been much more effective.  Esa will stop taking risperidone.  He has a supply on hand in case he experiences any manic symptoms.    Esa is prone to seasonal depressions and has used light therapy in the past.  He will start it again if his depression reemerges.  He is aware with how to use light therapy.    Esa is agreeable with our treatment plan.  He will return for follow-up in 2-1/2 months.          In Dec 2016 Esa experienced an episode of severe katie with psychosis (grandiose delusions). He was seen in the ER but not hospitalized. He had some mild depressive episodes and possible (but questionable) short hypomanic periods in the preceding years, but never received treatment for these.     Esa's manic symptoms began when he  "became excited about a new idea at work. 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 He became so excited about this the he was unable to sleep. As his katie escalated, his sleep decreased to 1-2 hours per night for 5-6 nights. His affect was elated and also markedly irritable. He would \"go off\" on people for minor things in very uncharacteristic ways. His thoughts were \"really fast, crisp, and clear\". He saw \"connections in everything.\" His energy was clearly elevated. He became grandiose, believing that his idea would change the world and as a result he would meet many \"powerful people\". Eventually he came to believe that sings on the radio were meant for him.     Esa was taking Adderall at the time. He has taken this, on and off, for many years. In my opinion it is not an adequate explanation for his manic symptoms.      His mother (a PCP) became concerned and brought him to hospital. He was assessed in the ER and prescribed Ativan for sleep. He was seen by Dr Burt (a psychiatrist) the next day. He was prescribed OLZ and LTG. The doses were titrated \"up and down\" for the next 6 months. There were significant side effects (sedation, cognitive impairment), likely from OLZ. Most recently, Esa stopped OLZ for a period of time. In the context of being on a honeymoon in [place], he develop racing thoughts and difficulty sleeping and restarted it, with benefit. He currently takes 10 mg HS.    Esa endorses several previous short (1-2 day) periods of reduced sleep and elevated mood, typically in the context of getting involved in a work project. At most they represent subthreshold hypomanic periods.     He has also experienced several periods over the years, lasting about a week, of low mood, decreased energy and motivation, poor focus/concentration, and negative thoughts. He denies neurovegetative symptoms or SI. His wife " thought that these represented depressive episodes.    Esa has consumed EtOH heavily at points during his life. It was most pronounced during his college years. He has sporadically binge-drank since then. He has not had a drink in 2 weeks.    Past Psych Hx: As above. Esa was assessed at [place] in Dec 2016 and diagnosed with BDI. He was seen by Psychiatry at age 17 and dxed with ADD. He took Adderall off and on (mostly on) with benefit over the years since then. He denies abusing it.     Past Med Hx: 2 concussion: 1) age 15 - skiing. Age 16: football. LOC x a couple of minutes with both.    MEDICATION ADHERENCE: Good.   Current Medications     Current Outpatient Medications   Medication Sig Dispense Refill     divalproex sodium extended-release (DEPAKOTE ER) 500 MG 24 hr tablet Take 3 tablets (1,500 mg) by mouth At Bedtime 540 tablet 1     lurasidone (LATUDA) 80 MG TABS tablet Take 1 tablet (80 mg) by mouth daily 90 tablet 0     risperiDONE (RISPERDAL) 1 MG tablet Take 1 tablet (1 mg) by mouth At Bedtime 90 tablet 0         Substance use     ETOH: Sporadic binge drinking  Cigarettes: NA  Street Drugs: Denies    Review of Systems     A comprehensive review of systems was performed and is negative other than noted above.     Allergies     Allergies   Allergen Reactions     No Known Allergies

## 2020-09-22 NOTE — PATIENT INSTRUCTIONS
Treatment Plan Today:   As discussed    Discontinue risperidone.  He may reinstitute it if you start to experience manic symptoms    Continue your other medications as usual    Consider using light therapy if you experience any seasonal depressive symptoms    Please return for follow-up in 2 months.    ------------------------------------------------------------------------    Thank you for coming to the PSYCHIATRY [facility].    Lab Testing:  If you had lab testing today and your results are reassuring or normal they will be mailed to you or sent through [facility] within 7 days. If the lab tests need quick action we will call you with the results. The phone number we will call with results is # [phone] (home) NONE (work). If this is not the best number please call our clinic and change the number.    Medication Refills:  If you need any refills please call your pharmacy and they will contact us. Our fax number for refills is [phone]. Please allow three business for refill processing. If you need to  your refill at a new pharmacy, please contact the new pharmacy directly. The new pharmacy will help you get your medications transferred.     Scheduling:  If you have any concerns about today's visit or wish to schedule another appointment please call our office during normal business hours [phone] (8-5:00 M-F)    Contact Us:  Please call [phone] during business hours (8-5:00 M-F).  If after clinic hours, or on the weekend, please call  [phone].    Financial Assistance [phone]  [facility] Billing [phone]  Central Billing Office, [facility]: [phone]  [facility] Billing [phone]  Medical Records [phone]      MENTAL HEALTH CRISIS NUMBERS:  For a medical emergency please call  911 or go to the nearest ER.     [facility]:   [facility] -[phone]   Crisis Residence [facility] -[phone]   Walk-In [phone]   COPE  24/7 [facility] Mobile Team -[phone] (adults)/[phone] (child)  CHILD: [facility] needs assessment team - [phone]      [facility]:   [facility] - [phone]   Walk-in counseling [facility] - [phone]   Walk-in counseling [facility] - [phone]   Crisis Residence [facility] - [phone]  Urgent Care Adult Mental Dsm[facility]w-[phone] mobile unit/ 24/7 crisis line    National Crisis Numbers:   National Suicide Prevention Lifeline: 5-041-867-TALK (478-270-4587)  Poison Control Center - 6-816-613-9455  [employer]resources for a list of additional resources (SOS)  Trans Lifeline a hotline for transgender people 8-169-962-2888  The [employer] Project a hotline for LGBT youth 1-407.453.5258  Crisis Text Line: For any crisis 24/7   To: 674928  see www.crisistextline.org  - IF MAKING A CALL FEELS TOO HARD, send a text!         Again thank you for choosing PSYCHIATRY CLINIC and please let us know how we can best partner with you to improve you and your family's health.    You may be receiving a survey regarding this appointment. We would love to have your feedback, both positive and negative. The survey is done by an external company, so your answers are anonymous.

## 2020-12-08 ENCOUNTER — VIRTUAL VISIT (OUTPATIENT)
Dept: PSYCHIATRY | Facility: CLINIC | Age: 35
End: 2020-12-08
Attending: PSYCHIATRY & NEUROLOGY
Payer: COMMERCIAL

## 2020-12-08 DIAGNOSIS — F31.2 BIPOLAR AFFECTIVE DISORDER, CURRENTLY MANIC, SEVERE, WITH PSYCHOTIC FEATURES (H): ICD-10-CM

## 2020-12-08 DIAGNOSIS — F31.30 BIPOLAR I DISORDER, MOST RECENT EPISODE DEPRESSED (H): ICD-10-CM

## 2020-12-08 PROCEDURE — 99213 OFFICE O/P EST LOW 20 MIN: CPT | Mod: TEL | Performed by: PSYCHIATRY & NEUROLOGY

## 2020-12-08 RX ORDER — LURASIDONE HYDROCHLORIDE 80 MG/1
80 TABLET, FILM COATED ORAL DAILY
Qty: 90 TABLET | Refills: 3 | Status: SHIPPED | OUTPATIENT
Start: 2020-12-08 | End: 2021-09-14

## 2020-12-08 RX ORDER — DIVALPROEX SODIUM 500 MG/1
1500 TABLET, EXTENDED RELEASE ORAL AT BEDTIME
Qty: 270 TABLET | Refills: 3 | Status: SHIPPED | OUTPATIENT
Start: 2020-12-08 | End: 2021-11-02

## 2020-12-08 NOTE — PROGRESS NOTES
Progress Note    TELEPHONE VISIT  Esa Rivera is a 35 year old pt. who is being evaluated via a billable telephone visit.      The patient has been notified of the following:    We have found that certain health care needs can be provided without the need for a physical exam. This service lets us provide the care you need with a short phone conversation. If a prescription is necessary we can send it directly to your pharmacy. If lab work is needed we can place an order for that and you can then stop by our lab to have the test done at a later time. Insurers are generally covering virtual visits as they would in-office visits so billing should not be different than normal.  If for some reason you do get billed incorrectly, you should contact the billing office to correct it and that number is in the AVS .    Patient has given verbal consent for a telephone visit?:  Yes   How would the pt like to obtain the AVS?:  Blossom[employer] SmartPhrase [PsychAVS] has been placed in 'Patient Instructions':  Yes     Start Time:  3:39 PM          End Time:  4:00 PM      Esa Rivera is a 35 year old year old male with Bipolar I Disorder, Most Recent Episode Manic Severe, with psychotic behavior (296.40) who presents for ongoing psychiatric care. He has no children. He and his wife (a psychologist) live on their own in [facility]. Esa works as an  for [employer].    Diagnosis    Axis 1: Bipolar I disorder, with severe manic episode with psychosis in Dec 2016. ADHD by history. Alcohol use disorder, moderate, in short-term remission  Axis 2: Nil  Axis 3: Remote concussion x 2  Axis 4: Moderate stressors  Axis 5: GAF at time of visit: 70    Assessment & Plan     Since the last visit Esa has been adherent with his usual medications including Depakote 1500 mg at bedtime, and Latuda 80 mg at bedtime.  As we agreed at his last visit, he stopped risperidone about 3 months ago  More recently, several weeks ago, he discontinued  N-acetylcysteine of his own accord.  This had been very helpful for his ruminations, and they have returned, and are very anxiety provoking.  Esa will restart N-acetylcysteine 900 mg twice daily immediately.  He will continue his other medications.  If he is not feeling better in the next few weeks he will let me know.  We can consider adding risperidone, or cautiously introduce an SSRI antidepressant.  I will make a referral for psychotherapy to address an ongoing tendency to ruminate, even with N-acetylcysteine.  He will return for follow-up in 2 months.    The patient understands the risks, benefits, adverse effects and alternatives. Agrees to treatment with the capacity to do so. No medical contraindications to treatment. Agrees to call clinic for any problems. The patient understands to call 911 or come to the nearest ED if life threatening or urgent symptoms present.    Interim History     Since the last visit Esa decided on his own accord to discontinue N-acetylcysteine.  We had started several months ago due to a tendency to ruminate excessively about COVID, and it has been very beneficial for Esa.  Since he stopped it his ruminations have returned.  He ruminates on a number of topics, such as mice in his home, whether his friends or socializing without him, and others.  He estimates he spends at least 75% of the day ruminating.    In this context, Esa reports feeling down and irritable at times, and having a short attention span.  He denies other symptoms of depression or katie and denies symptoms of psychosis.  I strongly suspect that the symptoms are secondary to his ruminations, rather than reflecting the reemergence of his mood disorder.    Esa is agreeable to restart N-acetylcysteine 900 mg twice daily.  He will continue his other usual medications.  He will contact me in a couple of weeks if he is not feeling markedly better.  I will make a referral for psychotherapy for him.  He will return for  "follow-up in 2 months.        In Dec 2016 Esa experienced an episode of severe katie with psychosis (grandiose delusions). He was seen in the ER but not hospitalized. He had some mild depressive episodes and possible (but questionable) short hypomanic periods in the preceding years, but never received treatment for these.     Esa's manic symptoms began when he became excited about a new idea at work. 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 He became so excited about this the he was unable to sleep. As his katie escalated, his sleep decreased to 1-2 hours per night for 5-6 nights. His affect was elated and also markedly irritable. He would \"go off\" on people for minor things in very uncharacteristic ways. His thoughts were \"really fast, crisp, and clear\". He saw \"connections in everything.\" His energy was clearly elevated. He became grandiose, believing that his idea would change the world and as a result he would meet many \"powerful people\". Eventually he came to believe that sings on the radio were meant for him.     Esa was taking Adderall at the time. He has taken this, on and off, for many years. In my opinion it is not an adequate explanation for his manic symptoms.      His mother (a PCP) became concerned and brought him to hospital. He was assessed in the ER and prescribed Ativan for sleep. He was seen by Dr Burt (a psychiatrist) the next day. He was prescribed OLZ and LTG. The doses were titrated \"up and down\" for the next 6 months. There were significant side effects (sedation, cognitive impairment), likely from OLZ. Most recently, Esa stopped OLZ for a period of time. In the context of being on a honeymoon in [place], he develop racing thoughts and difficulty sleeping and restarted it, with benefit. He currently takes 10 mg HS.    Esa endorses several previous short (1-2 day) periods of reduced sleep and " elevated mood, typically in the context of getting involved in a work project. At most they represent subthreshold hypomanic periods.     He has also experienced several periods over the years, lasting about a week, of low mood, decreased energy and motivation, poor focus/concentration, and negative thoughts. He denies neurovegetative symptoms or SI. His wife thought that these represented depressive episodes.    Esa has consumed EtOH heavily at points during his life. It was most pronounced during his college years. He has sporadically binge-drank since then. He has not had a drink in 2 weeks.    Past Psych Hx: As above. Esa was assessed at [facility] in Dec 2016 and diagnosed with BDI. He was seen by Psychiatry at age 17 and dxed with ADD. He took Adderall off and on (mostly on) with benefit over the years since then. He denies abusing it.     Past Med Hx: 2 concussion: 1) age 15 - skiing. Age 16: football. LOC x a couple of minutes with both.    MEDICATION ADHERENCE: Good.   Current Medications     Current Outpatient Medications   Medication Sig Dispense Refill     divalproex sodium extended-release (DEPAKOTE ER) 500 MG 24 hr tablet Take 3 tablets (1,500 mg) by mouth At Bedtime 540 tablet 1     lurasidone (LATUDA) 80 MG TABS tablet Take 1 tablet (80 mg) by mouth daily 90 tablet 3     risperiDONE (RISPERDAL) 1 MG tablet Take 1 tablet (1 mg) by mouth At Bedtime 90 tablet 0         Substance use     ETOH: Sporadic binge drinking  Cigarettes: NA  Street Drugs: Denies    Review of Systems     A comprehensive review of systems was performed and is negative other than noted above.     Allergies     Allergies   Allergen Reactions     No Known Allergies

## 2020-12-08 NOTE — PATIENT INSTRUCTIONS
Thank you for coming to the [facility] [facility].    Restart N-acetylcysteine 900 mg twice daily    Continue your other medications as usual    Please return for follow-up in 2 months    Lab Testing:  If you had lab testing today and your results are reassuring or normal they will be mailed to you or sent through [employer] within 7 days. If the lab tests need quick action we will call you with the results. The phone number we will call with results is # [phone] (home) NONE (work). If this is not the best number please call our clinic and change the number.    Medication Refills:  If you need any refills please call your pharmacy and they will contact us. Our fax number for refills is [phone]. Please allow three business for refill processing. If you need to  your refill at a new pharmacy, please contact the new pharmacy directly. The new pharmacy will help you get your medications transferred.     Scheduling:  If you have any concerns about today's visit or wish to schedule another appointment please call our office during normal business hours [phone] (8-5:00 M-F)    Contact Us:  Please call [phone] during business hours (8-5:00 M-F).  If after clinic hours, or on the weekend, please call  [phone].    Financial Assistance [phone]  [facility] Billing [phone]  Central Billing Office, [facility]: [phone]  [place] Billing [phone]  Medical Records [phone]  [place] Patient Bill of Rights [email or web address]       MENTAL HEALTH CRISIS NUMBERS:  For a medical emergency please call  911 or go to the nearest ER.     [facility]:   [facility] -[phone]   Crisis Residence [facility] -[phone]   Walk-In Counseling Center [facility] -[phone]   COPE 24/7 [facility] Mobile Team -[phone] (adults)/[phone] (child)  CHILD:  [facility] needs assessment team - [phone]      [facility]:   [facility] - [phone]   Walk-in counseling [facility] - [phone]   Walk-in counseling [facility] - [phone]   Crisis Residence [facility] Residence - [phone]  Urgent Care Adult Mental Ggyp[facility]-[phone] mobile unit/ 24/7 crisis line    National Crisis Numbers:   National Suicide Prevention Lifeline: 1-414-616-TALK (290-460-0161)  Poison Control Center - 9-163-821-9440  [place]/resources for a list of additional resources (SOS)  Trans Lifeline a hotline for transgender people 0-260-440-6069  The Shaun Project a hotline for LGBT youth 8-613-737-3431  Crisis Text Line: For any crisis 24/7   To: 630473  see www.crisistextline.org  - IF MAKING A CALL FEELS TOO HARD, send a text!         Again thank you for choosing [facility] M[facility] AD[facility] and please let us know how we can best partner with you to improve you and your family's health.    You may be receiving a survey regarding this appointment. We would love to have your feedback, both positive and negative. The survey is done by an external company, so your answers are anonymous.

## 2021-01-09 ENCOUNTER — HEALTH MAINTENANCE LETTER (OUTPATIENT)
Age: 36
End: 2021-01-09

## 2021-02-08 ENCOUNTER — NURSE TRIAGE (OUTPATIENT)
Dept: NURSING | Facility: CLINIC | Age: 36
End: 2021-02-08

## 2021-02-08 NOTE — TELEPHONE ENCOUNTER
Patient calling as he is wondering about COVID-19 vaccine.      We are working hard to begin vaccinating more people against COVID-19. Currently, we are only vaccinating individuals age 75 and older and Phase 1a workers - healthcare workers who are unable to do their job remotely. Vaccine availability is very limited.    If you are 75 or older, or a healthcare worker who is unable to do your job remotely, please log in to [employer] using this link to see if we have an open appointment and schedule an appointment.  If there are no appointments left, you will be unable to schedule and need to check back later.  If you are a healthcare worker, you will be asked to provide proof of employment at your appointment. If you cannot, you will be turned away.    Vaccine appointments are being added as they become available. Please check your [employer] account frequently for availability. If you have technical difficulty using [employer], call [phone] for assistance.    You can learn more about the [facility]'s phased approach to administering the vaccine, with details on each phase, [email or web address].      Aa vaccine supply increases and we are able to open appointments to more groups, we will share that information on our website [email or web address]. Check this website to stay up to date on COVID-19 vaccination information.    Annalisa Payton RN on 2/8/2021 at 3:22 PM          Additional Information    Health Information question, no triage required and triager able to answer question    Protocols used: INFORMATION ONLY CALL-A-

## 2021-02-16 ENCOUNTER — VIRTUAL VISIT (OUTPATIENT)
Dept: PSYCHIATRY | Facility: CLINIC | Age: 36
End: 2021-02-16
Attending: PSYCHIATRY & NEUROLOGY
Payer: COMMERCIAL

## 2021-02-16 ENCOUNTER — TELEPHONE (OUTPATIENT)
Dept: PSYCHIATRY | Facility: CLINIC | Age: 36
End: 2021-02-16

## 2021-02-16 DIAGNOSIS — F31.30 BIPOLAR I DISORDER, MOST RECENT EPISODE DEPRESSED (H): Primary | ICD-10-CM

## 2021-02-16 DIAGNOSIS — Z51.81 ENCOUNTER FOR THERAPEUTIC DRUG MONITORING: ICD-10-CM

## 2021-02-16 DIAGNOSIS — F31.2 BIPOLAR AFFECTIVE DISORDER, CURRENTLY MANIC, SEVERE, WITH PSYCHOTIC FEATURES (H): ICD-10-CM

## 2021-02-16 PROCEDURE — 99214 OFFICE O/P EST MOD 30 MIN: CPT | Mod: GT | Performed by: PSYCHIATRY & NEUROLOGY

## 2021-02-16 ASSESSMENT — PAIN SCALES - GENERAL: PAINLEVEL: NO PAIN (0)

## 2021-02-16 NOTE — PROGRESS NOTES
"VIDEO VISIT  Esa Rivera is a 35 year old patient who is being evaluated via a billable video visit.      The patient has been notified of following:   \"This video visit will be conducted via a call between you and your physician/provider. We have found that certain health care needs can be provided without the need for an in-person physical exam. This service lets us provide the care you need with a video conversation. If a prescription is necessary we can send it directly to your pharmacy. If lab work is needed we can place an order for that and you can then stop by our lab to have the test done at a later time. Insurers are generally covering virtual visits as they would in-office visits so billing should not be different than normal.  If for some reason you do get billed incorrectly, you should contact the billing office to correct it and that number is in the AVS .    Video Conference to be completed via:  [email or web address]    Patient has given verbal consent for video visit?:  Yes    Patient would prefer that any video invitations be sent by: Send to e-mail at: [email or web address]      How would patient like to obtain AVS?:  Philippe    AVS SmartPhrase [PsychAVS] has been placed in 'Patient Instructions':  Yes  "

## 2021-02-16 NOTE — PROGRESS NOTES
Progress Note    Video- Visit Details  Type of service:  video visit for medication management  Time of service:    Date:  02/16/2021    Video Start Time:  2:04 PM        Video End Time:  2:22    Reason for video visit:  Patient unable to travel due to Covid-19  Originating Site (patient location):  [facility]   Location- Patient's home  Distant Site (provider location):  Remote location  Mode of Communication:  Video Conference via [email or web address]  Consent:  Patient has given verbal consent for video visit?: Yes       Esa Rivera is a 35 year old year old male with Bipolar I Disorder, Most Recent Episode Manic Severe, with psychotic behavior (296.40) who presents for ongoing psychiatric care. He has no children. He and his wife (a psychologist) live on their own in [place] [facility]. Esa works as an  for [employer].    Diagnosis    Axis 1: Bipolar I disorder, with severe manic episode with psychosis in Dec 2016. ADHD by history. Alcohol use disorder, moderate, in short-term remission  Axis 2: Nil  Axis 3: Remote concussion x 2  Axis 4: Moderate stressors  Axis 5: GAF at time of visit: 70    Assessment & Plan     Since the last visit Esa has been adherent with his usual medications.  He has restarted N-acetylcysteine and is taking 600 mg twice daily.  He reports his mood is stable and he denies symptoms of depression or katie.  His ruminative thoughts are much less with N-acetylcysteine.  He is agreeable he can increase it by 600mg if needed if his ruminative thoughts return.  He sometimes uses risperidone when they get bad as well, and I have given him the okay for that also.  He will continue his usual medications.  He will get a blood test to check his Depakote level and other routine labs.  He will return for follow-up in 3 months. We will need to talk about sleep at that point as Esa and his wife are expecting a baby and sleep deprivation could be a major trigger for katie and psychosis.    The patient understands the risks,  benefits, adverse effects and alternatives. Agrees to treatment with the capacity to do so. No medical contraindications to treatment. Agrees to call clinic for any problems. The patient understands to call 911 or come to the nearest ED if life threatening or urgent symptoms present.    Interim History     Since the last visit Esa reports things have been going well.  His mood is stable.  He denies symptoms of depression or katie.  His anxiety level is manageable.    Esa continues to get episodes of ruminative thoughts that can last for several weeks.  He is now back on N-acetylcysteine, 600 mg twice daily, and the ruminative thoughts are much less.  Esa is aware that he can increase the dose as outlined above if needed.  He sometimes uses as needed risperidone for a few weeks if the ruminative thoughts get really bad, with benefit, and I have given him the okay to do that also.    Esa is agreeable to continue his usual medications.  He will get a blood test to check his Depakote level and other routine labs.  He will return for follow-up in 3 months.  We will need to talk about sleep at that point as Esa and his wife are expecting a baby and sleep deprivation could be a major trigger for katie and psychosis.        In Dec 2016 Esa experienced an episode of severe katie with psychosis (grandiose delusions). He was seen in the ER but not hospitalized. He had some mild depressive episodes and possible (but questionable) short hypomanic periods in the preceding years, but never received treatment for these.     Esa's manic symptoms began when he became excited about a new idea at work. 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 He became so excited about this the he was unable to sleep. As his katie escalated, his sleep decreased to 1-2 hours per night for 5-6 nights. His affect was elated and also markedly irritable. He  "would \"go off\" on people for minor things in very uncharacteristic ways. His thoughts were \"really fast, crisp, and clear\". He saw \"connections in everything.\" His energy was clearly elevated. He became grandiose, believing that his idea would change the world and as a result he would meet many \"powerful people\". Eventually he came to believe that sings on the radio were meant for him.     Esa was taking Adderall at the time. He has taken this, on and off, for many years. In my opinion it is not an adequate explanation for his manic symptoms.      His mother (a PCP) became concerned and brought him to hospital. He was assessed in the ER and prescribed Ativan for sleep. He was seen by Dr Burt (a psychiatrist) the next day. He was prescribed OLZ and LTG. The doses were titrated \"up and down\" for the next 6 months. There were significant side effects (sedation, cognitive impairment), likely from OLZ. Most recently, Esa stopped OLZ for a period of time. In the context of being on a honeymoon in [place], he develop racing thoughts and difficulty sleeping and restarted it, with benefit. He currently takes 10 mg HS.    Esa endorses several previous short (1-2 day) periods of reduced sleep and elevated mood, typically in the context of getting involved in a work project. At most they represent subthreshold hypomanic periods.     He has also experienced several periods over the years, lasting about a week, of low mood, decreased energy and motivation, poor focus/concentration, and negative thoughts. He denies neurovegetative symptoms or SI. His wife thought that these represented depressive episodes.    Esa has consumed EtOH heavily at points during his life. It was most pronounced during his college years. He has sporadically binge-drank since then. He has not had a drink in 2 weeks.    Past Psych Hx: As above. Esa was assessed at [facility] in Dec 2016 and diagnosed with BDI. He was seen by Psychiatry at age 17 and dxed " with ADD. He took Adderall off and on (mostly on) with benefit over the years since then. He denies abusing it.     Past Med Hx: 2 concussion: 1) age 15 - skiing. Age 16: football. LOC x a couple of minutes with both.    MEDICATION ADHERENCE: Good.   Current Medications     Current Outpatient Medications   Medication Sig Dispense Refill     divalproex sodium extended-release (DEPAKOTE ER) 500 MG 24 hr tablet Take 3 tablets (1,500 mg) by mouth At Bedtime 270 tablet 3     lurasidone (LATUDA) 80 MG TABS tablet Take 1 tablet (80 mg) by mouth daily 90 tablet 3         Substance use     ETOH: Sporadic binge drinking  Cigarettes: NA  Street Drugs: Denies    Review of Systems     A comprehensive review of systems was performed and is negative other than noted above.     Allergies     Allergies   Allergen Reactions     No Known Allergies

## 2021-02-16 NOTE — TELEPHONE ENCOUNTER
----- Message from Mayito Motley MD sent at 2/16/2021  2:22 PM CST -----  Chris Hammer,Can you order labs for Esa?  He gets them done in the [employer] system.  He needs a CBC and differential, AST, ALT, BMP, valproic acid level, and fasting cholesterol panel, lipids, glucose, and hemoglobin A1c.Thanks,    DB          Writer placed requested orders.

## 2021-02-16 NOTE — PATIENT INSTRUCTIONS
Continue your medications at the usual doses    Please return for follow-up in 3 months    Please get a blood test to check your Depakote level and other routine labs    Thank you for coming to the [facility] [place] [facility].    Lab Testing:  If you had lab testing today and your results are reassuring or normal they will be mailed to you or sent through Whois within 7 days. If the lab tests need quick action we will call you with the results. The phone number we will call with results is # [phone] (home) NONE (work). If this is not the best number please call our clinic and change the number.    Medication Refills:  If you need any refills please call your pharmacy and they will contact us. Our fax number for refills is [phone]. Please allow three business for refill processing. If you need to  your refill at a new pharmacy, please contact the new pharmacy directly. The new pharmacy will help you get your medications transferred.     Scheduling:  If you have any concerns about today's visit or wish to schedule another appointment please call our office during normal business hours [phone] (8-5:00 M-F)    Contact Us:  Please call [phone] during business hours (8-5:00 M-F).  If after clinic hours, or on the weekend, please call  [phone].    Financial Assistance [phone]  [facility] Billing [phone]  Central Billing Office, [facility]: [phone]  [place] Billing [phone]  Medical Records [phone]  [place] Patient Bill of Rights [email or web address]       MENTAL HEALTH CRISIS NUMBERS:  For a medical emergency please call  911 or go to the nearest ER.     [facility]:   [facility] -[phone]   Crisis Residence [facility] Residence -[phone]   Walk-In Counseling Center [facility] -[phone]   COPE 24/7 [facility] Mobile Team -[phone]  (adults)/[phone] (child)  CHILD: [facility] needs assessment team - [phone]      [facility]:   [facility] - [phone]   Walk-in counseling [facility] - [phone]   Walk-in counseling [facility] - [phone]   Crisis Residence [facility] - [phone]  Urgent Care Adult Mental Xyexyp-[phone] mobile unit/ 24/7 crisis line    National Crisis Numbers:   National Suicide Prevention Lifeline: 9-857-442-TALK (762-459-6692)  Poison Control Center - 0-614-265-8239  [facility]/resources for a list of additional resources (SOS)  Trans Lifeline a hotline for transgender people 9-259-949-7027  The [employer] Project a hotline for LGBT youth 1-520-569-6689  Crisis Text Line: For any crisis 24/7   To: 012321  see www.crisistextline.org  - IF MAKING A CALL FEELS TOO HARD, send a text!         Again thank you for choosing [facility] [place] [facility] and please let us know how we can best partner with you to improve you and your family's health.    You may be receiving a survey regarding this appointment. We would love to have your feedback, both positive and negative. The survey is done by an external company, so your answers are anonymous.

## 2021-03-13 ENCOUNTER — HEALTH MAINTENANCE LETTER (OUTPATIENT)
Age: 36
End: 2021-03-13

## 2021-05-14 ENCOUNTER — TELEPHONE (OUTPATIENT)
Dept: FAMILY MEDICINE | Facility: CLINIC | Age: 36
End: 2021-05-14

## 2021-05-14 ENCOUNTER — ALLIED HEALTH/NURSE VISIT (OUTPATIENT)
Dept: FAMILY MEDICINE | Facility: CLINIC | Age: 36
End: 2021-05-14
Payer: COMMERCIAL

## 2021-05-14 DIAGNOSIS — F31.2 BIPOLAR AFFECTIVE DISORDER, CURRENTLY MANIC, SEVERE, WITH PSYCHOTIC FEATURES (H): ICD-10-CM

## 2021-05-14 DIAGNOSIS — Z51.81 ENCOUNTER FOR THERAPEUTIC DRUG MONITORING: ICD-10-CM

## 2021-05-14 DIAGNOSIS — Z23 NEED FOR VACCINATION: Primary | ICD-10-CM

## 2021-05-14 LAB
ALT SERPL W P-5'-P-CCNC: 44 U/L (ref 0–70)
ANION GAP SERPL CALCULATED.3IONS-SCNC: 4 MMOL/L (ref 3–14)
AST SERPL W P-5'-P-CCNC: 14 U/L (ref 0–45)
BASOPHILS # BLD AUTO: 0 10E9/L (ref 0–0.2)
BASOPHILS NFR BLD AUTO: 0.6 %
BUN SERPL-MCNC: 9 MG/DL (ref 7–30)
CALCIUM SERPL-MCNC: 8.9 MG/DL (ref 8.5–10.1)
CHLORIDE SERPL-SCNC: 108 MMOL/L (ref 94–109)
CHOLEST SERPL-MCNC: 190 MG/DL
CO2 SERPL-SCNC: 28 MMOL/L (ref 20–32)
CREAT SERPL-MCNC: 0.99 MG/DL (ref 0.66–1.25)
DIFFERENTIAL METHOD BLD: NORMAL
EOSINOPHIL # BLD AUTO: 0.1 10E9/L (ref 0–0.7)
EOSINOPHIL NFR BLD AUTO: 1.6 %
ERYTHROCYTE [DISTWIDTH] IN BLOOD BY AUTOMATED COUNT: 11.5 % (ref 10–15)
GFR SERPL CREATININE-BSD FRML MDRD: >90 ML/MIN/{1.73_M2}
GLUCOSE SERPL-MCNC: 91 MG/DL (ref 70–99)
HBA1C MFR BLD: 4.8 % (ref 0–5.6)
HCT VFR BLD AUTO: 47 % (ref 40–53)
HDLC SERPL-MCNC: 44 MG/DL
HGB BLD-MCNC: 16.1 G/DL (ref 13.3–17.7)
IMM GRANULOCYTES # BLD: 0 10E9/L (ref 0–0.4)
IMM GRANULOCYTES NFR BLD: 0.6 %
LDLC SERPL CALC-MCNC: 123 MG/DL
LYMPHOCYTES # BLD AUTO: 2 10E9/L (ref 0.8–5.3)
LYMPHOCYTES NFR BLD AUTO: 40.3 %
MCH RBC QN AUTO: 30.4 PG (ref 26.5–33)
MCHC RBC AUTO-ENTMCNC: 34.3 G/DL (ref 31.5–36.5)
MCV RBC AUTO: 89 FL (ref 78–100)
MONOCYTES # BLD AUTO: 0.5 10E9/L (ref 0–1.3)
MONOCYTES NFR BLD AUTO: 9.7 %
NEUTROPHILS # BLD AUTO: 2.3 10E9/L (ref 1.6–8.3)
NEUTROPHILS NFR BLD AUTO: 47.2 %
NONHDLC SERPL-MCNC: 146 MG/DL
NRBC # BLD AUTO: 0 10*3/UL
NRBC BLD AUTO-RTO: 0 /100
PLATELET # BLD AUTO: 277 10E9/L (ref 150–450)
POTASSIUM SERPL-SCNC: 4.4 MMOL/L (ref 3.4–5.3)
RBC # BLD AUTO: 5.29 10E12/L (ref 4.4–5.9)
SODIUM SERPL-SCNC: 140 MMOL/L (ref 133–144)
TRIGL SERPL-MCNC: 116 MG/DL
VALPROATE SERPL-MCNC: 70 MG/L (ref 50–100)
WBC # BLD AUTO: 4.9 10E9/L (ref 4–11)

## 2021-05-14 PROCEDURE — 90715 TDAP VACCINE 7 YRS/> IM: CPT

## 2021-05-14 PROCEDURE — 80164 ASSAY DIPROPYLACETIC ACD TOT: CPT | Performed by: PSYCHIATRY & NEUROLOGY

## 2021-05-14 PROCEDURE — 80048 BASIC METABOLIC PNL TOTAL CA: CPT | Performed by: PSYCHIATRY & NEUROLOGY

## 2021-05-14 PROCEDURE — 83036 HEMOGLOBIN GLYCOSYLATED A1C: CPT | Performed by: PSYCHIATRY & NEUROLOGY

## 2021-05-14 PROCEDURE — 36415 COLL VENOUS BLD VENIPUNCTURE: CPT | Performed by: PSYCHIATRY & NEUROLOGY

## 2021-05-14 PROCEDURE — 80061 LIPID PANEL: CPT | Performed by: PSYCHIATRY & NEUROLOGY

## 2021-05-14 PROCEDURE — 90471 IMMUNIZATION ADMIN: CPT

## 2021-05-14 PROCEDURE — 85025 COMPLETE CBC W/AUTO DIFF WBC: CPT | Performed by: PSYCHIATRY & NEUROLOGY

## 2021-05-14 PROCEDURE — 84460 ALANINE AMINO (ALT) (SGPT): CPT | Performed by: PSYCHIATRY & NEUROLOGY

## 2021-05-14 PROCEDURE — 84450 TRANSFERASE (AST) (SGOT): CPT | Performed by: PSYCHIATRY & NEUROLOGY

## 2021-05-14 PROCEDURE — 99207 PR NO CHARGE NURSE ONLY: CPT

## 2021-05-14 NOTE — TELEPHONE ENCOUNTER
Pt called and asked if he was current on his TDap, he set up an appointment with nurse only  His wife is pregnant and it was recommended that he catch up    Parul Meyer RN   [facility]

## 2021-05-14 NOTE — PROGRESS NOTES
Prior to immunization administration, verified patients identity using patient s name and date of birth. Please see Immunization Activity for additional information.     Screening Questionnaire for Adult Immunization    Are you sick today?   No   Do you have allergies to medications, food, a vaccine component or latex?   No   Have you ever had a serious reaction after receiving a vaccination?   No   Do you have a long-term health problem with heart, lung, kidney, or metabolic disease (e.g., diabetes), asthma, a blood disorder, no spleen, complement component deficiency, a cochlear implant, or a spinal fluid leak?  Are you on long-term aspirin therapy?   No   Do you have cancer, leukemia, HIV/AIDS, or any other immune system problem?   No   Do you have a parent, brother, or sister with an immune system problem?   No   In the past 3 months, have you taken medications that affect  your immune system, such as prednisone, other steroids, or anticancer drugs; drugs for the treatment of rheumatoid arthritis, Crohn s disease, or psoriasis; or have you had radiation treatments?   No   Have you had a seizure, or a brain or other nervous system problem?   No   During the past year, have you received a transfusion of blood or blood    products, or been given immune (gamma) globulin or antiviral drug?   No   For women: Are you pregnant or is there a chance you could become       pregnant during the next month?   No   Have you received any vaccinations in the past 4 weeks?   No     Immunization questionnaire answers were all negative.        Per orders of Dr. Aristides Jenkins , injection of TDAP given by Rozina Landrum MA. Patient instructed to remain in clinic for 15 minutes afterwards, and to report any adverse reaction to me immediately.       Screening performed by Rozina Landrum MA on 5/14/2021 at 8:48 AM.

## 2021-05-18 ENCOUNTER — VIRTUAL VISIT (OUTPATIENT)
Dept: PSYCHIATRY | Facility: CLINIC | Age: 36
End: 2021-05-18
Attending: PSYCHIATRY & NEUROLOGY
Payer: COMMERCIAL

## 2021-05-18 DIAGNOSIS — F31.74 BIPOLAR 1 DISORDER, MANIC, FULL REMISSION (H): Primary | ICD-10-CM

## 2021-05-18 PROCEDURE — 99213 OFFICE O/P EST LOW 20 MIN: CPT | Mod: TEL | Performed by: PSYCHIATRY & NEUROLOGY

## 2021-05-18 NOTE — PATIENT INSTRUCTIONS
Continue your medications at the usual doses    Please return for follow-up in 4 months          **For crisis resources, please see the information at the end of this document**     Patient Education      Thank you for coming to the [facility] [place] [facility].    Lab Testing:  If you had lab testing today and your results are reassuring or normal they will be mailed to you or sent through StormPins within 7 days. If the lab tests need quick action we will call you with the results. The phone number we will call with results is # [phone] (home) NONE (work). If this is not the best number please call our clinic and change the number.    Medication Refills:  If you need any refills please call your pharmacy and they will contact us. Our fax number for refills is [phone]. Please allow three business for refill processing. If you need to  your refill at a new pharmacy, please contact the new pharmacy directly. The new pharmacy will help you get your medications transferred.     Scheduling:  If you have any concerns about today's visit or wish to schedule another appointment please call our office during normal business hours [phone] (8-5:00 M-F)    Contact Us:  Please call [phone] during business hours (8-5:00 M-F).  If after clinic hours, or on the weekend, please call  [phone].    Financial Assistance [phone]  [facility] Billing [phone]  Central Billing Office, [facility]: [phone]  [place] Billing [phone]  Medical Records [phone]  [place] Patient Bill of Rights [email or web address]       MENTAL HEALTH CRISIS NUMBERS:  For a medical emergency please call  911 or go to the nearest ER.     [facility]:   [facility] -[phone]   Crisis Residence [facility] -[phone]   Walk-In Counseling Center [facility] -[phone]   COPE 24/7  [facility] Mobile Team -[phone] (adults)/[phone] (child)  CHILD: [facility] needs assessment team - [phone]      [facility] - [phone]   Walk-in counseling [facility] - [phone]   Walk-in counseling [facility] - [phone]   Crisis Residence [facility] Residence - [phone]  Urgent Care Adult Mental Kxoqsi-[phone] mobile unit/ 24/7 crisis line    National Crisis Numbers:   National Suicide Prevention Lifeline: 5-749-225-TALK (399-172-6254)  Poison Control Center - 1-605.366.8034  [employer]/resources for a list of additional resources (SOS)  Trans Lifeline a hotline for transgender people 0-987-849-8893  The [employer] Project a hotline for LGBT youth 1-737.880.9420  Crisis Text Line: For any crisis 24/7   To: 265244  see www.crisistextline.org  - IF MAKING A CALL FEELS TOO HARD, send a text!         Again thank you for choosing [facility] [place] [facility] and please let us know how we can best partner with you to improve you and your family's health.    You may be receiving a survey regarding this appointment. We would love to have your feedback, both positive and negative. The survey is done by an external company, so your answers are anonymous.

## 2021-05-18 NOTE — PROGRESS NOTES
Progress Note    TELEPHONE VISIT  Esa Rivera is a 36 year old pt. who is being evaluated via a billable telephone visit.      The patient has been notified of the following:    We have found that certain health care needs can be provided without the need for a physical exam. This service lets us provide the care you need with a short phone conversation. If a prescription is necessary we can send it directly to your pharmacy. If lab work is needed we can place an order for that and you can then stop by our lab to have the test done at a later time. Insurers are generally covering virtual visits as they would in-office visits so billing should not be different than normal.  If for some reason you do get billed incorrectly, you should contact the billing office to correct it and that number is in the AVS .    Patient has given verbal consent for a telephone visit?:  Yes   How would the pt like to obtain the AVS?:  [employer] SmartPhrase [PsychAVS] has been placed in 'Patient Instructions':  Yes     Start Time:  2:05 PM          End Time:  2:15      Esa Rivera is a 35 year old year old male with Bipolar I Disorder, Most Recent Episode Manic Severe, with psychotic behavior (296.40) who presents for ongoing psychiatric care. He has no children. He and his wife (a psychologist) live on their own in [place] [facility] [place] Esa works as an  for [employer].    Diagnosis    Axis 1: Bipolar I disorder, with severe manic episode with psychosis in Dec 2016. ADHD by history. Alcohol use disorder, moderate, in short-term remission  Axis 2: Nil  Axis 3: Remote concussion x 2  Axis 4: Moderate stressors  Axis 5: GAF at time of visit: 70    Assessment & Plan     Since the last visit Esa has been adherent with his usual medications including N-acetylcysteine.  He has been well and denies symptoms of depression, katie, or psychosis.  His recent labs were normal, except for a non-HDL cholesterol which was slightly above the upper limit.  I  counseled Esa about diet and exercise.  Esa will continue his usual medications and return for follow-up in 3 months.  We will need to talk about sleep at that point as Esa and his wife are expecting a baby and sleep deprivation could be a major trigger for katie and psychosis.    The patient understands the risks, benefits, adverse effects and alternatives. Agrees to treatment with the capacity to do so. No medical contraindications to treatment. Agrees to call clinic for any problems. The patient understands to call 911 or come to the nearest ED if life threatening or urgent symptoms present.    Interim History     Today's visit was conducted over the phone as Esa did not have access to video.    Esa reports that since his last visit, things have been going well.  He has been consistent with taking his medications.  His Depakote level is in the therapeutic range at 70.  He reports he is tolerating the medications well.    Esa reports that he is stable and denies symptoms of depression, katie, or psychosis.  Work is going well and Esa is able to deal with day-to-day stressors there without difficulty.    We reviewed Esa's labs today.  His non-HDL cholesterol was slightly elevated, and I counseled him about diet and exercise.    Esa is agreeable to continue his medications at the current doses.  He will return for follow-up in 4 months.        In Dec 2016 Esa experienced an episode of severe katie with psychosis (grandiose delusions). He was seen in the ER but not hospitalized. He had some mild depressive episodes and possible (but questionable) short hypomanic periods in the preceding years, but never received treatment for these.     Esa's manic symptoms began when he became excited about a new idea at work. 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 He became so excited about this the he was unable to sleep. As  "his katie escalated, his sleep decreased to 1-2 hours per night for 5-6 nights. His affect was elated and also markedly irritable. He would \"go off\" on people for minor things in very uncharacteristic ways. His thoughts were \"really fast, crisp, and clear\". He saw \"connections in everything.\" His energy was clearly elevated. He became grandiose, believing that his idea would change the world and as a result he would meet many \"powerful people\". Eventually he came to believe that sings on the radio were meant for him.     Esa was taking Adderall at the time. He has taken this, on and off, for many years. In my opinion it is not an adequate explanation for his manic symptoms.      His mother (a PCP) became concerned and brought him to hospital. He was assessed in the ER and prescribed Ativan for sleep. He was seen by Dr Burt (a psychiatrist) the next day. He was prescribed OLZ and LTG. The doses were titrated \"up and down\" for the next 6 months. There were significant side effects (sedation, cognitive impairment), likely from OLZ. Most recently, Esa stopped OLZ for a period of time. In the context of being on a honeymoon in [place], he develop racing thoughts and difficulty sleeping and restarted it, with benefit. He currently takes 10 mg HS.    Esa endorses several previous short (1-2 day) periods of reduced sleep and elevated mood, typically in the context of getting involved in a work project. At most they represent subthreshold hypomanic periods.     He has also experienced several periods over the years, lasting about a week, of low mood, decreased energy and motivation, poor focus/concentration, and negative thoughts. He denies neurovegetative symptoms or SI. His wife thought that these represented depressive episodes.    Esa has consumed EtOH heavily at points during his life. It was most pronounced during his college years. He has sporadically binge-drank since then. He has not had a drink in 2 " weeks.    Past Psych Hx: As above. Esa was assessed at [place] in Dec 2016 and diagnosed with BDI. He was seen by Psychiatry at age 17 and dxed with ADD. He took Adderall off and on (mostly on) with benefit over the years since then. He denies abusing it.     Past Med Hx: 2 concussion: 1) age 15 - skiing. Age 16: football. LOC x a couple of minutes with both.    MEDICATION ADHERENCE: Good.   Current Medications     Current Outpatient Medications   Medication Sig Dispense Refill     divalproex sodium extended-release (DEPAKOTE ER) 500 MG 24 hr tablet Take 3 tablets (1,500 mg) by mouth At Bedtime 270 tablet 3     lurasidone (LATUDA) 80 MG TABS tablet Take 1 tablet (80 mg) by mouth daily 90 tablet 3         Substance use     ETOH: Sporadic binge drinking  Cigarettes: NA  Street Drugs: Denies    Review of Systems     A comprehensive review of systems was performed and is negative other than noted above.     Allergies     Allergies   Allergen Reactions     No Known Allergies

## 2021-06-02 ENCOUNTER — OFFICE VISIT (OUTPATIENT)
Dept: FAMILY MEDICINE | Facility: CLINIC | Age: 36
End: 2021-06-02
Payer: COMMERCIAL

## 2021-06-02 VITALS
HEART RATE: 67 BPM | WEIGHT: 280 LBS | RESPIRATION RATE: 28 BRPM | BODY MASS INDEX: 34.82 KG/M2 | DIASTOLIC BLOOD PRESSURE: 82 MMHG | OXYGEN SATURATION: 97 % | TEMPERATURE: 97.8 F | HEIGHT: 75 IN | SYSTOLIC BLOOD PRESSURE: 126 MMHG

## 2021-06-02 DIAGNOSIS — R20.0 NUMBNESS AND TINGLING OF BOTH FEET: ICD-10-CM

## 2021-06-02 DIAGNOSIS — Z00.00 ROUTINE GENERAL MEDICAL EXAMINATION AT A HEALTH CARE FACILITY: Primary | ICD-10-CM

## 2021-06-02 DIAGNOSIS — R20.2 NUMBNESS AND TINGLING OF BOTH FEET: ICD-10-CM

## 2021-06-02 DIAGNOSIS — R63.5 WEIGHT GAIN: ICD-10-CM

## 2021-06-02 DIAGNOSIS — G56.02 CARPAL TUNNEL SYNDROME OF LEFT WRIST: ICD-10-CM

## 2021-06-02 PROCEDURE — 99395 PREV VISIT EST AGE 18-39: CPT | Performed by: FAMILY MEDICINE

## 2021-06-02 ASSESSMENT — MIFFLIN-ST. JEOR: SCORE: 2285.7

## 2021-06-02 NOTE — PATIENT INSTRUCTIONS
Contact if numbness/tingling worse feet, left hand  Try carpal tunnel wrist splint at night.  Diet and exercise      Preventive Health Recommendations  Male Ages 26 - 39    Yearly exam:             See your health care provider every year in order to  o   Review health changes.   o   Discuss preventive care.    o   Review your medicines if your doctor has prescribed any.    You should be tested each year for STDs (sexually transmitted diseases), if you re at risk.     After age 35, talk to your provider about cholesterol testing. If you are at risk for heart disease, have your cholesterol tested at least every 5 years.     If you are at risk for diabetes, you should have a diabetes test (fasting glucose).  Shots: Get a flu shot each year. Get a tetanus shot every 10 years.     Nutrition:    Eat at least 5 servings of fruits and vegetables daily.     Eat whole-grain bread, whole-wheat pasta and brown rice instead of white grains and rice.     Get adequate Calcium and Vitamin D.     Lifestyle    Exercise for at least 150 minutes a week (30 minutes a day, 5 days a week). This will help you control your weight and prevent disease.     Limit alcohol to one drink per day.     No smoking.     Wear sunscreen to prevent skin cancer.     See your dentist every six months for an exam and cleaning.

## 2021-06-02 NOTE — PROGRESS NOTES
SUBJECTIVE:   CC: Esa Rivera is an 36 year old male who presents for preventive health visit.     Patient has been advised of split billing requirements and indicates understanding: Yes  Healthy Habits:    Do you get at least three servings of calcium containing foods daily (dairy, green leafy vegetables, etc.)? yes    Amount of exercise or daily activities, outside of work: 60 minutes of walking once daily     Problems taking medications regularly No    Medication side effects: No    Have you had an eye exam in the past two years? no    Do you see a dentist twice per year? No (not since covid-19)    Do you have sleep apnea, excessive snoring or daytime drowsiness?no      Joint Pain    Onset: On and off for several years    Description:   Location: left wrist  Character: Dull ache    Intensity: moderate    Progression of Symptoms: intermittent    Accompanying Signs & Symptoms:  Other symptoms: weakness of handgrip, also some numbness and tingling of the feet    History:   Previous similar pain: YES-2018    Precipitating factors:   Trauma or overuse: YES-probable increase time at the computer, increased weight has coincided with symptoms in the feet    Alleviating factors:  Improved by: nothing  Therapies Tried and outcome: none        Today's PHQ-2 Score: 2 on 6/2/2021   PHQ-2 ( 1999 Pfizer) 1/6/2020 5/1/2017   Q1: Little interest or pleasure in doing things 1 0   Q2: Feeling down, depressed or hopeless 1 0   PHQ-2 Score 2 0   Some encounter information is confidential and restricted. Go to Review Flowsheets activity to see all data.     Abuse: Current or Past(Physical, Sexual or Emotional)- No  Do you feel safe in your environment? Yes    Have you ever done Advance Care Planning? (For example, a Health Directive, POLST, or a discussion with a medical provider or your loved ones about your wishes): No, advance care planning information given to patient to review.  Patient declined advance care planning  discussion at this time.    Social History     Tobacco Use     Smoking status: Never Smoker     Smokeless tobacco: Never Used   Substance Use Topics     Alcohol use: No     Alcohol/week: 2.5 standard drinks     Types: 3 Standard drinks or equivalent per week     If you drink alcohol do you typically have >3 drinks per day or >7 drinks per week? No                      Last PSA: No results found for: PSA    Reviewed orders with patient. Reviewed health maintenance and updated orders accordingly - Yes  Lab work is in process  Labs reviewed in EPIC    Reviewed and updated as needed this visit by clinical staff                 Reviewed and updated as needed this visit by Provider                    ROS:  CONSTITUTIONAL: NEGATIVE for fever, chills, change in weight  INTEGUMENTARY/SKIN: NEGATIVE for worrisome rashes, moles or lesions  EYES: NEGATIVE for vision changes or irritation  ENT: NEGATIVE for ear, mouth and throat problems  RESP: NEGATIVE for significant cough or SOB  CV: NEGATIVE for chest pain, palpitations or peripheral edema  GI: NEGATIVE for nausea, abdominal pain, heartburn, or change in bowel habits   male: negative for dysuria, hematuria, decreased urinary stream, erectile dysfunction, urethral discharge  MUSCULOSKELETAL: NEGATIVE for significant arthralgias or myalgia  NEURO: NEGATIVE for weakness, dizziness or paresthesias  PSYCHIATRIC: NEGATIVE for changes in mood or affect    OBJECTIVE:   Wt 127 kg (280 lb)   BMI 35.84 kg/m    EXAM:  GENERAL: alert, no distress and over weight  NECK: no adenopathy, no asymmetry, masses, or scars and thyroid normal to palpation  CV: regular rate and rhythm, normal S1 S2, no S3 or S4, no murmur, click or rub, no peripheral edema and peripheral pulses strong  MS: no other gross musculoskeletal defects noted, no edema  MS: tenderness to palpation right wrist carpal tunnel  NEURO: Normal strength and tone, mentation intact and speech normal  PSYCH: mentation appears  "normal, affect normal/bright    Diagnostic Test Results:  Labs reviewed in Epic    ASSESSMENT/PLAN:       ICD-10-CM    1. Routine general medical examination at a health care facility  Z00.00    2. Numbness and tingling of both feet  R20.0     R20.2    3. Carpal tunnel syndrome of left wrist  G56.02    4. Weight gain  R63.5        Patient has been advised of split billing requirements and indicates understanding: Yes  COUNSELING:  Reviewed preventive health counseling, as reflected in patient instructions       Regular exercise       Healthy diet/nutrition    Estimated body mass index is 35.84 kg/m  as calculated from the following:    Height as of 1/6/20: 1.882 m (6' 2.11\").    Weight as of this encounter: 127 kg (280 lb).    Weight management plan: Discussed healthy diet and exercise guidelines    He reports that he has never smoked. He has never used smokeless tobacco.    Contact if numbness/tingling worse feet, left hand  Try carpal tunnel wrist splint at night.  Diet and exercise    Aristides Jenkins MD  [facility]  "

## 2021-06-27 PROBLEM — R20.2 NUMBNESS AND TINGLING OF BOTH FEET: Status: ACTIVE | Noted: 2021-06-27

## 2021-06-27 PROBLEM — R63.5 WEIGHT GAIN: Status: ACTIVE | Noted: 2021-06-27

## 2021-06-27 PROBLEM — F30.9 MANIA (H): Status: RESOLVED | Noted: 2018-05-16 | Resolved: 2021-06-27

## 2021-06-27 PROBLEM — G56.02 CARPAL TUNNEL SYNDROME OF LEFT WRIST: Status: ACTIVE | Noted: 2021-06-27

## 2021-06-27 PROBLEM — R20.0 NUMBNESS AND TINGLING OF BOTH FEET: Status: ACTIVE | Noted: 2021-06-27

## 2021-08-22 NOTE — TELEPHONE ENCOUNTER
Dr. Jenkins,    Please see mychart message from patient. Is there concern for diabetes based on symptoms, or could it be d/t side effects of medication? Would you recommend follow up OV?    Please advise.     Pat Persaud RN  [facility]   Simple: Patient demonstrates quick and easy understanding/Patient asked questions

## 2021-09-14 ENCOUNTER — VIRTUAL VISIT (OUTPATIENT)
Dept: PSYCHIATRY | Facility: CLINIC | Age: 36
End: 2021-09-14
Attending: PSYCHIATRY & NEUROLOGY
Payer: COMMERCIAL

## 2021-09-14 DIAGNOSIS — F31.30 BIPOLAR I DISORDER, MOST RECENT EPISODE DEPRESSED (H): ICD-10-CM

## 2021-09-14 PROCEDURE — 99214 OFFICE O/P EST MOD 30 MIN: CPT | Mod: TEL | Performed by: PSYCHIATRY & NEUROLOGY

## 2021-09-14 RX ORDER — LURASIDONE HYDROCHLORIDE 20 MG/1
20 TABLET, FILM COATED ORAL
Qty: 90 TABLET | Refills: 3 | Status: SHIPPED | OUTPATIENT
Start: 2021-09-14 | End: 2021-12-14

## 2021-09-14 RX ORDER — LURASIDONE HYDROCHLORIDE 80 MG/1
80 TABLET, FILM COATED ORAL DAILY
Qty: 90 TABLET | Refills: 3 | Status: SHIPPED | OUTPATIENT
Start: 2021-09-14 | End: 2021-12-14

## 2021-09-14 NOTE — PROGRESS NOTES
Progress Note    TELEPHONE VISIT  Esa Rivera is a 36 year old pt. who is being evaluated via a billable telephone visit.      The patient has been notified of the following:    We have found that certain health care needs can be provided without the need for a physical exam. This service lets us provide the care you need with a short phone conversation. If a prescription is necessary we can send it directly to your pharmacy. If lab work is needed we can place an order for that and you can then stop by our lab to have the test done at a later time. Insurers are generally covering virtual visits as they would in-office visits so billing should not be different than normal.  If for some reason you do get billed incorrectly, you should contact the billing office to correct it and that number is in the AVS .    Patient has given verbal consent for a telephone visit?:  Yes   How would the pt like to obtain the AVS?:  Splash[employer]S SmartPhrase [PsychAVS] has been placed in 'Patient Instructions':  Yes     Start Time:  1:34 PM          End Time:  2:00      Esa Rivera is a 35 year old year old male with Bipolar I Disorder, Most Recent Episode Manic Severe, with psychotic behavior (296.40) who presents for ongoing psychiatric care. He has no children. He and his wife (a psychologist) live on their own in [facility]. Esa works as an  for [employer].    Diagnosis    Axis 1: Bipolar I disorder, with severe manic episode with psychosis in Dec 2016. ADHD by history. Alcohol use disorder, moderate, in short-term remission  Axis 2: Nil  Axis 3: Remote concussion x 2  Axis 4: Moderate stressors  Axis 5: GAF at time of visit: 70    Assessment & Plan     Since the last visit Esa has mostly been adherent with his usual medications, except for N-acetylcysteine.  In the context of a number of stressors, including birth of a new child and dissatisfaction with his job, he has experienced a relapse into depression.  There are no current  safety concerns.  We discussed options including adding lamotrigine, which Esa did not get an optimal response to previously, or increasing Latuda from 80 mg to 100 mg.  Esa elects the latter.  We can also consider adding an SSRI or Wellbutrin, or switching to Vraylar, as future options if needed.  Esa will increase Latuda to 100 mg and continue Depakote at the current dose.  He will restart N-acetylcysteine.  He will return for follow-up in 3 months.  He will contact me if he is not doing better in 1 month and we will arrange an earlier appointment.    The patient understands the risks, benefits, adverse effects and alternatives. Agrees to treatment with the capacity to do so. No medical contraindications to treatment. Agrees to call clinic for any problems. The patient understands to call 911 or come to the nearest ED if life threatening or urgent symptoms present.    Interim History     Since the last visit Esa has experienced a number of stressors.  He remains unhappy with his job.  He has trained as an  but does not really feel that he is functioning in that role in his current position.  He and his wife also recently had their first child.  Esa is now starting a 10-week parental leave and will be primarily responsible for caregiving, at least in the daytime.  He is happy about his new child but finds the more limited ability to socialize with others as being problematic.    In this context, Esa has experienced a relapse into depression.  His mood is low.  He thinks negatively.  He is motivation and energy level are poor.  He is having difficulty with otherwise routine activities, and gave the example of his father needing to mow Esa's lawn a few weeks ago because he felt unable to do it.  Esa has fleeting thoughts of being better off dead but denies suicidal thoughts and is able to contract for safety.    We discussed treatment options.  Esa has discontinued N-acetylcysteine, and is having  "some return of ruminative anxiety as a result.  This could be a contributing factor to his depression and I have recommended he restart N-acetylcysteine.  To target depressive symptoms we discussed increasing Latuda to 100 mg or adding lamotrigine.  Esa did not get an optimal response to lamotrigine during a previous trial.  We will increase Latuda to 100 mg.    Future options if needed include switching Latuda to Vraylar or adding an SSRI or Wellbutrin.    Esa is agreeable with the treatment plan.  He will return for follow-up in 3 months.  He will message me in a month if he is not feeling better.        In Dec 2016 Esa experienced an episode of severe katie with psychosis (grandiose delusions). He was seen in the ER but not hospitalized. He had some mild depressive episodes and possible (but questionable) short hypomanic periods in the preceding years, but never received treatment for these.     Esa's manic symptoms began when he became excited about a new idea at work. 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 He became so excited about this the he was unable to sleep. As his katie escalated, his sleep decreased to 1-2 hours per night for 5-6 nights. His affect was elated and also markedly irritable. He would \"go off\" on people for minor things in very uncharacteristic ways. His thoughts were \"really fast, crisp, and clear\". He saw \"connections in everything.\" His energy was clearly elevated. He became grandiose, believing that his idea would change the world and as a result he would meet many \"powerful people\". Eventually he came to believe that sings on the radio were meant for him.     Esa was taking Adderall at the time. He has taken this, on and off, for many years. In my opinion it is not an adequate explanation for his manic symptoms.      His mother (a PCP) became concerned and brought him to hospital. He " "was assessed in the ER and prescribed Ativan for sleep. He was seen by Dr Burt (a psychiatrist) the next day. He was prescribed OLZ and LTG. The doses were titrated \"up and down\" for the next 6 months. There were significant side effects (sedation, cognitive impairment), likely from OLZ. Most recently, Esa stopped OLZ for a period of time. In the context of being on a honeymoon in [place], he develop racing thoughts and difficulty sleeping and restarted it, with benefit. He currently takes 10 mg HS.    Esa endorses several previous short (1-2 day) periods of reduced sleep and elevated mood, typically in the context of getting involved in a work project. At most they represent subthreshold hypomanic periods.     He has also experienced several periods over the years, lasting about a week, of low mood, decreased energy and motivation, poor focus/concentration, and negative thoughts. He denies neurovegetative symptoms or SI. His wife thought that these represented depressive episodes.    Esa has consumed EtOH heavily at points during his life. It was most pronounced during his college years. He has sporadically binge-drank since then. He has not had a drink in 2 weeks.    Past Psych Hx: As above. Esa was assessed at [facility] in Dec 2016 and diagnosed with BDI. He was seen by Psychiatry at age 17 and dxed with ADD. He took Adderall off and on (mostly on) with benefit over the years since then. He denies abusing it.     Past Med Hx: 2 concussion: 1) age 15 - skiing. Age 16: football. LOC x a couple of minutes with both.    MEDICATION ADHERENCE: Good.   Current Medications     Current Outpatient Medications   Medication Sig Dispense Refill     Acetylcysteine (NAC PO) Take by mouth 2 times daily Takes twice daily for mood       divalproex sodium extended-release (DEPAKOTE ER) 500 MG 24 hr tablet Take 3 tablets (1,500 mg) by mouth At Bedtime 270 tablet 3     lurasidone (LATUDA) 80 MG TABS tablet Take 1 tablet (80 mg) by " mouth daily 90 tablet 3         Substance use     ETOH: Sporadic binge drinking  Cigarettes: NA  Street Drugs: Denies    Review of Systems     A comprehensive review of systems was performed and is negative other than noted above.     Allergies     Allergies   Allergen Reactions     No Known Allergies

## 2021-09-14 NOTE — PATIENT INSTRUCTIONS
Increase Latuda to 100 mg daily    Continue Depakote at the usual dose    Restart N-acetylcysteine    Please return for follow-up on December 14          **For crisis resources, please see the information at the end of this document**     Patient Education      Thank you for coming to the [facility] [place] [facility].    Lab Testing:  If you had lab testing today and your results are reassuring or normal they will be mailed to you or sent through [employer] within 7 days. If the lab tests need quick action we will call you with the results. The phone number we will call with results is # [phone] (home) NONE (work). If this is not the best number please call our clinic and change the number.    Medication Refills:  If you need any refills please call your pharmacy and they will contact us. Our fax number for refills is [phone]. Please allow three business for refill processing. If you need to  your refill at a new pharmacy, please contact the new pharmacy directly. The new pharmacy will help you get your medications transferred.     Scheduling:  If you have any concerns about today's visit or wish to schedule another appointment please call our office during normal business hours [phone] (8-5:00 M-F)    Contact Us:  Please call [phone] during business hours (8-5:00 M-F).  If after clinic hours, or on the weekend, please call  [phone].    Financial Assistance [phone]  [facility] [phone]  [facility] B[facility], [facility]: [phone]  [place] Billing [phone]  Medical Records [phone]  [place] Patient Bill of Rights [email or web address]       MENTAL HEALTH CRISIS NUMBERS:  For a medical emergency please call  911 or go to the nearest ER.     [facility]:   [facility] -[phone]   Crisis Residence [facility] -[phone]    Walk-In Counseling Center [facility] -[phone]   COPE 24/7 [facility] Mobile Team -[phone] (adults)/[phone] (child)  CHILD: [facility] needs assessment team - [phone]      [facility]:   [facility] - [phone]   Walk-in counseling [facility] - [phone]   Walk-in counseling [facility] - [phone]   Crisis Residence [facility] - [phone]  Urgent Care Adult Mental [address]-[phone] mobile unit/ 24/7 crisis line    National Crisis Numbers:   National Suicide Prevention Lifeline: 0-885-658-TALK (643-382-2544)  Poison Control Center - 1-164.855.6921  Hobby/resources for a list of additional resources (SOS)  Trans Lifeline a hotline for transgender people 1-153.897.8065  The Shaun Project a hotline for LGBT youth 1-250.679.4761  Crisis Text Line: For any crisis 24/7   To: 521168  see www.crisistextline.org  - IF MAKING A CALL FEELS TOO HARD, send a text!         Again thank you for choosing [facility] M[facility] HE[facility] ADDICTION [facility] and please let us know how we can best partner with you to improve you and your family's health.    You may be receiving a survey regarding this appointment. We would love to have your feedback, both positive and negative. The survey is done by an external company, so your answers are anonymous.

## 2021-09-27 ENCOUNTER — MYC MEDICAL ADVICE (OUTPATIENT)
Dept: PSYCHIATRY | Facility: CLINIC | Age: 36
End: 2021-09-27

## 2021-09-27 DIAGNOSIS — F31.30 BIPOLAR I DISORDER, MOST RECENT EPISODE DEPRESSED (H): Primary | ICD-10-CM

## 2021-09-29 ENCOUNTER — TELEPHONE (OUTPATIENT)
Dept: PSYCHIATRY | Facility: CLINIC | Age: 36
End: 2021-09-29

## 2021-09-29 NOTE — TELEPHONE ENCOUNTER
"Writer called pt to explore his concerns.  Pt reported that he has a few 80 mg tabs left and will use the on-hand 20 mg supply to provide 80 mg dose (he recently picked up a 90-tablet supply).    Pt identified that he is hesitant to fill out financial form and is willing to assume he won't qualify for PAP.  Pt also identified that he is willing to stay at 80 mg Latuda dose, as going over 80 mg \"causes too many problems.\"          "

## 2021-09-29 NOTE — TELEPHONE ENCOUNTER
[facility] Call Center    Phone Message    May a detailed message be left on voicemail: yes     Reason for Call: Other: Pt calling on an update regarding his last [employer] message. Pt needed clarification from the message that was sent and would like a call back.     Action Taken: Other: [facility] psych pool    Travel Screening: Not Applicable

## 2021-09-29 NOTE — TELEPHONE ENCOUNTER
Mayito Motley MD Snyder, David J, RN  Phone Number: [phone]   Mayito Phipps, Mayito Allen MD  Cc: Mayito Phipps, RN  Phone Number: [phone]     Mayito:     Please see Esa's message about Latuda cost.   I also sent him information on the patient assistance program and the [facility].     I know we don't stock samples, like some clinics do, but there have been other patients that we've requested samples for, and it seems to go through OK when it's for a specific patient.   OK to pursue that?     Harman Hammer,    Yes, definitely okay to pursue that from my end.     DB         =========================================      Writer called [facility] ([phone]) and coordinated with internal therapeutic specialist Shae to fax form requesting samples to clinic.    Halina[employer]

## 2021-10-07 ENCOUNTER — TRANSFERRED RECORDS (OUTPATIENT)
Dept: HEALTH INFORMATION MANAGEMENT | Facility: CLINIC | Age: 36
End: 2021-10-07

## 2021-10-07 NOTE — TELEPHONE ENCOUNTER
Writer registered Dr. Motley on [email or web address], but was unable to order samples.  Writer called [facility] ([phone]) and coordinated for Shae (direct [phone]) to call .

## 2021-10-11 ENCOUNTER — TELEPHONE (OUTPATIENT)
Dept: PSYCHIATRY | Facility: CLINIC | Age: 36
End: 2021-10-11

## 2021-10-11 NOTE — TELEPHONE ENCOUNTER
On 10/11/2021, 7 pages of records were received from [facility]. This was routed to Mayito Motley.  The documents were faxed to scanning on October 11, 2021 and held a copy in Psychiatry until scanning is confirmed. Travis Palacios, EMT

## 2021-10-18 ENCOUNTER — TELEPHONE (OUTPATIENT)
Dept: PSYCHIATRY | Facility: CLINIC | Age: 36
End: 2021-10-18

## 2021-10-18 NOTE — TELEPHONE ENCOUNTER
[facility] Call [facility]    Phone Message    May a detailed message be left on voicemail: yes     Reason for Call: Other: Pt called to follow-up on taking a trial medication.      Action Taken: Other: [facility] psych pool    Travel Screening: Not Applicable

## 2021-10-20 ENCOUNTER — MYC MEDICAL ADVICE (OUTPATIENT)
Dept: PSYCHIATRY | Facility: CLINIC | Age: 36
End: 2021-10-20

## 2021-10-20 NOTE — TELEPHONE ENCOUNTER
Writer attempted to contact Shae ([phone]) and the call was never answered and eventually dropped.    Writer attempted again to order samples on line and received the response:  We are unable to provide additional materials at this time. This may be due to one or all of the following reasons:  AMA specialty we have on record restricts us from providing samples  State License cannot be verified at this time  JONN cannot be verified at this time  Professional designation we have on record restricts us from providing samples  Controlled substance samples are not permitted in the requested state  Maximum number of samples has been exceeded  Samples not available at this time    Writer called [facility] ([phone]). Yeyo attempted to contact a , but was unable to. She identified a plan to have someone contact writer. Provided direct number.

## 2021-10-22 NOTE — TELEPHONE ENCOUNTER
Writer received voice mail message from Holly at [facility] ([phone]), requesting callback regarding Latuda samples.

## 2021-10-23 ENCOUNTER — HEALTH MAINTENANCE LETTER (OUTPATIENT)
Age: 36
End: 2021-10-23

## 2021-10-25 NOTE — TELEPHONE ENCOUNTER
On 10/22/21 at 12:21 PM writer attempted to contact [employer] and received only music.    On 10/25/21 at 9:27AM writer called [phone] and coordinated with Shae to have the sample request form faxed and emailed. Shae identified that after receipt of the completed request form it would take 3-5 business days for the samples to be shipped.

## 2021-10-26 ENCOUNTER — TELEPHONE (OUTPATIENT)
Dept: PSYCHIATRY | Facility: CLINIC | Age: 36
End: 2021-10-26
Payer: COMMERCIAL

## 2021-10-26 ENCOUNTER — DOCUMENTATION ONLY (OUTPATIENT)
Dept: OTHER | Facility: CLINIC | Age: 36
End: 2021-10-26

## 2021-10-26 NOTE — TELEPHONE ENCOUNTER
Social Work   Incoming/Outgoing Call  [facility]    Incoming From: Julia Rivera  Callback number: [phone]    Reason for Call:  Julia called the clinic to determine if there was anything that could be done to ensure that insurance would cover Esa's scripts. Esa's dosage increased, and because of this, he is now prescribed two different tablets of the same medication, and his insurance will not cover both scripts.    Response/Plan:  I spoke with Julia and let her know I would contact Esa's prescriber and our pharmacy team to determine if anything could be done. I told Julia I would call back once I had more information.    Harman Phipps, Dr. Motley's nurse partner, reached out to Julia with more information. Please see [employer] for details.    Will route to patient's current psychiatric provider(s) as an FYI.   Please call or EPIC message with any questions or concerns.    Sharri Bhakta,  JOSEE, [facility]  [phone]

## 2021-10-26 NOTE — TELEPHONE ENCOUNTER
Writer attempted to contact [employer] at [phone] and there was no answer. The connection ended after ringing many times.    Writer Googled extensively for a Latuda sample request form and located one at  [email or web address]  Downloaded form, routed to Dr. Motley to sign.      Received signed form, faxed to [employer] at [phone] using Epic communication and emailed to [email or web address] from [email or web address] using encryption.

## 2021-10-26 NOTE — TELEPHONE ENCOUNTER
[facility] Call [facility]    Phone Message    May a detailed message be left on voicemail: yes     Reason for Call: Other: Patient Request    Patient calling and wanting to speak with nurse about medication samples.     Action Taken: Message routed to:  Other: nursing pool    Travel Screening: Not Applicable

## 2021-10-27 ENCOUNTER — MYC MEDICAL ADVICE (OUTPATIENT)
Dept: PSYCHIATRY | Facility: CLINIC | Age: 36
End: 2021-10-27

## 2021-10-27 DIAGNOSIS — F31.30 BIPOLAR I DISORDER, MOST RECENT EPISODE DEPRESSED (H): Primary | ICD-10-CM

## 2021-10-27 RX ORDER — ESCITALOPRAM OXALATE 10 MG/1
TABLET ORAL
Qty: 30 TABLET | Refills: 0 | Status: SHIPPED | OUTPATIENT
Start: 2021-10-27 | End: 2021-11-02

## 2021-10-27 NOTE — TELEPHONE ENCOUNTER
"Mayito Motley MD Snyder, David J, RN Hi Dave,    I looked at my last note regarding Esa.  He was experiencing depression and that is when we increased his Latuda from 80 mg to 100 mg.  I also wrote:     We discussed options including adding lamotrigine, which Esa did not get an optimal response to previously, or increasing Latuda from 80 mg to 100 mg.  We can also consider adding an SSRI or Wellbutrin, or switching to Vraylar, as future options if needed.       Few options in terms of a plan B:    1.  I am not sure if we will know a timeframe for when the Latuda will be available, but if Esa is only going to miss 3 or 4 days then I would suggest sticking with it and he can restart it at the 100 mg dose when it becomes available.    2.  I looked at Esa's insurance, unfortunately Vraylar is not on his formulary so that does not seem like a good option cost wise.    3.  Starting an antidepressant like Lexapro or Wellbutrin is an option.  There is a risk of inducing manic symptoms, although Depakote should help to protect against that.  I find Lexapro to be more effective, so starting at 5 mg for a week and increasing to 10 mg after that if Esa is tolerating it well is probably the best plan B.     DB         =========================================      Writer conferred with Dr. Motley, who recommended that pt reduce Latuda to 60 mg daily until Monday.        =========================================        Writer called [employer] at [phone] and Shae identified that it takes up to 3 business days to process faxes or emails and would contact this writer when the order appears to her.        =========================================      Writer conferred with Dr. Motley, who suggested that the pt reduce Latuda to 40 mg daily.    Writer called pt and reviewed med plan.  Pt stated that his depression is \"terrible\" and \"barely manageable\" and \"at an all-time bad\" with some SI, with no plan. Pt identified " "that the 100 mg Latuda dose was somewhat more effective for his depression.  Pt identified that he has twenty 20 mg Latuda tabs on hand. Takes at night.    Writer conferred with Dr. Motley, who recommended the pt start the Lexapro per instructions above and continue with Latuda 40 mg daily.    Writer reviewed plan with pt.  Pt stated that Seroquel was \"very bad\" for him (metabolic effects, \"zombie,\" drooling) and he is reluctant to start this medication (Dr. Motley identified that Seroquel, Vraylar, and Latuda are the only antipsychotics approved for bipolar augmentation. Vraylar is not covered by pt's insurance and some pts tolerate Seroquel better when in conjunction with other meds than by itself).    Writer provided pt with after-hours number and prompted him to present at ED if SI becomes unmanageable. Pt identified understanding.  Writer identified plan to contact pt on 11/1. Writer set reminder.  Writer e-prescribed Lexapro to pharmacy.        "

## 2021-10-27 NOTE — TELEPHONE ENCOUNTER
[facility] Call Center    Phone Message    May a detailed message be left on voicemail: yes     Reason for Call: Medication Question or concern regarding medication   Prescription Clarification  Name of Medication: sleep prn - risperdone  Prescribing Provider: Bond   Pharmacy:    [id number] - [place], [place] - [address]       What on the order needs clarification?   Wife called, says she and  are not comfortable calling a hotline for sleep concerns.    Wife reports pt has some risperidone 1 mg at home but that these  at beginning of September. Pt and wife are asking for a new risperdone PRN rx and what Dr Motley would recommend for dosage.        Action Taken: Message routed to:  Other: nursing pool    Travel Screening: Not Applicable

## 2021-10-27 NOTE — TELEPHONE ENCOUNTER
Writer attempted to contact [employer] at [phone] to confirm receipt of order and received music.

## 2021-10-27 NOTE — TELEPHONE ENCOUNTER
Writer received email of Latuda sample request form from [employer]. Emailed yesterday's completed form to address indicated on the email, [email or web address].

## 2021-10-28 RX ORDER — RISPERIDONE 1 MG/1
TABLET ORAL
Qty: 60 TABLET | Refills: 1 | Status: SHIPPED | OUTPATIENT
Start: 2021-10-28 | End: 2022-04-22

## 2021-10-28 NOTE — TELEPHONE ENCOUNTER
[employer] <[email or web address]>  Thu 10/28/2021 12:40 PM  Hello,    Confirming received.    Thank you,         [employer] Support Team    [employer] [address]    Email: [email or web address]    Visit our website at: [email or web address]    Visit our affiliate website, [employer], at: www.[email or web address]    And visit our new affiliate s website, [email or web address], at: [email or web address]        Please consider the environment before printing this email         Our vision is to CREATE THE SHORTEST PATH BETWEEN PATIENT AND THERAPY.         This material is intended only for the individual or entity to which it is addressed. It may contain privileged, confidential information which is exempt from disclosure under applicable laws. If you are not the intended recipient, please note that you are strictly prohibited from disseminating or distributing this material (other than to the intended recipient) or copying this material. If you have received this communication in error, please delete this email from your system. Thank you.

## 2021-10-28 NOTE — TELEPHONE ENCOUNTER
Mayito Motley MD Snyder, David J, RN Hi Dave,    I think risperidone is a reasonable choice.  We can prescribe 1 mg tablets.  Esa can take 1-2 mg at night as needed for sleep.  Let's give him a supply of 60 tablets with 1 repeat.      =========================================      Mayito Motley MD Snyder, David J, RN  Yes, exactly.  Sorry for the separate messages, I saw the request for a PRN for manic symptoms after I sent you the suggestion for a PRN for sleep.     DB             Previous Messages       ----- Message -----   From: Mayito Phipps RN   Sent: 10/28/2021   9:41 AM CDT   To: Mayito Motley MD, Mayito Phipps RN   Subject: Risperidone order                                 Mayito:     You sent me a couple of different risperidone suggestions (see below).   Would this boil down to 1-2 mg as needed for sleep and 1-2 mg twice daily as needed for katie?     Harman         =========================================    Writer e-prescribed risperidone to pharmacy.  Left voice mail message to pt reviewing med order and offering appointment with Dr. Motley on 11/2 @ 3PM. Also routed to pt via [employer].

## 2021-11-01 ENCOUNTER — TELEPHONE (OUTPATIENT)
Dept: PSYCHIATRY | Facility: CLINIC | Age: 36
End: 2021-11-01

## 2021-11-01 NOTE — TELEPHONE ENCOUNTER
Writer called pt to explore his response to Lexapro.  Pt denied any worrisome effects of the medication and reported that he had not observed any effects of the medication.    Pt agreed to 11/2 appointment @ 3PM. Routed message to scheduling to convert held time.

## 2021-11-02 ENCOUNTER — TELEPHONE (OUTPATIENT)
Dept: PSYCHIATRY | Facility: CLINIC | Age: 36
End: 2021-11-02

## 2021-11-02 ENCOUNTER — VIRTUAL VISIT (OUTPATIENT)
Dept: PSYCHIATRY | Facility: CLINIC | Age: 36
End: 2021-11-02
Attending: PSYCHIATRY & NEUROLOGY
Payer: COMMERCIAL

## 2021-11-02 DIAGNOSIS — F31.2 BIPOLAR AFFECTIVE DISORDER, CURRENTLY MANIC, SEVERE, WITH PSYCHOTIC FEATURES (H): ICD-10-CM

## 2021-11-02 DIAGNOSIS — Z51.81 ENCOUNTER FOR THERAPEUTIC DRUG MONITORING: ICD-10-CM

## 2021-11-02 DIAGNOSIS — F31.30 BIPOLAR I DISORDER, MOST RECENT EPISODE DEPRESSED (H): Primary | ICD-10-CM

## 2021-11-02 DIAGNOSIS — F31.30 BIPOLAR I DISORDER, MOST RECENT EPISODE DEPRESSED (H): ICD-10-CM

## 2021-11-02 PROCEDURE — 99214 OFFICE O/P EST MOD 30 MIN: CPT | Mod: TEL | Performed by: PSYCHIATRY & NEUROLOGY

## 2021-11-02 RX ORDER — ESCITALOPRAM OXALATE 10 MG/1
10 TABLET ORAL DAILY
Qty: 30 TABLET | Refills: 1 | Status: SHIPPED | OUTPATIENT
Start: 2021-11-24 | End: 2021-12-14

## 2021-11-02 RX ORDER — DIVALPROEX SODIUM 500 MG/1
1500 TABLET, EXTENDED RELEASE ORAL AT BEDTIME
Qty: 270 TABLET | Refills: 3 | Status: SHIPPED | OUTPATIENT
Start: 2021-11-02 | End: 2021-12-14

## 2021-11-02 NOTE — TELEPHONE ENCOUNTER
----- Message from Mayito Motley MD sent at 11/2/2021  3:32 PM CDT -----  Chris Hammer,Can you order labs for Esa?  He gets them done through [employer].  He needs a CBC and differential, AST, ALT, and valproic acid level.Thanks,DB        Writer placed requested orders.

## 2021-11-02 NOTE — PATIENT INSTRUCTIONS
Continue Depakote and N-acetylcysteine as usual    After 1 week on the 5 mg dose of Lexapro, increase it to 10mg    Discontinue Latuda after your 40 mg dose runs out    Please return for follow-up as scheduled in December        **For crisis resources, please see the information at the end of this document**     Patient Education      Thank you for coming to the [facility]ALTH & [facility] [place] [facility].    Lab Testing:  If you had lab testing today and your results are reassuring or normal they will be mailed to you or sent through [employer] within 7 days. If the lab tests need quick action we will call you with the results. The phone number we will call with results is # [phone] (home) NONE (work). If this is not the best number please call our clinic and change the number.    Medication Refills:  If you need any refills please call your pharmacy and they will contact us. Our fax number for refills is [phone]. Please allow three business for refill processing. If you need to  your refill at a new pharmacy, please contact the new pharmacy directly. The new pharmacy will help you get your medications transferred.     Scheduling:  If you have any concerns about today's visit or wish to schedule another appointment please call our office during normal business hours [phone] (8-5:00 M-F)    Contact Us:  Please call [phone] during business hours (8-5:00 M-F).  If after clinic hours, or on the weekend, please call  [phone].    Financial Assistance [phone]  [facility] Billing [phone]  Central Billing Office, ealth: [phone]  [facility] Billing [phone]  Medical Records [phone]  [facility] Patient Bill of Rights [email or web address]       MENTAL HEALTH CRISIS NUMBERS:  For a medical emergency please call  911 or go to the nearest ER.     [facility]:   [facility]  -[phone]   Crisis Residence [facility] -[phone]   Walk-In Counseling Center [facility] -[phone]   COPE 24/7 [place] Mobile Team -[phone] (adults)/[phone] (child)  CHILD: [facility] needs assessment team - [phone]      [facility]:   [facility] - [phone]   Walk-in counseling [facility] - [phone]   Walk-in counseling [facility] - [phone]   Crisis Residence [facility] - [phone]  Urgent Care Adult Mental Qtx[facility]u-[phone] mobile unit/ 24/7 crisis line    National Crisis Numbers:   National Suicide Prevention Lifeline: 7-971-867-TALK (699-121-7849)  Poison Control Center - 1-447.740.3915  [employer]/resources for a list of additional resources (SOS)  Trans Lifeline a hotline for transgender people 1-928.107.3061  The Shaun Project a hotline for LGBT youth 1-416.818.4726  Crisis Text Line: For any crisis 24/7   To: 564246  see www.crisistextline.org  - IF MAKING A CALL FEELS TOO HARD, send a text!         Again thank you for choosing [facility] ADDI[facility] [place] [facility] and please let us know how we can best partner with you to improve you and your family's health.    You may be receiving a survey regarding this appointment. We would love to have your feedback, both positive and negative. The survey is done by an external company, so your answers are anonymous.

## 2021-11-02 NOTE — PROGRESS NOTES
Progress Note    TELEPHONE VISIT  Esa Rivera is a 36 year old pt. who is being evaluated via a billable telephone visit.      The patient has been notified of the following:    We have found that certain health care needs can be provided without the need for a physical exam. This service lets us provide the care you need with a short phone conversation. If a prescription is necessary we can send it directly to your pharmacy. If lab work is needed we can place an order for that and you can then stop by our lab to have the test done at a later time. Insurers are generally covering virtual visits as they would in-office visits so billing should not be different than normal.  If for some reason you do get billed incorrectly, you should contact the billing office to correct it and that number is in the AVS .    Patient has given verbal consent for a telephone visit?:  Yes   How would the pt like to obtain the AVS?:  [email or web address] SmartPhrase [PsychAVS] has been placed in 'Patient Instructions':  Yes     Start Time:  3:02 PM          End Time:  3:30      Esa Rivera is a 36 year old year old male with Bipolar I Disorder, Most Recent Episode Manic Severe, with psychotic behavior (296.40) who presents for ongoing psychiatric care. He has no children. He and his wife (a psychologist) live on their own in [place]. Esa works as an  for "[employer].    Diagnosis    Axis 1: Bipolar I disorder, with severe manic episode with psychosis in Dec 2016. ADHD by history. Alcohol use disorder, moderate, in short-term remission  Axis 2: Nil  Axis 3: Remote concussion x 2  Axis 4: Moderate stressors  Axis 5: GAF at time of visit: 70    Assessment & Plan     I saw Esa on an urgent basis today.  He is about to run out of Latuda and is experiencing ongoing symptoms of depression.  Latuda is no longer an affordable medication for him for insurance reasons.  He has started a trial of Lexapro 5 mg daily a few days ago.  He will continue on  "Latuda 40 mg daily until it runs out, in about a week.  He will increase Lexapro to 10 mg after a week on the 5 mg dose.  He will continue Depakote and N-acetylcysteine.  He will get a blood test to check his Depakote level next week.  He will return for follow-up in December.    The patient understands the risks, benefits, adverse effects and alternatives. Agrees to treatment with the capacity to do so. No medical contraindications to treatment. Agrees to call clinic for any problems. The patient understands to call 911 or come to the nearest ED if life threatening or urgent symptoms present.    Interim History     Since the last visit, Esa has experienced ongoing symptoms of depression.  He reports that they have been \"really bad\".  There are a number of triggers including work stress and the stress of raising a new infant.    Esa reports that his mood is low most of the time.  His motivation and energy level are markedly diminished.  He prefers to spend time on his own.  He tends to think negatively about things.  He endorses passive suicidal thoughts but denies any intent or plan and is able to contract for safety.    For insurance reasons, Esa is no longer able to take Latuda and will run out in a few days.  A few days ago, we started him on Lexapro as a replacement.  We discussed this in detail today.  Esa is aware that it can help with depression but it has a low risk for triggering katie.  We reviewed early warning signs of katie and Esa will let me know if any of them occur.  He is aware that 10 mg to 30 mg of Latuda is typically an effective dose and it can take 2 to 4 weeks to start to work.  He will increase the dose to 10mg after a week on the 5 mg dose.    Esa is not currently experiencing manic symptoms.  He is not abusing alcohol or other drugs.  He is on paternity leave but plans to return to work in a week's time.    Esa is agreeable to continue Depakote and N-acetylcysteine and increase " "Lexapro to 10mg in a few days.  He will contact me in 3 weeks if things are not substantially better.  He has a follow-up appointment scheduled for December.        In Dec 2016 Esa experienced an episode of severe katie with psychosis (grandiose delusions). He was seen in the ER but not hospitalized. He had some mild depressive episodes and possible (but questionable) short hypomanic periods in the preceding years, but never received treatment for these.     Esa's manic symptoms began when he became excited about a new idea at work. 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 He became so excited about this the he was unable to sleep. As his katie escalated, his sleep decreased to 1-2 hours per night for 5-6 nights. His affect was elated and also markedly irritable. He would \"go off\" on people for minor things in very uncharacteristic ways. His thoughts were \"really fast, crisp, and clear\". He saw \"connections in everything.\" His energy was clearly elevated. He became grandiose, believing that his idea would change the world and as a result he would meet many \"powerful people\". Eventually he came to believe that sings on the radio were meant for him.     Esa was taking Adderall at the time. He has taken this, on and off, for many years. In my opinion it is not an adequate explanation for his manic symptoms.      His mother (a PCP) became concerned and brought him to hospital. He was assessed in the ER and prescribed Ativan for sleep. He was seen by Dr Burt (a psychiatrist) the next day. He was prescribed OLZ and LTG. The doses were titrated \"up and down\" for the next 6 months. There were significant side effects (sedation, cognitive impairment), likely from OLZ. Most recently, Esa stopped OLZ for a period of time. In the context of being on a honeymoon in [place], he develop racing thoughts and difficulty sleeping and " restarted it, with benefit. He currently takes 10 mg HS.    Esa endorses several previous short (1-2 day) periods of reduced sleep and elevated mood, typically in the context of getting involved in a work project. At most they represent subthreshold hypomanic periods.     He has also experienced several periods over the years, lasting about a week, of low mood, decreased energy and motivation, poor focus/concentration, and negative thoughts. He denies neurovegetative symptoms or SI. His wife thought that these represented depressive episodes.    Esa has consumed EtOH heavily at points during his life. It was most pronounced during his college years. He has sporadically binge-drank since then. He has not had a drink in 2 weeks.    Past Psych Hx: As above. Esa was assessed at [facility] in Dec 2016 and diagnosed with BDI. He was seen by Psychiatry at age 17 and dxed with ADD. He took Adderall off and on (mostly on) with benefit over the years since then. He denies abusing it.     Past Med Hx: 2 concussion: 1) age 15 - skiing. Age 16: football. LOC x a couple of minutes with both.    MEDICATION ADHERENCE: Good.   Current Medications     Current Outpatient Medications   Medication Sig Dispense Refill     Acetylcysteine (NAC PO) Take by mouth 2 times daily Takes twice daily for mood       divalproex sodium extended-release (DEPAKOTE ER) 500 MG 24 hr tablet Take 3 tablets (1,500 mg) by mouth At Bedtime 270 tablet 3     escitalopram (LEXAPRO) 10 MG tablet Take one-half tablet (5 mg) by mouth daily for seven days, then one tablet (10 mg) daily 30 tablet 0     lurasidone (LATUDA) 20 MG TABS tablet Take 1 tablet (20 mg) by mouth daily with food Take with 80 mg tab for total = 100 mg daily 90 tablet 3     lurasidone (LATUDA) 80 MG TABS tablet Take 1 tablet (80 mg) by mouth daily Take with 20 mg tab for total = 100 mg daily 90 tablet 3     risperiDONE (RISPERDAL) 1 MG tablet Take 1-2 tablets (1-2 mg) by mouth as needed for sleep  and 1-2 tablets (1-2 mg) twice daily as needed for katie 60 tablet 1         Substance use     ETOH: Sporadic binge drinking  Cigarettes: NA  Street Drugs: Denies    Review of Systems     A comprehensive review of systems was performed and is negative other than noted above.     Allergies     Allergies   Allergen Reactions     No Known Allergies

## 2021-11-12 DIAGNOSIS — F31.30 BIPOLAR I DISORDER, MOST RECENT EPISODE DEPRESSED (H): ICD-10-CM

## 2021-11-15 RX ORDER — RISPERIDONE 1 MG/1
TABLET ORAL
Qty: 360 TABLET | Refills: 1 | OUTPATIENT
Start: 2021-11-15

## 2021-11-15 NOTE — TELEPHONE ENCOUNTER
From med dispense report:  Dispensed Days Supply Quantity Provider Pharmacy    RISPERIDONE 1MG TAB 10/28/2021 10 60 tablet SERGEY STEVENSON [facility] [address] IN [facility] - ...       Writer called pt to explore his risperidone use. Pt reported that he recently filled the medication and hasn't taken any yet.    Denied request.

## 2021-11-18 DIAGNOSIS — F31.30 BIPOLAR I DISORDER, MOST RECENT EPISODE DEPRESSED (H): ICD-10-CM

## 2021-11-18 RX ORDER — ESCITALOPRAM OXALATE 10 MG/1
TABLET ORAL
Qty: 30 TABLET | Refills: 1 | OUTPATIENT
Start: 2021-11-18

## 2021-11-22 ENCOUNTER — TRANSFERRED RECORDS (OUTPATIENT)
Dept: HEALTH INFORMATION MANAGEMENT | Facility: CLINIC | Age: 36
End: 2021-11-22
Payer: COMMERCIAL

## 2021-12-09 ENCOUNTER — TRANSFERRED RECORDS (OUTPATIENT)
Dept: HEALTH INFORMATION MANAGEMENT | Facility: CLINIC | Age: 36
End: 2021-12-09
Payer: COMMERCIAL

## 2021-12-14 ENCOUNTER — TELEPHONE (OUTPATIENT)
Dept: PSYCHIATRY | Facility: CLINIC | Age: 36
End: 2021-12-14
Payer: COMMERCIAL

## 2021-12-14 ENCOUNTER — VIRTUAL VISIT (OUTPATIENT)
Dept: PSYCHIATRY | Facility: CLINIC | Age: 36
End: 2021-12-14
Attending: PSYCHIATRY & NEUROLOGY
Payer: COMMERCIAL

## 2021-12-14 DIAGNOSIS — F31.2 BIPOLAR AFFECTIVE DISORDER, CURRENTLY MANIC, SEVERE, WITH PSYCHOTIC FEATURES (H): ICD-10-CM

## 2021-12-14 DIAGNOSIS — F31.30 BIPOLAR I DISORDER, MOST RECENT EPISODE DEPRESSED (H): ICD-10-CM

## 2021-12-14 DIAGNOSIS — Z51.81 ENCOUNTER FOR THERAPEUTIC DRUG MONITORING: ICD-10-CM

## 2021-12-14 DIAGNOSIS — F31.2 BIPOLAR AFFECTIVE DISORDER, CURRENTLY MANIC, SEVERE, WITH PSYCHOTIC FEATURES (H): Primary | ICD-10-CM

## 2021-12-14 PROCEDURE — 99214 OFFICE O/P EST MOD 30 MIN: CPT | Mod: TEL | Performed by: PSYCHIATRY & NEUROLOGY

## 2021-12-14 RX ORDER — DIVALPROEX SODIUM 500 MG/1
1500 TABLET, EXTENDED RELEASE ORAL AT BEDTIME
Qty: 270 TABLET | Refills: 3 | Status: SHIPPED | OUTPATIENT
Start: 2021-12-14 | End: 2022-04-22

## 2021-12-14 RX ORDER — ESCITALOPRAM OXALATE 10 MG/1
TABLET ORAL
Qty: 225 TABLET | Refills: 0 | Status: SHIPPED | OUTPATIENT
Start: 2021-12-14 | End: 2022-01-18

## 2021-12-14 ASSESSMENT — PAIN SCALES - GENERAL: PAINLEVEL: NO PAIN (0)

## 2021-12-14 NOTE — TELEPHONE ENCOUNTER
Mayito Motley MD Snyder, David J, RN Hi Dave,    Can you order labs for Esa?  He gets them done in the [facility] system.  He needs a CBC and differential, AST, ALT, and valproic acid level.    Thanks,     DB           Writer placed requested orders.

## 2021-12-14 NOTE — PROGRESS NOTES
"VIDEO VISIT  Esa Rivera is a 36 year old patient that has consented to receive services via billable video visit.      The patient has been notified of following:   \"This video visit will be conducted via a call between you and your physician/provider. We have found that certain health care needs can be provided without the need for an in-person physical exam. This service lets us provide the care you need with a video conversation. If a prescription is necessary we can send it directly to your pharmacy. If lab work is needed we can place an order for that and you can then stop by our lab to have the test done at a later time. Insurers are generally covering virtual visits as they would in-office visits so billing should not be different than normal.  If for some reason you do get billed incorrectly, you should contact the billing office to correct it and that number is in the AVS .    Patient will join video visit via:  [employer]io (Patient / guardian confirmed to join via Fredio)    If patient attempts to join the video via [employer] at appointment start time, but is unable to, they would prefer that the provider send them a video invitation via:   Send to preferred e-mail: [email or web address]      How would patient like to obtain AVS?:  MyChart    "

## 2021-12-14 NOTE — PATIENT INSTRUCTIONS
Increase Lexapro to 15 mg daily for 1 month    If depressive symptoms persist, you may increase it further to 20 mg after 1month    Continue taking Depakote at the usual dose    Please get a blood test to check your Depakote level    Please return for follow-up in January 18      **For crisis resources, please see the information at the end of this document**   Patient Education    Thank you for coming to the [facility] MENTAL HEALTH & ADDICTION [place] [facility].    Lab Testing:  If you had lab testing today and your results are reassuring or normal they will be mailed to you or sent through [employer] within 7 days. If the lab tests need quick action we will call you with the results. The phone number we will call with results is # [phone] (home) NONE (work). If this is not the best number please call our clinic and change the number.    Medication Refills:  If you need any refills please call your pharmacy and they will contact us. Our fax number for refills is [phone]. Please allow three business for refill processing. If you need to  your refill at a new pharmacy, please contact the new pharmacy directly. The new pharmacy will help you get your medications transferred.     Scheduling:  If you have any concerns about today's visit or wish to schedule another appointment please call our office during normal business hours [phone] (8-5:00 M-F)    Contact Us:  Please call [phone] during business hours (8-5:00 M-F).  If after clinic hours, or on the weekend, please call  [phone].    Financial Assistance [phone]  [employer] Billing [phone]  [facility] B[facility], [facility]: [phone]  [facility] Billing [phone]  Medical Records [phone]  [facility] Patient Bill of Rights [email or web address]       MENTAL HEALTH CRISIS NUMBERS:  For a medical emergency please call  911 or go to the nearest ER.      [facility]:   [facility] -[phone]   Crisis Residence [facility] -[phone]   Walk-In Counseling Center [facility] -[phone]   COPE 24/7 [place] Mobile Team -[phone] (adults)/[phone] (child)  CHILD: [facility] needs assessment team - [phone]      [facility]:   [facility] - [phone]   Walk-in counseling [facility] - [phone]   Walk-in counseling [facility] - [phone]   Crisis Residence [facility] Residence - [phone]  Urgent Care Adult Mental Caplrs-[phone] mobile unit/ 24/7 crisis line    National Crisis Numbers:   National Suicide Prevention Lifeline: 0-532-664-TALK (320-060-7013)  Poison Control Center - 1-934.251.8275  [employer]/resources for a list of additional resources (SOS)  Trans Lifeline a hotline for transgender people 5-066-484-4190  The Shaun Project a hotline for LGBT youth 1-552.597.4682  Crisis Text Line: For any crisis 24/7   To: 209536  see www.crisistextline.org  - IF MAKING A CALL FEELS TOO HARD, send a text!         Again thank you for choosing [facility] MENTAL HEALTH & ADDICTION [facility] and please let us know how we can best partner with you to improve you and your family's health.    You may be receiving a survey regarding this appointment. We would love to have your feedback, both positive and negative. The survey is done by an external company, so your answers are anonymous.

## 2021-12-14 NOTE — PROGRESS NOTES
Progress Note    TELEPHONE VISIT  Esa Rivera is a 36 year old pt. who is being evaluated via a billable telephone visit.      The patient has been notified of the following:    We have found that certain health care needs can be provided without the need for a physical exam. This service lets us provide the care you need with a short phone conversation. If a prescription is necessary we can send it directly to your pharmacy. If lab work is needed we can place an order for that and you can then stop by our lab to have the test done at a later time. Insurers are generally covering virtual visits as they would in-office visits so billing should not be different than normal.  If for some reason you do get billed incorrectly, you should contact the billing office to correct it and that number is in the AVS .    Patient has given verbal consent for a telephone visit?:  Yes   How would the pt like to obtain the AVS?:  [employer]  AVS SmartPhrase [PsychAVS] has been placed in 'Patient Instructions':  Yes     Start Time:  132 PM          End Time:  200      Esa Rivera is a 36 year old year old male with Bipolar I Disorder, Most Recent Episode Manic Severe, with psychotic behavior (296.40) who presents for ongoing psychiatric care. He has no children. He and his wife (a psychologist) live on their own in [facility]. Esa works as an  for [employer].    Diagnosis    Axis 1: Bipolar I disorder, with severe manic episode with psychosis in Dec 2016. ADHD by history. Alcohol use disorder, moderate, in short-term remission  Axis 2: Nil  Axis 3: Remote concussion x 2  Axis 4: Moderate stressors  Axis 5: GAF at time of visit: 70    Assessment & Plan     Since the last visit, Esa discontinued Latuda and started a trial of Lexapro, 5 mg for 2 weeks and then 10 mg since then.  He has remained adherent with Depakote.  He reports moderate improvement in his depressive symptoms.  He denies any manic or mixed symptoms.  He will increase  the dose of Lexapro to 15 mg for a month and then, if needed, to 20 mg.  He will continue Depakote at the usual dose.  He will get a blood test to check his Depakote level and other routine labs.  He will return for follow-up on January 18.    The patient understands the risks, benefits, adverse effects and alternatives. Agrees to treatment with the capacity to do so. No medical contraindications to treatment. Agrees to call clinic for any problems. The patient understands to call 911 or come to the nearest ED if life threatening or urgent symptoms present.    Interim History     Since the last visit, Esa reports improvement in his depressive symptoms.  His mood is better and he is able to feel happy at times and enjoy some things, giving the example of going out hunting over the weekend.  His motivation and energy are definitely improved and he is more helpful around the house.  However, his mood remains low at times, his motivation and energy are still less than usual, and he has a tendency to think negatively.  He denies suicidal thoughts.    Esa denies manic or mixed symptoms.  He denies recent substance abuse.  He has been adherent with Depakote and Lexapro as outlined above.    On the whole, Esa is pleased with his response to Lexapro.  He feels it is a better medication for him than Latuda was.  He is also pleased that it is much less expensive.  He wonders if increasing the dose will help more with his depression.    Esa is agreeable to increase Lexapro as outlined above.  He will remain adherent with Depakote.  He will return for follow-up on January 18.        In Dec 2016 Esa experienced an episode of severe katie with psychosis (grandiose delusions). He was seen in the ER but not hospitalized. He had some mild depressive episodes and possible (but questionable) short hypomanic periods in the preceding years, but never received treatment for these.     Esa's manic symptoms began when he became  "excited about a new idea at work. 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 He became so excited about this the he was unable to sleep. As his katie escalated, his sleep decreased to 1-2 hours per night for 5-6 nights. His affect was elated and also markedly irritable. He would \"go off\" on people for minor things in very uncharacteristic ways. His thoughts were \"really fast, crisp, and clear\". He saw \"connections in everything.\" His energy was clearly elevated. He became grandiose, believing that his idea would change the world and as a result he would meet many \"powerful people\". Eventually he came to believe that sings on the radio were meant for him.     Esa was taking Adderall at the time. He has taken this, on and off, for many years. In my opinion it is not an adequate explanation for his manic symptoms.      His mother (a PCP) became concerned and brought him to hospital. He was assessed in the ER and prescribed Ativan for sleep. He was seen by Dr Burt (a psychiatrist) the next day. He was prescribed OLZ and LTG. The doses were titrated \"up and down\" for the next 6 months. There were significant side effects (sedation, cognitive impairment), likely from OLZ. Most recently, Esa stopped OLZ for a period of time. In the context of being on a honeymoon in [place], he develop racing thoughts and difficulty sleeping and restarted it, with benefit. He currently takes 10 mg HS.    Esa endorses several previous short (1-2 day) periods of reduced sleep and elevated mood, typically in the context of getting involved in a work project. At most they represent subthreshold hypomanic periods.     He has also experienced several periods over the years, lasting about a week, of low mood, decreased energy and motivation, poor focus/concentration, and negative thoughts. He denies neurovegetative symptoms or SI. His wife thought " that these represented depressive episodes.    Esa has consumed EtOH heavily at points during his life. It was most pronounced during his college years. He has sporadically binge-drank since then. He has not had a drink in 2 weeks.    Past Psych Hx: As above. Esa was assessed at [facility] in Dec 2016 and diagnosed with BDI. He was seen by Psychiatry at age 17 and dxed with ADD. He took Adderall off and on (mostly on) with benefit over the years since then. He denies abusing it.     Past Med Hx: 2 concussion: 1) age 15 - skiing. Age 16: football. LOC x a couple of minutes with both.    MEDICATION ADHERENCE: Good.   Current Medications     Current Outpatient Medications   Medication Sig Dispense Refill     Acetylcysteine (NAC PO) Take by mouth 2 times daily Takes twice daily for mood       divalproex sodium extended-release (DEPAKOTE ER) 500 MG 24 hr tablet Take 3 tablets (1,500 mg) by mouth At Bedtime 270 tablet 3     escitalopram (LEXAPRO) 10 MG tablet Take 1 tablet (10 mg) by mouth daily Take one-half tablet (5 mg) by mouth daily for seven days, then one tablet (10 mg) daily 30 tablet 1     lurasidone (LATUDA) 20 MG TABS tablet Take 1 tablet (20 mg) by mouth daily with food Take with 80 mg tab for total = 100 mg daily 90 tablet 3     lurasidone (LATUDA) 80 MG TABS tablet Take 1 tablet (80 mg) by mouth daily Take with 20 mg tab for total = 100 mg daily 90 tablet 3     risperiDONE (RISPERDAL) 1 MG tablet Take 1-2 tablets (1-2 mg) by mouth as needed for sleep and 1-2 tablets (1-2 mg) twice daily as needed for katie 60 tablet 1         Substance use     ETOH: Sporadic binge drinking  Cigarettes: NA  Street Drugs: Denies    Review of Systems     A comprehensive review of systems was performed and is negative other than noted above.     Allergies     Allergies   Allergen Reactions     No Known Allergies

## 2021-12-14 NOTE — TELEPHONE ENCOUNTER
On December 14, 2021, at 12:40 PM, writer called patient at mobile to confirm Virtual Visit. Writer unable to make contact with patient. Writer left detailed voice message for call back. [phone] left as call back number. Travis Palacios, EMT

## 2021-12-21 DIAGNOSIS — F31.30 BIPOLAR I DISORDER, MOST RECENT EPISODE DEPRESSED (H): ICD-10-CM

## 2021-12-23 RX ORDER — ESCITALOPRAM OXALATE 10 MG/1
TABLET ORAL
Qty: 30 TABLET | Refills: 1 | OUTPATIENT
Start: 2021-12-23

## 2021-12-23 NOTE — TELEPHONE ENCOUNTER
From 12/14/21 virtual visit note:  Since the last visit, Esa discontinued Latuda and started a trial of Lexapro, 5 mg for 2 weeks and then 10 mg since then.  He has remained adherent with Depakote.  He reports moderate improvement in his depressive symptoms.  He denies any manic or mixed symptoms.  He will increase the dose of Lexapro to 15 mg for a month and then, if needed, to 20 mg.  He will continue Depakote at the usual dose.    Increase Lexapro to 15 mg daily for 1 month     If depressive symptoms persist, you may increase it further to 20 mg after 1month    Denied as new dose was sent to pharmacy on 12/14/21.

## 2021-12-23 NOTE — TELEPHONE ENCOUNTER
escitalopram (LEXAPRO) 10 MG  Last refilled: 12/14/21  Qty: #225     Last seen: 12/14/21     RTC: 1/18  Cancel: 0  No-show: 0  Next appt: 1/18/21  Refill pended and routed to the provider for review/determination due to: Current dose ?

## 2022-01-05 ENCOUNTER — TELEPHONE (OUTPATIENT)
Dept: PSYCHIATRY | Facility: CLINIC | Age: 37
End: 2022-01-05
Payer: COMMERCIAL

## 2022-01-05 NOTE — TELEPHONE ENCOUNTER
"Writer called Julia and offered the appointment times provided in the FIGS message for tomorrow morning. Patient would like to meet with Dr. Motley at 8:30 am. Message sent to scheduling.    Per Julia, the patient is experiencing worsening depression since the visit on 12/14. He continues to struggle with lack of motivation and energy, along with passive SI. Julia said, \"It's like he has all of these escape fantasies.\" He's also feeling very hopeless, specifically about his medication regimen and that he can't find a medication to help with his level of depression. At this time, she denies any active safety concerns and will continue to monitor for this.    At tomorrow's visit, Julia is wondering if Dr. Motley can reiterate the importance of having a therapist. Julia said it has been a struggle to get the patient to follow through with therapy, so she feels this needs to be discussed tomorrow.   "

## 2022-01-05 NOTE — TELEPHONE ENCOUNTER
"[facility] Call Center    Phone Message    May a detailed message be left on voicemail: yes     Reason for Call: Symptoms or Concerns     Current symptom or concern: Wife called, says pt is \"really struggling with depression,\" been on a new med for about two months, seems like it's not doing anything to help/. Pt is irritable, struggling with motivation, low energy    By Tolu:    Wife called looking to get a sooner appointment, which is not available. She declined to send a Qubole message and would rather discuss with a nurse.          Action Taken: Message routed to:  Other: nursing pool, Dr Motley    Travel Screening: Not Applicable                                                                        "

## 2022-01-05 NOTE — TELEPHONE ENCOUNTER
Mayito Motley MD Olson, Lisa 27 minutes ago (9:21 AM)     DB    I just messaged the patient and let him know I could see him tomorrow morning if that would work.     DB

## 2022-01-06 ENCOUNTER — VIRTUAL VISIT (OUTPATIENT)
Dept: PSYCHIATRY | Facility: CLINIC | Age: 37
End: 2022-01-06
Attending: PSYCHIATRY & NEUROLOGY
Payer: COMMERCIAL

## 2022-01-06 DIAGNOSIS — F31.30 BIPOLAR I DISORDER, MOST RECENT EPISODE DEPRESSED (H): Primary | ICD-10-CM

## 2022-01-06 PROCEDURE — 99214 OFFICE O/P EST MOD 30 MIN: CPT | Mod: GT | Performed by: PSYCHIATRY & NEUROLOGY

## 2022-01-06 RX ORDER — LURASIDONE HYDROCHLORIDE 40 MG/1
TABLET, FILM COATED ORAL
Qty: 24 TABLET | Refills: 0 | Status: SHIPPED | OUTPATIENT
Start: 2022-01-06 | End: 2022-01-18

## 2022-01-06 ASSESSMENT — PAIN SCALES - GENERAL: PAINLEVEL: NO PAIN (0)

## 2022-01-06 NOTE — PROGRESS NOTES
Progress Note    Video- Visit Details  Type of service:  video visit for medication management  Time of service:    Date:  01/06/2022    Video Start Time:  835 AM        Video End Time:  905    Reason for video visit:  Patient unable to travel due to Covid-19  Originating Site (patient location):  [facility]   Location- Patient's home  Distant Site (provider location):  Remote location  Mode of Communication:  Video Conference via [employer]  Consent:  Patient has given verbal consent for video visit?: Yes       Esa Rivera is a 36 year old year old male with Bipolar I Disorder, Most Recent Episode Manic Severe, with psychotic behavior (296.40) who presents for ongoing psychiatric care. He has no children. He and his wife (a psychologist) live on their own in [facility]. Esa works as an  for [employer].    Diagnosis    Axis 1: Bipolar I disorder, with severe manic episode with psychosis in Dec 2016. ADHD by history. Alcohol use disorder, moderate, in short-term remission  Axis 2: Nil  Axis 3: Remote concussion x 2  Axis 4: Moderate stressors  Axis 5: GAF at time of visit: 70    Assessment & Plan     I saw Esa on an urgent basis today after his wife contacted the clinic, concerned that Esa's depression was not getting better.  Today he reports ongoing depressive symptoms, with a possible mixed component (irritability agitation) that seems to have worsened since his dose of Lexapro was increased.  He denies suicidal or homicidal thoughts or other safety concerns.  He is agreeable to reduce Lexapro from 20 mg to 15 mg for a week, and then to 10 mg thereafter.  Concurrently he will add Latuda 20 mg for a week and then increase to 40 mg.  We will look into coverage for Vraylar.  Latuda previously led to only a partial response for Esa's depression, which actually worsened despite ongoing treatment.  Esa is agreeable with the plan.  He will return for follow-up at his next scheduled visit on January 18.    The patient  understands the risks, benefits, adverse effects and alternatives. Agrees to treatment with the capacity to do so. No medical contraindications to treatment. Agrees to call clinic for any problems. The patient understands to call 911 or come to the nearest ED if life threatening or urgent symptoms present.    Interim History     Since the last visit Esa reports ongoing symptoms of depression.  They have improved a little, if at all, despite increasing his Lexapro from 10 mg to 20 mg.  If anything, (irritability, agitation) has become more pronounced on the higher dose of Lexapro.  Esa admits to thoughts of death and passive suicidal ideation, though he denies any intent or plan and is able to contract for safety.  He is more argumentative with his wife but he denies any thoughts of harming others.  He denies psychotic symptoms.    Number of stressors are likely perpetuating Esa's depression.  He is unhappy with his career trajectory at work.  There is some strain in his marriage, at least partly due to his current depression and worsened with his mixed symptoms.    We had a lengthy discussion regarding medications.  Esa currently responded partially to Latuda, which he had to stop several months ago for cost reasons.  Now that we are in the new year the medication is affordable for him again.  For the time being he is agreeable to reduce Lexapro to 50 mg for a week and then to 10 mg to reduce mixed symptoms.  Concurrently he will start Latuda 20 mg for a week and then increase to 40 mg to target depressive symptoms.  Based on his past response, it is unclear if this will be a fully effective treatment regimen.  Vraylar is not currently covered by his insurance, but is the only first-line treatment that he has not previously taken.      With respect to other first-line medications, Esa previously became toxic on lithium with a blood level of over 3 and restarting it could be dangerous.  He recalls developing  "mixed symptoms while taking lamotrigine.  There is also a heightened risk of rash if he takes it in conjunction with Depakote.  Latuda as noted is not fully effective.  Seroquel was markedly sedating and intolerable.     Esa is also agreeable to a referral to a therapist.  I will look into options for him.  I    Esa is agreeable with the treatment plan.  He will return for follow-up as scheduled on January 18.        In Dec 2016 Esa experienced an episode of severe katie with psychosis (grandiose delusions). He was seen in the ER but not hospitalized. He had some mild depressive episodes and possible (but questionable) short hypomanic periods in the preceding years, but never received treatment for these.     Esa's manic symptoms began when he became excited about a new idea at work. 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 He became so excited about this the he was unable to sleep. As his katie escalated, his sleep decreased to 1-2 hours per night for 5-6 nights. His affect was elated and also markedly irritable. He would \"go off\" on people for minor things in very uncharacteristic ways. His thoughts were \"really fast, crisp, and clear\". He saw \"connections in everything.\" His energy was clearly elevated. He became grandiose, believing that his idea would change the world and as a result he would meet many \"powerful people\". Eventually he came to believe that sings on the radio were meant for him.     Esa was taking Adderall at the time. He has taken this, on and off, for many years. In my opinion it is not an adequate explanation for his manic symptoms.      His mother (a PCP) became concerned and brought him to hospital. He was assessed in the ER and prescribed Ativan for sleep. He was seen by Dr Burt (a psychiatrist) the next day. He was prescribed OLZ and LTG. The doses were titrated \"up and down\" for the next " 6 months. There were significant side effects (sedation, cognitive impairment), likely from OLZ. Most recently, Esa stopped OLZ for a period of time. In the context of being on a honeymoon in [place], he develop racing thoughts and difficulty sleeping and restarted it, with benefit. He currently takes 10 mg HS.    Esa endorses several previous short (1-2 day) periods of reduced sleep and elevated mood, typically in the context of getting involved in a work project. At most they represent subthreshold hypomanic periods.     He has also experienced several periods over the years, lasting about a week, of low mood, decreased energy and motivation, poor focus/concentration, and negative thoughts. He denies neurovegetative symptoms or SI. His wife thought that these represented depressive episodes.    Esa has consumed EtOH heavily at points during his life. It was most pronounced during his college years. He has sporadically binge-drank since then. He has not had a drink in 2 weeks.    Past Psych Hx: As above. Esa was assessed at [facility] in Dec 2016 and diagnosed with BDI. He was seen by Psychiatry at age 17 and dxed with ADD. He took Adderall off and on (mostly on) with benefit over the years since then. He denies abusing it.     Past Med Hx: 2 concussion: 1) age 15 - skiing. Age 16: football. LOC x a couple of minutes with both.    MEDICATION ADHERENCE: Good.   Current Medications     Current Outpatient Medications   Medication Sig Dispense Refill     Acetylcysteine (NAC PO) Take by mouth 2 times daily Takes twice daily for mood       divalproex sodium extended-release (DEPAKOTE ER) 500 MG 24 hr tablet Take 3 tablets (1,500 mg) by mouth At Bedtime 270 tablet 3     escitalopram (LEXAPRO) 10 MG tablet Take 1.5 tablets (15 mg) by mouth daily for 30 days, THEN 2 tablets (20 mg) daily. Take one-half tablet (5 mg) by mouth daily for seven days, then one tablet (10 mg) daily 225 tablet 0     risperiDONE (RISPERDAL) 1 MG  tablet Take 1-2 tablets (1-2 mg) by mouth as needed for sleep and 1-2 tablets (1-2 mg) twice daily as needed for katie 60 tablet 1         Substance use     ETOH: Sporadic binge drinking  Cigarettes: NA  Street Drugs: Denies    Review of Systems     A comprehensive review of systems was performed and is negative other than noted above.     Allergies     Allergies   Allergen Reactions     No Known Allergies

## 2022-01-06 NOTE — PATIENT INSTRUCTIONS
Reduce Lexapro to 15 mg (1-1/2 tablets) for 1 week, then reduce to 10 mg (1 tablet) thereafter    Concurrently, start Latuda 20 mg (half tablet) daily with food for 1 week, then increase to 40 mg (full tablet) daily with food thereafter    Continue Depakote and risperidone as usual    Please return for follow-up on January 18 as scheduled    I will look into therapy options for you      **For crisis resources, please see the information at the end of this document**   Patient Education    Thank you for coming to the [facility] [place].    Lab Testing:  If you had lab testing today and your results are reassuring or normal they will be mailed to you or sent through [employer] within 7 days. If the lab tests need quick action we will call you with the results. The phone number we will call with results is # [phone] (home) NONE (work). If this is not the best number please call our clinic and change the number.    Medication Refills:  If you need any refills please call your pharmacy and they will contact us. Our fax number for refills is [phone]. Please allow three business for refill processing. If you need to  your refill at a new pharmacy, please contact the new pharmacy directly. The new pharmacy will help you get your medications transferred.     Scheduling:  If you have any concerns about today's visit or wish to schedule another appointment please call our office during normal business hours [phone] (8-5:00 M-F)    Contact Us:  Please call [phone] during business hours (8-5:00 M-F).  If after clinic hours, or on the weekend, please call  [phone].    Financial Assistance [phone]  [facility] Billing [phone]  Central Billing Office, [facility]: [phone]  [facility] Billing [phone]  Medical Records [phone]  [facility] Patient Bill of Rights [email or web address]        MENTAL HEALTH CRISIS NUMBERS:  For a medical emergency please call  911 or go to the nearest ER.     [facility]:   [facility] -[phone]   Crisis Residence [facility] -[phone]   Walk-In Counseling Center [facility] -[phone]   COPE 24/7 [place] Mobile Team -[phone] (adults)/[phone] (child)  CHILD: [facility] needs assessment team - [phone]      [facility]:   [facility] - [phone]   Walk-in counseling [facility] - [phone]   Walk-in counseling [facility] - [phone]   Crisis Residence [facility] - [phone]  Urgent Care Adult Mental Qxucbp-[phone] mobile unit/ 24/7 crisis line    National Crisis Numbers:   National Suicide Prevention Lifeline: 5-081-363-TALK (670-340-4819)  Poison Control Center - 4-352-531-7897  [employer]/resources for a list of additional resources (SOS)  Trans Lifeline a hotline for transgender people 1-984.319.1110  The [employer] Project a hotline for LGBT youth 1-805.903.8739  Crisis Text Line: For any crisis 24/7   To: 110549  see www.crisistextline.org  - IF MAKING A CALL FEELS TOO HARD, send a text!         Again thank you for choosing [facility] and please let us know how we can best partner with you to improve you and your family's health.    You may be receiving a survey regarding this appointment. We would love to have your feedback, both positive and negative. The survey is done by an external company, so your answers are anonymous.

## 2022-01-06 NOTE — PROGRESS NOTES
"VIDEO VISIT  Esa Rivera is a 36 year old patient that has consented to receive services via billable video visit.      The patient has been notified of following:   \"This video visit will be conducted via a call between you and your physician/provider. We have found that certain health care needs can be provided without the need for an in-person physical exam. This service lets us provide the care you need with a video conversation. If a prescription is necessary we can send it directly to your pharmacy. If lab work is needed we can place an order for that and you can then stop by our lab to have the test done at a later time. Insurers are generally covering virtual visits as they would in-office visits so billing should not be different than normal.  If for some reason you do get billed incorrectly, you should contact the billing office to correct it and that number is in the AVS .    Patient will join video visit via:  Metabiotat (Patient / guardian confirmed to join via [employer])    If patient attempts to join the video via Keepsafehart at appointment start time, but is unable to, they would prefer that the provider send them a video invitation via:   Text to preferred phone: [phone]      How would patient like to obtain AVS?:  MyChart    "

## 2022-01-18 ENCOUNTER — VIRTUAL VISIT (OUTPATIENT)
Dept: PSYCHIATRY | Facility: CLINIC | Age: 37
End: 2022-01-18
Attending: PSYCHIATRY & NEUROLOGY
Payer: COMMERCIAL

## 2022-01-18 DIAGNOSIS — F31.30 BIPOLAR I DISORDER, MOST RECENT EPISODE DEPRESSED (H): ICD-10-CM

## 2022-01-18 PROCEDURE — 99214 OFFICE O/P EST MOD 30 MIN: CPT | Mod: GT | Performed by: PSYCHIATRY & NEUROLOGY

## 2022-01-18 RX ORDER — LURASIDONE HYDROCHLORIDE 40 MG/1
TABLET, FILM COATED ORAL
Qty: 30 TABLET | Refills: 1 | Status: SHIPPED | OUTPATIENT
Start: 2022-01-18 | End: 2022-02-11

## 2022-01-18 RX ORDER — ESCITALOPRAM OXALATE 10 MG/1
10 TABLET ORAL DAILY
Qty: 90 TABLET | Refills: 1 | Status: SHIPPED | OUTPATIENT
Start: 2022-01-18 | End: 2022-04-22

## 2022-01-18 ASSESSMENT — PAIN SCALES - GENERAL: PAINLEVEL: NO PAIN (0)

## 2022-01-18 NOTE — PROGRESS NOTES
"VIDEO VISIT  Esa Rivera is a 36 year old patient that has consented to receive services via billable video visit.      The patient has been notified of following:   \"This video visit will be conducted via a call between you and your physician/provider. We have found that certain health care needs can be provided without the need for an in-person physical exam. This service lets us provide the care you need with a video conversation. If a prescription is necessary we can send it directly to your pharmacy. If lab work is needed we can place an order for that and you can then stop by our lab to have the test done at a later time. Insurers are generally covering virtual visits as they would in-office visits so billing should not be different than normal.  If for some reason you do get billed incorrectly, you should contact the billing office to correct it and that number is in the AVS .    Patient will join video visit via:  Reffpediat (Patient / guardian confirmed to join via Portable Scores)    If patient attempts to join the video via Telegent Systemshart at appointment start time, but is unable to, they would prefer that the provider send them a video invitation via:   Text to preferred phone: [phone]      How would patient like to obtain AVS?:  MyChart      "

## 2022-01-18 NOTE — PROGRESS NOTES
Progress Note    Video- Visit Details  Type of service:  video visit for medication management  Time of service:    Date:  01/18/2022    Video Start Time:  835 AM        Video End Time:  905    Reason for video visit:  Patient unable to travel due to Covid-19  Originating Site (patient location):  [facility]   Location- Patient's home  Distant Site (provider location):  Remote location  Mode of Communication:  Video Conference via AmWell  Consent:  Patient has given verbal consent for video visit?: Yes       Esa Rivera is a 36 year old year old male with Bipolar I Disorder, Most Recent Episode Manic Severe, with psychotic behavior (296.40) who presents for ongoing psychiatric care. He has no children. He and his wife (a psychologist) live on their own in [place]. Esa works as an  for [employer].    Diagnosis    Axis 1: Bipolar I disorder, with severe manic episode with psychosis in Dec 2016. ADHD by history. Alcohol use disorder, moderate, in short-term remission  Axis 2: Nil  Axis 3: Remote concussion x 2  Axis 4: Moderate stressors  Axis 5: GAF at time of visit: 70    Assessment & Plan     At the last visit 2 weeks ago, Esa restarted Latuda 20 mg daily and increase the dose to 40 mg daily after 1 week, and reduced Lexapro to 15 mg for a week and then 10 mg since, to target depression with mixed symptoms.  He is feeling markedly better since then, almost back to normal.  He will continue his medications at the current doses.  He will return for follow-up on March 15.    The patient understands the risks, benefits, adverse effects and alternatives. Agrees to treatment with the capacity to do so. No medical contraindications to treatment. Agrees to call clinic for any problems. The patient understands to call 911 or come to the nearest ED if life threatening or urgent symptoms present.    Interim History     At the last visit, Esa reported symptoms of depression with a mixed component.  These had emerged in the  "context of him having to stop Latuda for cost considerations, and initiating a trial of Lexapro.  Oxypro did not appear to be beneficial for Esa's depression, and I suspect was contributing to the mixed component.    With the new calendar year, Esa is able to afford Latuda again.  It was reinstituted as outlined above, and Lexapro was reduced.  Esa feels much better since then, almost back to normal.  His irritability has resolved.  His mood is better and he is more motivated and active.  He feels more optimistic.  He is pleased with the response.    Neither Latuda nor Lexapro alone seem to be sufficient to treat Esa's depressions.  Hopefully the combination will be more helpful, as an adjunct to his ongoing Depakote.  Esa is agreeable to continue his medications at the current doses.  He will return for follow-up on March 12.        In Dec 2016 Esa experienced an episode of severe katie with psychosis (grandiose delusions). He was seen in the ER but not hospitalized. He had some mild depressive episodes and possible (but questionable) short hypomanic periods in the preceding years, but never received treatment for these.     Esa's manic symptoms began when he became excited about a new idea at work. 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 He became so excited about this the he was unable to sleep. As his katie escalated, his sleep decreased to 1-2 hours per night for 5-6 nights. His affect was elated and also markedly irritable. He would \"go off\" on people for minor things in very uncharacteristic ways. His thoughts were \"really fast, crisp, and clear\". He saw \"connections in everything.\" His energy was clearly elevated. He became grandiose, believing that his idea would change the world and as a result he would meet many \"powerful people\". Eventually he came to believe that sings on the radio were meant for him. " "    Esa was taking Adderall at the time. He has taken this, on and off, for many years. In my opinion it is not an adequate explanation for his manic symptoms.      His mother (a PCP) became concerned and brought him to hospital. He was assessed in the ER and prescribed Ativan for sleep. He was seen by Dr Burt (a psychiatrist) the next day. He was prescribed OLZ and LTG. The doses were titrated \"up and down\" for the next 6 months. There were significant side effects (sedation, cognitive impairment), likely from OLZ. Most recently, Esa stopped OLZ for a period of time. In the context of being on a honeymoon in [place], he develop racing thoughts and difficulty sleeping and restarted it, with benefit. He currently takes 10 mg HS.    Esa endorses several previous short (1-2 day) periods of reduced sleep and elevated mood, typically in the context of getting involved in a work project. At most they represent subthreshold hypomanic periods.     He has also experienced several periods over the years, lasting about a week, of low mood, decreased energy and motivation, poor focus/concentration, and negative thoughts. He denies neurovegetative symptoms or SI. His wife thought that these represented depressive episodes.    Esa has consumed EtOH heavily at points during his life. It was most pronounced during his college years. He has sporadically binge-drank since then. He has not had a drink in 2 weeks.    Past Psych Hx: As above. Esa was assessed at [facility] in Dec 2016 and diagnosed with BDI. He was seen by Psychiatry at age 17 and dxed with ADD. He took Adderall off and on (mostly on) with benefit over the years since then. He denies abusing it.     Past Med Hx: 2 concussion: 1) age 15 - skiing. Age 16: football. LOC x a couple of minutes with both.    MEDICATION ADHERENCE: Good.   Current Medications     Current Outpatient Medications   Medication Sig Dispense Refill     Acetylcysteine (NAC PO) Take by mouth 2 times " daily Takes twice daily for mood       divalproex sodium extended-release (DEPAKOTE ER) 500 MG 24 hr tablet Take 3 tablets (1,500 mg) by mouth At Bedtime 270 tablet 3     escitalopram (LEXAPRO) 10 MG tablet Take 1.5 tablets (15 mg) by mouth daily for 30 days, THEN 2 tablets (20 mg) daily. Take one-half tablet (5 mg) by mouth daily for seven days, then one tablet (10 mg) daily 225 tablet 0     lurasidone (LATUDA) 40 MG TABS tablet 20 mg daily with food for 1 week, then increase to 40 mg daily with food 24 tablet 0     risperiDONE (RISPERDAL) 1 MG tablet Take 1-2 tablets (1-2 mg) by mouth as needed for sleep and 1-2 tablets (1-2 mg) twice daily as needed for katie 60 tablet 1         Substance use     ETOH: Sporadic binge drinking  Cigarettes: NA  Street Drugs: Denies    Review of Systems     A comprehensive review of systems was performed and is negative other than noted above.     Allergies     Allergies   Allergen Reactions     No Known Allergies

## 2022-01-18 NOTE — PATIENT INSTRUCTIONS
Continue her medications at the current doses    Please return for follow-up on March 12      **For crisis resources, please see the information at the end of this document**   Patient Education    Thank you for coming to the [facility] [place] [facility].    Lab Testing:  If you had lab testing today and your results are reassuring or normal they will be mailed to you or sent through Tiggly within 7 days. If the lab tests need quick action we will call you with the results. The phone number we will call with results is # [phone] (home) NONE (work). If this is not the best number please call our clinic and change the number.    Medication Refills:  If you need any refills please call your pharmacy and they will contact us. Our fax number for refills is [phone]. Please allow three business for refill processing. If you need to  your refill at a new pharmacy, please contact the new pharmacy directly. The new pharmacy will help you get your medications transferred.     Scheduling:  If you have any concerns about today's visit or wish to schedule another appointment please call our office during normal business hours [phone] (8-5:00 M-F)    Contact Us:  Please call [phone] during business hours (8-5:00 M-F).  If after clinic hours, or on the weekend, please call  [phone].    Financial Assistance [phone]  [facility] Billing [phone]  Central Billing Office, [facility]: [phone]  [facility] Billing [phone]  Medical Records [phone]  [facility] Patient Bill of Rights [email or web address]       MENTAL HEALTH CRISIS NUMBERS:  For a medical emergency please call  911 or go to the nearest ER.     [facility]:   [facility] -[phone]   Crisis Residence [facility] -[phone]   Walk-In Counseling Center [facility] -[phone]   COPE 24/7 [place]  Mobile Team -[phone] (adults)/[phone] (child)  CHILD: [facility] needs assessment team - [phone]      [facility]:   [facility] - [phone]   Walk-in counseling [facility] - [phone]   Walk-in counseling [facility] - [phone]   Crisis Residence [facility] - [phone]  Urgent Care Adult Mental [facility]-[phone] mobile unit/ 24/7 crisis line    National Crisis Numbers:   National Suicide Prevention Lifeline: 3-291-836-TALK (704-872-8422)  Poison Control Center - 1-180.657.1041  [facility] - Tuto.com/resources for a list of additional resources (SOS)  Trans Lifeline a hotline for transgender people 3-281-632-2920  The Shaun Project a hotline for LGBT youth 1-229.177.1703  Crisis Text Line: For any crisis 24/7   To: 323617  see www.crisistextline.org  - IF MAKING A CALL FEELS TOO HARD, send a text!         Again thank you for choosing [facility] [place] [facility] and please let us know how we can best partner with you to improve you and your family's health.    You may be receiving a survey regarding this appointment. We would love to have your feedback, both positive and negative. The survey is done by an external company, so your answers are anonymous.

## 2022-01-25 ENCOUNTER — VIRTUAL VISIT (OUTPATIENT)
Dept: PSYCHIATRY | Facility: CLINIC | Age: 37
End: 2022-01-25
Payer: COMMERCIAL

## 2022-01-25 DIAGNOSIS — F31.30 BIPOLAR I DISORDER, MOST RECENT EPISODE DEPRESSED (H): Primary | ICD-10-CM

## 2022-01-25 NOTE — PROGRESS NOTES
Clinician Contact & Progress Note  Department of Psychiatry & Behavioral Sciences  [facility]    Patient: Esa Rivera (1985)     MRN: [id number]  Date of Treatment:  Jan 25, 2022  Duration of Treatment: Start Time: 9:02; End Time: 10:03  Provider: Pablo Dickey   Supervisor: Rubia Boone, Ph.D.    People present:   Provider: TERESA Sam  Patient: Esa Rivera    Telehealth Video Visit AddOn  Type of service: Telemedicine Psychotherapy for Depression. The patient's condition can be safely assessed and treated via synchronous audio and visual telemedicine encounter.    Mode of Communication: Video Conference via [employer]  Reason for Telemedicine Visit: This patient visit was converted to a Telehealth video visit to minimize exposure to COVID-19.  Originating Site (Patient Location): Patient's home  Distant Site (Provider Location): Provider Remote Setting- Home Office  Consent:  The patient has verbally consented to: the potential risks and benefits of telemedicine versus in person care with special emphasis on confidentiality given family members in the home.  Attestation: As the provider I attest to compliance with applicable laws and regulations related to telemedicine.    Encounter Diagnosis   Name Primary?     Bipolar I disorder, most recent episode depressed (H) Yes       Assessment (current symptoms):    PHQ 4/24/2019 6/12/2019 8/7/2019   PHQ-9 Total Score 2 4 2   Q9: Thoughts of better off dead/self-harm past 2 weeks Not at all Not at all Not at all     Patient reports low mood, irritability, low motivation, behavioral avoidance of chores, and difficulty managing life stressors including relationship difficulties  and work. He reports passive suicidal ideation but denies plan or intent.    Session Content:     Patient presented unaccompanied for session today. Esa Rivera was referred for cognitive behavioral therapy (CBT) by Mayito Motley M.D., at the  "[facility] clinic (see chart note from Dr. Motley on 1/06/22). We briefly reviewed Esa Rivera s psychiatric and treatment history.    Provided psycho-education including scientific and empirical rationale for the use and course of behavioral activation therapy.   brady See described his recent activity of going kite skiing and this experience was used to illustrate the goal of BAT.     Esa reported that he has never felt that he has connected with therapy in the past. He reports that the experiences have never made him \"want to come back\" and thus usually discontinues therapy until things \"crash.\"  o He has experience with talk therapy, meditation, and an intensive two week behavioral health program at [facility]. He reports rarely finding benefit and very quickly stalling with progress.     We discussed Esa's current concerns, which include limited motivation to engage in enjoyable or mastery activities.     We conducted a detailed review ofEsa's schedule and identified potential activities to increase engagement, antonette, and mastery. We discussed the importance of tying activation assignments to values and long-term goals to increase motivation and likelihood of completing them.   brady See's schedule using involves waking, working from home, walking his dog, and then watching TV throughout the evening. He reports not enjoying the time watching TV but having formed a habit.     Identified/reviewed activation targets for goal setting (pleasant, valued, goal directed).  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 He also is not seeing family as much as he might like. One barrier to this is their home environment. He reports that some house chores have felt like \"pulling teeth\" and the house is \"filthy.\" He doesn't feel like he and his wife have an equitable distribution of work, which makes him not want to do any cleaning at all.   o His work has " started to become more interesting and engaging but he has historically felt undervalued for his work and effort.   o He reports enjoying hunting, kite skiing, the outdoors (loves the mountains).    Assigned out of session activation assignments: Esa does not feel comfortable receiving emails from the clinic. I will be looking into ways to send the PDF in an encrypted e-mail or message. If I am able to do this, Esa will read and complete the Vicious Cycles activity in the CBT Manual for Depression (PDF Pages 94-96).    Handouts  Status # Form   1/25  In session 1-4 What is Behavioral Activation?   1/24 5-8 Vicious Cycles    9 Activity Monitoring: Track Your Mood + Activity Monitoring Worksheet    10 UP and DOWN Activities    11 A Life [place] Living: Values, Pleasure, Mastery, and Goals    12-15 Values + Values Rating Sheet    16-17 Translating Values Into Activities    18-19 Pleasure, Mastery    20-21 Activities List    22 Values, Pleasure and Mastery Activities List    23 Goal setting    24-25 Activity Planning + Worksheet    26-27 Pleasure Predicting + Worksheet    28 Problem Solving and Acceptance    29-30 Dealing with Low Motivation + Low Motivation Tips    31-32 Behavioral Activation Tips    33 Barriers and Resources Worksheet     Treatment Plan:     Complete between session activation assignments: Vicious Cyles (if PDF is able to be provided)    Continue with behavioral activation therapy with writer.    Continue with psychiatric treatment provider Dr. Motley.    Mental Status Exam:  Alertness: alert  and oriented  Appearance: well groomed  Behavior/Demeanor: cooperative and agitated, with fair  eye contact   Speech: slowed  Language: intact. Preferred language identified as English.  Psychomotor: restless  Mood: depressed and irritable  Affect: restricted and labile; was occasionally incongruent to mood; was occassionally incongruent to content  Thought Process/Associations: perseverative and response  "delay  Thought Content:  Reports suicidal ideation;  Denies suicidal plan or intent  -Suicidal ideation: reports SI, denies intent, and denies plan  -Homicidal Ideation: denies  Perception: Reports none;  Denies auditory hallucinations and visual hallucinations; intact    Insight: adequate  Judgment: good  Cognition: does  appear grossly intact    Billing for \"Interactive Complexity\"?    No    Pablo Dickey     I did not directly participate in this patient visit. I discussed this patient with the above trainee in individual supervision and agree with the note and plan as documented.    Supervisor: Rubia Boone, PhD, LP      "

## 2022-01-26 ENCOUNTER — TELEPHONE (OUTPATIENT)
Dept: PSYCHIATRY | Facility: CLINIC | Age: 37
End: 2022-01-26
Payer: COMMERCIAL

## 2022-01-26 ENCOUNTER — MYC MEDICAL ADVICE (OUTPATIENT)
Dept: PSYCHIATRY | Facility: CLINIC | Age: 37
End: 2022-01-26
Payer: COMMERCIAL

## 2022-01-26 NOTE — TELEPHONE ENCOUNTER
[facility] Call Center    Phone Message    May a detailed message be left on voicemail: yes     Reason for Call: Other: Pt status      Wife of pt calling to make sure that Dr Motley saw message pt sent this morning. They're concerned about trying to prevent more severe manic symptoms. Wife doesn't need a call back, just checking to see if Dr Motley had gotten first message yet and hoping they'll hear back today.    Action Taken: Message routed to:  Other: moises alonzo    Travel Screening: Not Applicable

## 2022-01-27 NOTE — TELEPHONE ENCOUNTER
"Telephone Call with Darnell Rivera    Start time: 7:18PM  End time: 7:30PM    Notified by behavioral intake that patient Esa Rivera would like a call back due to feeling \"ramped up\".    On return call, Esa and Julia report that he is feeling \"ramped up\" and sent a Virtual Web message to Dr. Motley. Concerned that current feeling may escalate to katie. Were calling after they did not hear back from Dr. Motley.    Mr. Rivera reports that he has some risperidone PRN that he can take for katie symptoms, but was not sure if he should continue to take the Lexapro 10mg, as he feels that the Lexapro counteracts the risperidone.    A/P  36M with h/o bipolar type 1, ADHD calling due to concern for katie-like symptoms. On Latuda and Lexapro for MH, risperidone PRN. Amenable to taking the risperidone PRN and half dose of Lexapro tonight  -cut Lexapro to 5mg for tonight  -take one dose risperidone 1mg PRN, okay to repeat if not effective.  -call clinic in AM  -will route note to Dr. Tolu Travis MD, PhD  PGY-2 Psychiatry Resident    "

## 2022-01-31 DIAGNOSIS — F31.30 BIPOLAR I DISORDER, MOST RECENT EPISODE DEPRESSED (H): ICD-10-CM

## 2022-02-01 ENCOUNTER — VIRTUAL VISIT (OUTPATIENT)
Dept: PSYCHIATRY | Facility: CLINIC | Age: 37
End: 2022-02-01
Payer: COMMERCIAL

## 2022-02-01 DIAGNOSIS — F31.30 BIPOLAR I DISORDER, MOST RECENT EPISODE DEPRESSED (H): Primary | ICD-10-CM

## 2022-02-01 NOTE — PROGRESS NOTES
Clinician Contact & Progress Note  Department of Psychiatry & Behavioral Sciences  [facility]    Patient: Esa Rivera (1985)     MRN: [id number]  Date of Treatment:  Feb 1, 2022  Duration of Treatment: Start Time: 9:05; End Time: 10:00  Provider: Pablo Dickey   Supervisor: Rubia Boone, Ph.D.    People present:   Provider: TERESA Sam  Patient: Esa Rivera    Telehealth Video Visit AddOn  Type of service: Telemedicine Psychotherapy for Depression. The patient's condition can be safely assessed and treated via synchronous audio and visual telemedicine encounter.    Mode of Communication: Video Conference via [employer]  Reason for Telemedicine Visit: This patient visit was converted to a Telehealth video visit to minimize exposure to COVID-19.  Originating Site (Patient Location): Patient's home  Distant Site (Provider Location): Provider Remote Setting- Home Office  Consent:  The patient has verbally consented to: the potential risks and benefits of telemedicine versus in person care with special emphasis on confidentiality given family members in the home.  Attestation: As the provider I attest to compliance with applicable laws and regulations related to telemedicine.    Encounter Diagnosis   Name Primary?     Bipolar I disorder, most recent episode depressed (H) Yes     Assessment (current symptoms):    PHQ 6/12/2019 8/7/2019 2/1/2022   PHQ-9 Total Score 4 2 10   Q9: Thoughts of better off dead/self-harm past 2 weeks Not at all Not at all Not at all     ANGY-7 SCORE 2/1/2022   Total Score 10 (moderate anxiety)   Total Score 10     Patient reports low mood, irritability, low motivation, behavioral avoidance of chores, and difficulty managing life stressors including relationship difficulties  and work. He reports passive suicidal ideation but denies plan or intent.    Session Content:     We reviewed the overview for BA. Psychoeducation was provided around  "activity monitoring which would be assigned this week.     We reviewed the concept of viscious cycles. Esa reported that the most clear example to him is with interpersonal relationships.  brady See reported that he used to be very close with his brother but that now he avoids talking with him. He reported that his brother \"thinks he's better than other people.\" Esa also reported having a difficult time connecting with his family in general. He reports that they do not share common interests, his family does not do anything fun, and that they have nothing to say to one another. He reports that this feeling of disconnect from his family is getting worse over time. For example, he reported that he, his father, and brother go on a fishing trip every year and he doesn't want to go because \"it is always miserable.\"  brady See reported that the pattern of avoidance and disconnect has played out with his friends. He reports having many friends who he has not seen in years. Esa notes that he has previously reached out to his friends and they always say they are busy. He feels like he is reaching out to people and being shot down. This has led him to avoid continuing to reach out. When asked about his ideal social supports, he reports that he would like to have friends who he can just spend time with and invite to hang out regularly.    Esa brought up spirituality, meditation, and signs in the universe. He mentioned seeing that the writer is  and wanted to share experiences he had previously with spirituality.  brady See reported on many experiences that he has had during and outside of meditation where he has perceived \"signs\" from different things he saw. He reported wondering if he should return to meditating and \"open [him]self up\" to these spiritual messages.     We revisited the goal of activity monitoring for the week.    Handouts  Status # Form   1/25  In session 1-4 What is Behavioral Activation?   2/1 in " "session 5-8 Vicious Cycles   2/1 9 Activity Monitoring: Track Your Mood + Activity Monitoring Worksheet    10 UP and DOWN Activities    11 A Life Person Living: Values, Pleasure, Mastery, and Goals    12-15 Values + Values Rating Sheet    16-17 Translating Values Into Activities    18-19 Pleasure, Mastery    20-21 Activities List    22 Values, Pleasure and Mastery Activities List    23 Goal setting    24-25 Activity Planning + Worksheet    26-27 Pleasure Predicting + Worksheet    28 Problem Solving and Acceptance    29-30 Dealing with Low Motivation + Low Motivation Tips    31-32 Behavioral Activation Tips    33 Barriers and Resources Worksheet     Treatment Plan:     Complete between session activation assignments: Activity Monitoring (PDF Page 98)    Continue with behavioral activation therapy with writer.    Continue with psychiatric treatment provider Dr. Motley.    Mental Status Exam:  Alertness: alert  and oriented  Appearance: well groomed  Behavior/Demeanor: cooperative and agitated, with fair  eye contact   Speech: slowed  Language: intact. Preferred language identified as English.  Psychomotor: restless  Mood: depressed and irritable  Affect: restricted and labile; was occasionally incongruent to mood; was occassionally incongruent to content  Thought Process/Associations: perseverative and response delay  Thought Content:  Reports none;  Denies suicidal ideation  -Suicidal ideation: reports SI, denies intent, and denies plan  -Homicidal Ideation: denies  Perception: Reports none;  Denies auditory hallucinations and visual hallucinations; intact    Insight: adequate  Judgment: good  Cognition: does  appear grossly intact    Billing for \"Interactive Complexity\"?    No    Pablo Dickey     I did not directly participate in this patient visit. I discussed this patient with the above trainee in individual supervision and agree with the note and plan as documented.    Supervisor: Rubia Boone, PhD, LP    "

## 2022-02-01 NOTE — PATIENT INSTRUCTIONS
Chris See,    The PDF page for activity tracking this week is # 98 in the CBT Manual that I sent you.    Pablo Schroeder

## 2022-02-02 ENCOUNTER — TRANSFERRED RECORDS (OUTPATIENT)
Dept: HEALTH INFORMATION MANAGEMENT | Facility: CLINIC | Age: 37
End: 2022-02-02
Payer: COMMERCIAL

## 2022-02-02 RX ORDER — LURASIDONE HYDROCHLORIDE 40 MG/1
TABLET, FILM COATED ORAL
Qty: 24 TABLET | OUTPATIENT
Start: 2022-02-02

## 2022-02-03 ENCOUNTER — MYC MEDICAL ADVICE (OUTPATIENT)
Dept: PSYCHIATRY | Facility: CLINIC | Age: 37
End: 2022-02-03

## 2022-02-03 ENCOUNTER — TELEPHONE (OUTPATIENT)
Facility: CLINIC | Age: 37
End: 2022-02-03
Payer: COMMERCIAL

## 2022-02-03 NOTE — TELEPHONE ENCOUNTER
Patient reported that he had a tier exception approval that lowered the cost of Latuda enough that the coupon covered the majority of his cost. Please see related My[facility] Medical Advice encounter dated 1/08/19.    [employer]  ID: [id number]  BIN: [id number]  PCN: [id number]  GRP: [id number]  Help desk: [phone]    Submitted PA request via covermymeds. Key [id number]

## 2022-02-03 NOTE — TELEPHONE ENCOUNTER
[facility]ed [facility] pharmacist to relay patient's message regarding using the same insurance in January as well as a coupon and the Latuda costing $0. The pharmacist indicated that he does not know if the  coupon was a one-time fill only and that is what took the cost down, but the insurance company is coming back with a price of $700 for this month's fill. He recommended that the patient contact member services to get an explanation of his prescription benefits. Relayed this to patient via response to his [employer] message.    Routed to Dr. Motley for FYI.    From the Latuda website:    Start saving today with a new offer from LATUDA  Eligible patients may pay as little as $0 for the first 30-day prescription fill and $10 for 30- or 90-day refills.*  *Exclusions apply.    Simply present your card to your pharmacist with your LATUDA prescription to start taking advantage of this new offer    *Must meet eligibility requirements. For commercially insured patients, this Copay Savings Card covers out-of-pocket expenses with a maximum benefit of $400 for a 30-day supply or $1200 for a 90-day supply. The card allows up to twelve 30-day supply uses or four 90-day supply uses in a calendar year with a valid prescription    Just show your LATUDA Copay Savings Card at the pharmacy when you fill or refill your prescription.     If you have any questions or concerns about the LATUDA Copay Savings Card, call [employer] at [phone] ([phone]).

## 2022-02-03 NOTE — TELEPHONE ENCOUNTER
I spoke to Esa on the phone this morning.  After speaking with his psychotherapy team on Tuesday (2 days ago), there was some concern that Esa was experiencing manic symptoms.    Today, Esa reports what sounds to be sporadic episodes of mild hypomania.  I suspect when things are calm he is euthymic, but he is easily triggered into low-grade hypomania.    Esa has been thinking more about spirituality than usual, though not nearly to the delusional degree that he has experienced in the past.  He is aware that thinking about it too deeply is actually a trigger for katie, and so he is making a conscious effort not to do that.  He has been more physically active than usual, though again not to the excessive degree that he has in the past.  He has made a couple of purchases, including a wind kite and a new pair of skis.  His wife has noted that he has been more irritable than usual.    Esa denies current depressive symptoms.    On the phone, Esa sounded his usual self.  His speech was normal in rate and volume, spontaneous, fluent, and goal-directed.  He had insight into likely experiencing periodic mild hypomanic symptoms.    Esa is reluctant to discontinue Lexapro as the combination of it with Latuda has been very helpful in treating his severe depression that he had been in for several months.  He does agree to restart risperidone 1 mg at night for the next week (he stopped it several days ago).  If his manic symptoms intensify, he will discontinue Lexapro and contact me.  He agrees to message me in a week and let me know how things are going.    Mayito Motley MD

## 2022-02-04 ENCOUNTER — TELEPHONE (OUTPATIENT)
Dept: PSYCHIATRY | Facility: CLINIC | Age: 37
End: 2022-02-04
Payer: COMMERCIAL

## 2022-02-04 NOTE — TELEPHONE ENCOUNTER
Received 4 fax pages from [employer] indicating that a prior authorization is not required for this medication. The four pages of forms were then labeled and sent to scanning, with the original copy being held in psychiatry until later confirmed in scanning. Travis Palacios, EMT

## 2022-02-04 NOTE — TELEPHONE ENCOUNTER
Central Prior Authorization Team   Phone: [phone]      PA Initiation-TIERING EXCEPTION INITIATED VIA PHONE WITH JELANI AT [facility]    Medication: lurasidone (LATUDA) 40 MG TABS tablet - TIER EXCEPTION REQUEST-INITIATED  Insurance Company: [facility] - Phone [phone] Fax [phone]  [id number] [place] [facility] [place], [place] - [address] [place] [address]  Filling Pharmacy Phone: [phone]  Filling Pharmacy Fax:    Start Date: 2/4/2022

## 2022-02-04 NOTE — TELEPHONE ENCOUNTER
PA request through covermymeds.com came back with a determination that PA is not needed.     PA is needed for tier exception. [facility] but received the message that they are having technical difficulties and the call was terminated.    [facility] Non-Medicare Formulary Exception / Prior Authorization Request Form   Plan Contact  [phone] phone  [phone] fax    Will ask the PA Team for help.

## 2022-02-04 NOTE — TELEPHONE ENCOUNTER
Central Prior Authorization Team   Phone: [phone]          Additional information can be found in the MyChart Encounter dated: 02/03/22

## 2022-02-07 ENCOUNTER — MYC MEDICAL ADVICE (OUTPATIENT)
Dept: PSYCHIATRY | Facility: CLINIC | Age: 37
End: 2022-02-07
Payer: COMMERCIAL

## 2022-02-07 NOTE — TELEPHONE ENCOUNTER
It would make sense that insurance would not see PA request, as patient stated because this is not a PA, but rather a tiering exception. As I was calling [facility] to get an update, I received a call from Gen at [facility]. They were attempting to fax a form to us for additional information, but had the wrong fax # for us.  She is going to fax to the correct number and asked that it be faxed back to Fax # [phone].

## 2022-02-07 NOTE — TELEPHONE ENCOUNTER
Called [employer] at [phone] to find out how to obtain samples. Spoke with customer service and the help desk in order to gain access to the account set up for Dr. Motley. Obtained a form for samples that was emailed to clinic staff for printing and Dr. Motley's signature. The signed form can be faxed to [phone] and/or emailed to [email or web address].    The samples can take 5-10 business days to arrive once the form is received.

## 2022-02-07 NOTE — TELEPHONE ENCOUNTER
Received additional question set for tiering exception. Returned to [facility] via [employer].

## 2022-02-07 NOTE — TELEPHONE ENCOUNTER
Pt calling to get an update on PA/Tiering Exception. Verified fax number with pt for [employer] to fax information over to our clinic.

## 2022-02-08 ENCOUNTER — VIRTUAL VISIT (OUTPATIENT)
Dept: PSYCHIATRY | Facility: CLINIC | Age: 37
End: 2022-02-08
Payer: COMMERCIAL

## 2022-02-08 ENCOUNTER — LAB (OUTPATIENT)
Dept: LAB | Facility: CLINIC | Age: 37
End: 2022-02-08
Payer: COMMERCIAL

## 2022-02-08 DIAGNOSIS — F31.30 BIPOLAR I DISORDER, MOST RECENT EPISODE DEPRESSED (H): Primary | ICD-10-CM

## 2022-02-08 DIAGNOSIS — Z51.81 ENCOUNTER FOR THERAPEUTIC DRUG MONITORING: ICD-10-CM

## 2022-02-08 DIAGNOSIS — F31.2 BIPOLAR AFFECTIVE DISORDER, CURRENTLY MANIC, SEVERE, WITH PSYCHOTIC FEATURES (H): ICD-10-CM

## 2022-02-08 LAB
ALT SERPL W P-5'-P-CCNC: 52 U/L (ref 0–70)
AST SERPL W P-5'-P-CCNC: 15 U/L (ref 0–45)
BASOPHILS # BLD AUTO: 0 10E3/UL (ref 0–0.2)
BASOPHILS NFR BLD AUTO: 1 %
EOSINOPHIL # BLD AUTO: 0.1 10E3/UL (ref 0–0.7)
EOSINOPHIL NFR BLD AUTO: 2 %
ERYTHROCYTE [DISTWIDTH] IN BLOOD BY AUTOMATED COUNT: 11.7 % (ref 10–15)
HCT VFR BLD AUTO: 45.3 % (ref 40–53)
HGB BLD-MCNC: 15.6 G/DL (ref 13.3–17.7)
IMM GRANULOCYTES # BLD: 0 10E3/UL
IMM GRANULOCYTES NFR BLD: 0 %
LYMPHOCYTES # BLD AUTO: 2.4 10E3/UL (ref 0.8–5.3)
LYMPHOCYTES NFR BLD AUTO: 41 %
MCH RBC QN AUTO: 30.5 PG (ref 26.5–33)
MCHC RBC AUTO-ENTMCNC: 34.4 G/DL (ref 31.5–36.5)
MCV RBC AUTO: 89 FL (ref 78–100)
MONOCYTES # BLD AUTO: 0.5 10E3/UL (ref 0–1.3)
MONOCYTES NFR BLD AUTO: 8 %
NEUTROPHILS # BLD AUTO: 2.8 10E3/UL (ref 1.6–8.3)
NEUTROPHILS NFR BLD AUTO: 48 %
NRBC # BLD AUTO: 0 10E3/UL
NRBC BLD AUTO-RTO: 0 /100
PLATELET # BLD AUTO: 254 10E3/UL (ref 150–450)
RBC # BLD AUTO: 5.11 10E6/UL (ref 4.4–5.9)
VALPROATE SERPL-MCNC: 70 MG/L
WBC # BLD AUTO: 5.9 10E3/UL (ref 4–11)

## 2022-02-08 PROCEDURE — 36415 COLL VENOUS BLD VENIPUNCTURE: CPT

## 2022-02-08 PROCEDURE — 85025 COMPLETE CBC W/AUTO DIFF WBC: CPT

## 2022-02-08 PROCEDURE — 84450 TRANSFERASE (AST) (SGOT): CPT

## 2022-02-08 PROCEDURE — 84460 ALANINE AMINO (ALT) (SGPT): CPT

## 2022-02-08 PROCEDURE — 80164 ASSAY DIPROPYLACETIC ACD TOT: CPT

## 2022-02-08 NOTE — PROGRESS NOTES
"Clinician Contact & Progress Note  Department of Psychiatry & Behavioral Sciences  [facility]    Patient: Esa Rivera (1985)     MRN: [id number]  Date of Treatment:  Feb 8, 2022  Duration of Treatment: Start Time: 9:09; End Time: 10:00  Provider: Pablo Dickey M.A.  Supervisor: Rubia Boone, Ph.D.    People present:   Provider: Pablo Dickey  Patient: Esa Rivera    Telehealth Video Visit AddOn  Type of service: Telemedicine Psychotherapy for Depression. The patient's condition can be safely assessed and treated via synchronous audio and visual telemedicine encounter.    Mode of Communication: Video Conference via [employer]  Reason for Telemedicine Visit: This patient visit was converted to a Telehealth video visit to minimize exposure to COVID-19.  Originating Site (Patient Location): Patient's home  Distant Site (Provider Location): Provider Remote Setting- Home Office  Consent:  The patient has verbally consented to: the potential risks and benefits of telemedicine versus in person care with special emphasis on confidentiality given family members in the home.  Attestation: As the provider I attest to compliance with applicable laws and regulations related to telemedicine.    Encounter Diagnosis   Name Primary?     Bipolar I disorder, most recent episode depressed (H) Yes     Assessment (current symptoms):    PHQ 2/1/2022   PHQ-9 Total Score 10   Q9: Thoughts of better off dead/self-harm past 2 weeks Not at all   Some encounter information is confidential and restricted. Go to Review Flowsheets activity to see all data.     ANGY-7 SCORE 2/1/2022   Total Score 10 (moderate anxiety)   Total Score 10     Patient reports that he had a difficult week with trying to sort out his medications and talk with the insurance company. Despite this, he reports that he is feeling \"better than [he has] felt in 10 years\" currently. He denies suicidal ideation, plan, and intent. " "    Session Content:     Esa and I discussed the events that occurred following our session last week in order to process his thoughts and make sure we were on the same page.   o In general, Esa is grateful to have coordinated care between his doctors. This week, he felt frustrated that he had to  Advocate for himself to two separate healthcare professionals when he does not think he is experiencing katie.     We discussed Esa's early warning signs for katie and Esa provided verbal consent for Dr. Motley or his wife, Julia, to be contacted if he is experiencing manic symptoms.  o Big racing thoughts (giant business ideas, huge plans, racing problem solving), obsessed with work (zoned in, watching equipment run), lack of sleep (can't fall asleep, waking up with energy after being asleep for 3 hours), talking quickly (can't keep up with thoughts).     We discussed Esa's manic symptoms from the past.   o In the past his first experience was during katie but then he chased the spiritual connection really hard but then he had to recover mentally and emotionally for years. Has learned from those past experiences. Now the spirituality will come and go but he knows not to fabiano it and let it come and go.     We discussed Esa's current assessment of his symptoms.   o He feels that he is \"on the edge\" between euthymia and hypomania right now.   o While he has noticed some katie symptoms (e.g., waking up in the middle of the night feeling energized), he has been able to engage in opposite actions that do not allow those symptoms to escalate (e.g., returning to bed and going back to sleep rather than beginning to work on a project).   o He reports being aware that it could escalate and \"get out of control\" but that he does not feel like he is in that place currently. He does hope that his mood will become \"more stable\" so that he does not have to worry about himself slipping into katie.      Psychoeducation was provided " around the idea of continuing with BA, with setting goals at a moderate pace and intensity.     We reviewed Esa's typical schedule an discussed activity tracking for this week.     Handouts  Status # Form   1/25  In session 1-4 What is Behavioral Activation?   2/1 in session 5-8 Vicious Cycles   2/1 and 2/8 9 Activity Monitoring: Track Your Mood + Activity Monitoring Worksheet    10 UP and DOWN Activities    11 A Life Chambers Living: Values, Pleasure, Mastery, and Goals    12-15 Values + Values Rating Sheet    16-17 Translating Values Into Activities    18-19 Pleasure, Mastery    20-21 Activities List    22 Values, Pleasure and Mastery Activities List    23 Goal setting    24-25 Activity Planning + Worksheet    26-27 Pleasure Predicting + Worksheet    28 Problem Solving and Acceptance    29-30 Dealing with Low Motivation + Low Motivation Tips    31-32 Behavioral Activation Tips    33 Barriers and Resources Worksheet     Treatment Plan:     Complete between session activation assignments: Activity Monitoring (PDF Page 98)    Continue with behavioral activation therapy with writer. Next session is scheduled for 2/15.    Continue with psychiatric treatment provider Dr. Motley.    Mental Status Exam:  Alertness: alert  and oriented  Appearance: well groomed  Behavior/Demeanor: cooperative and agitated, with fair  eye contact   Speech: slowed  Language: intact. Preferred language identified as English.  Psychomotor: restless  Mood: euphoric, labile and description consistent with euthymia  Affect: full range; was congruent to mood; was occassionally incongruent to content  Thought Process/Associations: perseverative and response delay  Thought Content: Reports none;  Denies suicidal ideation  -Suicidal ideation: reports none, denies ideation, intent, and denies plan  -Homicidal Ideation: denies  Perception: Reports none;  Denies auditory hallucinations and visual hallucinations; intact    Insight: adequate  Judgment:  "good  Cognition: does  appear grossly intact    Billing for \"Interactive Complexity\"?    No    Pablo Dickey     I did not directly participate in this patient visit. I discussed this patient with the above trainee in individual supervision and agree with the note and plan as documented.    Supervisor: Rubia Boone, PhD, LP    "

## 2022-02-09 ENCOUNTER — MYC MEDICAL ADVICE (OUTPATIENT)
Dept: PSYCHIATRY | Facility: CLINIC | Age: 37
End: 2022-02-09
Payer: COMMERCIAL

## 2022-02-09 DIAGNOSIS — F31.30 BIPOLAR I DISORDER, MOST RECENT EPISODE DEPRESSED (H): ICD-10-CM

## 2022-02-09 NOTE — TELEPHONE ENCOUNTER
Sent patient a [employer] message asking if the coupon from [employer] will cover the $348.38 copay.

## 2022-02-09 NOTE — TELEPHONE ENCOUNTER
I spoke to James at [facility]. She said this was approved. Copay will now be $348.38.     Prior Authorization Approval    Authorization Effective Date: 2/7/2022  Authorization Expiration Date: 2/27/2023  Medication: lurasidone (LATUDA) 40 MG TABS tablet -Tiering exception-approved  Approved Dose/Quantity:   Reference #:     Insurance Company: [employer] - Phone [phone] Fax [phone]  Expected CoPay:       CoPay Card Available:      Foundation Assistance Needed:    Which Pharmacy is filling the prescription (Not needed for infusion/clinic administered[id number] [place] [facility] [place] [place] - [address]  Pharmacy Notified: Yes-pharmacy states previous copay was $714, but patient said if it isn't $20 he will not pay for it. They will profile the Rx  Patient Notified:

## 2022-02-09 NOTE — TELEPHONE ENCOUNTER
Patient contacted the pharmacy regarding Latuda cost. With the tier exception and [employer] coupon, his cost is $10 for a 30 ds.

## 2022-02-11 RX ORDER — LURASIDONE HYDROCHLORIDE 40 MG/1
40 TABLET, FILM COATED ORAL DAILY
Qty: 30 TABLET | Refills: 1 | Status: SHIPPED | OUTPATIENT
Start: 2022-02-11 | End: 2022-03-15 | Stop reason: DRUGHIGH

## 2022-02-11 NOTE — TELEPHONE ENCOUNTER
Patient requested updated Rx for Latuda 40 mg daily be sent to the pharmacy to avoid issues with the quantity dispensed.      Pended Rx for 30 ds with 1 rf and routed to Dr. Motley for review and signature if no dose changes are anticipated. Patient has a follow up appointment on 3/15/22.

## 2022-02-14 ENCOUNTER — NURSE TRIAGE (OUTPATIENT)
Dept: NURSING | Facility: CLINIC | Age: 37
End: 2022-02-14
Payer: COMMERCIAL

## 2022-02-14 ENCOUNTER — TELEPHONE (OUTPATIENT)
Dept: PSYCHIATRY | Facility: CLINIC | Age: 37
End: 2022-02-14
Payer: COMMERCIAL

## 2022-02-14 NOTE — TELEPHONE ENCOUNTER
"Esa's wife is calling requesting Dr Tanner guidance with Lexapro   States that when Esa takes 5mg-he wakes up at 1am and feels ready to go  He has been taking 2.5mg of Lexapro lately and is depressed   Didn't even have enough energy to call or message the clinic about his medication.  Denies suicidal thoughts.  She states he is also taking Risperidone 1mg at HS but has not taken the last 2 nights   Needs some guidance with medication  Julia would like Dr. Motley or nurse to call Esa back with recommendations             Reason for Disposition    Caller has urgent medication question about med that PCP prescribed and triager unable to answer question    Answer Assessment - Initial Assessment Questions  1. SYMPTOMS: \"Do you have any symptoms?\"      Lexapro--when taking 5mg daily  Wakes up at 1am and feels ready to go      Lexapro -when taking 2.5mg daily  Depressed  Hard to get up   Also taking Risperdal 1mg at HS    Protocols used: MEDICATION QUESTION CALL-A-OH      "

## 2022-02-15 ENCOUNTER — VIRTUAL VISIT (OUTPATIENT)
Dept: PSYCHIATRY | Facility: CLINIC | Age: 37
End: 2022-02-15
Payer: COMMERCIAL

## 2022-02-15 DIAGNOSIS — F31.30 BIPOLAR I DISORDER, MOST RECENT EPISODE DEPRESSED (H): Primary | ICD-10-CM

## 2022-02-15 NOTE — Clinical Note
Hi Dr. Motley, I know nurses from your team have been in contact with Esa about medication adjustments. I wanted to pass along my note from our session today as an FYI on his mood.  Thank you, Pablo

## 2022-02-15 NOTE — TELEPHONE ENCOUNTER
----- Message -----  From: Mayito Motley MD  Sent: 2/15/2022  12:01 PM CST  To: MAE Nuñez,    Thanks for the message.  One possibility would be for Esa to increase his Latuda.  I believe he is currently taking 40 mg, he could go up to 60 mg.  (If this is possible, I know there have been some insurance issues with getting the medication covered.)    DB    Follow-up:  See 2/15/22 [employer] message to pt.

## 2022-02-15 NOTE — PROGRESS NOTES
"Clinician Contact & Progress Note  Department of Psychiatry & Behavioral Sciences  [facility]    Patient: Esa Rivera (1985)     MRN: [id number]  Date of Treatment:  Feb 15, 2022  Duration of Treatment: Start Time: 9:14; End Time: 10:03  Provider: Pablo Dickey M.A.  Supervisor: Rubia Boone, Ph.D.    People present:   Provider: Pablo Dickey  Patient: Esa Rivera    Telehealth Video Visit AddOn  Type of service: Telemedicine Psychotherapy for Depression. The patient's condition can be safely assessed and treated via synchronous audio and visual telemedicine encounter.    Mode of Communication: Video Conference via Brigade  Reason for Telemedicine Visit: This patient visit was converted to a Telehealth video visit to minimize exposure to COVID-19.  Originating Site (Patient Location): Patient's home  Distant Site (Provider Location): Provider Remote Setting- Home Office  Consent:  The patient has verbally consented to: the potential risks and benefits of telemedicine versus in person care with special emphasis on confidentiality given family members in the home.  Attestation: As the provider I attest to compliance with applicable laws and regulations related to telemedicine.    Encounter Diagnosis   Name Primary?     Bipolar I disorder, most recent episode depressed (H) Yes       Assessment (current symptoms):    PHQ 6/12/2019 8/7/2019 2/1/2022   PHQ-9 Total Score 4 2 10   Q9: Thoughts of better off dead/self-harm past 2 weeks Not at all Not at all Not at all     ANGY-7 SCORE 2/1/2022   Total Score 10 (moderate anxiety)   Total Score 10     Patient reports that he is feeling back to depressed as of this weekend. He reports that he is now feeling depressed, hopeless, like he has no energy or desire to do anything, and irritable.     Session Content:     Esa reports his week has been \"horrible.\" He recounts that he was feeling \"the best he has felt\" since his youth " "and now is feeling \"super depressed\" again. He appears very frustrated with his medication change. He reports that he \"knows [he] had to do something\" about his hypomanic symptoms but wishes he could have \"just kept feeling okay even if it risked it (katie).\" He reports that he wants to find something where he does not have to take so much medication to feel balanced. He feels that he is losing patience with the medication process.    Late last week or early this weekend Esa reports that he reduced his Lexapro from 5 mg to 2.5 mg because he was waking up by 1 AM feeling ready to get up and go for the day. He reports that since making that switch, it felt like his mood changed \"over night.\" He reports that he has never experienced his mood switching this quickly before.     We discussed his daily activities and differences between them now and when he was feeling better. He reports that his activities are the same but right now he has to drag himself through the day.     Esa reported that he has a vacation coming up (leaving tomorrow morning for [place] and returning Sunday) and he is dreading it. He is not looking forward to going and he feels like it is going to be more trouble than it is worth. We talked about what he might have done on this trip when he was feeling better and how he might be able to try to do some of those things despite feeling down.     Esa reports feeling frustrated with his wife for not talking with him about important things and for \"pushing\" him to lower the Lexapro dose.    We discussed how behavioral activation can still be used to shape behavior to support his mood.     Esa denies currently experiencing any suicidal ideation. He did say that because this was a \"very big swing\" in his mood that seemed to happen so suddenly, he does feel worried about the future if he were to experience something like this again or if it started to feel even worse. In general, Esa feels like this " mood change was out of his control and shocking. Esa is aware of options for help-seeking if he were in crisis, including talking with his wife, calling our clinic, and going to the emergency room.     Handouts  Status # Form   1/25  In session 1-4 What is Behavioral Activation?   2/1 in session 5-8 Vicious Cycles   2/1 and 2/8 9 Activity Monitoring: Track Your Mood + Activity Monitoring Worksheet    10 UP and DOWN Activities    11 A Life [place] Living: Values, Pleasure, Mastery, and Goals    12-15 Values + Values Rating Sheet    16-17 Translating Values Into Activities    18-19 Pleasure, Mastery    20-21 Activities List    22 Values, Pleasure and Mastery Activities List    23 Goal setting    24-25 Activity Planning + Worksheet    26-27 Pleasure Predicting + Worksheet    28 Problem Solving and Acceptance    29-30 Dealing with Low Motivation + Low Motivation Tips    31-32 Behavioral Activation Tips    33 Barriers and Resources Worksheet     Treatment Plan:     Complete between session activation assignments: Try to engage in one activity during his vacation that he might find enjoyable.    Continue with behavioral activation therapy with writer. Next session is scheduled for 2/22/22.    Continue with psychiatric treatment provider Dr. Motley.    Mental Status Exam:  Alertness: oriented and drowsy  Appearance: well groomed  Behavior/Demeanor: cooperative and agitated, with fair  eye contact   Speech: slowed  Language: intact. Preferred language identified as English.  Psychomotor: restless  Mood: depressed and irritable  Affect: full range; was congruent to mood; was occassionally incongruent to content  Thought Process/Associations: perseverative and response delay  Thought Content: Reports none;  Denies suicidal ideation  -Suicidal ideation: reports none, denies ideation, intent, and denies plan  -Homicidal Ideation: denies  Perception: Reports none;  Denies auditory hallucinations and visual hallucinations; intact   "  Insight: adequate  Judgment: fair  Cognition: does  appear grossly intact    Billing for \"Interactive Complexity\"?    No    Pablo Dickey MA    I did not directly participate in this patient visit. I discussed this patient with the above trainee in individual supervision and agree with the note and plan as documented.    Supervisor: Rubia Boone, PhD, LP    "

## 2022-02-21 ENCOUNTER — BEH TREATMENT PLAN (OUTPATIENT)
Dept: PSYCHIATRY | Facility: CLINIC | Age: 37
End: 2022-02-21
Payer: COMMERCIAL

## 2022-02-21 DIAGNOSIS — F31.30 BIPOLAR I DISORDER, MOST RECENT EPISODE DEPRESSED (H): Primary | ICD-10-CM

## 2022-02-21 NOTE — PROGRESS NOTES
Outpatient Treatment Plan  Department of Psychiatry & Behavioral Sciences  [facility]     Patient: Esa Rivera  MRN: [id number]  Provider: Pablo Dickey M.A.  Supervisor: Rubia Boone, Ph.D.    Outpatient Treatment Plan     Today's Date: Feb 21, 2022                 90-Day Review Date: Feb 21, 2022 +90 days    Date of Initial Service:  Jan 25, 2022    Diagnosis:  Encounter Diagnosis   Name Primary?     Bipolar I disorder, most recent episode depressed (H) Yes       Current symptoms and circumstances that substantiate the diagnosis:  Esa reports that he was having persistent low mood, low energy, fatigue, irritability, guilt, avoidance of tasks, and passive suicidal ideation with no plan or intent. He reports that he has not experienced suicidal ideation in at least two weeks.     How symptoms and/or behaviors are affecting level of function:  He is experiencing difficulties engaging in work, completing tasks around the house, and engaging in enjoyable activities.     Risk Assessment:  Suicide:  Assessed Level of Immediate Risk: Low  Ideation: None current  Plan:  No  Means: No  Intent: No     Homicide/Violence:  Assessed Level of Immediate Risk: None  Ideation: No  Plan: No  Means: No  Intent: No          SYMPTOMS; PROBLEMS   MEASURABLE GOALS;    FUNCTIONAL IMPROVEMENT INTERVENTIONS Gains Made:     Bipolar Disorder (current episode depression) reduce depressive symptoms by 30%; create engagement in regular activities at a moderate level despite mood BA N/A Onset of treatment                                  Frequency of Sessions: Weekly     Discharge and Aftercare Goals: see measurable goals above     Expected duration of treatment: 3 months     Participants in therapy plan (family, friends, support network): self; We also have permission to contact Esa's wife Julia if necessary for treatment and safety.     Patient verbally consented to the treatment plan above.    Pablo  CARLEE Dickey.  Practicum Student    I did not directly participate in this patient visit. I discussed this patient with the above trainee in individual supervision and agree with the note and plan as documented.    Supervisor: Rubia Boone, PhD, LP

## 2022-02-22 ENCOUNTER — VIRTUAL VISIT (OUTPATIENT)
Dept: PSYCHIATRY | Facility: CLINIC | Age: 37
End: 2022-02-22
Payer: COMMERCIAL

## 2022-02-22 DIAGNOSIS — F31.30 BIPOLAR I DISORDER, MOST RECENT EPISODE DEPRESSED (H): Primary | ICD-10-CM

## 2022-02-22 NOTE — PROGRESS NOTES
"Clinician Contact & Progress Note  Department of Psychiatry & Behavioral Sciences  [facility]    Patient: Esa Rivera (1985)     MRN: [id number]  Date of Treatment:  Feb 22, 2022  Duration of Treatment: Start Time: 9:00; End Time: 9:57  Provider: Pablo Dickey M.A.  Supervisor: Rubia Boone, Ph.D.    People present:   Provider: Pablo Dickey  Patient: Esa Rivera    Telehealth Video Visit AddOn  Type of service: Telemedicine Psychotherapy for Depression. The patient's condition can be safely assessed and treated via synchronous audio and visual telemedicine encounter.    Mode of Communication: Video Conference via [employer]  Reason for Telemedicine Visit: This patient visit was converted to a Telehealth video visit to minimize exposure to COVID-19.  Originating Site (Patient Location): Patient's home  Distant Site (Provider Location): Provider Remote Setting- Home Office  Consent:  The patient has verbally consented to: the potential risks and benefits of telemedicine versus in person care with special emphasis on confidentiality given family members in the home.  Attestation: As the provider I attest to compliance with applicable laws and regulations related to telemedicine.    Encounter Diagnosis   Name Primary?     Bipolar I disorder, most recent episode depressed (H) Yes     Assessment (current symptoms):    PHQ 6/12/2019 8/7/2019 2/1/2022   PHQ-9 Total Score 4 2 10   Q9: Thoughts of better off dead/self-harm past 2 weeks Not at all Not at all Not at all     ANGY-7 SCORE 2/1/2022   Total Score 10 (moderate anxiety)   Total Score 10     Patient reports that he is still feeling depressed, hopeless, like he has no energy or desire to do anything, irritable, and stressed.     Session Content:     Esa reports that he had an \"alright time\" on his vacation this week. He reported that they went on walks, played games, and went out to eat. He reported that his " "depression was pretty consistent this week and that he felt easily stressed.     We discussed Esa's anxiety. He reports worrying about his neighborhood (he reports it is a high crime neighborhood and is worried he will have someone break in and marilee them), finances (despite having good jobs he feels that they are not able to afford things he thinks he should be able to), career/ life direction, daughter's wellbeing (\"she will drink the leaded water and drop IQ\"). He reports feeling like he is worried all the time. He reports that when he was feeling euthymic/ hypomanic he was not feeling worried at all.    We checked in about Esa's medication management. Esa reports that he could not  the prescribed Latuda because of the prescription cost. He feels frustrated that the staff did not understand his concerns in the messages he sent.     He is curious about the possibility of medical marijuana because of depression and stress. He reports that the used marijuana over the vacation and found it had a positive effect on his mood. He reports that he is not sure how the impact could change if he was not taking these other medications, which he notes could be of concern. He reports that his decision to try marijuana on the trip stemmed from remembering that in the past when he used marijuana he was more social and able to maintain good relationships and he was feeling very irritable and like he was at risk of ruining the relationship he had with his sister on the trip. He reports most noticing differences in his patience, positivity, laughter, and mindfulness/ being present while using marijuana.     We further discussed Esa's irritability, as he sees it as a primary barrier to his ability to connect socially. Esa reports that his irritability has gotten worse over the past three to five years. He reports that his irritability is so bad that he would not be surprised if his relationship ends in divorce if his mood " does not improve. He reports that his irritability feels like it boils up out of no where and he cannot control it. He reports that this happens due to any small inconvenience or annoyance.     We reoriented toward behavioral activation and the work we can plan together to address his depression. He is considering paying for a gym membership but he is not as interested as he used to be when he was feeling more positively. We discussed how he could take one step toward physical activity that might feel more manageable for him than joining a gym would be. He reports that it is most likely that he will be able to ride the stationary bike in his basement. The goal will be to ride the bike for 20 minutes one day this week. He will also complete activity tracking for one day this week.     Handouts  Status # Form   1/25  In session 1-4 What is Behavioral Activation?   2/1 in session 5-8 Vicious Cycles   2/1, 2/8, 2/22 9 Activity Monitoring: Track Your Mood + Activity Monitoring Worksheet    10 UP and DOWN Activities    11 A Life [place] Living: Values, Pleasure, Mastery, and Goals    12-15 Values + Values Rating Sheet    16-17 Translating Values Into Activities    18-19 Pleasure, Mastery    20-21 Activities List    22 Values, Pleasure and Mastery Activities List    23 Goal setting    24-25 Activity Planning + Worksheet    26-27 Pleasure Predicting + Worksheet    28 Problem Solving and Acceptance    29-30 Dealing with Low Motivation + Low Motivation Tips    31-32 Behavioral Activation Tips    33 Barriers and Resources Worksheet     Treatment Plan:     Complete between session activation assignments: Ride his stationary bike one time for 20-minutes, conduct one day of activity monitoring.     Continue with behavioral activation therapy with writer. Next session is scheduled for 3/1/22.    Continue with psychiatric treatment provider Dr. Motley.    Mental Status Exam:  Alertness: oriented and drowsy  Appearance: well  "groomed  Behavior/Demeanor: cooperative and agitated, with fair  eye contact   Speech: slowed  Language: intact. Preferred language identified as English.  Psychomotor: restless  Mood: depressed and irritable  Affect: full range; was congruent to mood; was occassionally incongruent to content  Thought Process/Associations: perseverative and response delay  Thought Content: Reports none;  Denies suicidal ideation  -Suicidal ideation: reports none, denies ideation, intent, and denies plan  -Homicidal Ideation: denies  Perception: Reports none;  Denies auditory hallucinations and visual hallucinations; intact    Insight: adequate  Judgment: fair  Cognition: does  appear grossly intact    Billing for \"Interactive Complexity\"?    No    Pablo Dickey MA    I did not directly participate in this patient visit. I discussed this patient with the above trainee in individual supervision and agree with the note and plan as documented.    Supervisor: Rubia Boone, PhD, LP      "

## 2022-03-01 ENCOUNTER — VIRTUAL VISIT (OUTPATIENT)
Dept: PSYCHIATRY | Facility: CLINIC | Age: 37
End: 2022-03-01
Payer: COMMERCIAL

## 2022-03-01 DIAGNOSIS — F31.30 BIPOLAR I DISORDER, MOST RECENT EPISODE DEPRESSED (H): Primary | ICD-10-CM

## 2022-03-01 NOTE — PROGRESS NOTES
Clinician Contact & Progress Note  Department of Psychiatry & Behavioral Sciences  [facility]    Patient: Esa Rivera (1985)     MRN: [id number]  Date of Treatment:  Mar 1, 2022  Duration of Treatment: Start Time: 9:02; End Time: 10:00  Provider: Pablo Dickey M.A.  Supervisor: Rubia Boone, Ph.D.    People present:   Provider: Pablo Dickey  Patient: Esa Rivera    Telehealth Video Visit AddOn  Type of service: Telemedicine Psychotherapy for Depression. The patient's condition can be safely assessed and treated via synchronous audio and visual telemedicine encounter.    Mode of Communication: Video Conference via [employer]  Reason for Telemedicine Visit: This patient visit was converted to a Telehealth video visit to minimize exposure to COVID-19.  Originating Site (Patient Location): Patient's home  Distant Site (Provider Location): Provider Remote Setting- Home Office  Consent:  The patient has verbally consented to: the potential risks and benefits of telemedicine versus in person care with special emphasis on confidentiality given family members in the home.  Attestation: As the provider I attest to compliance with applicable laws and regulations related to telemedicine.    Encounter Diagnosis   Name Primary?     Bipolar I disorder, most recent episode depressed (H) Yes     Assessment (current symptoms):    PHQ 6/12/2019 8/7/2019 2/1/2022   PHQ-9 Total Score 4 2 10   Q9: Thoughts of better off dead/self-harm past 2 weeks Not at all Not at all Not at all     ANGY-7 SCORE 2/1/2022   Total Score 10 (moderate anxiety)   Total Score 10     Patient reports that he is still feeling depressed, hopeless, like he has no energy or desire to do anything, irritable, and stressed.     Session Content:     Esa reports that he is trying to decide how the marijuana use is affecting his mood. He reports having more energy, being happier to be around, and being more tolerable  "which is useful when interacting with his family. He also reports that this has given him mental space to self-reflect on his personality and how that impacts relationships. However, he reports that for the other times of day he has more brain fog and his cognitive performance is lower. He reports he is making more thoughtless mistakes (e.g., putting extra soap in the laundry).     He reports that he thinks the Lexapro has made him more short-tempered.     We discussed the roadblocks that come up for Esa when trying to connect interpersonally with people. He reports that he is not a \"lighthearted\" person and is \"wound too tight\" to have fun or make jokes.    He has a hard time letting perfectionism go at home, even though he understands those expectations might not be realistic. He feels that in these situations he is angered very easily and snaps without being able to control it.  o Socratic questioning was used to explore the thoughts and beliefs underlying these thoughts. Esa's dad modeled this perfectionist way of life and he has adapted it to his house. Esa feels that he is more able to control his anger with friends and strangers as compared to family. He reports having high expectations with his wife because they live together. He reports that he thinks he can \"show his true self\" to his family.   o He reports that the house cleaning is a really big stressor for him. He feels hopeless about being able to ever have a clean house.      Esa forgot to do his activity monitoring sheet this week. He reports that he went kiting on Saturday which he enjoyed and then went to a hockey game with his friends but did not enjoy himself. He reports realizing that he doesn't really find those people interesting and doesn't want to be friends with them anymore. He is not sure how to meet new friends and build new relationships.    We discussed Esa's he goal will be to ride the bike for 20 minutes one day last week. He " "was not able to complete it. He reports that he \"hates the bike,\" so we talked about the importance of picking a goal with the least number of barriers. Esa will be out of town on a hunting trip this week. He will pay attention to the activities that boost and lower his mood this week.    Handouts  Status # Form   1/25  In session 1-4 What is Behavioral Activation?   2/1 in session 5-8 Vicious Cycles   2/1, 2/8, 2/22 9 Activity Monitoring: Track Your Mood + Activity Monitoring Worksheet   3/1 in session 10 UP and DOWN Activities    11 A Life [place] Living: Values, Pleasure, Mastery, and Goals    12-15 Values + Values Rating Sheet    16-17 Translating Values Into Activities    18-19 Pleasure, Mastery    20-21 Activities List    22 Values, Pleasure and Mastery Activities List    23 Goal setting    24-25 Activity Planning + Worksheet    26-27 Pleasure Predicting + Worksheet    28 Problem Solving and Acceptance    29-30 Dealing with Low Motivation + Low Motivation Tips    31-32 Behavioral Activation Tips    33 Barriers and Resources Worksheet     Treatment Plan:      Complete between session activation assignments: during hunting trip, notice activities that boost and lower his mood.     Continue with behavioral activation therapy with writer. Next session is scheduled for 3/8/22.    Continue with psychiatric treatment provider Dr. Motley.    Mental Status Exam:  Alertness: oriented and drowsy  Appearance: well groomed  Behavior/Demeanor: cooperative and agitated, with fair  eye contact   Speech: increased latency of response and slowed  Language: intact. Preferred language identified as English.  Psychomotor: restless  Mood: depressed and irritable  Affect: full range; was congruent to mood; was occassionally incongruent to content  Thought Process/Associations: perseverative and response delay  Thought Content: Reports none;  Denies suicidal ideation  -Suicidal ideation: reports none, denies ideation, intent, and denies " "plan  -Homicidal Ideation: denies  Perception: Reports none;  Denies auditory hallucinations and visual hallucinations; intact    Insight: adequate  Judgment: fair  Cognition: does  appear grossly intact    Billing for \"Interactive Complexity\"?    No    Pablo Dickey MA    I did not directly participate in this patient visit. I discussed this patient with the above trainee in individual supervision and agree with the note and plan as documented.    Supervisor: Rubia Boone, PhD, LP    "

## 2022-03-08 ENCOUNTER — VIRTUAL VISIT (OUTPATIENT)
Dept: PSYCHIATRY | Facility: CLINIC | Age: 37
End: 2022-03-08
Payer: COMMERCIAL

## 2022-03-08 DIAGNOSIS — F31.30 BIPOLAR I DISORDER, MOST RECENT EPISODE DEPRESSED (H): Primary | ICD-10-CM

## 2022-03-08 NOTE — PROGRESS NOTES
Clinician Contact & Progress Note  Department of Psychiatry & Behavioral Sciences  [facility]    Patient: Esa Rivera (1985)     MRN: [id number]  Date of Treatment:  Mar 8, 2022  Duration of Treatment: Start Time: 9:05; End Time: 10:03  Provider: Pablo Dickey M.A.  Supervisor: Rubia Boone, Ph.D.    People present:   Provider: Pablo Dickey  Patient: Esa Rivera    Telehealth Video Visit AddOn  Type of service: Telemedicine Psychotherapy for Depression. The patient's condition can be safely assessed and treated via synchronous audio and visual telemedicine encounter.    Mode of Communication: Video Conference via [employer]  Reason for Telemedicine Visit: This patient visit was converted to a Telehealth video visit to minimize exposure to COVID-19.  Originating Site (Patient Location): Patient's home  Distant Site (Provider Location): Provider Remote Setting- Home Office  Consent:  The patient has verbally consented to: the potential risks and benefits of telemedicine versus in person care with special emphasis on confidentiality given family members in the home.  Attestation: As the provider I attest to compliance with applicable laws and regulations related to telemedicine.    Encounter Diagnosis   Name Primary?     Bipolar I disorder, most recent episode depressed (H) Yes     Assessment (current symptoms):    PHQ 2/1/2022   PHQ-9 Total Score 10   Q9: Thoughts of better off dead/self-harm past 2 weeks Not at all   Some encounter information is confidential and restricted. Go to Review Flowsheets activity to see all data.     ANGY-7 SCORE 2/1/2022   Total Score 10 (moderate anxiety)   Total Score 10     Patient reports that he is still feeling depressed, hopeless, like he has no energy or desire to do anything, irritable, and stressed.     Session Content:     Esa reports that he enjoyed his time on his hunting trip but that he is feeling very tired now with being  "short on sleep each night.     He reports that his mood is staying pretty steady on the moderately depressed side. He reported feeling brain fog and a lack of motivation to do some work tasks and chores. He reports that his confidence is down and he is second guessing himself with work.     We talked about his trip and the activities that felt like mood boosters. He enjoyed the actual hunting and meeting new people on the trip. He reported that it was nice to get out and do things in the world, like getting he dinner. He reports wanting to build a relationship with the people he went on the trip with. He also reported that he trains his dog for hunting throughout the spring through fall.      Psychoeducation was provided around the importance of understanding values for behavioral activation. Socratic questioning was used to explore Esa's values:  o Physical wellbeing- Esa reports that he enjoys doing activities but he feels that he is \"overweight and out of shape\", which makes him feel like he cannot do them anymore (e.g., kiting, down hill skiing). He also reports that his reduced focus on his physical wellbeing over the years has led him to feel more low energy and fatigued daily and that the mental barrier to exercise is often worse than the activity itself.   - Identified value: He values his physical wellbeing because it facilitates engagement in enjoyable activities and provides more energy on a daily basis  o Family relationships- Esa reports that he wants to be a good dad. He reports that he worries that his complicated relationship with his wife could influence his ability to be a good dad because he \"might not be around.\" Being a good dad to Esa means being there for his daughter, teaching her about life, and having fun with her. Esa reports that he feels \"jealous of other families\" when it seems like they have a good relationship and help each other out. He reports feeling that he and his wife have " "differences in their priorities and needs and that they have not been able to come to a compromise in the past. He reports feeling that she does not care about the things he cares about around the house and is not willing to make changes when asked (e.g., consistently turning off the lights, cleaning up after herself). He reports that these things have added up over time and feel like \"a death by a thousand cuts\". He also reports that they have not been able to connect on anything for a long time, for example he doesn't know what his wife likes to do so it is difficult to buy gifts or plan for vacations. Esa and his wife have participated in couples therapy before and he reports that \"it didn't work because [his] wife was not willing to change things.\" Esa acknowledged that his personality traits contribute to the difficulty in their relationship through his quick temper, which limits his ability to enter into conversations in a way that the other person can be receptive. He reports that it feels that they are competing to be the boss of the household. We discussed how these conflicts have grown over time to turn decisions into arguments.   - Identified value: Need to further discuss.    We discussed the rest of the Values sheet activity. Esa will work on this for homework this week by working through PDF page 101, 102, 104.     Handouts  Status # Form   1/25  In session 1-4 What is Behavioral Activation?   2/1 in session 5-8 Vicious Cycles   2/1, 2/8, 2/22 9 Activity Monitoring: Track Your Mood + Activity Monitoring Worksheet   3/1 in session 10 UP and DOWN Activities   3/8 in session 11 A Life [place] Living: Values, Pleasure, Mastery, and Goals   3/8 12-15 Values + Values Rating Sheet    16-17 Translating Values Into Activities    18-19 Pleasure, Mastery    20-21 Activities List    22 Values, Pleasure and Mastery Activities List    23 Goal setting    24-25 Activity Planning + Worksheet    26-27 Pleasure " "Predicting + Worksheet    28 Problem Solving and Acceptance    29-30 Dealing with Low Motivation + Low Motivation Tips    31-32 Behavioral Activation Tips    33 Barriers and Resources Worksheet     Treatment Plan:      Complete between session activation assignments: complete the values rating sheet.     Continue with behavioral activation therapy with writer. Next session is scheduled for 3/15/22.    Continue with psychiatric treatment provider Dr. Motley.    Mental Status Exam:  Alertness: oriented and drowsy  Appearance: well groomed  Behavior/Demeanor: cooperative and agitated, with fair  eye contact   Speech: normal  Language: intact. Preferred language identified as English.  Psychomotor: restless  Mood: depressed and irritable  Affect: full range; was congruent to mood; was occassionally incongruent to content  Thought Process/Associations: perseverative and response delay  Thought Content: Reports none;  Denies suicidal ideation  -Suicidal ideation: reports none, denies ideation, intent, and denies plan  -Homicidal Ideation: denies  Perception: Reports none;  Denies auditory hallucinations and visual hallucinations; intact    Insight: adequate  Judgment: fair  Cognition: does  appear grossly intact    Billing for \"Interactive Complexity\"?    No    Pablo Dickey MA    I did not directly participate in this patient visit. I discussed this patient with the above trainee in individual supervision and agree with the note and plan as documented.    Supervisor: Rubia Boone, PhD, LP    "

## 2022-03-15 ENCOUNTER — VIRTUAL VISIT (OUTPATIENT)
Dept: PSYCHIATRY | Facility: CLINIC | Age: 37
End: 2022-03-15
Attending: PSYCHIATRY & NEUROLOGY
Payer: COMMERCIAL

## 2022-03-15 ENCOUNTER — VIRTUAL VISIT (OUTPATIENT)
Dept: PSYCHIATRY | Facility: CLINIC | Age: 37
End: 2022-03-15
Payer: COMMERCIAL

## 2022-03-15 DIAGNOSIS — F31.30 BIPOLAR I DISORDER, MOST RECENT EPISODE DEPRESSED (H): ICD-10-CM

## 2022-03-15 DIAGNOSIS — F31.30 BIPOLAR I DISORDER, MOST RECENT EPISODE DEPRESSED (H): Primary | ICD-10-CM

## 2022-03-15 PROCEDURE — 99214 OFFICE O/P EST MOD 30 MIN: CPT | Mod: GT | Performed by: PSYCHIATRY & NEUROLOGY

## 2022-03-15 RX ORDER — LURASIDONE HYDROCHLORIDE 60 MG/1
60 TABLET, FILM COATED ORAL
Qty: 30 TABLET | Refills: 2 | Status: SHIPPED | OUTPATIENT
Start: 2022-03-15 | End: 2022-04-22

## 2022-03-15 NOTE — PROGRESS NOTES
Esa Rivera is a 37 year old who has consented to receive services via billable video visit.      Pt will join video visit via: TheTake  If there are problems joining the visit, send backup video invite via: Text to preferred phone: [phone]      Originating Location (patient location): Patient's home  Distant Location (provider location): [facility] [place] [facility]    Will anyone else be joining the video visit? No    How would you prefer to obtain AVS?: Philippe

## 2022-03-15 NOTE — PATIENT INSTRUCTIONS
Continue your medications at the current doses    Please let us know if you prefer to transition your care to the resident clinic or find an outside provider    **For crisis resources, please see the information at the end of this document**   Patient Education    Thank you for coming to the [facility] [place] [facility].    Lab Testing:  If you had lab testing today and your results are reassuring or normal they will be mailed to you or sent through [employer] within 7 days. If the lab tests need quick action we will call you with the results. The phone number we will call with results is # [phone]. If this is not the best number please call our clinic and change the number.     Medication Refills:  If you need any refills please call your pharmacy and they will contact us. Our fax number for refills is [phone]. Please allow three business days for refill processing.   If you need to change to a different pharmacy, please contact the new pharmacy directly. The new pharmacy will help you get your medications transferred.     Contact Us:  Please call [phone] during business hours (8-5:00 M-F).  If you have medication related questions after clinic hours, or on the weekend, please call [phone].    Financial Assistance [phone]  Medical Records [phone]       MENTAL HEALTH CRISIS RESOURCES:  For a emergency help, please call 531 or go to the nearest Emergency Department.     Emergency Walk-In Options:   Em[facility] Unit @ [facility] ([place]): [phone] - Specialized mental health emergency area designed to be calming  [facility] ([place]): [phone]  [facility] ([place]): [phone]  [place]): [phone]    [facility] Crisis Information:   [place]: [phone]  [place]: [phone]  Chirag (CHRISTINE) - Adult: [phone]     Child: [phone]  [place]ofi - Adult: [phone]      [facility]: [phone]  [place]: [phone]  List of all [facility] resources:   https://[place].gov/dhs/people-we-serve/adults/health-care/mental-health/resources/crisis-contacts.jsp    National Crisis Information:   Crisis Text Line: Text  [place]  to 450650  National Suicide Prevention Lifeline: 6-138-808-TALK (1-382.548.1404)       For online chat options, visit https://suicidepreventionlifeline.org/chat/  Poison Control Center: 3-535-753-5758  Trans Lifeline: 7-242-369-7222 - Hotline for transgender people of all ages  The Shaun Project: 7-142-163-7010 - Hotline for LGBT youth     For Non-Emergency Support:   Fast Tracker: Mental Health & Substance Use Disorder Resources -   https://www.[place].org/

## 2022-03-15 NOTE — PROGRESS NOTES
"Clinician Contact & Progress Note  Department of Psychiatry & Behavioral Sciences  [facility]    Patient: Esa Rivera (1985)     MRN: [id number]  Date of Treatment:  Mar 15, 2022  Duration of Treatment: Start Time: 9:02; End Time: 10:01  Provider: Pablo Dickey M.A.  Supervisor: Rubia Boone, Ph.D.    People present:   Provider: Pablo Dickey  Patient: Esa Rivera    Telehealth Video Visit AddOn  Type of service: Telemedicine Psychotherapy for Depression. The patient's condition can be safely assessed and treated via synchronous audio and visual telemedicine encounter.    Mode of Communication: Video Conference via [employer]  Reason for Telemedicine Visit: This patient visit was converted to a Telehealth video visit to minimize exposure to COVID-19.  Originating Site (Patient Location): Patient's home  Distant Site (Provider Location): Provider Remote Setting- Home Office  Consent:  The patient has verbally consented to: the potential risks and benefits of telemedicine versus in person care with special emphasis on confidentiality given family members in the home.  Attestation: As the provider I attest to compliance with applicable laws and regulations related to telemedicine.    Encounter Diagnosis   Name Primary?     Bipolar I disorder, most recent episode depressed (H) Yes     Assessment (current symptoms):    PHQ 2/1/2022   PHQ-9 Total Score 10   Q9: Thoughts of better off dead/self-harm past 2 weeks Not at all   Some encounter information is confidential and restricted. Go to Review Flowsheets activity to see all data.     ANGY-7 SCORE 2/1/2022   Total Score 10 (moderate anxiety)   Total Score 10     Patient reports that he feels like he is \"chilling out\" with his marijuana use and is feeling content, even though he is not feeling \"elated\" or \"high energy.\"    Session Content:     Esa reported that he went hunting with a friend on Sunday and had a good time. " "He has been trying to make an effort to connect socially with more people.    We discussed how Esa's marijuana use might be reducing his emotional impulsivity. He reports that when he is not using marijuana and is depressed, his \"frustration is like a fire; It builds up quick and just explodes.\" However, he reports that when he is feeling euthymic or hypomanic, or when using marijuana, he feels that he is able to have more \"patience,\" care less about his standards of \"perfection,\" and that he doesn't feel as overwhelmed by tasks (e.g., cleaning the house or stopping a the store feel like \"huge burdens\" when he is depressed).     Esa reports that he had previously been given an ADHD diagnosis and that one symptom noted at that time was being \"emotionally impulsive.\" We discussed the implications of emotional impulsivity and the importance of awareness of his thoughts and emotions.    We discussed our work over the past few weeks and the flux in Esa's mood and behaviors. We discussed how this has in some ways kept us from moving forward steadily with BA, as Esa's primary concerns have changed week to week. Esa reports that this has been a unique time with doing more, loosening up on COVID restrictions, and having these other changes. We also discussed Esa's insight into his depressive symptoms and thoughts, which seems to fluctuate regularly.     This week Esa's outlook was much more positive than it was last week. Whereas last week he described house chores as a burden and insurmountable, this week he reported that he feels capable of doing house chores without help from his wife in order to achieve goals he wants such as having people over for a BBQ.     Esa was unable to complete his homework this week. He reports that he forgot to do it until before the session today. We continued our discussion of Esa's values:  o Mental health: Esa reports he wants to address his \"toxic negativity.\" He also reports " "that he \"get[s] flare ups\" in emotions (e.g., the volume, tone, pace of his speech go up), and this happens when he is excited, angry, or frustrated. He reports having impulsive emotional reactions and feeling like he cannot identify the space between an event/trigger and his reaction.    Psychoeducation was provided around the practice of mindfulness for limiting mind wandering for those who experience worry or rumination and for practicing focusing on the present. Esa reports that his mind \"wanders a lot.\" Esa will engage in a mindfulness exercise, either during an activity (e.g., on a walk) or with focusing on breath, twice this week.     Handouts  Status # Form   1/25  In session 1-4 What is Behavioral Activation?   2/1 in session 5-8 Vicious Cycles   2/1, 2/8, 2/22 9 Activity Monitoring: Track Your Mood + Activity Monitoring Worksheet   3/1 in session 10 UP and DOWN Activities   3/8 & 3/15 in session 11 A Life [place] Living: Values, Pleasure, Mastery, and Goals   3/8 12-15 Values + Values Rating Sheet    16-17 Translating Values Into Activities    18-19 Pleasure, Mastery    20-21 Activities List    22 Values, Pleasure and Mastery Activities List    23 Goal setting    24-25 Activity Planning + Worksheet    26-27 Pleasure Predicting + Worksheet    28 Problem Solving and Acceptance    29-30 Dealing with Low Motivation + Low Motivation Tips    31-32 Behavioral Activation Tips    33 Barriers and Resources Worksheet     Treatment Plan:      Values: physical wellbeing to facilitate activities, being a present father,     Complete between session activation assignments: engage in mindfulness.     Continue with behavioral activation therapy with writer. Next session is scheduled for 3/22/22.    Continue with psychiatric treatment provider Dr. Motley.    Mental Status Exam:  Alertness: alert  and oriented  Appearance: well groomed  Behavior/Demeanor: cooperative, pleasant and agitated, with fair  eye contact   Speech: " "normal  Language: intact. Preferred language identified as English.  Psychomotor: normal or unremarkable  Mood: depressed  Affect: full range; was congruent to mood; was occassionally incongruent to content  Thought Process/Associations: perseverative and response delay  Thought Content: Reports none;  Denies suicidal ideation  -Suicidal ideation: reports none, denies ideation, intent, and denies plan  -Homicidal Ideation: denies  Perception: Reports none;  Denies auditory hallucinations and visual hallucinations; intact    Insight: adequate  Judgment: fair  Cognition: does  appear grossly intact    Billing for \"Interactive Complexity\"?    No    Pablo Dickey MA    I did not directly participate in this patient visit. I discussed this patient with the above trainee in individual supervision and agree with the note and plan as documented.    Supervisor: Rubia Boone, PhD, LP    "

## 2022-03-15 NOTE — PROGRESS NOTES
Progress Note    Video- Visit Details  Type of service:  video visit for medication management  Time of service:    Date:  03/15/2022    Video Start Time:  835 AM        Video End Time:  905    Reason for video visit:  Patient unable to travel due to Covid-19  Originating Site (patient location):  [facility]   Location- Patient's home  Distant Site (provider location):  Remote location  Mode of Communication:  Video Conference via [employer]  Consent:  Patient has given verbal consent for video visit?: Yes       Esa Rivera is a 36 year old year old male with Bipolar I Disorder, Most Recent Episode Manic Severe, with psychotic behavior (296.40) who presents for ongoing psychiatric care. He has no children. He and his wife (a psychologist) live on their own in [facility]. Esa works as an  for [employer].    Diagnosis    Axis 1: Bipolar I disorder, with severe manic episode with psychosis in Dec 2016. ADHD by history. Alcohol use disorder, moderate, in short-term remission  Axis 2: Nil  Axis 3: Remote concussion x 2  Axis 4: Moderate stressors  Axis 5: GAF at time of visit: 70    Assessment & Plan     Since the last visit Esa increased his dose of Lexapro from 5 mg to 10 mg about 2 weeks ago, and increased Latuda from 40 mg to 60 mg about 4 days ago.  He has continued with his usual dose of Depakote.  He reports that his mood is much more stable and he denies symptoms of depression or katie.  He also smokes marijuana periodically, which she reports helps him to relax and reduces his irritability.  He will continue his medications at the current doses.  I have advised him to keep his marijuana use to a minimum and he is agreeable.  He will either switch his care over to the resident clinic or look for care outside of our clinic, and will notify me as to his choice.    The patient understands the risks, benefits, adverse effects and alternatives. Agrees to treatment with the capacity to do so. No medical contraindications  "to treatment. Agrees to call clinic for any problems. The patient understands to call 911 or come to the nearest ED if life threatening or urgent symptoms present.    Interim History     Since the last visit Esa made the medication changes outlined above.  His mood is much more stable, and he has been free of depressive or manic symptoms for about the last 2 weeks.  He feels his current combination of medications is helpful.  Prior to this, Esa was unwell for several months, with a severe depression interrupted by emerging manic symptoms, and then becoming depressed again until 2 weeks ago.    Esa continues to see a therapist and finds this helpful.  He is using marijuana periodically and notes that it helps reduce his irritability and helps him to feel calmer.  He does not see a downside to using it.  I have advised him to keep his use to a minimum and he is agreeable to do this.    Esa will continue his medications at the usual doses.  He will either transition his care to the resident clinic or to an outside provider, and will let us know which he prefers.        In Dec 2016 Esa experienced an episode of severe katie with psychosis (grandiose delusions). He was seen in the ER but not hospitalized. He had some mild depressive episodes and possible (but questionable) short hypomanic periods in the preceding years, but never received treatment for these.     Esa's manic symptoms began when he became excited about a new idea at work. 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 He became so excited about this the he was unable to sleep. As his katie escalated, his sleep decreased to 1-2 hours per night for 5-6 nights. His affect was elated and also markedly irritable. He would \"go off\" on people for minor things in very uncharacteristic ways. His thoughts were \"really fast, crisp, and clear\". He saw \"connections in " "everything.\" His energy was clearly elevated. He became grandiose, believing that his idea would change the world and as a result he would meet many \"powerful people\". Eventually he came to believe that sings on the radio were meant for him.     Esa was taking Adderall at the time. He has taken this, on and off, for many years. In my opinion it is not an adequate explanation for his manic symptoms.      His mother (a PCP) became concerned and brought him to hospital. He was assessed in the ER and prescribed Ativan for sleep. He was seen by Dr Butr (a psychiatrist) the next day. He was prescribed OLZ and LTG. The doses were titrated \"up and down\" for the next 6 months. There were significant side effects (sedation, cognitive impairment), likely from OLZ. Most recently, Esa stopped OLZ for a period of time. In the context of being on a honeymoon in [place], he develop racing thoughts and difficulty sleeping and restarted it, with benefit. He currently takes 10 mg HS.    Esa endorses several previous short (1-2 day) periods of reduced sleep and elevated mood, typically in the context of getting involved in a work project. At most they represent subthreshold hypomanic periods.     He has also experienced several periods over the years, lasting about a week, of low mood, decreased energy and motivation, poor focus/concentration, and negative thoughts. He denies neurovegetative symptoms or SI. His wife thought that these represented depressive episodes.    Esa has consumed EtOH heavily at points during his life. It was most pronounced during his college years. He has sporadically binge-drank since then. He has not had a drink in 2 weeks.    Past Psych Hx: As above. Esa was assessed at [facility] in Dec 2016 and diagnosed with BDI. He was seen by Psychiatry at age 17 and dxed with ADD. He took Adderall off and on (mostly on) with benefit over the years since then. He denies abusing it.     Past Med Hx: 2 concussion: 1) age " 15 - skiing. Age 16: football. LOC x a couple of minutes with both.    MEDICATION ADHERENCE: Good.   Current Medications     Current Outpatient Medications   Medication Sig Dispense Refill     Acetylcysteine (NAC PO) Take by mouth 2 times daily Takes twice daily for mood       divalproex sodium extended-release (DEPAKOTE ER) 500 MG 24 hr tablet Take 3 tablets (1,500 mg) by mouth At Bedtime 270 tablet 3     escitalopram (LEXAPRO) 10 MG tablet Take 1 tablet (10 mg) by mouth daily Take one-half tablet (5 mg) by mouth daily for seven days, then one tablet (10 mg) daily 90 tablet 1     lurasidone (LATUDA) 60 MG TABS tablet Take 1 tablet (60 mg) by mouth daily with food 30 tablet 0     risperiDONE (RISPERDAL) 1 MG tablet Take 1-2 tablets (1-2 mg) by mouth as needed for sleep and 1-2 tablets (1-2 mg) twice daily as needed for katie 60 tablet 1     lurasidone (LATUDA) 40 MG TABS tablet Take 1 tablet (40 mg) by mouth daily with food. (Patient not taking: Reported on 3/15/2022) 30 tablet 1         Substance use     ETOH: Sporadic binge drinking  Cigarettes: NA  Street Drugs: Denies    Review of Systems     A comprehensive review of systems was performed and is negative other than noted above.     Allergies     Allergies   Allergen Reactions     No Known Allergies

## 2022-03-22 ENCOUNTER — VIRTUAL VISIT (OUTPATIENT)
Dept: PSYCHIATRY | Facility: CLINIC | Age: 37
End: 2022-03-22
Payer: COMMERCIAL

## 2022-03-22 DIAGNOSIS — F31.30 BIPOLAR I DISORDER, MOST RECENT EPISODE DEPRESSED (H): Primary | ICD-10-CM

## 2022-03-22 NOTE — PROGRESS NOTES
"Clinician Contact & Progress Note  Department of Psychiatry & Behavioral Sciences  [facility]    Patient: Esa Rivera (1985)     MRN: [id number]  Date of Treatment:  Mar 22, 2022  Duration of Treatment: Start Time: 9:00; End Time: 10:00  Provider: Pablo Dickey M.A.  Supervisor: Rubia Booen, Ph.D.    People present:   Provider: Pablo Dickey  Patient: Esa Rivera    Telehealth Video Visit AddOn  Type of service: Telemedicine Psychotherapy for Depression. The patient's condition can be safely assessed and treated via synchronous audio and visual telemedicine encounter.    Mode of Communication: Video Conference via [employer]&  Reason for Telemedicine Visit: This patient visit was converted to a Telehealth video visit to minimize exposure to COVID-19.  Originating Site (Patient Location): Patient's home  Distant Site (Provider Location): Provider Remote Setting- Home Office  Consent:  The patient has verbally consented to: the potential risks and benefits of telemedicine versus in person care with special emphasis on confidentiality given family members in the home.  Attestation: As the provider I attest to compliance with applicable laws and regulations related to telemedicine.    Encounter Diagnosis   Name Primary?     Bipolar I disorder, most recent episode depressed (H) Yes     Assessment (current symptoms):    PHQ 2/1/2022   PHQ-9 Total Score 10   Q9: Thoughts of better off dead/self-harm past 2 weeks Not at all   Some encounter information is confidential and restricted. Go to Review Flowsheets activity to see all data.     ANGY-7 SCORE 2/1/2022   Total Score 10 (moderate anxiety)   Total Score 10     Patient reports that he feels like he is feeling foggy but has felt like his mood is maintaining stable at a low, but not \"deeply depressed\" mood. He reported that he has been getting more sleep than usual and still feeling tired during the day.     Session " "Content:     We reviewed Esa's activities he has planned coming up. He reported that they are doing a sporting tala league on Tuesdays and dog training on Thursday and Saturday. He reports that he is trying to balance having stuff to do but not too much stuff so that he can spend time with family.     We reviewed Esa's practice of mindfulness this week. He reported that yesterday his daughter came home sick from  and he took a minute to step back and be present with holding his daughter and take in the experience.    Esa reported that there were not many situations that arose last week where he was irritable or angry in which he would have used mindfulness. He reported that situations did emerge over the past week where he usually would have \"exploded\" but instead he was able to engage in a calm manner. He reported that it feels like this hasn't changed the outcomes of those interactions, even though he hopes that it would.     He reports that it is painful for him that he isn't able to communicate well with or rely on his wife as a life partner. We discussed their typical pattern of communication when talking about their needs. He reports that he has a hard time with using black-or-white language and \"we or I\" language. Esa doesn't feel that his wife is willing to make any changes to benefit their relationship and feels \"stuck\" with what to do next.    We discussed Esa's activity planning for this week. He will continue to work on mindfulness throughout the week. He will also plan to read to his daughter twice this week in the evenings.    Handouts  Status # Form   1/25  In session 1-4 What is Behavioral Activation?   2/1 in session 5-8 Vicious Cycles   2/1, 2/8, 2/22 9 Activity Monitoring: Track Your Mood + Activity Monitoring Worksheet   3/1 in session 10 UP and DOWN Activities   3/8 & 3/15 in session 11 A Life [place] Living: Values, Pleasure, Mastery, and Goals   3/8 12-15 Values + Values Rating Sheet " "  3/22 16-17 Translating Values Into Activities    18-19 Pleasure, Mastery    20-21 Activities List    22 Values, Pleasure and Mastery Activities List    23 Goal setting    24-25 Activity Planning + Worksheet    26-27 Pleasure Predicting + Worksheet    28 Problem Solving and Acceptance    29-30 Dealing with Low Motivation + Low Motivation Tips    31-32 Behavioral Activation Tips    33 Barriers and Resources Worksheet     Treatment Plan:      Values: physical wellbeing to facilitate activities, being a present father, being more positive (e.g., reducing \"toxic negativity\"), foster positive social relationships    Complete between session activation assignments: read with his daughter twice in the evenings.    Continue with behavioral activation therapy with writer. Next session is scheduled for 3/29/22.    Continue with psychiatric treatment provider Dr. Motley.    Mental Status Exam:  Alertness: alert  and oriented  Appearance: well groomed  Behavior/Demeanor: cooperative, pleasant and agitated, with fair  eye contact   Speech: normal  Language: intact. Preferred language identified as English.  Psychomotor: normal or unremarkable  Mood: depressed  Affect: full range; was congruent to mood; was occassionally incongruent to content  Thought Process/Associations: perseverative and response delay  Thought Content: Reports none;  Denies suicidal ideation  -Suicidal ideation: reports none, denies ideation, intent, and denies plan  -Homicidal Ideation: denies  Perception: Reports none;  Denies auditory hallucinations and visual hallucinations; intact    Insight: adequate  Judgment: fair  Cognition: does  appear grossly intact    Billing for \"Interactive Complexity\"?    No    Pablo Dickey MA    I did not directly participate in this patient visit. I discussed this patient with the above trainee in individual supervision and agree with the note and plan as documented.    Supervisor: Rubia Boone, PhD, LP  "

## 2022-03-29 ENCOUNTER — VIRTUAL VISIT (OUTPATIENT)
Dept: PSYCHIATRY | Facility: CLINIC | Age: 37
End: 2022-03-29
Payer: COMMERCIAL

## 2022-03-29 DIAGNOSIS — F31.30 BIPOLAR I DISORDER, MOST RECENT EPISODE DEPRESSED (H): Primary | ICD-10-CM

## 2022-03-29 NOTE — PROGRESS NOTES
Clinician Contact & Progress Note  Department of Psychiatry & Behavioral Sciences  [facility]    Patient: Esa Rivera (1985)     MRN: [id number]  Date of Treatment:  Mar 29, 2022  Duration of Treatment: Start Time: 9:00; End Time: 10:00  Provider: Pablo Dickey M.A.  Supervisor: Rubia Boone, Ph.D.    People present:   Provider: Pablo Dickey  Patient: Esa Rivera    Telehealth Video Visit AddOn  Type of service: Telemedicine Psychotherapy for Depression. The patient's condition can be safely assessed and treated via synchronous audio and visual telemedicine encounter.    Mode of Communication: Video Conference via [employer]  Reason for Telemedicine Visit: This patient visit was converted to a Telehealth video visit to minimize exposure to COVID-19.  Originating Site (Patient Location): Patient's home  Distant Site (Provider Location): Provider Remote Setting- Home Office  Consent:  The patient has verbally consented to: the potential risks and benefits of telemedicine versus in person care with special emphasis on confidentiality given family members in the home.  Attestation: As the provider I attest to compliance with applicable laws and regulations related to telemedicine.    Encounter Diagnosis   Name Primary?     Bipolar I disorder, most recent episode depressed (H) Yes     Assessment (current symptoms):    PHQ 6/12/2019 8/7/2019 2/1/2022   PHQ-9 Total Score 4 2 10   Q9: Thoughts of better off dead/self-harm past 2 weeks Not at all Not at all Not at all     ANGY-7 SCORE 2/1/2022   Total Score 10 (moderate anxiety)   Total Score 10     Patient reports that his mood has remained pretty stable this past week. Specifically, he feels like he is feeling foggy and down in the morning but has been able to feel like his energy and mood is lifted by mid-day.      Session Content:     Esa reports that his week went by pretty quickly with his wife and daughter both  "getting sick. He has felt like he has to drag himself through the morning but then is feeling more energetic by mid-day, which sustains throughout the evening.    Esa reports that he has been doing training with his dog in the evenings, which has been pleasant. He was not able to complete his activation of reading to his daughter, as she was sick and in bed early on the     Esa reports that his daughter has been waking up frequently throughout the night, which has been worse now that she is sick. He reports that usually his wife takes care of their daughter throughout the night because sleep is important with his diagnosis, but he woke up early with her this morning because his wife is sick.    Esa reports that he has a lot of tasks to get done soon with job searching, planning their trip to [place], and taking care of household needs (e.g., cars serviced). He reports that he is feeling low motivation to get it all done.   o We discussed Esa's thoughts around looking for a new job. He is concerned that switching to a new job it might be much more stressful than his current position, which he is worried about. He reports that his threshold for stress is pretty low before it impacts his mental health. He is also worried about needing to take disability again and being dropped from a new position.     He is continuing to wonder whether his marijuana use is lowering his drive and motivation. He is wondering whether there is a net positive or negative impact of marijuana use on his life, given the positive impact on his irritability and patience.     Esa mentioned being interested in further discussing his substance use history and how this might have impacted his mood and coping mechanisms over time. He reports that usually he finds \"little things unreasonably stressful\" and that marijuana is helping him to alleviate that feeling of stress. He reports that he is better able to remain calm and enjoy his time with his " "family while also knowing he can get tasks done in time. He reports that he has also noticed a decrease in his worry and stress around other topics (e.g., crime, mice) that used to occupy his mind.     We discussed how Esa's reports were highlighting the importance of emotion regulation, acceptance, and problem solving. Psychoeducation was provided around skills for emotion regulation and the contexts in which acceptance and problem solving are useful. We planned to return to this discussion next week.     Handouts  Status # Form   1/25  In session 1-4 What is Behavioral Activation?   2/1 in session 5-8 Vicious Cycles   2/1, 2/8, 2/22 9 Activity Monitoring: Track Your Mood + Activity Monitoring Worksheet   3/1 in session 10 UP and DOWN Activities   3/8 & 3/15 in session 11 A Life [place] Living: Values, Pleasure, Mastery, and Goals   3/8 12-15 Values + Values Rating Sheet   3/22 16-17 Translating Values Into Activities    18-19 Pleasure, Mastery    20-21 Activities List    22 Values, Pleasure and Mastery Activities List    23 Goal setting    24-25 Activity Planning + Worksheet    26-27 Pleasure Predicting + Worksheet    28 Problem Solving and Acceptance    29-30 Dealing with Low Motivation + Low Motivation Tips    31-32 Behavioral Activation Tips    33 Barriers and Resources Worksheet     Treatment Plan:      Values: physical wellbeing to facilitate activities, being a present father, being more positive (e.g., reducing \"toxic negativity\"), foster positive social relationships    Complete between session activation assignments: 15 minutes of vacation planning two nights this week.     Continue with behavioral activation therapy with writer. Next session is scheduled for 4/5/22.    Continue with psychiatric treatment provider: a resident working with Dr. Motley.    Mental Status Exam:  Alertness: alert  and oriented  Appearance: well groomed  Behavior/Demeanor: cooperative and pleasant, with fair  eye contact   Speech: " "normal  Language: intact. Preferred language identified as English.  Psychomotor: normal or unremarkable  Mood: depressed  Affect: initially blunted but became full range toward the second half of the session; was congruent to mood; was occassionally incongruent to content  Thought Process/Associations: perseverative and response delay  Thought Content: Reports none;  Denies suicidal ideation  -Suicidal ideation: reports none, denies ideation, intent, and denies plan  -Homicidal Ideation: denies  Perception: Reports none;  Denies auditory hallucinations and visual hallucinations; intact    Insight: adequate  Judgment: fair  Cognition: does  appear grossly intact    Billing for \"Interactive Complexity\"?    No    Pablo Dickey MA    I did not directly participate in this patient visit. I discussed this patient with the above trainee in individual supervision and agree with the note and plan as documented.    Supervisor: Rubia Boone, PhD, LP    "

## 2022-04-09 ENCOUNTER — HEALTH MAINTENANCE LETTER (OUTPATIENT)
Age: 37
End: 2022-04-09

## 2022-04-19 ENCOUNTER — VIRTUAL VISIT (OUTPATIENT)
Dept: PSYCHIATRY | Facility: CLINIC | Age: 37
End: 2022-04-19
Payer: COMMERCIAL

## 2022-04-19 DIAGNOSIS — F31.30 BIPOLAR I DISORDER, MOST RECENT EPISODE DEPRESSED (H): Primary | ICD-10-CM

## 2022-04-19 NOTE — PROGRESS NOTES
"Clinician Contact & Progress Note  Department of Psychiatry & Behavioral Sciences  [facility]    Patient: Esa Rivera (1985)     MRN: [id number]  Date of Treatment:  Apr 19, 2022  Duration of Treatment: Start Time: 9:00; End Time: 9:58  Provider: Pablo Dickey M.A.  Supervisor: Rubia Boone, Ph.D.    People present:   Provider: Pablo Dickey  Patient: Esa Rivera    Telehealth Video Visit AddOn  Type of service: Telemedicine Psychotherapy for Depression. The patient's condition can be safely assessed and treated via synchronous audio and visual telemedicine encounter.    Mode of Communication: Video Conference via [employer]  Reason for Telemedicine Visit: This patient visit was converted to a Telehealth video visit to minimize exposure to COVID-19.  Originating Site (Patient Location): Patient's home  Distant Site (Provider Location): Provider Remote Setting- Home Office  Consent:  The patient has verbally consented to: the potential risks and benefits of telemedicine versus in person care with special emphasis on confidentiality given family members in the home.  Attestation: As the provider I attest to compliance with applicable laws and regulations related to telemedicine.    Encounter Diagnosis   Name Primary?     Bipolar I disorder, most recent episode depressed (H) Yes     Assessment (current symptoms):    PHQ 2/1/2022   PHQ-9 Total Score 10   Q9: Thoughts of better off dead/self-harm past 2 weeks Not at all   Some encounter information is confidential and restricted. Go to Review Flowsheets activity to see all data.     ANGY-7 SCORE 2/1/2022   Total Score 10 (moderate anxiety)   Total Score 10     Patient reports that his mood has remained mostly positive, with some \"mood swings\" of irritability and low motivation.      Session Content:     Esa reports that he is feeling pretty good. He reported that he has had his \"normal mood swings\" in the past few weeks " "but \"for the most part [is] doing alright.\" He reported that there were a couple of things that lowered his mood lately, like having difficulties with dog training.    Esa reported that vacation planning has continued to be a stressor for him. He reported that he enjoys traveling but he hasn't been able to get himself to plan the trip. He reported that he \"should be\" doing it but \"just cannot.\"     Esa reported that he is still experiencing benefits from using marijuana. He reported that his wife is still noticing the difference in his mood, patience, and empathy when he is using marijuana and that he is continuing to notice that he is able to relax when he otherwise would to able to. Esa reports that he understands people's (e.g., doctors) concerns with use but that it hasn't felt concerning to him so far. He reported that his performance at work is not suffering and his relationships are benefiting.     We discussed how Esa experiences a lot of cognitive fusion with his thoughts, where his thoughts and emotions feel like they are in the driving role and he has no control over tem. Psychoeducation was provided around cognitive skills and the CBT triangle.     We discussed termination in later June. Esa reported that more direction in homework would be useful.    We discussed Esa's homework this week, which is to complete a Daily Thought Record Worksheet (pdf page 64).     Handouts  Status # Form   1/25  In session 1-4 What is Behavioral Activation?   2/1 in session 5-8 Vicious Cycles   2/1, 2/8, 2/22 9 Activity Monitoring: Track Your Mood + Activity Monitoring Worksheet   3/1 in session 10 UP and DOWN Activities   3/8 & 3/15 in session 11 [employer] [facility] [place] [facility]: Values, Pleasure, Mastery, and Goals   3/8 12-15 Values + Values Rating Sheet   3/22 16-17 Translating Values Into Activities   4/19 18-19 Pleasure, Mastery   4/19 20-21 Activities List    22 Values, Pleasure and Mastery Activities List    23 Goal " "setting    24-25 Activity Planning + Worksheet    26-27 Pleasure Predicting + Worksheet    28 Problem Solving and Acceptance    29-30 Dealing with Low Motivation + Low Motivation Tips    31-32 Behavioral Activation Tips    33 Barriers and Resources Worksheet     Treatment Plan:      Values: physical wellbeing to facilitate activities, being a present father, being more positive (e.g., reducing \"toxic negativity\"), foster positive social relationships    Complete between session activation assignments: n/a, thought record this week.    Continue with behavioral activation therapy with writer. Next session is scheduled for 4/26/22.    Continue with psychiatric treatment provider: a resident working with Dr. Motley.    Mental Status Exam:  Alertness: alert  and oriented  Appearance: well groomed  Behavior/Demeanor: cooperative and pleasant, with fair  eye contact   Speech: normal  Language: intact. Preferred language identified as English.  Psychomotor: normal or unremarkable  Mood: irritable and labile  Affect: full range; was congruent to mood; was occassionally incongruent to content  Thought Process/Associations: perseverative and response delay  Thought Content: Reports none;  Denies suicidal ideation  -Suicidal ideation: reports none, denies ideation, intent, and denies plan  -Homicidal Ideation: denies  Perception: Reports none;  Denies auditory hallucinations and visual hallucinations; intact    Insight: adequate  Judgment: fair  Cognition: does  appear grossly intact    Billing for \"Interactive Complexity\"?    No    Pablo Dickey MA    I did not directly participate in this patient visit. I discussed this patient with the above trainee in individual supervision and agree with the note and plan as documented.    Supervisor: Rubia Boone, PhD, LP    "

## 2022-04-22 ENCOUNTER — VIRTUAL VISIT (OUTPATIENT)
Dept: PSYCHIATRY | Facility: CLINIC | Age: 37
End: 2022-04-22
Attending: PSYCHIATRY & NEUROLOGY
Payer: COMMERCIAL

## 2022-04-22 DIAGNOSIS — F10.21 ALCOHOL USE DISORDER, MODERATE, IN SUSTAINED REMISSION (H): ICD-10-CM

## 2022-04-22 DIAGNOSIS — F31.9 BIPOLAR 1 DISORDER (H): Primary | ICD-10-CM

## 2022-04-22 DIAGNOSIS — F31.30 BIPOLAR I DISORDER, MOST RECENT EPISODE DEPRESSED (H): ICD-10-CM

## 2022-04-22 DIAGNOSIS — F31.2 BIPOLAR AFFECTIVE DISORDER, CURRENTLY MANIC, SEVERE, WITH PSYCHOTIC FEATURES (H): ICD-10-CM

## 2022-04-22 PROCEDURE — 90792 PSYCH DIAG EVAL W/MED SRVCS: CPT | Mod: GT | Performed by: STUDENT IN AN ORGANIZED HEALTH CARE EDUCATION/TRAINING PROGRAM

## 2022-04-22 RX ORDER — ESCITALOPRAM OXALATE 10 MG/1
10 TABLET ORAL DAILY
Qty: 30 TABLET | Refills: 1 | Status: SHIPPED | OUTPATIENT
Start: 2022-04-22 | End: 2022-06-21

## 2022-04-22 RX ORDER — LURASIDONE HYDROCHLORIDE 60 MG/1
60 TABLET, FILM COATED ORAL
Qty: 30 TABLET | Refills: 1 | Status: SHIPPED | OUTPATIENT
Start: 2022-04-22 | End: 2022-06-17

## 2022-04-22 RX ORDER — DIVALPROEX SODIUM 500 MG/1
1500 TABLET, EXTENDED RELEASE ORAL AT BEDTIME
Qty: 90 TABLET | Refills: 1 | Status: SHIPPED | OUTPATIENT
Start: 2022-04-22 | End: 2022-07-12

## 2022-04-22 RX ORDER — RISPERIDONE 1 MG/1
TABLET ORAL
Qty: 60 TABLET | Refills: 1 | Status: SHIPPED | OUTPATIENT
Start: 2022-04-22 | End: 2022-08-16

## 2022-04-22 NOTE — PATIENT INSTRUCTIONS
**For crisis resources, please see the information at the end of this document**   Patient Education    Thank you for coming to the [facility] [place] [facility].    Lab Testing:  If you had lab testing today and your results are reassuring or normal they will be mailed to you or sent through [employer] within 7 days. If the lab tests need quick action we will call you with the results. The phone number we will call with results is # [phone]. If this is not the best number please call our clinic and change the number.     Medication Refills:  If you need any refills please call your pharmacy and they will contact us. Our fax number for refills is [phone]. Please allow three business days for refill processing.   If you need to change to a different pharmacy, please contact the new pharmacy directly. The new pharmacy will help you get your medications transferred.     Contact Us:  Please call [phone] during business hours (8-5:00 M-F).  If you have medication related questions after clinic hours, or on the weekend, please call [phone].    Financial Assistance [phone]  Medical Records [phone]       MENTAL HEALTH CRISIS RESOURCES:  For a emergency help, please call 911 or go to the nearest Emergency Department.     Emergency Walk-In Options:   [facility] Unit @ [facility] ([place]): [phone] - Specialized mental health emergency area designed to be calming  [facility] ([place]): [phone]  [facility] ([place]): [phone]  [facility]): [phone]    County Crisis Information:   [place]: [phone]  [place]: [phone]  [place] (CHRISTINE) - Adult: [phone]     Child: [phone]  [place] - Adult: [phone]     Child: [phone]  [place]: [phone]  List of all [facility] resources:    https://[place].gov/dhs/people-we-serve/adults/health-care/mental-health/resources/crisis-contacts.jsp    National Crisis Information:   Crisis Text Line: Text  [place]  to 339659  National Suicide Prevention Lifeline: 7-688-072-TALK (1-676.896.5870)       For online chat options, visit https://suicidepreventionlifeline.org/chat/  Poison Control Center: 1-792.142.3535  Trans Lifeline: 1-276.822.6032 - Hotline for transgender people of all ages  The [employer]: 1-000-900-9699 - Hotline for LGBT youth     For Non-Emergency Support:   Fast Tracker: Mental Health & Substance Use Disorder Resources -   https://www.tsumobin.org/      Treatment Plan Today:     1) Medications-no medication changes, continue current medications as prescribed    2) Follow-up appt with Dr Will in 6 weeks    3) Crisis numbers are below and clinic after hours number is [phone]    4) other medications discussed during this visit include: Guanfacine and clonidine for possible ADHD symptoms and utilizing sertraline instead of escitalopram as an alternative antidepressant medication.

## 2022-04-22 NOTE — PROGRESS NOTES
Video- Visit Details  Type of service:  video visit for diagnostic assessment  Time of service:    Date:  Apr 22, 2022    Video Start Time:  10:15 am         Video End Time:  11:45 am    Reason for video visit:  Patient has requested telehealth visit  Originating Site (patient location):  [facility]- [place]   Location- Patient's home  Distant Site (provider location):  [facility]  Mode of Communication:  [facility] Conference [facility]  TRANSFER of CARE DIAGNOSTIC ASSESSMENT     CARE TEAM:  PCP- Aristides Jenkins, Psychotherapist- Pablo Dickey @ [facility]  Esa Rivera is a 37 year old who uses the name Esa and pronouns he, him.      DIAGNOSIS   Bipolar I Disorder, most recent episode manic, severe, with psychotic behavior, in remission  Alcohol use disorder, moderate, in sustained remission  History of ADHD     ASSESSMENT   Esa was referred by Dr. Motley to the resident clinic for ongoing psychiatric care.  He presents today for a transfer of care diagnostic assessment.  Esa endorses a history of hypomania beginning in his youth with 1 to 2-day periods of reduced sleep, elevated mood and subsequent depressive episodes featuring low mood, low energy and poor focus.  He was diagnosed with ADHD at age 17 and tried multiple ADHD medications and then Adderall which he used intermittently up until 12/20/2016 when he experienced his first manic episode.  His first manic episode occurred in the context of a work project where he began to sleep less, approximately 1 to 2 hours per night for 5 to 6 days and became irritable with racing thoughts, grandiosity, increased energy and ideas of reference.  He was seen at the ED and diagnosed with bipolar 1.  Adderall was discontinued and olanzapine and lamotrigine were started which helped resolve katie but included significant side effects of sedation and cognitive impairment.  Reports trying lithium for mood  "stabilization but subsequently developed lithium toxicity in 2018 after intentionally restricting food and fluid intake due to apparent grandiose or Orthodoxy delusions. He was subsequently hospitalized for katie for 5 days at [facility].  Orrin lamotrigine and olanzapine were discontinued given elevated lithium and lamotrigine levels.  Risperidone and divalproex was initiated, manic symptoms resolved and he was subsequently discharged.  As an outpatient he was started on quetiapine to promote sleep but found this excessively sedating then subsequently switched to lurasidone to improve mood and sedation.  He reports starting escitalopram due to depression though experienced increased irritability at higher doses with improvement in mood and reduced irritability at his current lowered dose.  Additionally Esa reports starting use of oral cannabis to improve irritability, mood and low motivation attributed to his current medication regimen.  He has a history of 2 TBI's with loss of consciousness in his teens, history of heavy alcohol consumption and binge drinking now in sustained remission, denies history of suicidal ideation, suicide attempts or self-harm.    Today Esa reports his mood has been \"pretty good\" and denies katie or hypomania symptoms.  He notes varied degrees of depression that are at baseline and minimal anxiety.  He reports good adherence to his medications and notes the following side effects: Abnormal lower jaw movements and clenching of teeth that have occurred for the past 1 to 2 years.  These movements have not worsened and are not bothersome.  The jaw movements occur in no particular pattern throughout the day and are often not noticed by Esa and pointed out by his wife.  Weight gain after changing medications in 2018 that has since stabilized.  Irritability attributed to escitalopram that improves with cannabis use.  He is sleeping 8.5 hours per night, and denies naps or nightmares.  Esa " "endorses a stressful work environment, childcare stressors from caring for his 1-year-old daughter, and difficulties with training his dog.  He rates his current stress level as a 6-7 out of 10.  He denies other health concerns today.    Review of symptoms was notable for symptoms of depression, abnormal jaw movements, mood swings, racing thoughts, social anxiety and occasional angry outburst.  He reports using 10-12 \"1 mg nicotine pouches\" per day, consuming 3 to 4 cups of coffee per day and consuming approximately 5 mg of cannabis edibles daily.    Given Esa's report of abnormal jaw movements with concurrent lurasidone and risperidone therapy, tardive dyskinesia (TD) is of concern.  Symptoms of TD were discussed with Esa and he denied any abnormal tongue movements, lip smacking, or abnormal facial or body movements.  Esa was amenable to scheduling an in-person appointment in 6 weeks to conduct an AIMS assessment and was educated regarding risks of TD with his current medications.  Given Esa's irritability attributed to escitalopram use we discussed reducing his dose of escitalopram or switching to an alternative antidepressant such as sertraline.  Esa elected to continue his current dose of escitalopram given upcoming work meetings and travel and agreed to explore this at his follow-up visit.  Starting clonidine or guanfacine to address ADHD symptoms was discussed and Esa requested to research this more on his own before starting these medications.  Additional information regarding clonidine and guanfacine for ADHD treatment was provided in his AVS.  Esa was encouraged to try reducing cannabis use including skipping use on certain days of the week and agreed to do so.  Additionally Esa was encouraged to discuss switching or stopping therapy with his therapist and to explore alternative psychotherapy treatment options.  No medication changes were made at this visit.    Future considerations: Consider " reducing escitalopram or switching to sertraline to improve mood and given irritability on escitalopram.  Consider starting clonidine or guanfacine for ADHD symptom management.  Continue to encourage reduction of daily cannabis use.  Monitor and screen for worsening of possible TD symptoms.    [place]:  Indicates no controlled substance prescriptions.     PLAN                                                                                                                1) Meds-  - Continue divalproex 1500 mg at bedtime  - Continue escitalopram 10 mg daily  - Continue lurasidone 60 mg daily  - Continue risperidone 1-2 mg daily prn sleep and 1-2 mg daily prn crystal    2) Psychotherapy- Continue weekly psychotherapy with Pablo. Planning to stop soon. Wants to think on switching therapist.     3) Next due-  Labs- VPA level on 2/2022: 70. ALT/AST/CBC: 2/2022 WNL. AP monitoring labs ordered today  EKG- 1/2/2017 QTc 418 ms @ 46 bpm, Repeat ECG due at next in person visit.   Rating scales- PHQ-9, ANGY-7, AIMS due    4) Referrals-  None    5) Dispo- Return to clinic in 6 weeks in person appointment     PERTINENT BACKGROUND                           [most recent eval 04/22/22]     History of mild depressive episodes and short hypomanic episodes (1-2 day periods of reduced sleep and elevated mood usually in context of work projects) and week long episodes of low mood, low energy/motivation, poor focus/concentration and negative thoughts in the preceding years but not treatment. Diagnosed with ADHD at age 17 with Intermittent prescription Adderall use. First crystal occurring in 12/2016 during a period of insomnia in the context of excitement about a work project. Crystal symptoms included decreased sleep, irritability, racing thoughts, grandiosity, increased energy and ideas of reference. Previously prescribed olanzapine and lamotrigine with significant side effects (sedation, cognitive impairment). No history of SI  ", SA or neurovegetative symptoms. History of heavy alcohol use in college years with sporadic amol drinking since. History of TBI w/LOC (couple minutes) at age 15 (skiing) and 16 (football). Last hospitalized in 2018 for katie and lithium toxicity.     Psych pertinent item history includes psych hosp (x1) and substance use: alcohol and cannabis     SUBJECTIVE   Since the last visit:  Mood: \"pretty good\", denies katie or hypomania. Depression ups and downs, low confidence, depression at baseline. Denies anxiety.   Meds: Good adherence. Some abnormal jaw movements and clenching of teeth, no lip smacking or tongue for 1-2 years, stable and not bothersome.  Some weight gain. Lexapro can increase irritability, improved with reduced dose and cannabis use.   Sleep: Good, 8.5 hrs per night. No naps. Denies nightmares.   Stress: Stressful work week, new baby and difficulties with dog training. 6-7/10 stress level.   Health: Denies health concerns    RECENT PSYCH ROS:   Depression:  depressed mood, anhedonia, low energy, psychomotor changes [chronic abnormal jaw movements] and mood dysregulation  Elevated:  racing thoughts and mood dysregulation  Psychosis:  none  Anxiety:  social anxiety  Trauma Related:  intrusive memories, angry outbursts and mood dysregulation  Sleep: yes  Other: N/A    Adverse Effects: 2-year history of stable abnormal movements of jaw and clenching teeth attributed to medications.  Weight gain attributed to medications that is now stable  Pertinent Negative Symptoms: No suicidal ideation, violent ideation, psychosis or hallucinations  Recent Substance Use:     Alcohol- None  Tobacco- Nicotine pouches 4 mg, 10-12 per day.   Caffeine- 3-4 cups coffee per day  Cannabis- edibles, approximately 5 mg daily, for mood: he believes it helps with irritability, anxiety, depression.    Opioids- None           Narcan Kit- N/A   Other illicit drugs- none     FAMILY and SOCIAL HISTORY                                 " pt reported     Family Hx:   Maternal uncle: depression  Maternal grandmother: dementia  Paternal grandfather: AUD  Sister: Suspected OCD and anxiety  Brother: Suspected mood disorder, alcohol use  No family history: of schizophrenia.    Social Hx:  Financial/ Work- Works as an [employer]  at [employer] for 11 years   Partner/ -  (wife is psychologist) 5 yrs  Children- Daughter born in 6/2021     Living situation- Lives in a home with his wife and daughter in [place], [place]      Social/ Spiritual Support- Limited, sometimes with wife. Trying to make new friends. Close friend in [place], [place].    Feels Safe at Home- yes   Legal- None     Trauma History (self-report)- None  Early History/Education- Born and raised in [place], [place] area. Describes childhood as good. Denies significant issues with discipline or academics. Did well academically. Few close friends.      PSYCHIATRIC HISTORY     SIB- None  Suicide Attempt [#, most recent]- None  Suicidal Ideation Hx- None    Violence/Aggression Hx- None  Psychosis Hx- Ideas of reference during katie  Eating Disorder Hx- None  Other- None    Psych Hosp [#, most recent]- x5 days in station 20 in 2018  Commitment- None  ECT- None  Outpatient Programs - None  Other - N/A     PAST MED TRIALS      Medication Max Dose (mg) Dates / Duration Helpful? DC Reason / Adverse Effects?   olanzapine 30 1033-1902 Y Sedation, cognitive blunting   lamotrigine 400 5101-2605 N Developed mixed mood symptoms, rash   lurasidone 100 2018-current Y nausea   escitalopram 20 2020-current Y Irritability, hypomania at higher doses   divalproex 1500 2018-current Y    risperidone 2 2018-current Y    lithium 1800 2017 Y Hx of lithium toxicity, affective flattening   quetiapine 200 9523-0126  Severely sedating   Adderall 20 5421-1684 Y Stopped d/t katie   Lisdexampfetamine  2017 N    Methlyphenidate   N              SUBSTANCE USE HISTORY     Past Use- Alcohol- yes, past history of heavy use and  "binge drinking, Tobacco- nicotine pouches , Caffeine- coffee/ tea [2-4 per day], Opioids- no    Narcan Kit- N/A , Cannabis- yes  and Other Illicit Drugs-none  Treatment- #, most recent- no  Medical Consequences- no  Legal Consequences- no  Other- N/A     MEDICAL HISTORY and ALLERGY     ALLERGIES: No known allergies    Patient Active Problem List   Diagnosis     Attention deficit disorder     Bipolar 1 disorder, manic, full remission (H)     History of neck injury     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     Rash and nonspecific skin eruption     Carpal tunnel syndrome of left wrist     Weight gain     Numbness and tingling of both feet        MEDICAL REVIEW OF SYSTEMS   Contraception- wife using contracetive  N/A     MEDICATIONS     Current Outpatient Medications   Medication Sig Dispense Refill     Acetylcysteine (NAC PO) Take by mouth 2 times daily Takes twice daily for mood       divalproex sodium extended-release (DEPAKOTE ER) 500 MG 24 hr tablet Take 3 tablets (1,500 mg) by mouth At Bedtime 270 tablet 3     escitalopram (LEXAPRO) 10 MG tablet Take 1 tablet (10 mg) by mouth daily Take one-half tablet (5 mg) by mouth daily for seven days, then one tablet (10 mg) daily 90 tablet 1     lurasidone (LATUDA) 60 MG TABS tablet Take 1 tablet (60 mg) by mouth daily with food 30 tablet 2     risperiDONE (RISPERDAL) 1 MG tablet Take 1-2 tablets (1-2 mg) by mouth as needed for sleep and 1-2 tablets (1-2 mg) twice daily as needed for katie 60 tablet 1      VITALS   There were no vitals taken for this visit.    MENTAL STATUS EXAM     Alertness: alert   Appearance: adequately groomed  Behavior/Demeanor: cooperative, pleasant and calm, with good  eye contact   Speech: normal and regular rate and rhythm  Language: intact and no problems  Psychomotor: normal or unremarkable  Mood: \"pretty good\"  Affect: full range; congruent to: mood- yes, content- yes  Thought Process/Associations: unremarkable  Thought Content:  " Reports none;  Denies suicidal & violent ideation and delusions  Perception:  Reports none;  Denies hallucinations  Insight: good  Judgment: good  Cognition: does  appear grossly intact; formal cognitive testing was not done  Gait and Station: N/A (telehealth)     LABS and DATA     PHQ 6/12/2019 8/7/2019 2/1/2022   PHQ-9 Total Score 4 2 10   Q9: Thoughts of better off dead/self-harm past 2 weeks Not at all Not at all Not at all       Recent Labs   Lab Test 05/14/21  0828 05/29/18  0919   CR 0.99 0.84   GFRESTIMATED >90 >90     Recent Labs   Lab Test 02/08/22  0756 05/14/21  0828   BARBIE 70 70     Recent Labs   Lab Test 02/08/22 0756 05/14/21  0828 05/29/18  0919 05/16/18  1152 01/02/17  1705   AST 15 14   < > 34 61*   ALT 52 44   < > 53 114*   ALKPHOS  --   --   --  65 43    < > = values in this interval not displayed.     Recent Labs   Lab Test 02/08/22 0756 05/14/21  0828 06/04/19  1204   WBC 5.9 4.9 5.6   ANEU  --  2.3 2.9   HGB 15.6 16.1 14.9    277 235     Recent Labs   Lab Test 05/14/21  0828 05/29/18  0919   GLC 91 90   A1C 4.8  --      Recent Labs   Lab Test 05/14/21  0828 05/05/18  0920   CHOL 190 105   TRIG 116 71   * 43   HDL 44 48     Recent Labs   Lab Test 02/08/22 0756 05/14/21  0828 05/29/18  0919 05/16/18  1152 01/02/17  1705   AST 15 14   < > 34 61*   ALT 52 44   < > 53 114*   ALKPHOS  --   --   --  65 43    < > = values in this interval not displayed.       ECG on 1/2/17 QTc = 418 ms @ 46 BPM       PSYCHOTROPIC DRUG INTERACTIONS   Additive QT prolongation: Escitalopram, Risperidone    ESCITALOPRAM OXALATE -- RISPERIDONE  Drug interaction is major   Major Fair Concurrent use of ESCITALOPRAM and QT INTERVAL-PROLONGING DRUGS may result in increased risk of QT-interval prolongation.    MANAGEMENT:  Monitoring for adverse effects and periodic EKGs     RISK STATEMENT for SAFETY     Esa COLT Rivera did not appear to be an imminent safety risk to self or others.    TREATMENT RISK STATEMENT:  The risks, benefits, alternatives and potential adverse effects have been discussed and are understood by the pt. The pt understands the risks of using street drugs or alcohol. There are no medical contraindications, the pt agrees to treatment with the ability to do so. The pt knows to call the clinic for any problems or to access emergency care if needed.  Medical and substance use concerns are documented above.  Psychotropic drug interaction check was done, including changes made today.     PROVIDER: Wisam Will MD    Patient not staffed in clinic.  Note will be reviewed and signed by supervisor Dr. Medeiros.

## 2022-04-22 NOTE — PROGRESS NOTES
Esa Rivera is a 37 year old who has consented to receive services via billable video visit.      Pt will join video visit via: Yappe  If there are problems joining the visit, send backup video invite via: Text to preferred phone: [phone]      Originating Location (patient location): Patient's home  Distant Location (provider location): [facility] [place] [facility]    Will anyone else be joining the video visit? No    How would you prefer to obtain AVS?: Philippe

## 2022-04-26 ENCOUNTER — TELEPHONE (OUTPATIENT)
Dept: PSYCHIATRY | Facility: CLINIC | Age: 37
End: 2022-04-26
Payer: COMMERCIAL

## 2022-04-26 NOTE — TELEPHONE ENCOUNTER
Clinician Contact Note  Department of Psychiatry & Behavioral Sciences  [facility]    Patient: Esa Rivera (1985)     MRN: [id number]  Date:  Apr 26, 2022  Provider: Pablo Dickey M.A.  Supervisor: Rubia Boone, Ph.D.    People present:   Provider: Pablo Dickey  Patient: Esa Rivera did not show for today's appointment on [facility]. I called Esa and was able to reach him over the phone. He reported that his symptoms have improved and he wished to discontinue psychotherapy. Esa also had an appointment on 4/22/22 with Dr. Wisam Will for transfer of psychiatric care, during which Esa expressed interest in discontinuing psychotherapy. This was supported by Dr. Wisam Will. I encouraged Esa to reach out to our clinic at [phone] for any future psychiatric needs, including being connected with the social work team for future referrals if he wishes to receive psychotherapy in the future.      Pablo Dickey MA    I did not directly participate in this patient encounter. I discussed this patient with the above trainee in individual supervision and agree with the note and plan as documented.    Supervisor: Rubia Boone, PhD, LP

## 2022-04-27 NOTE — PROGRESS NOTES
Medication Therapy Management (MTM) Encounter    ASSESSMENT:                            Medication Adherence/Access: No issues identified    Bipolar I Disorder, severe with psychotic behavior: Currently, pt's mood is depressed and he denies katie/hypomania symptoms. Notes indicate his depression has been difficult to treat without triggering katie/mixed episodes. His most recent mixed/hypomania episode occurred in Jan 2022 on the regimen of VPA 1500mg and Lexapro 20mg daily.  The most recent 2022 med changes have included restarting and titrating Latuda and Lexapro dose adjustments.  No changes to his Depakote dose have been made since 2018 and he has had no medication changes over the last 2 months. His Depakote level is WNL. There is concern that patient is experiencing EPSE from Latuda and this will be further assessed at his 6/14/22 visit with psychiatrist. Our visit today was via telephone so writer was unable to observe any abnormal movements.     In regards to medication considerations, options mentioned by pt's provider has included switching to an alternative selective serotonin reuptake inhibitor such as sertraline. Other possible considerations identified today may include:  Wellbutrin, Vraylar or Caplyta. It appears Vraylar and Caplyta are PA eligible with a quantity limit of 1 capsule per day.  Ultimately, it remains unclear if Lexapro has been beneficial for patient's depression, and it is thought to have contributed to hypomania/mixed episode at 20mg daily. Therefore, it may be preferable to first try switching Lexapro to either sertraline or Wellbutrin. Would lean toward Wellbutrin due to lower risk of switch to katie/hypomania. However, if pt is thought to have EPSE, this may lead to prioritization of switching his antipsychotic medication. It is noted that Depakote may increase Caplyta concentrations and therefore Vraylar may be preferred. Finally, pt was taking NAC in the past, but reports he  hasn't taken it for ~1 year. This could be restarted due to potential benefit for depression in bipolar disorder (3rd line adjunctive treatment in CANMAT guidelines). No changes will be made today, will await PGx testing results and completion of study visit #2.     PLAN:                            Considerations for psychiatry may include (see above for considerations):   - changing Lexapro to sertraline or Wellbutrin  - changing Latuda to Vraylar or Caplyta  - restarting NAC     Follow-up: study visit 2 with review of PGx results to occur in 2-3 weeks    SUBJECTIVE/OBJECTIVE:                          Esa Rivera is a 37 year old male called for an initial visit. He was referred to me from Dr. Motley.      Reason for visit: Patient is enrolled in MT and Pharmacogenetic Evaluation in Mental Health study. He is called today for study visit #1 to review current and past medications.    Allergies/ADRs: Reviewed in chart  Past Medical History: Reviewed in chart  Tobacco: He reports that he has never smoked. He has never used smokeless tobacco.  Alcohol: not currently using    Medication Adherence/Access: no issues reported    Bipolar I Disorder, severe with psychotic behavior:   Current medications:   Depakote 1500mg at bedtime  Latuda 60mg daily  escitalopram 10mg daily  Risperidone 1-2mg daily PRN sleep and 1-2mg daily PRN katie [hasn't needed to take in 2-3 months]    Esa is a 37 year old with psychiatric history of Bipolar I Disorder and alcohol use disorder, in sustained remission. He has been seen at [facility] since 2017, primarily by Dr. Mayito Motley but recently transferred care to Dr. Wisam Will.  Most recent visit (transfer of care DA) was 4/22/22 at which time no medication changes were made.      Brief psychiatric history: Diagnosed with ADHD at age 17 and was prescribed Adderall until first manic episode in 2016.  Patient's first manic episode is described as severe with  symptoms of psychosis (delusions), grandiosity, decreased need for sleep (1-2 hours for 5-6 nights), racing thoughts, ideas of reference.  He was not hospitalized during this episode; psychiatrist prescribed olanzapine and lamotrigine. Pt was hospitalized in May 2018 for katie while prescribed olanzapine, lamotrigine, and lithium. He presented with religiosity, grandiose delusions and elevated lithium level (2.4 mmol/L) in the setting of malnutrition and dehydration (pt was restricting intake). During hospitalization, PTA medications were discontinued and Depakote and risperidone were started. Shortly after discharge he was started on Seroquel to help with sleep, but found it too sedating so it was switched to Latuda to improve mood and sedation. Escitalopram was added in 2021 to target ongoing and difficult to treat depression symptoms.     Currently, Esa describes his mood as irritable and depressed. He endorses symptoms of decreased energy and motivation, negative outlook on life, trouble helping around the house (has 1 year old child), and irritability. He reports being stable on VPA 1500mg + Latuda 80mg with no symptoms of katie for about 2 years (roughly 7351-0703). However, over time, he reports his depression worsened and he was started on escitalopram in October 2021. Shortly thereafter, he had to stop Latuda due to insurance issues (restriction on # capsules/year when attempting to titrate to 100mg daily). Lexapro was titrated to 20mg daily, however in Jan 2022 he presented with a mixed epidode. His medication regimen at the time was VPA 1500mg daily and Lexapro 20mg daily. Lexapro was reduced to 15mg then 10mg and Latuda was restarted at 20mg daily then 40mg over the course of 1 week (Latuda was covered again due to 1st of the year).  In Feb 2022, Lexapro was further decreased to 5mg daily due to mixed/katie symptoms and subsequently Latuda was increased from 40mg daily to 60mg daily due to worsening  depression symptoms. Lexapro was then increased back to 10mg daily for depression. No medication changes were made in March or April 2022. In regards to side effects, Esa endorses some abnormal jaw movements (jaw protruding to front/side) that his wife has noticed, although patient himself has been unaware of these movements. He denies any other abnormal movements in his face, mouth/tongue, or body. Plan was made at 4/22/22 psychiatry visit for in-person visit to perform AIMS assessment on 6/14/22. He otherwise denies medication side effects and feels he has tolerated his current regimen much better than past regimens.     VPA level:   70mg/L 2/8/22  70mg/L 5/14/21       PAST MED TRIALS       Medication Max Dose (mg) Dates / Duration Helpful? DC Reason / Adverse Effects? Notes   olanzapine 30 7915-0346 Y Sedation, cognitive blunting, intolerable per patient    lamotrigine 400 2639-0628 N Stopped during 2018 hospitalization due to starting VPA (risk of rash with concomitant therapy)    lurasidone 80  2018-current Y  Stopped end of 2021 due to insurance issue, restarted 2022    escitalopram 20 Oct 2021-current Y Irritability, hypomania at higher doses    divalproex 1500 2018-current Y      risperidone 2 2018-current Y      lithium 1800 2017 Y Hx of lithium toxicity while restricting food/water intake, affective flattening    quetiapine 200 2018   Severely sedating, intolerable per patient    Adderall 20 4044-8806 Y Stopped d/t katie    Lisdexampfetamine   2017 N      Methlyphenidate     N                       ----------------      I spent 50 minutes with this patient today. A copy of the visit note was provided to the patient's provider(s).    The patient was sent via [employer] a summary of these recommendations.     Mami Castro, PharmD, BCPP  Medication Therapy Management Pharmacist  [facility]      Telemedicine Visit Details  Type of service:  Telephone visit  Start Time: 8:35  AM  End Time: 9:24 AM  Originating Location (patient location): Home  Distant Location (provider location):  [facility] MENTAL [facility] ADDI[facility]     Medication Therapy Recommendations  Bipolar 1 disorder (H)    Current Medication: escitalopram (LEXAPRO) 10 MG tablet   Rationale: Condition refractory to medication - Ineffective medication - Effectiveness   Recommendation: Change Medication - Wellbutrin  MG Tb24   Status: Contact Provider - Awaiting Response

## 2022-04-28 ENCOUNTER — VIRTUAL VISIT (OUTPATIENT)
Dept: PHARMACY | Facility: CLINIC | Age: 37
End: 2022-04-28
Payer: COMMERCIAL

## 2022-04-28 DIAGNOSIS — F31.9 BIPOLAR 1 DISORDER (H): Primary | ICD-10-CM

## 2022-04-28 PROCEDURE — 99207 PR NO CHARGE LOS: CPT | Performed by: PHARMACIST

## 2022-04-28 NOTE — Clinical Note
Chris Joel and Mayito   CCing you both on my MTM note for this patient. He was referred to our PGx study by Dr. Motley and I completed study visit #1 with him. I will meet with him again after we get the PGx results and we will see if that helps prioritize any med options for him. Please let me know if you have any questions or anything to add to his case/history that I may have missed. I will be meeting with our study team next week to review his case.   Thank you!  Mami

## 2022-05-03 NOTE — PATIENT INSTRUCTIONS
"Recommendations from today's MTM visit:                                                      No changes will be made today. I will reach out to you via [employer] to schedule study visit #2 to review your pharmacogenetic testing results.     It was great speaking with you today.  I value your experience and would be very thankful for your time in providing feedback in our clinic survey. In the next few days, you may receive an email or text message from [employer] with a link to a survey related to your  clinical pharmacist.\"     To schedule another MTM appointment, please call the clinic directly or you may call the MTM scheduling line at [phone] or toll-free at [phone].     My Clinical Pharmacist's contact information:                                                      Please feel free to contact me with any questions or concerns you have.      Mami Castro, PharmD, BCPP  Medication Therapy Management Pharmacist  [facility]     "

## 2022-05-11 NOTE — PROGRESS NOTES
Medication Therapy Management (MTM) Encounter    ASSESSMENT:                            Medication Adherence/Access: No issues identified    Bipolar I Disorder, severe with psychotic behavior: Pharmacogenetic Results:  OneOme testing conducted and reported on 22.     Select PGX Results: (report will be scanned in media tab after study visit #3)     Gene Genotype/Phenotype   CY *1A/*1F - rapid metabolizer   CYP2D6 *1/*2A - extensive (normal) metabolizer   CYP2B6 *1/*1 - extensive (normal) metabolizer   [id number] *1/*1 - extensive (normal) metabolizer   CYP2C9 *1/*3 - intermediate metabolizer   CY *1/*1 - extensive (normal) metabolizer   SLC6A4 *L/*/L - normal response       Pharmacogenetic assessment and recommendations:  Key Findings:  1. Pt is an intermediate metabolizer of CYP2C9                 - This can result in higher than expected serum levels at normal dosing of drugs predominately metabolized by this enzyme(s).     2. Pt is an ultrarapid metabolizer of CY                - This can result in lower than expected serum levels at normal dosing of drugs predominately metabolized by this enzyme(s).                   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 These polymorphisms do not impact the metabolism or recommended dosing of any of patient's currently prescribed medications (Lexapro, Latuda, Depakote, or risperidone). At study visit #1 on 22, potential medication considerations identified included switching Lexapro to Wellbutrin or sertraline or switching Latuda to Vraylar or Caplyta. All these medications remain reasonable options, none of which are impacted by pt's CY or CYP2C9 metabolic activity.  Today we discussed differences between Wellbutrin and sertraline and insurance restrictions with Vraylar and Caplyta (PA required and quantity limit 1 tablet per day). Pt remains open to any of these medication options.  As previously mentioned, pt will see Dr. Will on 6/14 for further evaluation of possible EPSE. Patient would like to defer any medication changes until that time and that evaluation will also help prioritize if a change should be made to Latuda due to EPSE or if we can start with an antidepressant medication change (due to inefficacy with Lexapro).          PLAN:                            1. Today we reviewed your OneOme results.   2. We discussed some medication options including Wellbutrin, sertraline, Vraylar, and Caplyta. No changes will be made today and you will follow-up with Dr. Will on 6/14.     Follow-up:6/14/22 with Dr. Will; [facility] study visit 3 to occur about 4 weeks after medication change    SUBJECTIVE/OBJECTIVE:                          Esa Rivera is a 37 year old male contacted via secure video for a follow-up visit.  Today's visit is a follow-up [facility] visit from 4/28/22 - study visit #1.      Reason for visit: Patient is enrolled in [facility] and Pharmacogenetic Evaluation in Mental Health study.  He is here today for study visit #2 to review genetic testing results and medication recommendations.    Allergies/ADRs: Reviewed in chart  Past Medical History: Reviewed in chart  Tobacco: He reports that he has never smoked. He has never used smokeless tobacco.  Alcohol: not currently using      Medication Adherence/Access: no issues reported    Bipolar I Disorder, severe with psychotic behavior:   Current medications:   Depakote 1500mg at bedtime  Latuda 60mg daily  escitalopram 10mg daily  Risperidone 1-2mg daily PRN sleep and 1-2mg daily PRN katie [hasn't needed to take in 2-3 months]    Esa is a 37 year old with psychiatric history of Bipolar I Disorder and alcohol use disorder, in sustained remission. He has been seen at [facility] since 2017, primarily by Dr. Mayito Motley but recently transferred care to Dr. Wisam Will.  Most recent visit (transfer of care DA)  was 4/22/22 at which time no medication changes were made.      Brief psychiatric history: Diagnosed with ADHD at age 17 and was prescribed Adderall until first manic episode in 2016.  Patient's first manic episode is described as severe with symptoms of psychosis (delusions), grandiosity, decreased need for sleep (1-2 hours for 5-6 nights), racing thoughts, ideas of reference.  He was not hospitalized during this episode; psychiatrist prescribed olanzapine and lamotrigine. Pt was hospitalized in May 2018 for katie while prescribed olanzapine, lamotrigine, and lithium. He presented with religiosity, grandiose delusions and elevated lithium level (2.4 mmol/L) in the setting of malnutrition and dehydration (pt was restricting intake). During hospitalization, PTA medications were discontinued and Depakote and risperidone were started. Shortly after discharge he was started on Seroquel to help with sleep, but found it too sedating so it was switched to Latuda to improve mood and sedation. Escitalopram was added in 2021 to target ongoing and difficult to treat depression symptoms.     Currently, Esa describes his mood as irritable and depressed. He endorses symptoms of decreased energy and motivation, negative outlook on life, trouble helping around the house (has 1 year old child), and irritability. He reports being stable on VPA 1500mg + Latuda 80mg with no symptoms of katie for about 2 years (roughly [id number]). However, over time, he reports his depression worsened and he was started on escitalopram in October 2021. Shortly thereafter, he had to stop Latuda due to insurance issues (restriction on # capsules/year when attempting to titrate to 100mg daily). Lexapro was titrated to 20mg daily, however in Jan 2022 he presented with a mixed epidode. His medication regimen at the time was VPA 1500mg daily and Lexapro 20mg daily. Lexapro was reduced to 15mg then 10mg and Latuda was restarted at 20mg daily then 40mg over  the course of 1 week (Latuda was covered again due to 1st of the year).  In Feb 2022, Lexapro was further decreased to 5mg daily due to mixed/katie symptoms and subsequently Latuda was increased from 40mg daily to 60mg daily due to worsening depression symptoms. Lexapro was then increased back to 10mg daily for depression. No medication changes were made in March or April 2022. In regards to side effects, Esa endorses some abnormal jaw movements (jaw protruding to front/side) that his wife has noticed, although patient himself has been unaware of these movements. He denies any other abnormal movements in his face, mouth/tongue, or body. Plan was made at 4/22/22 psychiatry visit for in-person visit to perform AIMS assessment on 6/14/22. He otherwise denies medication side effects and feels he has tolerated his current regimen much better than past regimens.     Today, no significant changes have occurred to medications or patient's symptoms since study visit #1 on 4/28/22. He remains depressed with low mood, very low motivation, anhedonia and difficulty engaging in activities. Goal of visit today is to review OneOme PGx results (summarized in Assessment) and outline potential medication considerations. Patient will be out of the country until 6/13 so he prefers not to make any medication changes until he returns from his trip.     VPA level:   70mg/L 2/8/22  70mg/L 5/14/21       PAST MED TRIALS       Medication Max Dose (mg) Dates / Duration Helpful? DC Reason / Adverse Effects? Notes   olanzapine 30 7016-4748 Y Sedation, cognitive blunting, intolerable per patient    lamotrigine 400 6482-5725 N Stopped during 2018 hospitalization due to starting VPA (risk of rash with concomitant therapy)    lurasidone 80  2018-current Y  Stopped end of 2021 due to insurance issue, restarted 2022    escitalopram 20 Oct 2021-current Y Irritability, hypomania at higher doses    divalproex 1500 2018-current Y      risperidone 2  2018-current Y      lithium 1800 2017 Y Hx of lithium toxicity while restricting food/water intake, affective flattening    quetiapine 200 2018   Severely sedating, intolerable per patient    Adderall 20 5195-3415 Y Stopped d/t katie    Lisdexampfetamine   2017 N      Methlyphenidate     N                       ----------------      I spent 63 minutes with this patient today. I offer these suggestions for consideration by Dr. Wisam Will. A copy of the visit note was provided to the patient's provider(s).    The patient was sent via [employer] a summary of these recommendations.     Mami Castro, PharmD, BCPP  Medication Therapy Management Pharmacist  [facility]        Telemedicine Visit Details  Type of service:  Video Conference via [employer]  Start Time: 10:35 AM  End Time: 11:37 AM  Originating Location (patient location): [place]  Distant Location (provider location):  [facility] ADDI[facility]     Medication Therapy Recommendations  No medication therapy recommendations to display   *MTPs previously denoted in study visit #1

## 2022-05-17 ENCOUNTER — VIRTUAL VISIT (OUTPATIENT)
Dept: PHARMACY | Facility: CLINIC | Age: 37
End: 2022-05-17
Payer: COMMERCIAL

## 2022-05-17 DIAGNOSIS — F31.9 BIPOLAR 1 DISORDER (H): Primary | ICD-10-CM

## 2022-05-17 PROCEDURE — 99207 PR NO CHARGE LOS: CPT | Performed by: PHARMACIST

## 2022-05-17 NOTE — Clinical Note
Chris laguerre,   I had study visit #2 with patient today. No big notable PGx results, so I think whatever you feel most appropriate in terms of med next steps would be on the table. Wellbutrin, Sertraline, Vraylar, or Caplyta are the options we discussed today.   Thank you!  Mmai

## 2022-05-17 NOTE — PATIENT INSTRUCTIONS
"Recommendations from today's MTM visit:                                                      1. Today we reviewed your OneOme results.   2. We discussed some medication options including Wellbutrin, sertraline, Vraylar, and Caplyta. No changes will be made today and you will follow-up with Dr. Will on 6/14.     Follow-up:6/14/22 with Dr. Will; MTM study visit 3 to occur about 4 weeks after medication change    It was great speaking with you today.  I value your experience and would be very thankful for your time in providing feedback in our clinic survey. In the next few days, you may receive an email or text message from [employer] with a link to a survey related to your  clinical pharmacist.\"     To schedule another MTM appointment, please call the clinic directly or you may call the MTM scheduling line at [phone] or toll-free at [phone].     My Clinical Pharmacist's contact information:                                                      Please feel free to contact me with any questions or concerns you have.      Mami Castro, PharmD, BCPP  Medication Therapy Management Pharmacist  [facility]    "

## 2022-06-17 DIAGNOSIS — F31.30 BIPOLAR I DISORDER, MOST RECENT EPISODE DEPRESSED (H): ICD-10-CM

## 2022-06-17 RX ORDER — LURASIDONE HYDROCHLORIDE 60 MG/1
60 TABLET, FILM COATED ORAL
Qty: 30 TABLET | Refills: 0 | Status: SHIPPED | OUTPATIENT
Start: 2022-06-17 | End: 2022-07-12

## 2022-06-17 NOTE — TELEPHONE ENCOUNTER
Last seen: 4/22/22   RTC: 6 weeks  Cancel: 6/14/22  No-show: none  Next appt: 7/12/22     Incoming refill from pharmacy    Medication requested:   lurasidone (LATUDA) 60 MG TABS tablet 30 tablet 1 4/22/2022 6/21/2022 No   Sig - Route: Take 1 tablet (60 mg) by mouth daily with food - Oral       From chart note: Continue lurasidone 60 mg daily    30 day Medication refill approved per refill protocol.

## 2022-07-12 ENCOUNTER — OFFICE VISIT (OUTPATIENT)
Dept: PSYCHIATRY | Facility: CLINIC | Age: 37
End: 2022-07-12
Attending: PSYCHIATRY & NEUROLOGY
Payer: COMMERCIAL

## 2022-07-12 VITALS
WEIGHT: 279 LBS | SYSTOLIC BLOOD PRESSURE: 127 MMHG | DIASTOLIC BLOOD PRESSURE: 87 MMHG | BODY MASS INDEX: 34.87 KG/M2 | HEART RATE: 61 BPM

## 2022-07-12 DIAGNOSIS — F31.2 BIPOLAR AFFECTIVE DISORDER, CURRENTLY MANIC, SEVERE, WITH PSYCHOTIC FEATURES (H): ICD-10-CM

## 2022-07-12 DIAGNOSIS — F31.9 BIPOLAR 1 DISORDER (H): Primary | ICD-10-CM

## 2022-07-12 DIAGNOSIS — F31.30 BIPOLAR I DISORDER, MOST RECENT EPISODE DEPRESSED (H): ICD-10-CM

## 2022-07-12 PROCEDURE — 90792 PSYCH DIAG EVAL W/MED SRVCS: CPT | Mod: GC | Performed by: STUDENT IN AN ORGANIZED HEALTH CARE EDUCATION/TRAINING PROGRAM

## 2022-07-12 PROCEDURE — G0463 HOSPITAL OUTPT CLINIC VISIT: HCPCS

## 2022-07-12 RX ORDER — LURASIDONE HYDROCHLORIDE 60 MG/1
60 TABLET, FILM COATED ORAL
Qty: 30 TABLET | Refills: 0 | Status: SHIPPED | OUTPATIENT
Start: 2022-07-12 | End: 2022-08-16

## 2022-07-12 RX ORDER — DIVALPROEX SODIUM 500 MG/1
1500 TABLET, EXTENDED RELEASE ORAL AT BEDTIME
Qty: 90 TABLET | Refills: 1 | Status: SHIPPED | OUTPATIENT
Start: 2022-07-12 | End: 2022-08-16

## 2022-07-12 ASSESSMENT — ABNORMAL INVOLUNTARY MOVEMENT SCALE (AIMS)
AIMS_DISAPPEAR_SLEEPING: NO
CURRENT_PROBLEMS_TEETH_DENTURES: NO
EDENTIA: NO
PATIENT_WEARS_DENTURES: NO
AIMS_PATIENT_AWARENESS: NO AWARENESS
TONGUE: 0

## 2022-07-12 ASSESSMENT — PAIN SCALES - GENERAL: PAINLEVEL: NO PAIN (0)

## 2022-07-12 NOTE — PATIENT INSTRUCTIONS
Treatment Plan Today:     1) Medications:  -Stop Lexapro by decreasing to 5mg (half tablet) for 2 weeks, then stop  -Continue Depakote 1500mg daily by mouth  -Continue Latuda 60mg daily with food  -Continue Risperidone as needed for sleep or katie    2) you have been referred for individual therapy    3) Please follow-up with Dr Travis in 4 weeks    4) Crisis numbers are below and clinic after hours number is [phone]         **For crisis resources, please see the information at the end of this document**   Patient Education    Thank you for coming to the [facility] [place] [facility].     Lab Testing:  If you had lab testing today and your results are reassuring or normal they will be mailed to you or sent through [employer] within 7 days. If the lab tests need quick action we will call you with the results. The phone number we will call with results is # [phone]. If this is not the best number please call our clinic and change the number.     Medication Refills:  If you need any refills please call your pharmacy and they will contact us. Our fax number for refills is [phone].   Three business days of notice are needed for general medication refill requests.   Five business days of notice are needed for controlled substance refill requests.   If you need to change to a different pharmacy, please contact the new pharmacy directly. The new pharmacy will help you get your medications transferred.     Contact Us:  Please call [phone] during business hours (8-5:00 M-F).   If you have medication related questions after clinic hours, or on the weekend, please call [phone].     Financial Assistance [phone]   Medical Records [phone]       MENTAL HEALTH CRISIS RESOURCES:  For a emergency help, please call 911 or go to the nearest Emergency Department.     Emergency Walk-In Options:   [facility] Unit @ [place] [facility]): [phone] - Specialized mental  health emergency area designed to be calming  [facility] ([place]): [phone]  [facility] ([place]): [phone]  [facility] ([place]): [phone]    [facility] Crisis Information:   Luis Fernando: [phone]  Robert: [phone]  Chirag (CHRISTINE) - Adult: [phone]     Child: [phone]  Kofi - Adult: [phone]     Child: [phone]  [place]: [phone]  List of all [facility] resources:   https://[place].gov/dhs/people-we-serve/adults/health-care/mental-health/resources/crisis-contacts.jsp    National Crisis Information:   Crisis Text Line: Text  [place]  to 411862  National Suicide Prevention Lifeline: 0-015-723-TALK (1-529.367.5093)       For online chat options, visit https://suicidepreventionlifeline.org/chat/  Poison Control Center: 1-110.538.8393  Trans Lifeline: 1-176.606.1825 - Hotline for transgender people of all ages  The [employer] Project: 8-416-968-7716 - Hotline for LGBT youth     For Non-Emergency Support:   Fast Tracker: Mental Health & Substance Use Disorder Resources -   https://www.VERTILASckFundlyn.org/

## 2022-07-12 NOTE — PROGRESS NOTES
"       [facility]  Psychiatry Clinic  TRANSFER of CARE DIAGNOSTIC ASSESSMENT     CARE TEAM:  PCP- Aristides Jenkins    Psychotherapist- None   Esa Rivera is a 37 year old who uses the name Esa and pronouns he, him, his.      DIAGNOSIS   # Bipolar I Disorder, In remission  # Alcohol use disorder, moderate, in sustained remission  # History of ADHD     ASSESSMENT   Esa is doing well overall. He reports that since last visit he has been doing well overall, and presents with no safety concerns - no SI, no HI.     Issues addressed today are below.    -Bipolar Disorder:   Esa reports an interval stability of his mood; he attributes this to cannabis use, which \"changed my life\". He does note that the Lexapro seems to increase his irritability and aggression. On chart review, it looks like there has been a pattern of irritable behavior and a manic episode while on Lexapro. Given his bipolar disorder diagnosis, irritability could represent an activation of bipolar symptoms in the context of Lexapro. He is no longer feeling depressed with a congruent affect; therefore Lexapro will be tapered and discontinued. His current medication regimen (Latuda + depakote) should be sufficient to stabilize depressive/mood episodes. Additionally, Esa is amenable to restarting psychotherapy for depression/mood.     -Cannabis use:   Reports that mood, irritability, and family life are better with cannabis use. He does note some adverse effects including morning fog and mental slowing. Overall, Esa feels that the benefits outweight the risks. He is precondemplative regarding this substance use. Psychoeducation was provided regarding tolerance and psychosis associated with cannabis use. He is aware of psychotic and katie warning signs from prior experience. Will use PRN risperidone if experiencing any psychosis or katie.    -Abnormal movements, rule out tardive dyskinesia:   There was concern for TD given " report that wife observes his jaw protruding laterally at times, which he cannot feel. This has been ongoing since initiation of latuda. AIMS assessed today in person and was negative for involuntary movements globally (score 0). Will continue to monitor AIMS annually while using antipsychotics. The intermittent fixed position of the jaw may represent dystonia. May trial cogentin if bothersome; Esa declined intervention today.    MNPMP was checked today:  Indicates not taking controlled substances.     PLAN                                                                                                                1) Meds-  - Discontinue Lexapro by tapering to 5mg for 2 weeks, then stop  - Continue Depakote 1500mg daily by mouth  - Continue Latuda 60mg daily with food  - Continue Risperidone as needed for sleep or crystal    2) Psychotherapy-    -establish care    3) Next due-  Labs- WNL in 02/2022  EKG- as needed  Rating scales- AIMS: done 7/12/22 with total score of 0     4) Referrals-  Therapy- referral placed today    5) Dispo- RTC in 4 weeks     PERTINENT BACKGROUND                                                    [most recent eval 07/12/22]   History of mild depressive episodes and short hypomanic episodes (1-2 day periods of reduced sleep and elevated mood usually in context of work projects) and week long episodes of low mood, low energy/motivation, poor focus/concentration and negative thoughts in the preceding years but not treatment. Diagnosed with ADHD at age 17 with Intermittent prescription Adderall use. First crystal occurring in 12/2016 during a period of insomnia in the context of excitement about a work project. Crystal symptoms included decreased sleep, irritability, racing thoughts, grandiosity, increased energy and ideas of reference. Previously prescribed olanzapine and lamotrigine with significant side effects (sedation, cognitive impairment). No history of SI , SA or neurovegetative symptoms.  "History of heavy alcohol use in college years with sporadic binge drinking since. History of TBI w/LOC (couple minutes) at age 15 (skiing) and 16 (football). Last hospitalized in 2018 for katie and lithium toxicity.      Psych pertinent item history includes psych hosp (x1) and substance use: alcohol and cannabis     SUBJECTIVE     Most recent history began 3 months ago when Esa was last seen in clinic. Since that time, he reports that thing have been \"going pretty well\". He reports that using cannabis has \"changed my life\". He is less irritable and argumentative at home, gets more done around the house, and is able to care for his 1 year old daughter. He reports that cannabis \"calms my flares of too much emotion\". He feels that he is a \"positive thinker\" on cannabis and very negative without it. He also mentions some downsides to cannabis use including feeling foggy in the morning (\"like to too risperidone\") and some mental slowing. We also discuss the potenital for tolerance over time, and Esa notes increasing his use to 10-15mg after starting at 5mg. He also expresses concern that use could lead to psychosis and the nature of psychosis was discussed as well as warning signs. He is familiar with psychosis from prior manic episode. Overall, he feels that benefits outweigh risks currently, since it is \"saved\" his marriage and his \"wife wants a divorce\" when he is not using. He requests to learn about potential drug interactions between his prescribed medications and cannabis, and a Lexicomp evaluation was completed in session.    Esa reports that Lexapro makes him \"angry\" and yells at family. The only time he has been manic/hypomanic was when he was on Lexapro and off of Latuda. On chart review, it seems he was on 20mg Lexapro at the time of manic episode, and Lexapro has been associated with irritability in the past. Ultimately, Esa is hoping to transition to a medication like Wellbutrin, which recent genetic " "analysis has indicated might be beneficial.    Esa reports intermittent jaw movement where his wife willl tell him that his jaw has moved to the side and it stays there. Happens every week or two. He does not notice it. He is concerned about TD, which is why he came in person. No muscle stiffness, pain or cramping; no irregular or uncontrollable movements in face, trunk, or limbs.    Recent Social History: see below  -wife \"wants a divorce when not using cannabis\"  -still working at , although has some more difficulty with mental tasks  -daughter is one year old, he has better distress tolerance around daughter when using cannabis    Recent Psych Symptoms:   Depression:  none, when using cannabis  Elevated:  none, irritable at times  Psychosis:  none  Anxiety:  none  Trauma Related:  none  Sleep: regular  Other: N/A    Recent Substance Use:     -alcohol: No   -cannabis: Yes: 10-15mg of cannabis daily, increased from 5mg - precontemplative on change   -tobacco: No  -caffeine:  Yes   -opioids: No   Narcan Kit currrent: No   -other: none    Pertinent Negatives: No suicidal or violent ideation, self-injury, psychosis, hallucinations, delusions and katie  Adverse Effects: reports intermittent jaw movement where his wife willl tell him that his jaw has moved to the side and stays there. Happens every week or two. He does not notice it.      FAMILY and SOCIAL HISTORY                                 pt reported     Family History:   Maternal uncle: depression  Maternal grandmother: dementia  Paternal grandfather: AUD  Sister: Suspected OCD and anxiety  Brother: Suspected mood disorder, alcohol use  No family history: of schizophrenia.     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 Trying to make new friends. Close friend in [place], [place].     Feels Safe at Home- yes   Legal- None     Trauma History (self-report)- None  Early History/Education- Born and raised in [place], [place] area. Describes childhood as good. Denies significant issues with discipline or academics. Did well academically. Few close friends.     PAST PSYCHIATRIC HISTORY     SIB- None  Suicide Attempt [#, most recent]- None  Suicidal Ideation Hx- None     Violence/Aggression Hx- None  Psychosis Hx- Ideas of reference during katie  Eating Disorder Hx- None  Other- None     Psych Hosp [#, most recent]- x5 days in station 20 in 2018  Commitment- None  ECT- None  Outpatient Programs - None  Other - N/A     PAST MED TRIALS       Medication Max Dose (mg) Dates / Duration Helpful? DC Reason / Adverse Effects?   olanzapine 30 3128-1033 Y Sedation, cognitive blunting   lamotrigine 400 4537-6014 N Developed mixed mood symptoms, rash   lurasidone 100 2018-current Y nausea   escitalopram 20 2020-current Y Irritability, hypomania at higher doses   divalproex 1500 2018-current Y     risperidone 2 2018-current Y     lithium 1800 2017 Y Hx of lithium toxicity, affective flattening   quetiapine 200 2121-7979   Severely sedating   Adderall 20 7084-2998 Y Stopped d/t katie   Lisdex-  ampfetamine   2017 N     Methlyphenidate     N                    PAST SUBSTANCE USE HISTORY     Past Use-   Alcohol- yes, past history of heavy use and binge drinking,   Tobacco- nicotine pouches   Caffeine- coffee/ tea [2-4cups per day]   Opioids- no    Narcan Kit- N/A   Cannabis- yes  Other Illicit Drugs-none    Treatment- #, most recent- no  Medical Consequences- no  Legal Consequences- no  Other- N/A     MEDICAL HISTORY and ALLERGY     ALLERGIES: Patient has no known allergies.    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     Rash and nonspecific skin eruption     Carpal tunnel syndrome of left wrist     Weight gain     Numbness and tingling of both feet        MEDICAL REVIEW OF SYSTEMS   Contraception-  Unknown   Pregnant- No  none in addition to that documented above     MEDICATIONS     Current Outpatient Medications   Medication Sig Dispense Refill     divalproex sodium extended-release (DEPAKOTE ER) 500 MG 24 hr tablet Take 3 tablets (1,500 mg) by mouth At Bedtime 90 tablet 1     escitalopram (LEXAPRO) 10 MG tablet Take 1 tablet (10 mg) by mouth daily 30 tablet 1     lurasidone (LATUDA) 60 MG TABS tablet Take 1 tablet (60 mg) by mouth daily with food 30 tablet 0     risperiDONE (RISPERDAL) 1 MG tablet Take 1-2 tablets (1-2 mg) by mouth as needed for sleep and 1-2 tablets (1-2 mg) twice daily as needed for katie 60 tablet 1      VITALS   There were no vitals taken for this visit.    MENTAL STATUS EXAM     Alertness: alert  and oriented  Appearance: well groomed  Behavior/Demeanor: cooperative and calm, with good  eye contact   Speech: slowed and normal rhythm  Language: no problems  Psychomotor: normal or unremarkable and no abnormal movements appreciated  Mood: \"doing pretty well\"  Affect: blunted; congruent to: mood- yes, content- yes  Thought Process/Associations: unremarkable, linear and logical, goal oriented  Thought Content:  Reports none;  Denies suicidal & violent ideation and delusions and delusions   Perception:  Reports none;  Denies auditory hallucinations and visual hallucinations  Insight: good  Judgment: good and adequate for safety  Cognition: does  appear grossly intact; formal cognitive testing was not done  Gait and Station: unremarkable     LABS and DATA     PHQ 6/12/2019 8/7/2019 2/1/2022   PHQ-9 Total Score 4 2 10   Q9: Thoughts of better off dead/self-harm past 2 weeks Not at all Not at all Not at all       Recent Labs "   Lab Test 05/14/21  0828 05/29/18  0919   CR 0.99 0.84   GFRESTIMATED >90 >90     Recent Labs   Lab Test 02/08/22  0756 05/14/21  0828 05/29/18  0919 05/16/18  1152 01/02/17  1705   AST 15 14   < > 34 61*   ALT 52 44   < > 53 114*   ALKPHOS  --   --   --  65 43    < > = values in this interval not displayed.       ECG on 1/2/17 QTc = 418 ms @ 46 BPM     PSYCHOTROPIC DRUG INTERACTIONS                                                       PSYCHCLINICDDI   Additive QT prolongation: lurasidone, Risperidone     CNS depression: lurasidone, risperidone, depakote, cannabis     MANAGEMENT:  Monitoring for adverse effects and periodic EKGs; deprescribe as able       RISK STATEMENT for SAFETY     Esa did not appear to be an imminent safety risk to self or others.    TREATMENT RISK STATEMENT: The risks, benefits, alternatives and potential adverse effects have been discussed and are understood by the pt. The pt understands the risks of using street drugs or alcohol. There are no medical contraindications, the pt agrees to treatment with the ability to do so. The pt knows to call the clinic for any problems or to access emergency care if needed.  Medical and substance use concerns are documented above.  Psychotropic drug interaction check was done, including changes made today.     PROVIDER: Esa Travis MD    Patient staffed in clinic with Dr. Desai who will sign the note.  Supervisor is Dr. Medeiros.

## 2022-07-20 DIAGNOSIS — F31.30 BIPOLAR I DISORDER, MOST RECENT EPISODE DEPRESSED (H): ICD-10-CM

## 2022-07-20 NOTE — TELEPHONE ENCOUNTER
escitalopram (LEXAPRO) 10 MG  Last refilled: 4/22/22  Qty: 30 : 1    Last seen: 7/12/22  RTC: 1 MOS  Cancel: 0  No-show: 0  Next appt: NONE  Scheduling has been notified to contact the pt for appointment.  Refill pended and routed to the provider for review/determination due to :  Clarification : DOSE & DIRECTIONS   CONTINUE OR discontinue MED ?

## 2022-07-21 NOTE — TELEPHONE ENCOUNTER
Per last visit note on 7/12, patient is tapering off Lexapro. Writer routed Rx Auth to Esa Travis for clarification on whether patient still needs refill w/ taper instructions or patient is already done with this. Arlene Smith, EMT

## 2022-07-22 RX ORDER — ESCITALOPRAM OXALATE 10 MG/1
TABLET ORAL
Qty: 90 TABLET | Refills: 1 | OUTPATIENT
Start: 2022-07-22

## 2022-07-22 NOTE — TELEPHONE ENCOUNTER
Writer called patient at the request of Dr. Travis to see if pt has completed taper or needs another refill. Patient confirmed that he is almost done with the taper and he has enough tablets to complete it. Pt stated he will call clinic or send [facility] message if he feels he wants to try going back on the med. Arlene Smith, EMT

## 2022-08-15 DIAGNOSIS — F31.2 BIPOLAR AFFECTIVE DISORDER, CURRENTLY MANIC, SEVERE, WITH PSYCHOTIC FEATURES (H): ICD-10-CM

## 2022-08-15 DIAGNOSIS — F31.30 BIPOLAR I DISORDER, MOST RECENT EPISODE DEPRESSED (H): ICD-10-CM

## 2022-08-15 NOTE — TELEPHONE ENCOUNTER
[facility] Call Center    Phone Message    May a detailed message be left on voicemail: yes     Reason for Call: Medication Refill Request    Has the patient contacted the pharmacy for the refill? Yes   Name of medication being requested: all medication  Provider who prescribed the medication: brea    Date medication is needed:   Pt says that due to financial concerns he wants to cancel all future appointments with this clinic and just get six months of refills so that he can find another regular provider at a different clinic      Action Taken: Message routed to:  Other: nursing pool    Travel Screening: Not Applicable

## 2022-08-16 RX ORDER — LURASIDONE HYDROCHLORIDE 60 MG/1
60 TABLET, FILM COATED ORAL
Qty: 30 TABLET | Refills: 2 | Status: SHIPPED | OUTPATIENT
Start: 2022-08-16 | End: 2022-12-09

## 2022-08-16 RX ORDER — DIVALPROEX SODIUM 500 MG/1
1500 TABLET, EXTENDED RELEASE ORAL AT BEDTIME
Qty: 90 TABLET | Refills: 2 | Status: SHIPPED | OUTPATIENT
Start: 2022-08-16 | End: 2022-11-08

## 2022-08-16 RX ORDER — RISPERIDONE 1 MG/1
TABLET ORAL
Qty: 60 TABLET | Refills: 2 | Status: SHIPPED | OUTPATIENT
Start: 2022-08-16

## 2022-08-16 NOTE — TELEPHONE ENCOUNTER
Writer contacted patient update on policy of 3 months of refills. Patient accepting and verbalized understanding. Patient confirmed preferred pharmacy. Pended medications and routed to provider for review/approval.

## 2022-10-09 ENCOUNTER — HEALTH MAINTENANCE LETTER (OUTPATIENT)
Age: 37
End: 2022-10-09

## 2022-12-02 DIAGNOSIS — F31.30 BIPOLAR I DISORDER, MOST RECENT EPISODE DEPRESSED (H): ICD-10-CM

## 2022-12-02 NOTE — TELEPHONE ENCOUNTER
Latuda 60MG  Last refilled:  8/16/22  Qty: 30 : 2  Last seen: 7/12/22  RTC: 1 MOS  Cancel: 8/29/22  No-show: 0  Next appt: NONE  30 DAY WILLY Refill pended and routed to the provider for review/determination due to : Pt outside of RTC timeframe WITH CANCEL X 1  Scheduling has been notified to contact the pt for appointment.

## 2022-12-05 ENCOUNTER — TELEPHONE (OUTPATIENT)
Dept: PSYCHIATRY | Facility: CLINIC | Age: 37
End: 2022-12-05

## 2022-12-05 NOTE — TELEPHONE ENCOUNTER
An immunization administration record was received from [facility]. The patient has received the following vaccinations:   Vaccine: influenza, injectable, quadrivalent, preservative  Dose: 0.50 mL  Injection Route / Site: IM / LD  Lot #: [id number]  Date Administered: 12-2-2022  : SANOFI-PASTEUR  Expiration date: 06-    An immunization administration record was received from [facility]. The patient has received the following vaccinations:   Vaccine: COVID-19, mRNA, LNP-S, bivalent booster, PF, 50  Dose: 0.50 mL  Injection Route / Site: IM / LD  Lot #: [id number]  Date Administered: 12-2-2022  : MODERNA  Expiration date: 06-    The document was sent to scanning and a hard copy is being held in nurse triage until scanning is confirmed.    Shaista Galvez on 12/5/2022 at 9:13 AM

## 2022-12-09 DIAGNOSIS — F31.30 BIPOLAR I DISORDER, MOST RECENT EPISODE DEPRESSED (H): ICD-10-CM

## 2022-12-09 RX ORDER — LURASIDONE HYDROCHLORIDE 60 MG/1
60 TABLET, FILM COATED ORAL
Qty: 30 TABLET | Refills: 0 | Status: SHIPPED | OUTPATIENT
Start: 2022-12-09

## 2022-12-09 NOTE — TELEPHONE ENCOUNTER
Writer called patient at [phone] and updated patient that Latuda was sent to preferred pharmacy and that enough medication was ordered to get patient to their next appointment with new provider. Patient verbalized understanding.

## 2022-12-09 NOTE — TELEPHONE ENCOUNTER
[facility] Call Center    Phone Message    May a detailed message be left on voicemail: yes     Reason for Call: Medication Refill Request    Has the patient contacted the pharmacy for the refill? Yes   Name of medication being requested: Sheldon  Provider who prescribed the medication:   Pharmacy: [facility] at [address] [place] [address] [facility] [place]  Date medication is needed: ASAP.  He took last dose last night. Pt is transferring his care to a new provider outside of the clinic. His appt is on 12/29. Pt is looking to get a one last refill before his appt with his new provider.          Action Taken: Other: [facility] [employer] psych pool    Travel Screening: Not Applicable

## 2022-12-09 NOTE — TELEPHONE ENCOUNTER
Last Seen 7/12/22  RTC 4 tae  Cancel 1  No-Show 0    Next Appt (with new provider at separate clinic)    Incoming Refill From patient request via MyChart    Medication Requested   lurasidone (LATUDA) 60 MG TABS tablet    Directions   Route: Take 1 tablet (60 mg) by mouth daily with food - Oral    Qty 30    Last Refill 8/16/22    Medication Refill Pended Per Refill Protocol

## 2022-12-09 NOTE — TELEPHONE ENCOUNTER
Pt called at 1:07PM to check on the progress of this medication. Pt is hoping this medication will be filled ASAP because they are due to take it with dinner this evening.

## 2022-12-24 DIAGNOSIS — F31.2 BIPOLAR AFFECTIVE DISORDER, CURRENTLY MANIC, SEVERE, WITH PSYCHOTIC FEATURES (H): ICD-10-CM

## 2022-12-26 NOTE — TELEPHONE ENCOUNTER
DEPAKOTE 500 MG   Last refilled: 11/8/22  Qty: 90    Last seen: 7/12/22  RTC: 1 MOS  Cancel: 0  No-show: 0  Next appt: NONE  30 DAY WILLY Refill pended and routed to the provider for review/determination due to : Pt outside of RTC timeframe WITH CANCEL X 1  Scheduling has been notified to contact the pt for appointment.

## 2022-12-27 RX ORDER — DIVALPROEX SODIUM 500 MG/1
1500 TABLET, EXTENDED RELEASE ORAL AT BEDTIME
Qty: 42 TABLET | Refills: 0 | OUTPATIENT
Start: 2022-12-27

## 2022-12-27 NOTE — TELEPHONE ENCOUNTER
RE: [facility]  APPT  Received: Today  Noreen Ramirez Julia B, RN; Esa Travis MD; Selam Curry, RN  Spoke to the patient today and he said he will be seeing a different provider on Thursday (12/29).  He verbally confirmed he will no longer be a patient in the Psychiatry clinic.     Thanks!   Noreen     Follow up:     Reached out to patient who confirmed he has enough medications to last until 12/29 and does not need refills at this time. Meds refused.     No further action needed by this writer

## 2023-03-02 RX ORDER — LURASIDONE HYDROCHLORIDE 60 MG/1
60 TABLET, FILM COATED ORAL DAILY
Qty: 30 TABLET | Refills: 0 | OUTPATIENT
Start: 2023-03-02

## 2023-04-28 ENCOUNTER — MEDICAL CORRESPONDENCE (OUTPATIENT)
Dept: HEALTH INFORMATION MANAGEMENT | Facility: CLINIC | Age: 38
End: 2023-04-28

## 2023-05-21 ENCOUNTER — HEALTH MAINTENANCE LETTER (OUTPATIENT)
Age: 38
End: 2023-05-21

## 2023-05-31 ENCOUNTER — OFFICE VISIT (OUTPATIENT)
Dept: FAMILY MEDICINE | Facility: CLINIC | Age: 38
End: 2023-05-31
Payer: COMMERCIAL

## 2023-05-31 VITALS
RESPIRATION RATE: 19 BRPM | TEMPERATURE: 98.3 F | HEIGHT: 74 IN | BODY MASS INDEX: 35.49 KG/M2 | DIASTOLIC BLOOD PRESSURE: 82 MMHG | OXYGEN SATURATION: 98 % | HEART RATE: 55 BPM | SYSTOLIC BLOOD PRESSURE: 129 MMHG | WEIGHT: 276.5 LBS

## 2023-05-31 DIAGNOSIS — R74.01 ELEVATED ALT MEASUREMENT: ICD-10-CM

## 2023-05-31 DIAGNOSIS — F31.74 BIPOLAR 1 DISORDER, MANIC, FULL REMISSION (H): ICD-10-CM

## 2023-05-31 DIAGNOSIS — Z00.00 ROUTINE GENERAL MEDICAL EXAMINATION AT A HEALTH CARE FACILITY: Primary | ICD-10-CM

## 2023-05-31 DIAGNOSIS — F10.21 ALCOHOL DEPENDENCE, IN REMISSION (H): ICD-10-CM

## 2023-05-31 PROBLEM — Z11.59 NEED FOR HEPATITIS C SCREENING TEST: Status: ACTIVE | Noted: 2023-05-31

## 2023-05-31 LAB
ERYTHROCYTE [DISTWIDTH] IN BLOOD BY AUTOMATED COUNT: 11.7 % (ref 10–15)
HCT VFR BLD AUTO: 45 % (ref 40–53)
HGB BLD-MCNC: 15.2 G/DL (ref 13.3–17.7)
MCH RBC QN AUTO: 30.5 PG (ref 26.5–33)
MCHC RBC AUTO-ENTMCNC: 33.8 G/DL (ref 31.5–36.5)
MCV RBC AUTO: 90 FL (ref 78–100)
PLATELET # BLD AUTO: 252 10E3/UL (ref 150–450)
RBC # BLD AUTO: 4.99 10E6/UL (ref 4.4–5.9)
WBC # BLD AUTO: 5.3 10E3/UL (ref 4–11)

## 2023-05-31 PROCEDURE — 80164 ASSAY DIPROPYLACETIC ACD TOT: CPT | Performed by: FAMILY MEDICINE

## 2023-05-31 PROCEDURE — 80061 LIPID PANEL: CPT | Performed by: FAMILY MEDICINE

## 2023-05-31 PROCEDURE — 99395 PREV VISIT EST AGE 18-39: CPT | Performed by: FAMILY MEDICINE

## 2023-05-31 PROCEDURE — 85027 COMPLETE CBC AUTOMATED: CPT | Performed by: FAMILY MEDICINE

## 2023-05-31 PROCEDURE — 80053 COMPREHEN METABOLIC PANEL: CPT | Performed by: FAMILY MEDICINE

## 2023-05-31 PROCEDURE — 36415 COLL VENOUS BLD VENIPUNCTURE: CPT | Performed by: FAMILY MEDICINE

## 2023-05-31 ASSESSMENT — ENCOUNTER SYMPTOMS
WEAKNESS: 0
CONSTIPATION: 0
COUGH: 1
HEMATURIA: 0
HEMATOCHEZIA: 0
MYALGIAS: 0
HEADACHES: 0
FREQUENCY: 0
SHORTNESS OF BREATH: 0
ABDOMINAL PAIN: 0
NERVOUS/ANXIOUS: 0
DIZZINESS: 0
PARESTHESIAS: 0
HEARTBURN: 0
SORE THROAT: 0
CHILLS: 0
ARTHRALGIAS: 0
EYE PAIN: 0
PALPITATIONS: 0
DIARRHEA: 0
NAUSEA: 0
DYSURIA: 0
FEVER: 0
JOINT SWELLING: 0

## 2023-05-31 NOTE — PROGRESS NOTES
SUBJECTIVE:   CC: Esa is an 38 year old who presents for preventative health visit.         5/31/2023     3:07 PM   Additional Questions   Roomed by JOLANTA         5/31/2023     3:07 PM   Patient Reported Additional Medications   Patient reports taking the following new medications WELLBUTRIN 100MG     Healthy Habits:     Getting at least 3 servings of Calcium per day:  Yes    Bi-annual eye exam:  NO    Dental care twice a year:  Yes    Sleep apnea or symptoms of sleep apnea:  None    Diet:  Regular (no restrictions)    Frequency of exercise:  None    Taking medications regularly:  Yes    Medication side effects:  Other    PHQ-2 Total Score: 2    Additional concerns today:  Yes    Bipolar Follow-Up    How are you doing with your depression since your last visit? No change    How are you doing with your anxiety since your last visit?  No change    Are you having other symptoms that might be associated with depression or anxiety? No    Have you had a significant life event? No     Do you have any concerns with your use of alcohol or other drugs? No maintaining sobriety    Social History     Tobacco Use     Smoking status: Never     Smokeless tobacco: Never   Vaping Use     Vaping status: Never Used   Substance Use Topics     Alcohol use: No     Alcohol/week: 2.5 standard drinks of alcohol     Types: 3 Standard drinks or equivalent per week     Drug use: No         6/12/2019     4:30 PM 8/7/2019     4:00 PM 2/1/2022     9:07 AM   PHQ   PHQ-9 Total Score 4 2 10   Q9: Thoughts of better off dead/self-harm past 2 weeks Not at all Not at all Not at all         2/1/2022     9:07 AM   ANGY-7 SCORE   Total Score 10 (moderate anxiety)   Total Score 10     Today's PHQ-2 Score:       5/31/2023     2:31 PM   PHQ-2 ( 1999 [employer])   Q1: Little interest or pleasure in doing things 1   Q2: Feeling down, depressed or hopeless 1   PHQ-2 Score 2   Q1: Little interest or pleasure in doing things Several days   Q2: Feeling down,  "depressed or hopeless Several days   PHQ-2 Score 2           Social History     Tobacco Use     Smoking status: Never     Smokeless tobacco: Never   Vaping Use     Vaping status: Never Used   Substance Use Topics     Alcohol use: No     Alcohol/week: 2.5 standard drinks of alcohol     Types: 3 Standard drinks or equivalent per week             5/31/2023     2:31 PM   Alcohol Use   Prescreen: >3 drinks/day or >7 drinks/week? No          View : No data to display.                Last PSA: No results found for: PSA    Reviewed orders with patient. Reviewed health maintenance and updated orders accordingly - Yes  Lab work is in process  Labs reviewed in EPIC    Reviewed and updated as needed this visit by clinical staff   Tobacco  Allergies  Meds              Reviewed and updated as needed this visit by Provider                 Review of Systems   Constitutional: Negative for chills and fever.   HENT: Positive for congestion. Negative for ear pain, hearing loss and sore throat.    Eyes: Negative for pain and visual disturbance.   Respiratory: Positive for cough. Negative for shortness of breath.    Cardiovascular: Negative for chest pain, palpitations and peripheral edema.   Gastrointestinal: Negative for abdominal pain, constipation, diarrhea, heartburn, hematochezia and nausea.   Genitourinary: Negative for dysuria, frequency, genital sores, hematuria, impotence, penile discharge and urgency.   Musculoskeletal: Negative for arthralgias, joint swelling and myalgias.   Skin: Negative for rash.   Neurological: Negative for dizziness, weakness, headaches and paresthesias.   Psychiatric/Behavioral: Positive for mood changes. The patient is not nervous/anxious.      OBJECTIVE:   /82 (BP Location: Right arm, Patient Position: Sitting, Cuff Size: Adult Regular)   Pulse 55   Temp 98.3  F (36.8  C) (Temporal)   Resp 19   Ht 1.885 m (6' 2.21\")   Wt 125.4 kg (276 lb 8 oz)   SpO2 98%   BMI 35.30 kg/m      Physical " Exam  GENERAL: healthy, alert and no distress  EYES: Eyes grossly normal to inspection, PERRL and conjunctivae and sclerae normal  HENT: ear canals and TM's normal, nose and mouth without ulcers or lesions  NECK: no adenopathy, no asymmetry, masses, or scars and thyroid normal to palpation  RESP: lungs clear to auscultation - no rales, rhonchi or wheezes  CV: regular rate and rhythm, normal S1 S2, no S3 or S4, no murmur, click or rub, no peripheral edema and peripheral pulses strong  ABDOMEN: soft, nontender, no hepatosplenomegaly, no masses and bowel sounds normal   (male): normal male genitalia without lesions or urethral discharge, no hernia  RECTAL: normal sphincter tone, no rectal masses, prostate normal size, smooth, nontender without nodules or masses  MS: no gross musculoskeletal defects noted, no edema  SKIN: no suspicious lesions or rashes  NEURO: Normal strength and tone, mentation intact and speech normal  PSYCH: mentation appears normal, affect normal/bright    Diagnostic Test Results:  Labs reviewed in Epic    ASSESSMENT/PLAN:       ICD-10-CM    1. Routine general medical examination at a health care facility  Z00.00 Lipid panel reflex to direct LDL Fasting     Comprehensive metabolic panel (BMP + Alb, Alk Phos, ALT, AST, Total. Bili, TP)     Lipid panel reflex to direct LDL Fasting     Comprehensive metabolic panel (BMP + Alb, Alk Phos, ALT, AST, Total. Bili, TP)      2. Bipolar 1 disorder, manic, full remission (H)  F31.74 Adult Mental Health  Referral     Valproic acid     CBC with platelets     Valproic acid     CBC with platelets      3. Alcohol dependence, in remission (H)  F10.21           COUNSELING:   Reviewed preventive health counseling, as reflected in patient instructions       Regular exercise       Healthy diet/nutrition       Vision screening       Colorectal cancer screening       Prostate cancer screening      BMI:   Estimated body mass index is 35.3 kg/m  as calculated  "from the following:    Height as of this encounter: 1.885 m (6' 2.21\").    Weight as of this encounter: 125.4 kg (276 lb 8 oz).   Weight management plan: Discussed healthy diet and exercise guidelines      He reports that he has never smoked. He has never used smokeless tobacco.    Patient Instructions   Consider medical cannabis, contact if interested    Aristides Jenkins MD  [facility]  "

## 2023-06-01 ENCOUNTER — MYC MEDICAL ADVICE (OUTPATIENT)
Dept: FAMILY MEDICINE | Facility: CLINIC | Age: 38
End: 2023-06-01
Payer: COMMERCIAL

## 2023-06-01 LAB
ALBUMIN SERPL BCG-MCNC: 4.7 G/DL (ref 3.5–5.2)
ALP SERPL-CCNC: 39 U/L (ref 40–129)
ALT SERPL W P-5'-P-CCNC: 76 U/L (ref 10–50)
ANION GAP SERPL CALCULATED.3IONS-SCNC: 13 MMOL/L (ref 7–15)
AST SERPL W P-5'-P-CCNC: 37 U/L (ref 10–50)
BILIRUB SERPL-MCNC: 0.7 MG/DL
BUN SERPL-MCNC: 12.9 MG/DL (ref 6–20)
CALCIUM SERPL-MCNC: 9.6 MG/DL (ref 8.6–10)
CHLORIDE SERPL-SCNC: 103 MMOL/L (ref 98–107)
CHOLEST SERPL-MCNC: 167 MG/DL
CREAT SERPL-MCNC: 0.87 MG/DL (ref 0.67–1.17)
DEPRECATED HCO3 PLAS-SCNC: 24 MMOL/L (ref 22–29)
GFR SERPL CREATININE-BSD FRML MDRD: >90 ML/MIN/1.73M2
GLUCOSE SERPL-MCNC: 89 MG/DL (ref 70–99)
HDLC SERPL-MCNC: 32 MG/DL
LDLC SERPL CALC-MCNC: 88 MG/DL
NONHDLC SERPL-MCNC: 135 MG/DL
POTASSIUM SERPL-SCNC: 4.7 MMOL/L (ref 3.4–5.3)
PROT SERPL-MCNC: 7.6 G/DL (ref 6.4–8.3)
SODIUM SERPL-SCNC: 140 MMOL/L (ref 136–145)
TRIGL SERPL-MCNC: 234 MG/DL
VALPROATE SERPL-MCNC: 16.1 UG/ML

## 2023-06-07 ENCOUNTER — VIRTUAL VISIT (OUTPATIENT)
Dept: PSYCHOLOGY | Facility: CLINIC | Age: 38
End: 2023-06-07
Payer: COMMERCIAL

## 2023-06-07 DIAGNOSIS — Z53.9 ERRONEOUS ENCOUNTER--DISREGARD: Primary | ICD-10-CM

## 2023-06-07 NOTE — PROGRESS NOTES
"Pt presented to appointment at 8:07am. Upon entering the appointment, provider welcomed pt and introduced herself. Pt reported he was looking for therapy. Provider shared with pt the plan for the session today to get the appropriate paper work completed, answer any questions pt has, and to get some updated symptoms information for the diagnostic assessment. Pt reported he was not interested in \"getting more diagnosis because [he] already [has] some\". Pt reported the last DA he had was about 4 years ago. Provider shared in order to start therapy there needs to be an updated DA in the chart due to [place] mental health policies. Pt stated he was not interested in continuing the session and would like the appointment with provider for next week to be cancelled. Pt then left the appointment at 8:11am.    Pt did not fill out intake paperwork before session and needs updated self-reports should he decide continue therapy with [facility] in the future.    Pt is encouraged to follow up with behavioral access if he would like to reschedule an intake session in the future with [facility].     Merline Argueta, Psychotherapist Trainee, [facility]  6/7/23  "

## 2023-06-12 RX ORDER — BUPROPION HYDROCHLORIDE 100 MG/1
1 TABLET, EXTENDED RELEASE ORAL
COMMUNITY
Start: 2023-05-25

## 2023-06-14 ENCOUNTER — HOSPITAL ENCOUNTER (OUTPATIENT)
Dept: ULTRASOUND IMAGING | Facility: CLINIC | Age: 38
Discharge: HOME | End: 2023-06-14
Attending: FAMILY MEDICINE | Admitting: FAMILY MEDICINE
Payer: COMMERCIAL

## 2023-06-14 ENCOUNTER — MYC MEDICAL ADVICE (OUTPATIENT)
Dept: PSYCHIATRY | Facility: CLINIC | Age: 38
End: 2023-06-14
Payer: COMMERCIAL

## 2023-06-14 DIAGNOSIS — R74.01 ELEVATED ALT MEASUREMENT: ICD-10-CM

## 2023-06-14 PROCEDURE — 76705 ECHO EXAM OF ABDOMEN: CPT

## 2023-06-15 NOTE — CONFIDENTIAL NOTE
"Patient sent MyChart message to Dr. Motley, asking to return to his care - or for a referral for a new psychiatrist.    Patient last saw Dr. Travis in July of 2022.    Patient has started seeing an outside provider.    Writer sent a MyChart reply and asked that he contact our Intake team to further discuss. Writer did make patient aware that the only option for care would be through the [facility].  (patient's current provider is a \"very new\" psychiatrist and patient does not agree with the current provider's recommendations.)  "

## 2023-06-19 NOTE — RESULT ENCOUNTER NOTE
Chris See: Your ultrasound shows that your body has stored excess carbohydrates as a fat in your liver.  If you can decrease your intake of sweets, starches, simple carbohydrates low enough, your body will convert this to usable carbohydrates when needed.  I can recommend Dr.Twila Peters [phone] practicing out near [place], however not sure how well your insurance will cover but you can ask her.    Aristides WILHELM

## 2023-07-17 ENCOUNTER — MEDICAL CORRESPONDENCE (OUTPATIENT)
Dept: HEALTH INFORMATION MANAGEMENT | Facility: CLINIC | Age: 38
End: 2023-07-17

## 2023-11-10 ENCOUNTER — LAB (OUTPATIENT)
Dept: LAB | Facility: CLINIC | Age: 38
End: 2023-11-10
Payer: COMMERCIAL

## 2023-11-10 DIAGNOSIS — E55.9 VITAMIN D DEFICIENCY: Primary | ICD-10-CM

## 2023-11-10 DIAGNOSIS — Z79.899 LONG TERM USE OF DRUG: ICD-10-CM

## 2023-11-10 DIAGNOSIS — E53.8 VITAMIN B 12 DEFICIENCY: ICD-10-CM

## 2023-11-10 DIAGNOSIS — E03.9 HYPOTHYROIDISM, ADULT: ICD-10-CM

## 2023-11-10 LAB
TSH SERPL DL<=0.005 MIU/L-ACNC: 2.33 UIU/ML (ref 0.3–4.2)
VALPROATE SERPL-MCNC: 43.2 UG/ML
VIT B12 SERPL-MCNC: 510 PG/ML (ref 232–1245)
VIT D+METAB SERPL-MCNC: 18 NG/ML (ref 20–50)

## 2023-11-10 PROCEDURE — 82306 VITAMIN D 25 HYDROXY: CPT

## 2023-11-10 PROCEDURE — 84443 ASSAY THYROID STIM HORMONE: CPT

## 2023-11-10 PROCEDURE — 80164 ASSAY DIPROPYLACETIC ACD TOT: CPT

## 2023-11-10 PROCEDURE — 36415 COLL VENOUS BLD VENIPUNCTURE: CPT

## 2023-11-10 PROCEDURE — 82607 VITAMIN B-12: CPT

## 2024-02-12 NOTE — NURSING NOTE
Chief Complaint   Patient presents with     Recheck Medication     Bipolar I disorder     Reviewed allergies, smoking status, and pharmacy preference  Administered abuse screening questions   Obtained weight, blood pressure and heart rate     
Patient taken to treatment room before rooming/vitals done today.Jenni Valle/YIMI        
Detail Level: Generalized

## 2024-03-19 ENCOUNTER — TELEPHONE (OUTPATIENT)
Dept: FAMILY MEDICINE | Facility: CLINIC | Age: 39
End: 2024-03-19

## 2024-03-19 ENCOUNTER — LAB (OUTPATIENT)
Dept: LAB | Facility: CLINIC | Age: 39
End: 2024-03-19
Payer: COMMERCIAL

## 2024-03-19 ENCOUNTER — VIRTUAL VISIT (OUTPATIENT)
Dept: FAMILY MEDICINE | Facility: CLINIC | Age: 39
End: 2024-03-19
Payer: COMMERCIAL

## 2024-03-19 DIAGNOSIS — Z11.59 NEED FOR HEPATITIS C SCREENING TEST: ICD-10-CM

## 2024-03-19 DIAGNOSIS — R30.0 DYSURIA: ICD-10-CM

## 2024-03-19 DIAGNOSIS — R30.0 DYSURIA: Primary | ICD-10-CM

## 2024-03-19 DIAGNOSIS — Z11.4 SCREENING FOR HIV (HUMAN IMMUNODEFICIENCY VIRUS): Primary | ICD-10-CM

## 2024-03-19 LAB
ALBUMIN UR-MCNC: NEGATIVE MG/DL
APPEARANCE UR: CLEAR
BACTERIA #/AREA URNS HPF: ABNORMAL /HPF
BILIRUB UR QL STRIP: NEGATIVE
COLOR UR AUTO: YELLOW
GLUCOSE UR STRIP-MCNC: NEGATIVE MG/DL
HGB UR QL STRIP: NEGATIVE
KETONES UR STRIP-MCNC: ABNORMAL MG/DL
LEUKOCYTE ESTERASE UR QL STRIP: NEGATIVE
NITRATE UR QL: NEGATIVE
PH UR STRIP: 6 [PH] (ref 5–7)
RBC #/AREA URNS AUTO: ABNORMAL /HPF
SP GR UR STRIP: 1.02 (ref 1–1.03)
SQUAMOUS #/AREA URNS AUTO: ABNORMAL /LPF
UROBILINOGEN UR STRIP-ACNC: 0.2 E.U./DL
WBC #/AREA URNS AUTO: ABNORMAL /HPF

## 2024-03-19 PROCEDURE — 87086 URINE CULTURE/COLONY COUNT: CPT

## 2024-03-19 PROCEDURE — 99442 PR PHYSICIAN TELEPHONE EVALUATION 11-20 MIN: CPT | Mod: 93 | Performed by: FAMILY MEDICINE

## 2024-03-19 PROCEDURE — 81001 URINALYSIS AUTO W/SCOPE: CPT

## 2024-03-19 NOTE — TELEPHONE ENCOUNTER
Pt calling per message below, advised Dr. Jenkins is out all week and pt is ok with either monitoring at home or appointment with different provider. Mild discomfort, no fever, no burning/frequency/urgency. Just mild pain while urinating and never had before.    Mike Abdi RN   [facility]

## 2024-03-19 NOTE — TELEPHONE ENCOUNTER
Reason for Call:  Appointment Request    Patient requesting this type of appt:  slight discomfort during urination x 2 days     Requested provider: Aristides Jenkins    Reason patient unable to be scheduled: Not within requested timeframe    When does patient want to be seen/preferred time: Same day    Comments: Pt requesting same day appt or sooner w/ their pcp, declined seeing other providers and other locations. Pt would like to be seen in person or virtual, whichever their pcp can fit them in the soonest to address their concern.     Pt okayed triage nurse, transferring to nurse line to discuss symptoms     [facility] or would you prefer to receive a phone call?:   No preference   Okay to leave a detailed message?: Yes at Cell number on file:    Telephone Information:   Mobile [phone]       Call taken on 3/19/2024 at 10:14 AM by Deya Galvez

## 2024-03-19 NOTE — PROGRESS NOTES
"Esa is a 39 year old who is being evaluated via a billable telephone visit.    What phone number would you like to be contacted at? cell  How would you like to obtain your AVS? MyChart  Originating Location (pt. Location): Home    Distant Location (provider location):  On-site    Assessment & Plan     Dysuria  Mild   No fever, chills or back pain' no STI risk  - UA with Microscopic reflex to Culture - lab collect; Future  - Urine Culture Aerobic Bacterial - lab collect; Future            BMI  Estimated body mass index is 35.3 kg/m  as calculated from the following:    Height as of 5/31/23: 1.885 m (6' 2.21\").    Weight as of 5/31/23: 125.4 kg (276 lb 8 oz).         See Patient Instructions    Subjective   Esa is a 39 year old, presenting for the following health issues:  No chief complaint on file.    HPI     Genitourinary - Male  Onset/Duration: mild dysuria  Description:   Dysuria (painful urination): YES}  Hematuria (blood in urine): No  Frequency: No  Waking at night to urinate: No  Hesitancy (delay in urine): No  Retention (unable to empty): No  Decrease in urinary flow: No  Incontinence: No  Progression of Symptoms:  same  Accompanying Signs & Symptoms:  Fever: No  Back/Flank pain: No  Urethral discharge: No     History:   History of frequent UTI s: No  History of kidney stones: No        Review of Systems  Constitutional, HEENT, cardiovascular, pulmonary, gi and gu systems are negative, except as otherwise noted.      Objective           Vitals:  No vitals were obtained today due to virtual visit.    Physical Exam   General: Alert and no distress //Respiratory: No audible wheeze, cough, or shortness of breath // Psychiatric:  Appropriate affect, tone, and pace of words      Results for orders placed or performed in visit on 03/19/24   UA with Microscopic reflex to Culture - lab collect     Status: Abnormal    Specimen: Urine, Midstream   Result Value Ref Range    Color Urine Yellow Colorless, Straw, Light " Yellow, Yellow    Appearance Urine Clear Clear    Glucose Urine Negative Negative mg/dL    Bilirubin Urine Negative Negative    Ketones Urine Trace (A) Negative mg/dL    Specific Gravity Urine 1.020 1.003 - 1.035    Blood Urine Negative Negative    pH Urine 6.0 5.0 - 7.0    Protein Albumin Urine Negative Negative mg/dL    Urobilinogen Urine 0.2 0.2, 1.0 E.U./dL    Nitrite Urine Negative Negative    Leukocyte Esterase Urine Negative Negative   UA Microscopic with Reflex to Culture     Status: Abnormal   Result Value Ref Range    Bacteria Urine None Seen None Seen /HPF    RBC Urine None Seen 0-2 /HPF /HPF    WBC Urine 0-5 0-5 /HPF /HPF    Squamous Epithelials Urine Few (A) None Seen /LPF    Narrative    Urine Culture not indicated         Phone call duration: 11 minutes  Signed Electronically by: Ethan Varela MD

## 2024-03-19 NOTE — TELEPHONE ENCOUNTER
Called pt and scheduled for VV telephone call at 5:40.    Mike Abdi RN   [facility]

## 2024-03-20 LAB — BACTERIA UR CULT: NO GROWTH

## 2024-05-01 ENCOUNTER — PATIENT OUTREACH (OUTPATIENT)
Dept: CARE COORDINATION | Facility: CLINIC | Age: 39
End: 2024-05-01
Payer: COMMERCIAL

## 2024-06-10 ENCOUNTER — OFFICE VISIT (OUTPATIENT)
Dept: FAMILY MEDICINE | Facility: CLINIC | Age: 39
End: 2024-06-10
Payer: COMMERCIAL

## 2024-06-10 VITALS
SYSTOLIC BLOOD PRESSURE: 116 MMHG | WEIGHT: 283 LBS | OXYGEN SATURATION: 98 % | HEIGHT: 74 IN | BODY MASS INDEX: 36.32 KG/M2 | TEMPERATURE: 98.8 F | DIASTOLIC BLOOD PRESSURE: 80 MMHG | HEART RATE: 60 BPM | RESPIRATION RATE: 18 BRPM

## 2024-06-10 DIAGNOSIS — R00.2 PALPITATIONS: ICD-10-CM

## 2024-06-10 DIAGNOSIS — F10.21 ALCOHOL DEPENDENCE, IN REMISSION (H): ICD-10-CM

## 2024-06-10 DIAGNOSIS — R63.5 WEIGHT GAIN: ICD-10-CM

## 2024-06-10 DIAGNOSIS — M25.561 ACUTE PAIN OF RIGHT KNEE: ICD-10-CM

## 2024-06-10 DIAGNOSIS — F31.74 BIPOLAR 1 DISORDER, MANIC, FULL REMISSION (H): ICD-10-CM

## 2024-06-10 DIAGNOSIS — Z00.00 VISIT FOR PREVENTIVE HEALTH EXAMINATION: ICD-10-CM

## 2024-06-10 PROCEDURE — 93000 ELECTROCARDIOGRAM COMPLETE: CPT | Performed by: FAMILY MEDICINE

## 2024-06-10 PROCEDURE — 99395 PREV VISIT EST AGE 18-39: CPT | Performed by: FAMILY MEDICINE

## 2024-06-10 PROCEDURE — 99213 OFFICE O/P EST LOW 20 MIN: CPT | Mod: 25 | Performed by: FAMILY MEDICINE

## 2024-06-10 RX ORDER — ARIPIPRAZOLE 5 MG/1
5 TABLET ORAL DAILY
COMMUNITY

## 2024-06-10 SDOH — HEALTH STABILITY: PHYSICAL HEALTH: ON AVERAGE, HOW MANY DAYS PER WEEK DO YOU ENGAGE IN MODERATE TO STRENUOUS EXERCISE (LIKE A BRISK WALK)?: 3 DAYS

## 2024-06-10 ASSESSMENT — SOCIAL DETERMINANTS OF HEALTH (SDOH): HOW OFTEN DO YOU GET TOGETHER WITH FRIENDS OR RELATIVES?: ONCE A WEEK

## 2024-06-10 ASSESSMENT — PATIENT HEALTH QUESTIONNAIRE - PHQ9
SUM OF ALL RESPONSES TO PHQ QUESTIONS 1-9: 8
SUM OF ALL RESPONSES TO PHQ QUESTIONS 1-9: 8
10. IF YOU CHECKED OFF ANY PROBLEMS, HOW DIFFICULT HAVE THESE PROBLEMS MADE IT FOR YOU TO DO YOUR WORK, TAKE CARE OF THINGS AT HOME, OR GET ALONG WITH OTHER PEOPLE: VERY DIFFICULT

## 2024-06-10 ASSESSMENT — PAIN SCALES - GENERAL: PAINLEVEL: NO PAIN (0)

## 2024-06-10 NOTE — PATIENT INSTRUCTIONS
Check labs today  EKG    Continue to get exercise, consider decreasing somewhat and slowly increase (45 min walks, increase to 1 hr walks)    Monitor knee pain    Keep track of sleep, discuss with psychiatrist at next appointment    More of a Mediteranean or DASH type diet

## 2024-06-10 NOTE — PROGRESS NOTES
"Preventive Care Visit  [facility]  Aristides Jenkins MD, Family Medicine  Ramo 10, 2024      Assessment & Plan     Visit for preventive health examination  Reviewed preventive health maintenance orders   - Comprehensive metabolic panel (BMP + Alb, Alk Phos, ALT, AST, Total. Bili, TP); Future  - Lipid panel reflex to direct LDL Fasting; Future  - EKG 12-lead complete w/read - Clinics    Need for hepatitis C screening test  Routine  - Hepatitis C Screen Reflex to HCV RNA Quant and Genotype; Future    Screening for HIV (human immunodeficiency virus)  Routine  - HIV Antigen Antibody Combo; Future    Palpitations  ECG done, NSR, will continue to monitor now    Weight gain  Wt today 283lbs, encouraged to continue physical activity, discussed mediterranean diet.    - Adult Comprehensive Weight Management  Referral; Future    Bipolar 1 disorder, manic, full remission (H24)  Psychiatry managing medication, working with new therapist, started on abilify last week (6/7), this is helping with his mood symptoms.      Acute pain of right knee  Started running during paternity leave, R knee pain began, stopped running but walking an average of 2 hours daily with daughter in the stroller, discussed cutting back on actity and slowly increasing to prevent injury    Alcohol dependence, in remission (H)  Continued sobriety      BMI  Estimated body mass index is 36.34 kg/m  as calculated from the following:    Height as of this encounter: 1.88 m (6' 2\").    Weight as of this encounter: 128.4 kg (283 lb).   Weight management plan: Patient referred to endocrine and/or weight management specialty Discussed healthy diet and exercise guidelines    Counseling  Appropriate preventive services were discussed with this patient, including applicable screening as appropriate for fall prevention, nutrition, physical activity, Tobacco-use cessation, weight loss and cognition.  Checklist reviewing " "preventive services available has been given to the patient.  Reviewed patient's diet, addressing concerns and/or questions.   He is at risk for lack of exercise and has been provided with information to increase physical activity for the benefit of his well-being.   The patient's PHQ-9 score is consistent with mild depression. He was provided with information regarding depression.       Goldy See is a 39 year old, presenting for the following:  Physical        6/10/2024     9:36 AM   Additional Questions   Roomed by Delphine WALL        Health Care Directive  Patient does not have a Health Care Directive or Living Will: Discussed advance care planning with patient; information given to patient to review.    HPI  Mr Rivera presents today for preventative visit, and concerns of occasional heart palpitations.    Concern - Palpitations  Onset: numerous weeks  Description: palpitations, increased awareness of heart beat, feeling like his heart is \"doing weird things\"  Intensity: mild  Progression of Symptoms:  same  Accompanying Signs & Symptoms: occasionally SOB, unsure whether related to Anxiety, unsure if dizziness is associated  Previous history of similar problem: no  Precipitating factors:        Worsened by: none  Alleviating factors:        Improved by: none  Therapies tried and outcome: None    Skin Lesion  Onset/Duration: 5/14  Description  Location: R bicep  Color: pink  Border description: sharp border  Character: linear, blanches to palpitation  Itching: no  Bleeding:  No  Intensity:  mild  Progression of Symptoms:  improving  Accompanying signs and symptoms:   Bleeding: No  Scaling: No  Excessive sun exposure/tanning: No  Sunscreen used: No  History:           Any previous history of skin cancer: No  Any family history of melanoma: No  Previous episodes of similar lesion: No  Precipitating or alleviating factors: scar and scab following mole excision by dermatology 5/14.  Pt reports purulent drainage at " one time, now improved.    Therapies tried and outcome: none    Concern - R knee pain  Onset: 2-3 weeks  Description: R knee pain/discomfort  Intensity: mild  Progression of Symptoms:  improving   Accompanying Signs & Symptoms: originally began when he started running, has continued somewhat, since he has been walking approximately 2 hrs daily  Previous history of similar problem: no  Precipitating factors:        Worsened by: increased activity  Alleviating factors:        Improved by: rest, lower impact activities  Therapies tried and outcome: None      6/10/2024   General Health   How would you rate your overall physical health? (!) POOR   Feel stress (tense, anxious, or unable to sleep) Rather much   (!) STRESS CONCERN      6/10/2024   Nutrition   Three or more servings of calcium each day? Yes   Diet: Regular (no restrictions)   How many servings of fruit and vegetables per day? (!) 2-3   How many sweetened beverages each day? 0-1         6/10/2024   Exercise   Days per week of moderate/strenous exercise 3 days         6/10/2024   Social Factors   Frequency of gathering with friends or relatives Once a week   Worry food won't last until get money to buy more No   Food not last or not have enough money for food? No   Do you have housing?  Yes   Are you worried about losing your housing? No   Lack of transportation? No   Unable to get utilities (heat,electricity)? No         6/10/2024   Dental   Dentist two times every year? Yes         6/10/2024   TB Screening   Were you born outside of the [place]? No       Today's PHQ-9 Score:       6/10/2024     9:18 AM   PHQ-9 SCORE   PHQ-9 Total Score MyChart 8 (Mild depression)   PHQ-9 Total Score 8         6/10/2024   Substance Use   Alcohol more than 3/day or more than 7/wk No   Do you use any other substances recreationally? (!) CANNABIS PRODUCTS     Social History     Tobacco Use    Smoking status: Never    Smokeless tobacco: Never   Vaping Use    Vaping status: Every Day  "   Substances: THC   Substance Use Topics    Alcohol use: No     Alcohol/week: 2.5 standard drinks of alcohol     Types: 3 Standard drinks or equivalent per week    Drug use: No             6/10/2024   One time HIV Screening   Previous HIV test? I don't know         6/10/2024   STI Screening   New sexual partner(s) since last STI/HIV test? No         6/10/2024   Contraception/Family Planning   Questions about contraception or family planning No        Reviewed and updated as needed this visit by Provider                     Review of Systems  CONSTITUTIONAL: NEGATIVE for fever, chills, change in weight  INTEGUMENTARY/SKIN: NEGATIVE for worrisome rashes, moles or lesions and POSITIVE for Scab R arm from mole excision by dermatology  EYES: NEGATIVE for vision changes or irritation  ENT/MOUTH: NEGATIVE for ear, mouth and throat problems  RESP: NEGATIVE for significant cough or SOB  CV: POSITIVE for palpitations and NEGATIVE for chest pain/chest pressure and dyspnea on exertion  GI: NEGATIVE for nausea, abdominal pain, heartburn, or change in bowel habits  : NEGATIVE for frequency, dysuria, or hematuria  MUSCULOSKELETAL:POSITIVE  for arthralgias R knee associated with activity  NEURO: NEGATIVE for weakness, dizziness or paresthesias  PSYCHIATRIC: NEGATIVE for changes in mood or affect     Objective    Exam  /80 (BP Location: Left arm, Patient Position: Sitting, Cuff Size: Adult Large)   Pulse 60   Temp 98.8  F (37.1  C) (Temporal)   Resp 18   Ht 1.88 m (6' 2\")   Wt 128.4 kg (283 lb)   SpO2 98%   BMI 36.34 kg/m     Estimated body mass index is 36.34 kg/m  as calculated from the following:    Height as of this encounter: 1.88 m (6' 2\").    Weight as of this encounter: 128.4 kg (283 lb).    Physical Exam  GENERAL: alert and no distress  EYES: Eyes grossly normal to inspection, PERRL and conjunctivae and sclerae normal  HENT: ear canals and TM's normal, nose and mouth without ulcers or lesions  NECK: no " adenopathy, no asymmetry, masses, or scars  RESP: lungs clear to auscultation - no rales, rhonchi or wheezes  CV: regular rate and rhythm, normal S1 S2, no S3 or S4, no murmur, click or rub, no peripheral edema  ABDOMEN: soft, nontender, no hepatosplenomegaly, no masses and bowel sounds normal  MS: no gross musculoskeletal defects noted, no edema  SKIN: no suspicious lesions or rashes, scar R biceps approximately 1.5 inches in length with 1/4 inch scab at end of scar  NEURO: Normal strength and tone, mentation intact and speech normal  PSYCH: mentation appears normal, affect normal/bright    EKG- NSR, unchanged    Nayan Corona NP Student  The information in this document, created by the nurse practioner student for me, accurately reflects the services I personally performed and the decisions made by me. I have reviewed and approved this document for accuracy prior to leaving the patient care area.      Signed Electronically by: Aristides Jenkins MD    Answers submitted by the patient for this visit:  Patient Health Questionnaire (Submitted on 6/10/2024)  If you checked off any problems, how difficult have these problems made it for you to do your work, take care of things at home, or get along with other people?: Very difficult  PHQ9 TOTAL SCORE: 8

## 2024-06-11 ENCOUNTER — LAB (OUTPATIENT)
Dept: LAB | Facility: CLINIC | Age: 39
End: 2024-06-11
Payer: COMMERCIAL

## 2024-06-11 DIAGNOSIS — Z00.00 VISIT FOR PREVENTIVE HEALTH EXAMINATION: ICD-10-CM

## 2024-06-11 LAB
ALBUMIN SERPL BCG-MCNC: 4.9 G/DL (ref 3.5–5.2)
ALP SERPL-CCNC: 38 U/L (ref 40–150)
ALT SERPL W P-5'-P-CCNC: 32 U/L (ref 0–70)
ANION GAP SERPL CALCULATED.3IONS-SCNC: 10 MMOL/L (ref 7–15)
AST SERPL W P-5'-P-CCNC: 23 U/L (ref 0–45)
BILIRUB SERPL-MCNC: 1 MG/DL
BUN SERPL-MCNC: 12.4 MG/DL (ref 6–20)
CALCIUM SERPL-MCNC: 9.6 MG/DL (ref 8.6–10)
CHLORIDE SERPL-SCNC: 103 MMOL/L (ref 98–107)
CHOLEST SERPL-MCNC: 177 MG/DL
CREAT SERPL-MCNC: 0.94 MG/DL (ref 0.67–1.17)
DEPRECATED HCO3 PLAS-SCNC: 26 MMOL/L (ref 22–29)
EGFRCR SERPLBLD CKD-EPI 2021: >90 ML/MIN/1.73M2
FASTING STATUS PATIENT QL REPORTED: YES
FASTING STATUS PATIENT QL REPORTED: YES
GLUCOSE SERPL-MCNC: 92 MG/DL (ref 70–99)
HCV AB SERPL QL IA: NONREACTIVE
HDLC SERPL-MCNC: 39 MG/DL
HIV 1+2 AB+HIV1 P24 AG SERPL QL IA: NONREACTIVE
LDLC SERPL CALC-MCNC: 114 MG/DL
NONHDLC SERPL-MCNC: 138 MG/DL
POTASSIUM SERPL-SCNC: 5 MMOL/L (ref 3.4–5.3)
PROT SERPL-MCNC: 7.4 G/DL (ref 6.4–8.3)
SODIUM SERPL-SCNC: 139 MMOL/L (ref 135–145)
TRIGL SERPL-MCNC: 120 MG/DL

## 2024-06-11 PROCEDURE — 87389 HIV-1 AG W/HIV-1&-2 AB AG IA: CPT

## 2024-06-11 PROCEDURE — 36415 COLL VENOUS BLD VENIPUNCTURE: CPT

## 2024-06-11 PROCEDURE — 86803 HEPATITIS C AB TEST: CPT

## 2024-06-11 PROCEDURE — 80061 LIPID PANEL: CPT

## 2024-06-11 PROCEDURE — 80053 COMPREHEN METABOLIC PANEL: CPT

## 2024-09-27 DIAGNOSIS — F31.4 SEVERE DEPRESSED BIPOLAR I DISORDER WITHOUT PSYCHOTIC FEATURES (H): Primary | ICD-10-CM

## 2025-01-15 NOTE — PATIENT INSTRUCTIONS
The following individual(s) verbally consented to be recorded using ambient AI listening technology and understand that they can each withdraw their consent to this listening technology at any point by asking the clinician to turn off or pause the recording: Emmanuel Mendiola   Treatment Plan Today:   As discussed    Reduce risperidone from 2 mg to 1 mg    You may increase N-acetylcysteine if needed to a maximum dose of 1200mg (2 tablets) twice daily    Continue your other medications as usual    Please return for follow-up in 4 to 6 weeks    ------------------------------------------------------------------------    Thank you for coming to the [facility].    Lab Testing:  If you had lab testing today and your results are reassuring or normal they will be mailed to you or sent through [employer] within 7 days. If the lab tests need quick action we will call you with the results. The phone number we will call with results is # [phone] (home) NONE (work). If this is not the best number please call our clinic and change the number.    Medication Refills:  If you need any refills please call your pharmacy and they will contact us. Our fax number for refills is [phone]. Please allow three business for refill processing. If you need to  your refill at a new pharmacy, please contact the new pharmacy directly. The new pharmacy will help you get your medications transferred.     Scheduling:  If you have any concerns about today's visit or wish to schedule another appointment please call our office during normal business hours [phone] (8-5:00 M-F)    Contact Us:  Please call [phone] during business hours (8-5:00 M-F).  If after clinic hours, or on the weekend, please call  [phone].    Financial Assistance [phone]  [facility] [phone]  Central Billing Office, [facility]: [phone]  [facility] [phone]  Medical Records [phone]      MENTAL HEALTH CRISIS NUMBERS:  For a medical emergency please call  911 or go to the nearest ER.     [facility]:   [facility] -[phone]   Crisis Residence [facility] -[phone]   Walk-In Counseling Center [facility] -[phone]   [place] [facility]  -[phone] (adults)/[phone] (child)  CHILD: [facility] needs assessment team - [phone]      Psychiatric:   [facility] - [phone]   Walk-in counseling [facility] - [phone]   Walk-in counseling [facility] - [phone]   Crisis Residence [facility] - [phone]  Urgent Care Adult Mental [facility]-[phone] mobile unit/ 24/7 crisis line    National Crisis Numbers:   National Suicide Prevention Lifeline: 5-935-119-TALK (668-870-8914)  Poison Control Center - 1-843.990.7189  Rebtel/resources for a list of additional resources (SOS)  Trans Lifeline a hotline for transgender people 7-520-287-0609  The [employer] a hotline for LGBT youth 5-907-339-3705  Crisis Text Line: For any crisis 24/7   To: 494167  see www.crisistextline.org  - IF MAKING A CALL FEELS TOO HARD, send a text!         Again thank you for choosing [facility] and please let us know how we can best partner with you to improve you and your family's health.    You may be receiving a survey regarding this appointment. We would love to have your feedback, both positive and negative. The survey is done by an external company, so your answers are anonymous.

## 2025-02-25 ENCOUNTER — LAB (OUTPATIENT)
Dept: LAB | Facility: CLINIC | Age: 40
End: 2025-02-25
Payer: COMMERCIAL

## 2025-02-25 DIAGNOSIS — F31.4 SEVERE DEPRESSED BIPOLAR I DISORDER WITHOUT PSYCHOTIC FEATURES (H): ICD-10-CM

## 2025-02-25 LAB
ANION GAP SERPL CALCULATED.3IONS-SCNC: 12 MMOL/L (ref 7–15)
BUN SERPL-MCNC: 15.7 MG/DL (ref 6–20)
CALCIUM SERPL-MCNC: 9.6 MG/DL (ref 8.8–10.4)
CHLORIDE SERPL-SCNC: 105 MMOL/L (ref 98–107)
CREAT SERPL-MCNC: 1.02 MG/DL (ref 0.67–1.17)
EGFRCR SERPLBLD CKD-EPI 2021: >90 ML/MIN/1.73M2
GLUCOSE SERPL-MCNC: 91 MG/DL (ref 70–99)
HCO3 SERPL-SCNC: 24 MMOL/L (ref 22–29)
POTASSIUM SERPL-SCNC: 4.9 MMOL/L (ref 3.4–5.3)
SODIUM SERPL-SCNC: 141 MMOL/L (ref 135–145)
VALPROATE SERPL-MCNC: 58 UG/ML

## 2025-02-25 PROCEDURE — 36415 COLL VENOUS BLD VENIPUNCTURE: CPT

## 2025-02-25 PROCEDURE — 80048 BASIC METABOLIC PNL TOTAL CA: CPT

## 2025-02-25 PROCEDURE — 80164 ASSAY DIPROPYLACETIC ACD TOT: CPT

## 2025-03-18 ENCOUNTER — TRANSFERRED RECORDS (OUTPATIENT)
Dept: HEALTH INFORMATION MANAGEMENT | Facility: CLINIC | Age: 40
End: 2025-03-18
Payer: COMMERCIAL

## 2025-05-12 ENCOUNTER — PATIENT OUTREACH (OUTPATIENT)
Dept: CARE COORDINATION | Facility: CLINIC | Age: 40
End: 2025-05-12
Payer: COMMERCIAL

## 2025-05-26 ENCOUNTER — PATIENT OUTREACH (OUTPATIENT)
Dept: CARE COORDINATION | Facility: CLINIC | Age: 40
End: 2025-05-26
Payer: COMMERCIAL

## 2025-07-26 ENCOUNTER — HEALTH MAINTENANCE LETTER (OUTPATIENT)
Age: 40
End: 2025-07-26